# Patient Record
Sex: FEMALE | Race: BLACK OR AFRICAN AMERICAN | Employment: FULL TIME | URBAN - METROPOLITAN AREA
[De-identification: names, ages, dates, MRNs, and addresses within clinical notes are randomized per-mention and may not be internally consistent; named-entity substitution may affect disease eponyms.]

---

## 2017-11-10 ENCOUNTER — APPOINTMENT (OUTPATIENT)
Dept: GENERAL RADIOLOGY | Age: 57
End: 2017-11-10
Attending: STUDENT IN AN ORGANIZED HEALTH CARE EDUCATION/TRAINING PROGRAM
Payer: SELF-PAY

## 2017-11-10 ENCOUNTER — HOSPITAL ENCOUNTER (EMERGENCY)
Age: 57
Discharge: HOME | End: 2017-11-10
Attending: STUDENT IN AN ORGANIZED HEALTH CARE EDUCATION/TRAINING PROGRAM
Payer: SELF-PAY

## 2017-11-10 VITALS
SYSTOLIC BLOOD PRESSURE: 197 MMHG | WEIGHT: 240 LBS | TEMPERATURE: 97.8 F | BODY MASS INDEX: 32.51 KG/M2 | DIASTOLIC BLOOD PRESSURE: 90 MMHG | OXYGEN SATURATION: 94 % | RESPIRATION RATE: 18 BRPM | HEART RATE: 98 BPM | HEIGHT: 72 IN

## 2017-11-10 DIAGNOSIS — R06.2 WHEEZING: Primary | ICD-10-CM

## 2017-11-10 DIAGNOSIS — R06.00 DYSPNEA, UNSPECIFIED TYPE: ICD-10-CM

## 2017-11-10 LAB
ALBUMIN SERPL-MCNC: 3.8 G/DL (ref 3.5–5)
ALBUMIN/GLOB SERPL: 0.9 {RATIO} (ref 1.1–2.2)
ALP SERPL-CCNC: 123 U/L (ref 45–117)
ALT SERPL-CCNC: 18 U/L (ref 12–78)
ANION GAP SERPL CALC-SCNC: 11 MMOL/L (ref 5–15)
AST SERPL-CCNC: 13 U/L (ref 15–37)
ATRIAL RATE: 81 BPM
BASOPHILS # BLD: 0 K/UL (ref 0–0.1)
BASOPHILS NFR BLD: 0 % (ref 0–1)
BILIRUB SERPL-MCNC: 0.5 MG/DL (ref 0.2–1)
BNP SERPL-MCNC: 696 PG/ML (ref 0–125)
BUN SERPL-MCNC: 20 MG/DL (ref 6–20)
BUN/CREAT SERPL: 22 (ref 12–20)
CALCIUM SERPL-MCNC: 9.7 MG/DL (ref 8.5–10.1)
CALCULATED P AXIS, ECG09: 53 DEGREES
CALCULATED R AXIS, ECG10: -29 DEGREES
CALCULATED T AXIS, ECG11: 45 DEGREES
CHLORIDE SERPL-SCNC: 103 MMOL/L (ref 97–108)
CO2 SERPL-SCNC: 25 MMOL/L (ref 21–32)
CREAT SERPL-MCNC: 0.93 MG/DL (ref 0.55–1.02)
DIAGNOSIS, 93000: NORMAL
EOSINOPHIL # BLD: 0.5 K/UL (ref 0–0.4)
EOSINOPHIL NFR BLD: 7 % (ref 0–7)
ERYTHROCYTE [DISTWIDTH] IN BLOOD BY AUTOMATED COUNT: 13 % (ref 11.5–14.5)
GLOBULIN SER CALC-MCNC: 4.2 G/DL (ref 2–4)
GLUCOSE SERPL-MCNC: 92 MG/DL (ref 65–100)
HCT VFR BLD AUTO: 40.9 % (ref 35–47)
HGB BLD-MCNC: 13.5 G/DL (ref 11.5–16)
LYMPHOCYTES # BLD: 1.8 K/UL (ref 0.8–3.5)
LYMPHOCYTES NFR BLD: 26 % (ref 12–49)
MCH RBC QN AUTO: 29.1 PG (ref 26–34)
MCHC RBC AUTO-ENTMCNC: 33 G/DL (ref 30–36.5)
MCV RBC AUTO: 88.1 FL (ref 80–99)
MONOCYTES # BLD: 0.4 K/UL (ref 0–1)
MONOCYTES NFR BLD: 6 % (ref 5–13)
NEUTS SEG # BLD: 4.1 K/UL (ref 1.8–8)
NEUTS SEG NFR BLD: 61 % (ref 32–75)
P-R INTERVAL, ECG05: 176 MS
PLATELET # BLD AUTO: 199 K/UL (ref 150–400)
POTASSIUM SERPL-SCNC: 3.7 MMOL/L (ref 3.5–5.1)
PROT SERPL-MCNC: 8 G/DL (ref 6.4–8.2)
Q-T INTERVAL, ECG07: 398 MS
QRS DURATION, ECG06: 96 MS
QTC CALCULATION (BEZET), ECG08: 462 MS
RBC # BLD AUTO: 4.64 M/UL (ref 3.8–5.2)
SODIUM SERPL-SCNC: 139 MMOL/L (ref 136–145)
TROPONIN I SERPL-MCNC: <0.04 NG/ML
VENTRICULAR RATE, ECG03: 81 BPM
WBC # BLD AUTO: 6.7 K/UL (ref 3.6–11)

## 2017-11-10 PROCEDURE — 83880 ASSAY OF NATRIURETIC PEPTIDE: CPT | Performed by: STUDENT IN AN ORGANIZED HEALTH CARE EDUCATION/TRAINING PROGRAM

## 2017-11-10 PROCEDURE — 93005 ELECTROCARDIOGRAM TRACING: CPT

## 2017-11-10 PROCEDURE — 74011250636 HC RX REV CODE- 250/636: Performed by: STUDENT IN AN ORGANIZED HEALTH CARE EDUCATION/TRAINING PROGRAM

## 2017-11-10 PROCEDURE — 94640 AIRWAY INHALATION TREATMENT: CPT

## 2017-11-10 PROCEDURE — 74011000250 HC RX REV CODE- 250: Performed by: STUDENT IN AN ORGANIZED HEALTH CARE EDUCATION/TRAINING PROGRAM

## 2017-11-10 PROCEDURE — 77030029684 HC NEB SM VOL KT MONA -A

## 2017-11-10 PROCEDURE — 85025 COMPLETE CBC W/AUTO DIFF WBC: CPT | Performed by: STUDENT IN AN ORGANIZED HEALTH CARE EDUCATION/TRAINING PROGRAM

## 2017-11-10 PROCEDURE — 36415 COLL VENOUS BLD VENIPUNCTURE: CPT | Performed by: STUDENT IN AN ORGANIZED HEALTH CARE EDUCATION/TRAINING PROGRAM

## 2017-11-10 PROCEDURE — 99285 EMERGENCY DEPT VISIT HI MDM: CPT

## 2017-11-10 PROCEDURE — 96360 HYDRATION IV INFUSION INIT: CPT

## 2017-11-10 PROCEDURE — 80053 COMPREHEN METABOLIC PANEL: CPT | Performed by: STUDENT IN AN ORGANIZED HEALTH CARE EDUCATION/TRAINING PROGRAM

## 2017-11-10 PROCEDURE — 84484 ASSAY OF TROPONIN QUANT: CPT | Performed by: STUDENT IN AN ORGANIZED HEALTH CARE EDUCATION/TRAINING PROGRAM

## 2017-11-10 PROCEDURE — 71010 XR CHEST PORT: CPT

## 2017-11-10 RX ORDER — PREDNISONE 20 MG/1
60 TABLET ORAL DAILY
Qty: 15 TAB | Refills: 0 | Status: SHIPPED | OUTPATIENT
Start: 2017-11-10 | End: 2017-11-15

## 2017-11-10 RX ORDER — AZITHROMYCIN 250 MG/1
TABLET, FILM COATED ORAL
Qty: 6 TAB | Refills: 0 | Status: SHIPPED | OUTPATIENT
Start: 2017-11-10 | End: 2018-04-18

## 2017-11-10 RX ORDER — METOPROLOL SUCCINATE 50 MG/1
50 TABLET, EXTENDED RELEASE ORAL DAILY
COMMUNITY
End: 2017-11-10

## 2017-11-10 RX ORDER — METOPROLOL TARTRATE 50 MG/1
50 TABLET ORAL 2 TIMES DAILY
COMMUNITY
End: 2018-04-18

## 2017-11-10 RX ADMIN — ALBUTEROL SULFATE 1 DOSE: 2.5 SOLUTION RESPIRATORY (INHALATION) at 15:38

## 2017-11-10 RX ADMIN — SODIUM CHLORIDE 1000 ML: 900 INJECTION, SOLUTION INTRAVENOUS at 15:30

## 2017-11-10 RX ADMIN — ALBUTEROL SULFATE 1 DOSE: 2.5 SOLUTION RESPIRATORY (INHALATION) at 16:00

## 2017-11-10 NOTE — ED PROVIDER NOTES
HPI Comments: 65 y.o. female with past medical history significant for hypertension who presents from home via EMS with chief complaint of shortness of breath. Patient states she has had a persistent cough with white phlegm over the past 3 days that has progressively worsened. Patient reports accompanying shortness of breath and wheezing over the last few days. Patient notes she has been taking cough syrup with mild relief for her sx. Patient has also been taking inhalers at home, which have helped though she ran out. EMS gave Dexamethasone 10 mg en route to the hospital that helped the patient with her breathing. Patient admits she has been under a lot of stress lately and has not been able to be evaluated due to work. Patient mentions hx of hypertension, but she denies any heart issues and hx of blood clots. Patient denies any hx of asthma or any recent travels. Patient denies fever and chest pain. There are no other acute medical concerns at this time. Old Chart Review: No pertinent medical records. Social hx: Tobacco Use: Yes (1 pack per day since she was 19), Alcohol Use: No, Drug Use: No    PCP: Madan Walker DPM    Note written by Isac Lim, as dictated by Britt Gaona MD 3:04 PM      The history is provided by the patient and the EMS personnel. Past Medical History:   Diagnosis Date    Hypertension        Past Surgical History:   Procedure Laterality Date    HX ORTHOPAEDIC           History reviewed. No pertinent family history. Social History     Social History    Marital status:      Spouse name: N/A    Number of children: N/A    Years of education: N/A     Occupational History    Not on file.      Social History Main Topics    Smoking status: Current Every Day Smoker     Packs/day: 1.00     Years: 15.00    Smokeless tobacco: Never Used    Alcohol use No    Drug use: No    Sexual activity: Not on file     Other Topics Concern    Not on file     Social History Narrative    No narrative on file         ALLERGIES: Review of patient's allergies indicates not on file. Review of Systems   Constitutional: Negative for chills and fever. HENT: Negative for sore throat. Respiratory: Positive for cough, shortness of breath and wheezing. Cardiovascular: Negative for chest pain. Gastrointestinal: Negative for abdominal pain and vomiting. Genitourinary: Negative for dysuria. Musculoskeletal: Negative for back pain. Skin: Negative for rash. Neurological: Negative for syncope and headaches. Psychiatric/Behavioral: Negative for confusion. All other systems reviewed and are negative. There were no vitals filed for this visit. Physical Exam   Constitutional: She is oriented to person, place, and time. She appears well-developed. She appears distressed. HENT:   Head: Normocephalic and atraumatic. Eyes: Conjunctivae and EOM are normal. Pupils are equal, round, and reactive to light. Neck: Normal range of motion. Neck supple. Cardiovascular: Normal rate, regular rhythm and normal heart sounds. No murmur heard. Pulmonary/Chest: She is in respiratory distress. She has decreased breath sounds. She has wheezes. Diffuse expiratory wheezing   Decreased breath sounds bilaterally though equal   Mild respiratory distress with retractions   Abdominal: Soft. Bowel sounds are normal. She exhibits no distension. There is no tenderness. There is no rebound. Musculoskeletal: Normal range of motion. She exhibits no edema. Neurological: She is alert and oriented to person, place, and time. No cranial nerve deficit. She exhibits normal muscle tone. Coordination normal.   Skin: Skin is warm and dry. No rash noted. Psychiatric: She has a normal mood and affect. Her behavior is normal.   Nursing note and vitals reviewed.      Note written by Isac Hahn, as dictated by Shanthi Jerome MD 3:05 PM    [facility]  ED Course       Procedures    Pt reports feeling much better after two duonebs. No wheezing on auscultation. SpO2 in the upper 90s on RA. No evidence of AMI, PNA, PNX. No risk factors for PE. This is likely undiagnosed COPD. Smoking cessation recommended. F/U with PCP. RTER precautions as needed.

## 2017-11-10 NOTE — ED NOTES
Assumed care of patient. Patient resting in stretcher receiving 2nd breathing treatment. Patient on monitor x3, call bell in reach. No needs expressed at this time will continue to monitor.

## 2017-11-10 NOTE — PROGRESS NOTES
Admission Medication Reconciliation:    Information obtained from: Patient    Significant PMH/Disease States:   Past Medical History:   Diagnosis Date    Hypertension        Chief Complaint for this Admission:  SOB    Allergies:  Review of patient's allergies indicates no known allergies. Prior to Admission Medications:   Prior to Admission Medications   Prescriptions Last Dose Informant Patient Reported? Taking?   metoprolol tartrate (LOPRESSOR) 50 mg tablet 11/9/2017 at 9PM  Yes Yes   Sig: Take 50 mg by mouth two (2) times a day.       Facility-Administered Medications: None         Comments/Recommendations:   (1) Added: metoprolol   (2) Changed: n/a  (3) Removed: metoprolol SL    Verified NKA

## 2017-11-10 NOTE — ED NOTES
Bedside shift change report given to Kimberlyn Louis (oncoming nurse) by Indu Chowdhury (offgoing nurse). Report included the following information SBAR and MAR.

## 2017-11-10 NOTE — DISCHARGE INSTRUCTIONS
Wheezing or Bronchoconstriction: Care Instructions  Your Care Instructions  Wheezing is a whistling noise made during breathing. It occurs when the small airways, or bronchial tubes, that lead to your lungs swell or contract (spasm) and become narrow. This narrowing is called bronchoconstriction. When your airways constrict, it is hard for air to pass through and this makes it hard for you to breathe. Wheezing and bronchoconstriction can be caused by many problems, including:  · An infection such as the flu or a cold. · Allergies such as hay fever. · Diseases such as asthma or chronic obstructive pulmonary disease. · Smoking. Treatment for your wheezing depends on what is causing the problem. Your wheezing may get better without treatment. But you may need to pay attention to things that cause your wheezing and avoid them. Or you may need medicine to help treat the wheezing and to reduce the swelling or to relieve spasms in your lungs. Follow-up care is a key part of your treatment and safety. Be sure to make and go to all appointments, and call your doctor if you are having problems. It is also a good idea to know your test results and keep a list of the medicines you take. How can you care for yourself at home? · Take your medicine exactly as prescribed. Call your doctor if you think you are having a problem with your medicine. You will get more details on the specific medicine your doctor prescribes. · If your doctor prescribed antibiotics, take them as directed. Do not stop taking them just because you feel better. You need to take the full course of antibiotics. · Breathe moist air from a humidifier, hot shower, or sink filled with hot water. This may help ease your symptoms and make it easier for you to breathe. · If you have congestion in your nose and throat, drinking plenty of fluids, especially hot fluids, may help relieve your symptoms.  If you have kidney, heart, or liver disease and have to limit fluids, talk with your doctor before you increase the amount of fluids you drink. · If you have mucus in your airways, it may help to breathe deeply and cough. · Do not smoke or allow others to smoke around you. Smoking can make your wheezing worse. If you need help quitting, talk to your doctor about stop-smoking programs and medicines. These can increase your chances of quitting for good. · Avoid things that may cause your wheezing. These may include colds, smoke, air pollution, dust, pollen, pets, cockroaches, stress, and cold air. When should you call for help? Call 911 anytime you think you may need emergency care. For example, call if:  ? · You have severe trouble breathing. ? · You passed out (lost consciousness). ?Call your doctor now or seek immediate medical care if:  ? · You cough up yellow, dark brown, or bloody mucus (sputum). ? · You have new or worse shortness of breath. ? · Your wheezing is not getting better or it gets worse after you start taking your medicine. ? Watch closely for changes in your health, and be sure to contact your doctor if:  ? · You do not get better as expected. Where can you learn more? Go to http://hannah-ellie.info/. Enter 454 0027 in the search box to learn more about \"Wheezing or Bronchoconstriction: Care Instructions. \"  Current as of: May 12, 2017  Content Version: 11.4  © 1310-7750 [employer]. Care instructions adapted under license by [employer] (which disclaims liability or warranty for this information). If you have questions about a medical condition or this instruction, always ask your healthcare professional. [place] [address] any warranty or liability for your use of this information. Shortness of Breath: Care Instructions  Your Care Instructions  Shortness of breath has many causes. Sometimes conditions such as anxiety can lead to shortness of breath.  Some people get mild shortness of breath when they exercise. Trouble breathing also can be a symptom of a serious problem, such as asthma, lung disease, emphysema, heart problems, and pneumonia. If your shortness of breath continues, you may need tests and treatment. Watch for any changes in your breathing and other symptoms. Follow-up care is a key part of your treatment and safety. Be sure to make and go to all appointments, and call your doctor if you are having problems. It's also a good idea to know your test results and keep a list of the medicines you take. How can you care for yourself at home? · Do not smoke or allow others to smoke around you. If you need help quitting, talk to your doctor about stop-smoking programs and medicines. These can increase your chances of quitting for good. · Get plenty of rest and sleep. · Take your medicines exactly as prescribed. Call your doctor if you think you are having a problem with your medicine. · Find healthy ways to deal with stress. ¨ Exercise daily. ¨ Get plenty of sleep. ¨ Eat regularly and well. When should you call for help? Call 911 anytime you think you may need emergency care. For example, call if:  ? · You have severe shortness of breath. ? · You have symptoms of a heart attack. These may include:  ¨ Chest pain or pressure, or a strange feeling in the chest.  ¨ Sweating. ¨ Shortness of breath. ¨ Nausea or vomiting. ¨ Pain, pressure, or a strange feeling in the back, neck, jaw, or upper belly or in one or both shoulders or arms. ¨ Lightheadedness or sudden weakness. ¨ A fast or irregular heartbeat. After you call 911, the  may tell you to chew 1 adult-strength or 2 to 4 low-dose aspirin. Wait for an ambulance. Do not try to drive yourself. ?Call your doctor now or seek immediate medical care if:  ? · Your shortness of breath gets worse or you start to wheeze. Wheezing is a high-pitched sound when you breathe.    ? · You wake up at night out of breath or have to prop your head up on several pillows to breathe. ? · You are short of breath after only light activity or while at rest.   ? Watch closely for changes in your health, and be sure to contact your doctor if:  ? · You do not get better over the next 1 to 2 days. Where can you learn more? Go to http://hannah-ellie.info/. Enter S780 in the search box to learn more about \"Shortness of Breath: Care Instructions. \"  Current as of: May 12, 2017  Content Version: 11.4  © 3839-3049 [employer]. Care instructions adapted under license by [employer] (which disclaims liability or warranty for this information). If you have questions about a medical condition or this instruction, always ask your healthcare professional. [address] any warranty or liability for your use of this information.

## 2018-04-17 ENCOUNTER — HOSPITAL ENCOUNTER (OUTPATIENT)
Dept: LAB | Age: 58
Discharge: HOME | End: 2018-04-17
Payer: COMMERCIAL

## 2018-04-17 ENCOUNTER — OFFICE VISIT (OUTPATIENT)
Dept: FAMILY MEDICINE CLINIC | Age: 58
End: 2018-04-17

## 2018-04-17 VITALS
TEMPERATURE: 98.8 F | HEART RATE: 81 BPM | SYSTOLIC BLOOD PRESSURE: 181 MMHG | OXYGEN SATURATION: 97 % | HEIGHT: 72 IN | DIASTOLIC BLOOD PRESSURE: 104 MMHG | RESPIRATION RATE: 18 BRPM | BODY MASS INDEX: 36.08 KG/M2 | WEIGHT: 266.4 LBS

## 2018-04-17 DIAGNOSIS — I10 ESSENTIAL HYPERTENSION: ICD-10-CM

## 2018-04-17 DIAGNOSIS — Z01.419 WELL WOMAN EXAM WITH ROUTINE GYNECOLOGICAL EXAM: ICD-10-CM

## 2018-04-17 DIAGNOSIS — Z12.11 SCREENING FOR MALIGNANT NEOPLASM OF COLON: ICD-10-CM

## 2018-04-17 DIAGNOSIS — M79.672 LEFT FOOT PAIN: ICD-10-CM

## 2018-04-17 DIAGNOSIS — J45.20 MILD INTERMITTENT ASTHMA, UNSPECIFIED WHETHER COMPLICATED: ICD-10-CM

## 2018-04-17 DIAGNOSIS — F41.8 DEPRESSION WITH ANXIETY: ICD-10-CM

## 2018-04-17 DIAGNOSIS — Z23 ENCOUNTER FOR IMMUNIZATION: ICD-10-CM

## 2018-04-17 DIAGNOSIS — F43.0 STRESS REACTION: ICD-10-CM

## 2018-04-17 DIAGNOSIS — E55.9 VITAMIN D DEFICIENCY: ICD-10-CM

## 2018-04-17 DIAGNOSIS — R53.83 OTHER FATIGUE: ICD-10-CM

## 2018-04-17 DIAGNOSIS — Z87.891 FORMER SMOKER: ICD-10-CM

## 2018-04-17 DIAGNOSIS — E66.01 CLASS 2 SEVERE OBESITY DUE TO EXCESS CALORIES WITH SERIOUS COMORBIDITY AND BODY MASS INDEX (BMI) OF 36.0 TO 36.9 IN ADULT (HCC): ICD-10-CM

## 2018-04-17 DIAGNOSIS — J30.89 ALLERGIC RHINITIS DUE TO OTHER ALLERGIC TRIGGER, UNSPECIFIED SEASONALITY: ICD-10-CM

## 2018-04-17 DIAGNOSIS — Z76.89 ENCOUNTER TO ESTABLISH CARE: Primary | ICD-10-CM

## 2018-04-17 PROCEDURE — 87624 HPV HI-RISK TYP POOLED RSLT: CPT | Performed by: FAMILY MEDICINE

## 2018-04-17 PROCEDURE — 88175 CYTOPATH C/V AUTO FLUID REDO: CPT | Performed by: FAMILY MEDICINE

## 2018-04-17 PROCEDURE — 87625 HPV TYPES 16 & 18 ONLY: CPT | Performed by: FAMILY MEDICINE

## 2018-04-17 RX ORDER — TRIAMTERENE/HYDROCHLOROTHIAZID 37.5-25 MG
1 TABLET ORAL DAILY
Qty: 90 TAB | Refills: 0 | Status: SHIPPED | OUTPATIENT
Start: 2018-04-17 | End: 2018-07-13 | Stop reason: SDUPTHER

## 2018-04-17 RX ORDER — LISINOPRIL 20 MG/1
20 TABLET ORAL DAILY
Qty: 90 TAB | Refills: 0 | Status: SHIPPED | OUTPATIENT
Start: 2018-04-17 | End: 2018-05-01 | Stop reason: SDUPTHER

## 2018-04-17 RX ORDER — SERTRALINE HYDROCHLORIDE 50 MG/1
50 TABLET, FILM COATED ORAL DAILY
Qty: 90 TAB | Refills: 0 | Status: SHIPPED | OUTPATIENT
Start: 2018-04-17 | End: 2018-07-13 | Stop reason: SDUPTHER

## 2018-04-17 NOTE — PATIENT INSTRUCTIONS
Stress  Follow up with a counselor or therapist for additional treatment of your mood. If you do not already have one, you can find a list through your insurance by calling the mental  Help line number on the back of your insurance card. Weight loss:  Remember this is like a part time job so your motivation and commitment is key to your success. Use small plate only  1/2 of every meal is fruit or vegetable  Drink liter or 1/2 gallon of water every day  Try meal prepping on Sunday with new different vegetables. Replace soda with diet soda or other zero sugar drinks (selter water just fine)  Start using the [employer] cynthia for calorie counting. Goal 2000 calories per day  Start work out at least 5 days per week for 20 minutes. Consider [employer] training cynthia for home exercises. Goals:   - Lose 13 lbs in 3 month       Call me if you have any issues or feel like quitting. Consider counseling for stress management     Return for follow up in 3 months. Well Visit, Women 48 to 72: Care Instructions  Your Care Instructions    Physical exams can help you stay healthy. Your doctor has checked your overall health and may have suggested ways to take good care of yourself. He or she also may have recommended tests. At home, you can help prevent illness with healthy eating, regular exercise, and other steps. Follow-up care is a key part of your treatment and safety. Be sure to make and go to all appointments, and call your doctor if you are having problems. It's also a good idea to know your test results and keep a list of the medicines you take. How can you care for yourself at home? · Reach and stay at a healthy weight. This will lower your risk for many problems, such as obesity, diabetes, heart disease, and high blood pressure. · Get at least 30 minutes of exercise on most days of the week. Walking is a good choice.  You also may want to do other activities, such as running, swimming, cycling, or playing tennis or team sports. · Do not smoke. Smoking can make health problems worse. If you need help quitting, talk to your doctor about stop-smoking programs and medicines. These can increase your chances of quitting for good. · Protect your skin from too much sun. When you're outdoors from 10 a.m. to 4 p.m., stay in the shade or cover up with clothing and a hat with a wide brim. Wear sunglasses that block UV rays. Even when it's cloudy, put broad-spectrum sunscreen (SPF 30 or higher) on any exposed skin. · See a dentist one or two times a year for checkups and to have your teeth cleaned. · Wear a seat belt in the car. · Limit alcohol to 1 drink a day. Too much alcohol can cause health problems. Follow your doctor's advice about when to have certain tests. These tests can spot problems early. · Cholesterol. Your doctor will tell you how often to have this done based on your age, family history, or other things that can increase your risk for heart attack and stroke. · Blood pressure. Have your blood pressure checked during a routine doctor visit. Your doctor will tell you how often to check your blood pressure based on your age, your blood pressure results, and other factors. · Mammogram. Ask your doctor how often you should have a mammogram, which is an X-ray of your breasts. A mammogram can spot breast cancer before it can be felt and when it is easiest to treat. · Pap test and pelvic exam. Ask your doctor how often you should have a Pap test. You may not need to have a Pap test as often as you used to. · Vision. Have your eyes checked every year or two or as often as your doctor suggests. Some experts recommend that you have yearly exams for glaucoma and other age-related eye problems starting at age 48. · Hearing. Tell your doctor if you notice any change in your hearing. You can have tests to find out how well you hear. · Diabetes. Ask your doctor whether you should have tests for diabetes.   · Colon cancer. You should begin tests for colon cancer at age 48. You may have one of several tests. Your doctor will tell you how often to have tests based on your age and risk. Risks include whether you already had a precancerous polyp removed from your colon or whether your parents, sisters and brothers, or children have had colon cancer. · Thyroid disease. Talk to your doctor about whether to have your thyroid checked as part of a regular physical exam. Women have an increased chance of a thyroid problem. · Osteoporosis. You should begin tests for bone density at age 72. If you are younger than 72, ask your doctor whether you have factors that may increase your risk for this disease. You may want to have this test before age 72. · Heart attack and stroke risk. At least every 4 to 6 years, you should have your risk for heart attack and stroke assessed. Your doctor uses factors such as your age, blood pressure, cholesterol, and whether you smoke or have diabetes to show what your risk for a heart attack or stroke is over the next 10 years. When should you call for help? Watch closely for changes in your health, and be sure to contact your doctor if you have any problems or symptoms that concern you. Where can you learn more? Go to http://hannah-ellie.info/. Enter E577 in the search box to learn more about \"Well Visit, Women 50 to 72: Care Instructions. \"  Current as of: May 12, 2017  Content Version: 11.4  © 9214-2889 [employer] Care instructions adapted under license by [employer] (which disclaims liability or warranty for this information). If you have questions about a medical condition or this instruction, always ask your healthcare professional. [place] [address] any warranty or liability for your use of this information.        Starting a Weight Loss Plan: Care Instructions  Your Care Instructions    If you are thinking about losing weight, it can be hard to know where to start. Your doctor can help you set up a weight loss plan that best meets your needs. You may want to take a class on nutrition or exercise, or join a weight loss support group. If you have questions about how to make changes to your eating or exercise habits, ask your doctor about seeing a registered dietitian or an exercise specialist.  It can be a big challenge to lose weight. But you do not have to make huge changes at once. Make small changes, and stick with them. When those changes become habit, add a few more changes. If you do not think you are ready to make changes right now, try to pick a date in the future. Make an appointment to see your doctor to discuss whether the time is right for you to start a plan. Follow-up care is a key part of your treatment and safety. Be sure to make and go to all appointments, and call your doctor if you are having problems. It's also a good idea to know your test results and keep a list of the medicines you take. How can you care for yourself at home? · Set realistic goals. Many people expect to lose much more weight than is likely. A weight loss of 5% to 10% of your body weight may be enough to improve your health. · Get family and friends involved to provide support. Talk to them about why you are trying to lose weight, and ask them to help. They can help by participating in exercise and having meals with you, even if they may be eating something different. · Find what works best for you. If you do not have time or do not like to cook, a program that offers meal replacement bars or shakes may be better for you. Or if you like to prepare meals, finding a plan that includes daily menus and recipes may be best.  · Ask your doctor about other health professionals who can help you achieve your weight loss goals. ¨ A dietitian can help you make healthy changes in your diet.   ¨ An exercise specialist or  can help you develop a safe and effective exercise program.  ¨ A counselor or psychiatrist can help you cope with issues such as depression, anxiety, or family problems that can make it hard to focus on weight loss. · Consider joining a support group for people who are trying to lose weight. Your doctor can suggest groups in your area. Where can you learn more? Go to http://hannah-ellie.info/. Enter I236 in the search box to learn more about \"Starting a Weight Loss Plan: Care Instructions. \"  Current as of: October 13, 2016  Content Version: 11.4  © 5006-8128 [employer]. Care instructions adapted under license by [employer] (which disclaims liability or warranty for this information). If you have questions about a medical condition or this instruction, always ask your healthcare professional. [place] [address] any warranty or liability for your use of this information. Anxiety Disorder: Care Instructions  Your Care Instructions    Anxiety is a normal reaction to stress. Difficult situations can cause you to have symptoms such as sweaty palms and a nervous feeling. In an anxiety disorder, the symptoms are far more severe. Constant worry, muscle tension, trouble sleeping, nausea and diarrhea, and other symptoms can make normal daily activities difficult or impossible. These symptoms may occur for no reason, and they can affect your work, school, or social life. Medicines, counseling, and self-care can all help. Follow-up care is a key part of your treatment and safety. Be sure to make and go to all appointments, and call your doctor if you are having problems. It's also a good idea to know your test results and keep a list of the medicines you take. How can you care for yourself at home? · Take medicines exactly as directed. Call your doctor if you think you are having a problem with your medicine. · Go to your counseling sessions and follow-up appointments.   · Recognize and accept your anxiety. Then, when you are in a situation that makes you anxious, say to yourself, \"This is not an emergency. I feel uncomfortable, but I am not in danger. I can keep going even if I feel anxious. \"  · Be kind to your body:  ¨ Relieve tension with exercise or a massage. ¨ Get enough rest.  ¨ Avoid alcohol, caffeine, nicotine, and illegal drugs. They can increase your anxiety level and cause sleep problems. ¨ Learn and do relaxation techniques. See below for more about these techniques. · Engage your mind. Get out and do something you enjoy. Go to a [place] movie, or take a walk or hike. Plan your day. Having too much or too little to do can make you anxious. · Keep a record of your symptoms. Discuss your fears with a good friend or family member, or join a support group for people with similar problems. Talking to others sometimes relieves stress. · Get involved in social groups, or volunteer to help others. Being alone sometimes makes things seem worse than they are. · Get at least 30 minutes of exercise on most days of the week to relieve stress. Walking is a good choice. You also may want to do other activities, such as running, swimming, cycling, or playing tennis or team sports. Relaxation techniques  Do relaxation exercises 10 to 20 minutes a day. You can play soothing, relaxing music while you do them, if you wish. · Tell others in your house that you are going to do your relaxation exercises. Ask them not to disturb you. · Find a comfortable place, away from all distractions and noise. · Lie down on your back, or sit with your back straight. · Focus on your breathing. Make it slow and steady. · Breathe in through your nose. Breathe out through either your nose or mouth. · Breathe deeply, filling up the area between your navel and your rib cage. Breathe so that your belly goes up and down. · Do not hold your breath. · Breathe like this for 5 to 10 minutes.  Notice the feeling of calmness throughout your whole body. As you continue to breathe slowly and deeply, relax by doing the following for another 5 to 10 minutes:  · Tighten and relax each muscle group in your body. You can begin at your toes and work your way up to your head. · Imagine your muscle groups relaxing and becoming heavy. · Empty your mind of all thoughts. · Let yourself relax more and more deeply. · Become aware of the state of calmness that surrounds you. · When your relaxation time is over, you can bring yourself back to alertness by moving your fingers and toes and then your hands and feet and then stretching and moving your entire body. Sometimes people fall asleep during relaxation, but they usually wake up shortly afterward. · Always give yourself time to return to full alertness before you drive a car or do anything that might cause an accident if you are not fully alert. Never play a relaxation tape while you drive a car. When should you call for help? Call 911 anytime you think you may need emergency care. For example, call if:  ? · You feel you cannot stop from hurting yourself or someone else. ? Keep the numbers for these national suicide hotlines: 9-939-254-TALK (7-306.750.9366) and 3-543-CNNGFZH (6-251.908.1516). If you or someone you know talks about suicide or feeling hopeless, get help right away. ? Watch closely for changes in your health, and be sure to contact your doctor if:  ? · You have anxiety or fear that affects your life. ? · You have symptoms of anxiety that are new or different from those you had before. Where can you learn more? Go to http://hannah-ellei.info/. Enter P754 in the search box to learn more about \"Anxiety Disorder: Care Instructions. \"  Current as of: May 12, 2017  Content Version: 11.4  © 6370-5158 [employer]. Care instructions adapted under license by [employer] (which disclaims liability or warranty for this information). If you have questions about a medical condition or this instruction, always ask your healthcare professional. [place] [address] any warranty or liability for your use of this information.

## 2018-04-17 NOTE — PROGRESS NOTES
[facility] Note      Subjective:     Chief Complaint   Patient presents with   [facility]     Patient here to establish care with a provider. Leonardo De La Vega is a 65y.o. year old female who presents for evaluation of the following:    Establishment of Care:  Previous PCP: Dr. Maura Briones, now retired  Care Team:   Dentist  Rhoda Rowe- Dr. Donna Medley      PMH:   HTN:   Tx: metoprolol inconsistently - uses her  last used 3 weeks ago  Previous [id number]+ triamterene 37.5 + HCTZ25 + lisinopril 40 + metoprolol 50 mg bid  Not adhering to low salt diet    Smoker:   Stopped smoking 4 month ago   Cold thomas    Asthma:   Diagnosed 5 months ago in ER after wheezing episode  Uses albuterol inhaler this morning  Has some trouble breathing when thinking about coming to the doctor    Allergic Rhinitis:  Tx: benadryl    Obesity:  Diet: Has been eating all meats and vegetables with weight loss then stopped   Activity: None  - used to walk at track and play softball but now no motivation  Recently stopped smoking and now snacking on candy and chocolate  Eating large portions      Acute Concerns:  Fatigue/ Stress:   Poor sleep,   Onset 4 weeks ago  Endorses stressors at work, feeling overwhelmed, thought that the would be be better off without her but feels she would never kill herself and denies any plan for self harm  No new joint aches, headaches, dizziness. Social:   Works as  at [employer]. Work 8 hours per day during the day  Lives with   Mood is \"fine\"      Health Maintenance:   TDaP: Due  Influenza: Due  Pneumovax: Not due.  Former smoker  Prevnar @65: Not due  Shingles @60: New vaccine unavailable in market yet    Colonoscopy @50: Due  ASA @ 55f and 45m: Not taking  Dexa @65: Not due  HCV for  45-65:  Due    HIV or other STD testing: Declined  Domestic Violence Screen: Nagative  Depression Screen: Denies depression or anhedonia   FÉLIX: 16, very diffcult  PHQ over the last two weeks 2018   Little interest or pleasure in doing things More than half the days   Feeling down, depressed or hopeless More than half the days   Total Score PHQ 2 4   Trouble falling or staying asleep, or sleeping too much Nearly every day   Feeling tired or having little energy Nearly every day   Poor appetite or overeating More than half the days   Feeling bad about yourself - or that you are a failure or have let yourself or your family down More than half the days   Trouble concentrating on things such as school, work, reading or watching TV More than half the days   Moving or speaking so slowly that other people could have noticed; or the opposite being so fidgety that others notice Several days   Thoughts of being better off dead, or hurting yourself in some way Several days   PHQ 9 Score 18   How difficult have these problems made it for you to do your work, take care of your home and get along with others Somewhat difficult       Pap:  Last unknown  Mammogram: Last unknown  No LMP recorded. Patient is not currently having periods (Reason: Premenopausal). reports that she does not engage in sexual activity. Review of Systems   Pertinent positives and negative per HPI. All other systems  reviewed are negative for a Comprehensive ROS (10+). Past Medical History:   Diagnosis Date    Hypertension         Social History     Social History    Marital status:      Spouse name: N/A    Number of children: N/A    Years of education: N/A     Occupational History    Not on file.      Social History Main Topics    Smoking status: Former Smoker     Packs/day: 1.00     Years: 15.00     Quit date: 2018    Smokeless tobacco: Never Used    Alcohol use Yes      Comment: occasionally    Drug use: No    Sexual activity: No     Other Topics Concern    Not on file     Social History Narrative       Current Outpatient Prescriptions   Medication Sig    metoprolol tartrate (LOPRESSOR) 50 mg tablet Take 50 mg by mouth two (2) times a day.  albuterol sulfate 90 mcg/actuation aepb Take 2 Puffs by inhalation every four (4) hours as needed for Cough.  azithromycin (ZITHROMAX Z-LACY) 250 mg tablet Take 2 tablets by mouth on day 1. Then 1 tab by mouth daily for days 2-5. No current facility-administered medications for this visit. Objective:     Vitals:    04/17/18 0843 04/17/18 0848   BP: (!) 196/104 (!) 181/104   Pulse: 85 81   Resp: 18    Temp: 98.8 °F (37.1 °C)    TempSrc: Oral    SpO2: 97%    Weight: 266 lb 6.4 oz (120.8 kg)    Height: 6' (1.829 m)        Physical Examination:  General: Alert, cooperative, no distress, appears stated age. Obese  Eyes: Conjunctivae/corneas clear. PERRL, EOMs intact. Ears: Normal external ear canals both ears. TM clear and mobile bilaterally  Nose: Nares normal. Septum midline. Mucosa normal. No drainage or sinus tenderness. Mouth/Throat: Lips, mucosa, and tongue normal. Teeth and gums normal.  Neck: Supple, symmetrical, trachea midline, no adenopathy. No thyroid enlargement/tenderness/nodules  Back: Symmetric, no curvature. ROM normal. No CVA tenderness. Lungs: Clear to auscultation bilaterally. Normal inspiratory and expiratory ratio. Heart: Regular rate and rhythm, S1, S2 normal, no murmur, click, rub or gallop. Abdomen: Soft, non-tender. Bowel sounds normal. No masses or organomegaly. Pelvic exam: normal external genitalia, vulva, vagina, cervix, uterus and adnexa. Extremities: Extremities normal, atraumatic, no cyanosis or edema. Pulses: 2+ and symmetric all extremities. Skin: Skin color, texture, turgor normal. No rashes or lesions on exposed skin. Lymph nodes: Cervical, supraclavicular nodes normal.  Neurologic: CNII-XII intact. Strength 5/5 grossly. Sensation and reflexes normal throughout. Psych: Anxious. Fast speech, not quite pressured. Labile mood, tearful during interview. Labs reviewed. Assessment/ Plan:   Diagnoses and all orders for this visit:    1. Encounter to establish care  -     Influenza virus vaccine (QUADRIVALENT PRES FREE SYRINGE) IM (73195)  -     Tetanus, diphtheria toxoids and acellular pertussis (TDAP) vaccine, in individuals >=7 years, IM  -     RI IMMUNIZ ADMIN,1 SINGLE/COMB VAC/TOXOID    2. Well woman exam with routine gynecological exam  -     LIPID PANEL  -     HEMOGLOBIN A1C WITH EAG  -     METABOLIC PANEL, COMPREHENSIVE  -     CBC WITH AUTOMATED DIFF  -     TSH AND FREE T4  -     VITAMIN D, 25 HYDROXY  -     PAP IG, APTIMA HPV AND RFX 16/18,45 ([id number]); Future  -     [facility] [place] MAMMO BI SCREENING INCL CAD; Future  -     MICROALBUMIN, UR, RAND W/ MICROALB/CREAT RATIO  -     HCV AB W/RFLX TO KATIE  -     REFERRAL TO GASTROENTEROLOGY    3. Essential hypertension  -     triamterene-hydroCHLOROthiazide (MAXZIDE) 37.5-25 mg per tablet; Take 1 Tab by mouth daily. -     lisinopril (PRINIVIL, ZESTRIL) 20 mg tablet; Take 1 Tab by mouth daily. 4. Former smoker    5. Mild intermittent asthma, unspecified whether complicated  -     albuterol sulfate 90 mcg/actuation aepb; Take 2 Puffs by inhalation every four (4) hours as needed for Cough. 6. Allergic rhinitis due to other allergic trigger, unspecified seasonality    7. Other fatigue  -     TSH AND FREE T4  -     VITAMIN D, 25 HYDROXY    8. Class 2 severe obesity due to excess calories with serious comorbidity and body mass index (BMI) of 36.0 to 36.9 in adult (HCC)  -     TSH AND FREE T4    9. Depression with anxiety  -     sertraline (ZOLOFT) 50 mg tablet; Take 1 Tab by mouth daily. 10. Stress reaction    11. Left foot pain    12.  Encounter for immunization  -     Influenza virus vaccine (QUADRIVALENT PRES FREE SYRINGE) IM (13741)  -     Tetanus, diphtheria toxoids and acellular pertussis (TDAP) vaccine, in individuals >=7 years, IM  -     RI IMMUNIZ ADMIN,1 SINGLE/COMB VAC/TOXOID    Other orders  -     INTERPRETATION  -     CVD REPORT        Previous records requested    Doing well overall  Abnormal findings discussed below. STI screen declined. Labs to eval end organ function today to monitor complication of chronic disease  Flu and Tdap given, otherwise vaccines up to date  Pap completed today    Blood pressure controlled off medications. Will refill triamterene/HCTZ and lisinopril. Follow-up in 4 weeks. Provided positive reinforcement for recent smoking cessation. Patient declined pharmacological cessation aid. Asthma without exacerbation. Current allergies. Trial OTC symptomatic management. Will refill albuterol for patient to have in case of exacerbation. Fatigue likely caused by current depression. Labs to eval etiology    Diet and activity modification encouraged for weight loss. Prioritized stress management as this usually hinders weight loss. Severe anxiety and depression. No active suicidal ideation or suicide plan. Will start SSRI. Follow-up in 1 month. Strongly encouraged patient start counseling/CBT. Chronic left foot pain at baseline. Encourage Tylenol as needed for pain control and follow-up with orthopedics. I have discussed the diagnosis with the patient and the intended plan as seen in the above orders. The patient has received an after-visit summary and questions were answered concerning future plans. I have discussed medication side effects and warnings with the patient as well.            Follow-up Disposition: Not on File      Signed,    Vandana Escalante MD  4/17/2018

## 2018-04-17 NOTE — MR AVS SNAPSHOT
[address] 
[phone] Patient: Jason Remy MRN: [id number] EAX:6[birth year]/3745 Visit Information Date & Time Provider Department Dept. Phone Encounter #  
 4/17/2018  8:30 AM Jay Hurley  [facility] [phone] [id number] Follow-up Instructions Return in about 2 weeks (around 5/1/2018) for Follow Up. Upcoming Health Maintenance Date Due Hepatitis C Screening 1960 Pneumococcal 19-64 Medium Risk (1 of 1 - PPSV23) 7/31/1980 DTaP/Tdap/Td series (1 - Tdap) 7/31/1982 PAP AKA CERVICAL CYTOLOGY 7/31/1982 BREAST CANCER SCRN MAMMOGRAM 7/31/2011 FOBT Q 1 YEAR AGE 50-75 7/31/2011 Influenza Age 5 to Adult 8/1/2017 Allergies as of 4/17/2018  Review Complete On: 4/17/2018 By: Real Beckford LPN No Known Allergies Current Immunizations  Never Reviewed No immunizations on file. Not reviewed this visit You Were Diagnosed With   
  
 Codes Comments Encounter to establish care    -  Primary ICD-10-CM: Z76.89 
ICD-9-CM: V65.8 Well woman exam with routine gynecological exam     ICD-10-CM: B60.263 ICD-9-CM: V72.31 Essential hypertension     ICD-10-CM: I10 
ICD-9-CM: 401.9 Former smoker     ICD-10-CM: J26.812 ICD-9-CM: V15.82 Mild intermittent asthma, unspecified whether complicated     T-53-HM: J45.20 ICD-9-CM: 493.90 Allergic rhinitis due to other allergic trigger, unspecified seasonality     ICD-10-CM: J30.89 ICD-9-CM: 477.8 Other fatigue     ICD-10-CM: R53.83 ICD-9-CM: 780.79 Class 2 severe obesity due to excess calories with serious comorbidity and body mass index (BMI) of 36.0 to 36.9 in [facility])     ICD-10-CM: E66.01, Z68.36 
ICD-9-CM: 278.01, V85.36 Stress reaction     ICD-10-CM: F43.0 ICD-9-CM: 308. 9 Vitals BP Pulse Temp Resp Height(growth percentile) Weight(growth percentile) (!) 181/104 (BP 1 Location: Left arm, BP Patient Position: Sitting) 81 98.8 °F (37.1 °C) (Oral) 18 6' (1.829 m) 266 lb 6.4 oz (120.8 kg) SpO2 BMI OB Status Smoking Status 97% 36.13 kg/m2 Postmenopausal Former Smoker Vitals History BMI and BSA Data Body Mass Index Body Surface Area  
 36.13 kg/m 2 2.48 m 2 Preferred Pharmacy Pharmacy Name Phone [address] [place] [address], [address] [place] [address] Rd. [phone] Your Updated Medication List  
  
   
This list is accurate as of 4/17/18  9:21 AM.  Always use your most recent med list.  
  
  
  
  
 albuterol sulfate 90 mcg/actuation Aepb Take 2 Puffs by inhalation every four (4) hours as needed for Cough. azithromycin 250 mg tablet Commonly known as:  Markos Ammon Take 2 tablets by mouth on day 1. Then 1 tab by mouth daily for days 2-5.  
  
 lisinopril 20 mg tablet Commonly known as:  Lollie Jump Take 1 Tab by mouth daily. metoprolol tartrate 50 mg tablet Commonly known as:  LOPRESSOR Take 50 mg by mouth two (2) times a day. triamterene-hydroCHLOROthiazide 37.5-25 mg per tablet Commonly known as:  Lisa Pimple Take 1 Tab by mouth daily. Prescriptions Sent to Pharmacy Refills  
 triamterene-hydroCHLOROthiazide (MAXZIDE) 37.5-25 mg per tablet 0 Sig: Take 1 Tab by mouth daily. Class: Normal  
 Pharmacy: [facility] [address], [address] [place] [address] Ph #: [phone] Route: Oral  
 lisinopril (PRINIVIL, ZESTRIL) 20 mg tablet 0 Sig: Take 1 Tab by mouth daily. Class: Normal  
 Pharmacy: [facility] [address] [facility] Ph #: [phone] Route: Oral  
  
We Performed the Following CBC WITH AUTOMATED DIFF [06124 CPT(R)] HCV AB W/RFLX TO KATIE [82199 CPT(R)] HEMOGLOBIN A1C WITH EAG [44284 CPT(R)] LIPID PANEL [00001 CPT(R)] METABOLIC PANEL, COMPREHENSIVE [68582 CPT(R)] MICROALBUMIN, UR, RAND W/ MICROALB/CREAT RATIO [id number] CPT(R)] TSH AND FREE T4 [93232 CPT(R)] VITAMIN D, 25 HYDROXY [id number] CPT(R)] Follow-up Instructions Return in about 2 weeks (around 5/1/2018) for Follow Up. To-Do List   
 04/17/2018 Imaging:  [facility] MAMMO BI SCREENING INCL CAD   
  
 04/17/2018 Pathology:  PAP IG, APTIMA HPV AND RFX 16/[birth year],45 (552004) Patient Instructions Stress Follow up with a counselor or therapist for additional treatment of your mood. If you do not already have one, you can find a list through your insurance by calling the mental  Help line number on the back of your insurance card. Weight loss: 
Remember this is like a part time job so your motivation and commitment is key to your success. Use small plate only 1/2 of every meal is fruit or vegetable Drink liter or 1/2 gallon of water every day Try meal prepping on Sunday with new different vegetables. Replace soda with diet soda or other zero sugar drinks (selter water just fine) Start using the StemSave cynthia for calorie counting. Goal 2000 calories per day Start work out at least 5 days per week for 20 minutes. Consider [employer] training cynthia for home exercises. Goals:  
- Lose 13 lbs in 3 month Call me if you have any issues or feel like quitting. Consider counseling for stress management Return for follow up in 3 months. Well Visit, Women 48 to 72: Care Instructions Your Care Instructions Physical exams can help you stay healthy. Your doctor has checked your overall health and may have suggested ways to take good care of yourself. He or she also may have recommended tests. At home, you can help prevent illness with healthy eating, regular exercise, and other steps. Follow-up care is a key part of your treatment and safety.  Be sure to make and go to all appointments, and call your doctor if you are having problems. It's also a good idea to know your test results and keep a list of the medicines you take. How can you care for yourself at home? · Reach and stay at a healthy weight. This will lower your risk for many problems, such as obesity, diabetes, heart disease, and high blood pressure. · Get at least 30 minutes of exercise on most days of the week. Walking is a good choice. You also may want to do other activities, such as running, swimming, cycling, or playing tennis or team sports. · Do not smoke. Smoking can make health problems worse. If you need help quitting, talk to your doctor about stop-smoking programs and medicines. These can increase your chances of quitting for good. · Protect your skin from too much sun. When you're outdoors from 10 a.m. to 4 p.m., stay in the shade or cover up with clothing and a hat with a wide brim. Wear sunglasses that block UV rays. Even when it's cloudy, put broad-spectrum sunscreen (SPF 30 or higher) on any exposed skin. · See a dentist one or two times a year for checkups and to have your teeth cleaned. · Wear a seat belt in the car. · Limit alcohol to 1 drink a day. Too much alcohol can cause health problems. Follow your doctor's advice about when to have certain tests. These tests can spot problems early. · Cholesterol. Your doctor will tell you how often to have this done based on your age, family history, or other things that can increase your risk for heart attack and stroke. · Blood pressure. Have your blood pressure checked during a routine doctor visit. Your doctor will tell you how often to check your blood pressure based on your age, your blood pressure results, and other factors. · Mammogram. Ask your doctor how often you should have a mammogram, which is an X-ray of your breasts. A mammogram can spot breast cancer before it can be felt and when it is easiest to treat.  
· Pap test and pelvic exam. Ask your doctor how often you should have a Pap test. You may not need to have a Pap test as often as you used to. · Vision. Have your eyes checked every year or two or as often as your doctor suggests. Some experts recommend that you have yearly exams for glaucoma and other age-related eye problems starting at age 48. · Hearing. Tell your doctor if you notice any change in your hearing. You can have tests to find out how well you hear. · Diabetes. Ask your doctor whether you should have tests for diabetes. · Colon cancer. You should begin tests for colon cancer at age 48. You may have one of several tests. Your doctor will tell you how often to have tests based on your age and risk. Risks include whether you already had a precancerous polyp removed from your colon or whether your parents, sisters and brothers, or children have had colon cancer. · Thyroid disease. Talk to your doctor about whether to have your thyroid checked as part of a regular physical exam. Women have an increased chance of a thyroid problem. · Osteoporosis. You should begin tests for bone density at age 72. If you are younger than 72, ask your doctor whether you have factors that may increase your risk for this disease. You may want to have this test before age 72. · Heart attack and stroke risk. At least every 4 to 6 years, you should have your risk for heart attack and stroke assessed. Your doctor uses factors such as your age, blood pressure, cholesterol, and whether you smoke or have diabetes to show what your risk for a heart attack or stroke is over the next 10 years. When should you call for help? Watch closely for changes in your health, and be sure to contact your doctor if you have any problems or symptoms that concern you. Where can you learn more? Go to http://hannah-ellie.info/. Enter T081 in the search box to learn more about \"Well Visit, Women 50 to 72: Care Instructions. \" Current as of: May 12, 2017 Content Version: 11.4 © 6783-6646 Healthwise, Incorporated. Care instructions adapted under license by [employer] (which disclaims liability or warranty for this information). If you have questions about a medical condition or this instruction, always ask your healthcare professional. [address] any warranty or liability for your use of this information. Starting a Weight Loss Plan: Care Instructions Your Care Instructions If you are thinking about losing weight, it can be hard to know where to start. Your doctor can help you set up a weight loss plan that best meets your needs. You may want to take a class on nutrition or exercise, or join a weight loss support group. If you have questions about how to make changes to your eating or exercise habits, ask your doctor about seeing a registered dietitian or an exercise specialist. 
It can be a big challenge to lose weight. But you do not have to make huge changes at once. Make small changes, and stick with them. When those changes become habit, add a few more changes. If you do not think you are ready to make changes right now, try to pick a date in the future. Make an appointment to see your doctor to discuss whether the time is right for you to start a plan. Follow-up care is a key part of your treatment and safety. Be sure to make and go to all appointments, and call your doctor if you are having problems. It's also a good idea to know your test results and keep a list of the medicines you take. How can you care for yourself at home? · Set realistic goals. Many people expect to lose much more weight than is likely. A weight loss of 5% to 10% of your body weight may be enough to improve your health. · Get family and friends involved to provide support. Talk to them about why you are trying to lose weight, and ask them to help.  They can help by participating in exercise and having meals with you, even if they may be eating something different. · Find what works best for you. If you do not have time or do not like to cook, a program that offers meal replacement bars or shakes may be better for you. Or if you like to prepare meals, finding a plan that includes daily menus and recipes may be best. 
· Ask your doctor about other health professionals who can help you achieve your weight loss goals. ¨ A dietitian can help you make healthy changes in your diet. ¨ An exercise specialist or  can help you develop a safe and effective exercise program. 
¨ A counselor or psychiatrist can help you cope with issues such as depression, anxiety, or family problems that can make it hard to focus on weight loss. · Consider joining a support group for people who are trying to lose weight. Your doctor can suggest groups in your area. Where can you learn more? Go to http://hannahCARDFREEellie.info/. Enter C193 in the search box to learn more about \"Starting a Weight Loss Plan: Care Instructions. \" Current as of: October 13, 2016 Content Version: 11.4 © 8901-1009 [employer]. Care instructions adapted under license by [employer] (which disclaims liability or warranty for this information). If you have questions about a medical condition or this instruction, always ask your healthcare professional. [address] any warranty or liability for your use of this information. Anxiety Disorder: Care Instructions Your Care Instructions Anxiety is a normal reaction to stress. Difficult situations can cause you to have symptoms such as sweaty palms and a nervous feeling. In an anxiety disorder, the symptoms are far more severe. Constant worry, muscle tension, trouble sleeping, nausea and diarrhea, and other symptoms can make normal daily activities difficult or impossible.  These symptoms may occur for no reason, and they can affect your work, school, or social life. Medicines, counseling, and self-care can all help. Follow-up care is a key part of your treatment and safety. Be sure to make and go to all appointments, and call your doctor if you are having problems. It's also a good idea to know your test results and keep a list of the medicines you take. How can you care for yourself at home? · Take medicines exactly as directed. Call your doctor if you think you are having a problem with your medicine. · Go to your counseling sessions and follow-up appointments. · Recognize and accept your anxiety. Then, when you are in a situation that makes you anxious, say to yourself, \"This is not an emergency. I feel uncomfortable, but I am not in danger. I can keep going even if I feel anxious. \" · Be kind to your body: ¨ Relieve tension with exercise or a massage. ¨ Get enough rest. 
¨ Avoid alcohol, caffeine, nicotine, and illegal drugs. They can increase your anxiety level and cause sleep problems. ¨ Learn and do relaxation techniques. See below for more about these techniques. · Engage your mind. Get out and do something you enjoy. Go to a Globe[employer] movie, or take a walk or hike. Plan your day. Having too much or too little to do can make you anxious. · Keep a record of your symptoms. Discuss your fears with a good friend or family member, or join a support group for people with similar problems. Talking to others sometimes relieves stress. · Get involved in social groups, or volunteer to help others. Being alone sometimes makes things seem worse than they are. · Get at least 30 minutes of exercise on most days of the week to relieve stress. Walking is a good choice. You also may want to do other activities, such as running, swimming, cycling, or playing tennis or team sports. Relaxation techniques Do relaxation exercises 10 to 20 minutes a day. You can play soothing, relaxing music while you do them, if you wish. · Tell others in your house that you are going to do your relaxation exercises. Ask them not to disturb you. · Find a comfortable place, away from all distractions and noise. · Lie down on your back, or sit with your back straight. · Focus on your breathing. Make it slow and steady. · Breathe in through your nose. Breathe out through either your nose or mouth. · Breathe deeply, filling up the area between your navel and your rib cage. Breathe so that your belly goes up and down. · Do not hold your breath. · Breathe like this for 5 to 10 minutes. Notice the feeling of calmness throughout your whole body. As you continue to breathe slowly and deeply, relax by doing the following for another 5 to 10 minutes: · Tighten and relax each muscle group in your body. You can begin at your toes and work your way up to your head. · Imagine your muscle groups relaxing and becoming heavy. · Empty your mind of all thoughts. · Let yourself relax more and more deeply. · Become aware of the state of calmness that surrounds you. · When your relaxation time is over, you can bring yourself back to alertness by moving your fingers and toes and then your hands and feet and then stretching and moving your entire body. Sometimes people fall asleep during relaxation, but they usually wake up shortly afterward. · Always give yourself time to return to full alertness before you drive a car or do anything that might cause an accident if you are not fully alert. Never play a relaxation tape while you drive a car. When should you call for help? Call 911 anytime you think you may need emergency care. For example, call if: 
? · You feel you cannot stop from hurting yourself or someone else. ? Keep the numbers for these national suicide hotlines: 9-880-262-TALK (0-650.739.1893) and 7-294-YCWRLHI (3-224.900.7641). If you or someone you know talks about suicide or feeling hopeless, get help right away. ?Watch closely for changes in your health, and be sure to contact your doctor if: 
? · You have anxiety or fear that affects your life. ? · You have symptoms of anxiety that are new or different from those you had before. Where can you learn more? Go to http://hannah-ellie.info/. Enter P754 in the search box to learn more about \"Anxiety Disorder: Care Instructions. \" Current as of: May 12, 2017 Content Version: 11.4 © 1939-3968 [employer]. Care instructions adapted under license by [employer] (which disclaims liability or warranty for this information). If you have questions about a medical condition or this instruction, always ask your healthcare professional. [address] any warranty or liability for your use of this information. Introducing [facility]! Evelyn Shah introduces Personal Estate Manager patient portal. Now you can access parts of your medical record, email your doctor's office, and request medication refills online. 1. In your internet browser, go to [email or web address] 2. Click on the First Time User? Click Here link in the Sign In box. You will see the New Member Sign Up page. 3. Enter your Personal Estate Manager Access Code exactly as it appears below. You will not need to use this code after youve completed the sign-up process. If you do not sign up before the expiration date, you must request a new code. · Personal Estate [employer] Access Code: [email or web address] Expires: 7/16/2018  8:23 AM 
 
4. Enter the last four digits of your Social Security Number (xxxx) and Date of Birth (mm/dd/yyyy) as indicated and click Submit. You will be taken to the next sign-up page. 5. Create a Personal Estate Manager ID. This will be your Personal Estate Manager login ID and cannot be changed, so think of one that is secure and easy to remember. 6. Create a Personal Estate Manager password. You can change your password at any time. 7. Enter your Password Reset Question and Answer. This can be used at a later time if you forget your password. 8. Enter your e-mail address. You will receive e-mail notification when new information is available in [address] 9. Click Sign Up. You can now view and download portions of your medical record. 10. Click the Download Summary menu link to download a portable copy of your medical information. If you have questions, please visit the Frequently Asked Questions section of the [employer] website. Remember, [employer] is NOT to be used for urgent needs. For medical emergencies, dial 911. Now available from your iPhone and Android! Please provide this summary of care documentation to your next provider. Your primary care clinician is listed as Andrez Smith. If you have any questions after today's visit, please call [phone].

## 2018-04-17 NOTE — PROGRESS NOTES
Chief Complaint   Patient presents with   [facility] Patient     Patient here to establish care with a provider. 1. Have you been to the ER, urgent care clinic since your last visit? Hospitalized since your last visit? Yes When: Right before thanksgiving for SOB, See Encounters    2. Have you seen or consulted any other health care providers outside of the [address] [place] [address] since your last visit? Include any pap smears or colon screening. No    Tdap and Flu vaccine given today in right deltoid with no adverse reactions.

## 2018-04-18 PROBLEM — J45.20 MILD INTERMITTENT ASTHMA: Status: ACTIVE | Noted: 2018-04-18

## 2018-04-18 PROBLEM — Z87.891 FORMER SMOKER: Status: ACTIVE | Noted: 2018-04-18

## 2018-04-18 PROBLEM — M79.672 LEFT FOOT PAIN: Status: ACTIVE | Noted: 2018-04-18

## 2018-04-18 PROBLEM — J30.9 ALLERGIC RHINITIS: Status: ACTIVE | Noted: 2018-04-18

## 2018-04-18 PROBLEM — I10 ESSENTIAL HYPERTENSION: Status: ACTIVE | Noted: 2018-04-18

## 2018-04-18 PROBLEM — F41.8 DEPRESSION WITH ANXIETY: Status: ACTIVE | Noted: 2018-04-18

## 2018-04-18 PROBLEM — E66.01 CLASS 2 SEVERE OBESITY DUE TO EXCESS CALORIES WITH SERIOUS COMORBIDITY AND BODY MASS INDEX (BMI) OF 36.0 TO 36.9 IN ADULT (HCC): Status: ACTIVE | Noted: 2018-04-18

## 2018-04-18 LAB
25(OH)D3+25(OH)D2 SERPL-MCNC: 20.1 NG/ML (ref 30–100)
ALBUMIN SERPL-MCNC: 4.3 G/DL (ref 3.5–5.5)
ALBUMIN/CREAT UR: 60.9 MG/G CREAT (ref 0–30)
ALBUMIN/GLOB SERPL: 1.5 {RATIO} (ref 1.2–2.2)
ALP SERPL-CCNC: 108 IU/L (ref 39–117)
ALT SERPL-CCNC: 13 IU/L (ref 0–32)
AST SERPL-CCNC: 17 IU/L (ref 0–40)
BASOPHILS # BLD AUTO: 0 X10E3/UL (ref 0–0.2)
BASOPHILS NFR BLD AUTO: 0 %
BILIRUB SERPL-MCNC: 0.8 MG/DL (ref 0–1.2)
BUN SERPL-MCNC: 19 MG/DL (ref 6–24)
BUN/CREAT SERPL: 21 (ref 9–23)
CALCIUM SERPL-MCNC: 10.1 MG/DL (ref 8.7–10.2)
CHLORIDE SERPL-SCNC: 104 MMOL/L (ref 96–106)
CHOLEST SERPL-MCNC: 181 MG/DL (ref 100–199)
CO2 SERPL-SCNC: 21 MMOL/L (ref 18–29)
CREAT SERPL-MCNC: 0.91 MG/DL (ref 0.57–1)
CREAT UR-MCNC: 76.7 MG/DL
EOSINOPHIL # BLD AUTO: 0.3 X10E3/UL (ref 0–0.4)
EOSINOPHIL NFR BLD AUTO: 5 %
ERYTHROCYTE [DISTWIDTH] IN BLOOD BY AUTOMATED COUNT: 13.2 % (ref 12.3–15.4)
EST. AVERAGE GLUCOSE BLD GHB EST-MCNC: 100 MG/DL
GFR SERPLBLD CREATININE-BSD FMLA CKD-EPI: 71 ML/MIN/1.73
GFR SERPLBLD CREATININE-BSD FMLA CKD-EPI: 82 ML/MIN/1.73
GLOBULIN SER CALC-MCNC: 2.8 G/DL (ref 1.5–4.5)
GLUCOSE SERPL-MCNC: 87 MG/DL (ref 65–99)
HBA1C MFR BLD: 5.1 % (ref 4.8–5.6)
HCT VFR BLD AUTO: 39.8 % (ref 34–46.6)
HCV AB S/CO SERPL IA: <0.1 S/CO RATIO (ref 0–0.9)
HCV AB SERPL QL IA: NORMAL
HDLC SERPL-MCNC: 61 MG/DL
HGB BLD-MCNC: 13.2 G/DL (ref 11.1–15.9)
IMM GRANULOCYTES # BLD: 0 X10E3/UL (ref 0–0.1)
IMM GRANULOCYTES NFR BLD: 0 %
INTERPRETATION, 910389: NORMAL
LDLC SERPL CALC-MCNC: 104 MG/DL (ref 0–99)
LYMPHOCYTES # BLD AUTO: 1.7 X10E3/UL (ref 0.7–3.1)
LYMPHOCYTES NFR BLD AUTO: 26 %
MCH RBC QN AUTO: 29.1 PG (ref 26.6–33)
MCHC RBC AUTO-ENTMCNC: 33.2 G/DL (ref 31.5–35.7)
MCV RBC AUTO: 88 FL (ref 79–97)
MICROALBUMIN UR-MCNC: 46.7 UG/ML
MONOCYTES # BLD AUTO: 0.5 X10E3/UL (ref 0.1–0.9)
MONOCYTES NFR BLD AUTO: 8 %
NEUTROPHILS # BLD AUTO: 3.9 X10E3/UL (ref 1.4–7)
NEUTROPHILS NFR BLD AUTO: 61 %
PLATELET # BLD AUTO: 235 X10E3/UL (ref 150–379)
POTASSIUM SERPL-SCNC: 4.4 MMOL/L (ref 3.5–5.2)
PROT SERPL-MCNC: 7.1 G/DL (ref 6–8.5)
RBC # BLD AUTO: 4.54 X10E6/UL (ref 3.77–5.28)
SODIUM SERPL-SCNC: 140 MMOL/L (ref 134–144)
T4 FREE SERPL-MCNC: 1.43 NG/DL (ref 0.82–1.77)
TRIGL SERPL-MCNC: 78 MG/DL (ref 0–149)
TSH SERPL DL<=0.005 MIU/L-ACNC: 0.95 UIU/ML (ref 0.45–4.5)
VLDLC SERPL CALC-MCNC: 16 MG/DL (ref 5–40)
WBC # BLD AUTO: 6.4 X10E3/UL (ref 3.4–10.8)

## 2018-04-22 PROBLEM — E55.9 VITAMIN D DEFICIENCY: Status: ACTIVE | Noted: 2018-04-22

## 2018-04-22 RX ORDER — ASPIRIN 325 MG
TABLET, DELAYED RELEASE (ENTERIC COATED) ORAL
Qty: 8 CAP | Refills: 2 | Status: SHIPPED | OUTPATIENT
Start: 2018-04-22 | End: 2019-04-10 | Stop reason: SDUPTHER

## 2018-04-23 DIAGNOSIS — J45.20 MILD INTERMITTENT ASTHMA, UNSPECIFIED WHETHER COMPLICATED: ICD-10-CM

## 2018-04-23 NOTE — PROGRESS NOTES
Inbound call from patient returning nurse call. Patients  verified. Reviewed recent lab results with patient. Verbalized understanding.

## 2018-04-23 NOTE — PROGRESS NOTES
Notify Patient:     Vitamin D is low. Start weekly supplement. I will send to pharmacy. Your urine protein was high. This can be an early sign of kidney disease (likely caused by high blood pressure). We cannot confirm this on one test. We will recheck in 306 months to confirm. All other test results were normal including thyroid, cholesterol, blood counts, and electrolytes. Your hepatitis C screen is negative.

## 2018-04-23 NOTE — PROGRESS NOTES
Outbound call to patient. No answer left message on patients name confirmed voicemail for patient to return call regarding recent labs.

## 2018-04-29 DIAGNOSIS — R87.610 ASCUS WITH POSITIVE HIGH RISK HPV CERVICAL: Primary | ICD-10-CM

## 2018-04-29 DIAGNOSIS — R87.810 ASCUS WITH POSITIVE HIGH RISK HPV CERVICAL: Primary | ICD-10-CM

## 2018-04-29 NOTE — PROGRESS NOTES
Notify Patient:   Pap smear showed abnormal cells, unclear if cancerous or not. You are have positive test for the virus which causes cervical cancer called HPV. With positive HPV testing, you are at high risk for cervical cancer. The next step is evaluation with OB/GYN for colposcopy or closer exam of your cervix. They may also take a sample or biopsy from your cervix.

## 2018-04-30 NOTE — PROGRESS NOTES
Outbound call to patient.  verified.  Notified patient of recent results and advised of MD recommendations and referral. Gave patient referral information and forwarded referral to [facility]

## 2018-05-01 ENCOUNTER — OFFICE VISIT (OUTPATIENT)
Dept: FAMILY MEDICINE CLINIC | Age: 58
End: 2018-05-01

## 2018-05-01 ENCOUNTER — HOSPITAL ENCOUNTER (OUTPATIENT)
Dept: MAMMOGRAPHY | Age: 58
Discharge: HOME | End: 2018-05-01
Payer: COMMERCIAL

## 2018-05-01 VITALS
BODY MASS INDEX: 35 KG/M2 | HEART RATE: 69 BPM | WEIGHT: 258.4 LBS | SYSTOLIC BLOOD PRESSURE: 162 MMHG | OXYGEN SATURATION: 97 % | HEIGHT: 72 IN | TEMPERATURE: 97.9 F | RESPIRATION RATE: 18 BRPM | DIASTOLIC BLOOD PRESSURE: 84 MMHG

## 2018-05-01 DIAGNOSIS — Z12.31 VISIT FOR SCREENING MAMMOGRAM: ICD-10-CM

## 2018-05-01 DIAGNOSIS — F41.8 DEPRESSION WITH ANXIETY: ICD-10-CM

## 2018-05-01 DIAGNOSIS — I10 ESSENTIAL HYPERTENSION: Primary | ICD-10-CM

## 2018-05-01 DIAGNOSIS — E66.01 CLASS 2 SEVERE OBESITY DUE TO EXCESS CALORIES WITH SERIOUS COMORBIDITY AND BODY MASS INDEX (BMI) OF 36.0 TO 36.9 IN ADULT (HCC): ICD-10-CM

## 2018-05-01 DIAGNOSIS — Z87.891 FORMER SMOKER: ICD-10-CM

## 2018-05-01 PROCEDURE — 77063 BREAST TOMOSYNTHESIS BI: CPT

## 2018-05-01 RX ORDER — LISINOPRIL 40 MG/1
40 TABLET ORAL DAILY
Qty: 90 TAB | Refills: 0 | Status: SHIPPED | OUTPATIENT
Start: 2018-05-01 | End: 2018-07-13 | Stop reason: SDUPTHER

## 2018-05-01 NOTE — PROGRESS NOTES
Chief Complaint   Patient presents with    Hypertension     follow up BP     1. Have you been to the ER, urgent care clinic since your last visit? Hospitalized since your last visit? No    2. Have you seen or consulted any other health care providers outside of the [facility] since your last visit? Include any pap smears or colon screening.  No

## 2018-05-01 NOTE — PROGRESS NOTES
[facility] Note      Subjective:     Chief Complaint   Patient presents with    Hypertension     follow up BP     Donnell Benz is a 65y.o. year old female who presents for evaluation of the following:      Care Team:   GYN- Dr. Mine Reddy      PMH:   HTN:   Tx: lisinopril 20  + triamterene 37.5 + HCTZ25  Previous [id number]+  + lisinopril 40 + metoprolol 50 mg bid  Not adhering to low salt diet    Former Smoker:   Stopped smoking 12/2017  Cold turkey  Using a pencil to imitate the smoking    Obesity:  Diet: Has been eating all meats and vegetables with weight loss then stopped   Activity: None  - used to walk at track and play softball but now no motivation  Recently stopped smoking and now snacking on candy and chocolate  Eating large portions    Interval Update:   Cut down on sodas, drinking more water  Weight down 8 lbs    Depression:    Poor sleep  Onset 3/2018  Endorses stressors at work, feeling overwhelmed, thought that the would be be better off without her but feels she would never kill herself and denies any plan for self harm  No new joint aches, headaches, dizziness. Social:   Works as  at [employer]. Work 8 hours per day during the day  Lives with         Review of Systems   Pertinent positives and negative per HPI. All other systems  reviewed are negative for a Comprehensive ROS (10+). Past Medical History:   Diagnosis Date    Hypertension         Social History     Social History    Marital status:      Spouse name: N/A    Number of children: N/A    Years of education: N/A     Occupational History    Not on file.      Social History Main Topics    Smoking status: Former Smoker     Packs/day: 1.00     Years: 15.00     Quit date: 1/17/2018    Smokeless tobacco: Never Used    Alcohol use Yes      Comment: occasionally    Drug use: No    Sexual activity: No     Other Topics Concern    Not on file     Social History Narrative       Current Outpatient Prescriptions   Medication Sig    albuterol sulfate 90 mcg/actuation aepb Take 2 Puffs by inhalation every four (4) hours as needed for Cough.  Cholecalciferol, Vitamin D3, 50,000 unit cap Take 1 capsule once a week for the next 8 weeks; if available OTC, please notify pt    triamterene-hydroCHLOROthiazide (MAXZIDE) 37.5-25 mg per tablet Take 1 Tab by mouth daily.  lisinopril (PRINIVIL, ZESTRIL) 20 mg tablet Take 1 Tab by mouth daily.  sertraline (ZOLOFT) 50 mg tablet Take 1 Tab by mouth daily. No current facility-administered medications for this visit. Objective:     Vitals:    05/01/18 0809 05/01/18 0813   BP: (!) 185/94 162/84   Pulse: 69    Resp: 18    Temp: 97.9 °F (36.6 °C)    TempSrc: Oral    SpO2: 97%    Weight: 258 lb 6.4 oz (117.2 kg)    Height: 6' (1.829 m)        Physical Examination:  General: Alert, cooperative, no distress, appears stated age. Obese  Eyes: Conjunctivae/corneas clear. PERRL, EOMs intact. Ears: Normal external ear canals both ears. TM clear and mobile bilaterally  Nose: Nares normal. Septum midline. Mucosa normal. No drainage or sinus tenderness. Mouth/Throat: Lips, mucosa, and tongue normal. Teeth and gums normal.  Neck: Supple, symmetrical, trachea midline, no adenopathy. No thyroid enlargement/tenderness/nodules  Back: Symmetric, no curvature. ROM normal. No CVA tenderness. Lungs: Clear to auscultation bilaterally. Normal inspiratory and expiratory ratio. Heart: Regular rate and rhythm, S1, S2 normal, no murmur, click, rub or gallop. Abdomen: Soft, non-tender. Bowel sounds normal. No masses or organomegaly. Pelvic exam: normal external genitalia, vulva, vagina, cervix, uterus and adnexa. Extremities: Extremities normal, atraumatic, no cyanosis or edema. Pulses: 2+ and symmetric all extremities. Skin: Skin color, texture, turgor normal. No rashes or lesions on exposed skin.   Lymph nodes: Cervical, supraclavicular nodes normal.  Neurologic: CNII-XII intact. Strength 5/5 grossly. Sensation and reflexes normal throughout. Psych: Anxious. Fast speech, not quite pressured. Labile mood, not tearful- improved from previous exam    Office Visit on 04/17/2018   Component Date Value Ref Range Status    Cholesterol, total 04/17/2018 181  100 - 199 mg/dL Final    Triglyceride 04/17/2018 78  0 - 149 mg/dL Final    HDL Cholesterol 04/17/2018 61  >39 mg/dL Final    VLDL, calculated 04/17/2018 16  5 - 40 mg/dL Final    LDL, calculated 04/17/2018 104* 0 - 99 mg/dL Final    Hemoglobin A1c 04/17/2018 5.1  4.8 - 5.6 % Final    Comment:          Pre-diabetes: 5.7 - 6.4           Diabetes: >6.4           Glycemic control for adults with diabetes: <7.0      Estimated average glucose 04/17/2018 100  mg/dL Final    Glucose 04/17/2018 87  65 - 99 mg/dL Final    BUN 04/17/2018 19  6 - 24 mg/dL Final    Creatinine 04/17/2018 0.91  0.57 - 1.00 mg/dL Final    GFR est non-AA 04/17/2018 71  >59 mL/min/1.73 Final    GFR est AA 04/17/2018 82  >59 mL/min/1.73 Final    BUN/Creatinine ratio 04/17/2018 21  9 - 23 Final    Sodium 04/17/2018 140  134 - 144 mmol/L Final    Potassium 04/17/2018 4.4  3.5 - 5.2 mmol/L Final    Chloride 04/17/2018 104  96 - 106 mmol/L Final    CO2 04/17/2018 21  18 - 29 mmol/L Final    Calcium 04/17/2018 10.1  8.7 - 10.2 mg/dL Final    Protein, total 04/17/2018 7.1  6.0 - 8.5 g/dL Final    Albumin 04/17/2018 4.3  3.5 - 5.5 g/dL Final    GLOBULIN, TOTAL 04/17/2018 2.8  1.5 - 4.5 g/dL Final    A-G Ratio 04/17/2018 1.5  1.2 - 2.2 Final    Bilirubin, total 04/17/2018 0.8  0.0 - 1.2 mg/dL Final    Alk.  phosphatase 04/17/2018 108  39 - 117 IU/L Final    AST (SGOT) 04/17/2018 17  0 - 40 IU/L Final    ALT (SGPT) 04/17/2018 13  0 - 32 IU/L Final    WBC 04/17/2018 6.4  3.4 - 10.8 x10E3/uL Final    RBC 04/17/2018 4.54  3.77 - 5.28 x10E6/uL Final    HGB 04/17/2018 13.2  11.1 - 15.9 g/dL Final    HCT 04/17/2018 39.8 34.0 - 46.6 % Final    MCV 04/17/2018 88  79 - 97 fL Final    MCH 04/17/2018 29.1  26.6 - 33.0 pg Final    MCHC 04/17/2018 33.2  31.5 - 35.7 g/dL Final    RDW 04/17/2018 13.2  12.3 - 15.4 % Final    PLATELET 52/24/8025 571  150 - 379 x10E3/uL Final    NEUTROPHILS 04/17/2018 61  Not Estab. % Final    Lymphocytes 04/17/2018 26  Not Estab. % Final    MONOCYTES 04/17/2018 8  Not Estab. % Final    EOSINOPHILS 04/17/2018 5  Not Estab. % Final    BASOPHILS 04/17/2018 0  Not Estab. % Final    ABS. NEUTROPHILS 04/17/2018 3.9  1.4 - 7.0 x10E3/uL Final    Abs Lymphocytes 04/17/2018 1.7  0.7 - 3.1 x10E3/uL Final    ABS. MONOCYTES 04/17/2018 0.5  0.1 - 0.9 x10E3/uL Final    ABS. EOSINOPHILS 04/17/2018 0.3  0.0 - 0.4 x10E3/uL Final    ABS. BASOPHILS 04/17/2018 0.0  0.0 - 0.2 x10E3/uL Final    IMMATURE GRANULOCYTES 04/17/2018 0  Not Estab. % Final    ABS. IMM. GRANS. 04/17/2018 0.0  0.0 - 0.1 x10E3/uL Final    TSH 04/17/2018 0.950  0.450 - 4.500 uIU/mL Final    T4, Free 04/17/2018 1.43  0.82 - 1.77 ng/dL Final    VITAMIN D, 25-HYDROXY 04/17/2018 20.1* 30.0 - 100.0 ng/mL Final    Comment: Vitamin D deficiency has been defined by the [address] practice guideline as a  level of serum 25-OH vitamin D less than 20 ng/mL (1,2). The Endocrine Society went on to further define vitamin D  insufficiency as a level between 21 and 29 ng/mL (2). 1. IOM ([place] of Medicine). 2010. Dietary reference     intakes for calcium and D. [facility]: The     [employer]. 2. Beverly MF, Yanet CEDEÑO, Refugio LEA, et al.     Evaluation, treatment, and prevention of vitamin D     deficiency: an Endocrine Society clinical practice     guideline. JCEM. 2011 Jul; 96(7):1911-30.       Creatinine, urine 04/17/2018 76.7  Not Estab. mg/dL Final    Microalbumin, urine 04/17/2018 46.7  Not Estab. ug/mL Final    Microalb/Creat ratio (ug/mg creat.) 04/17/2018 60.9* 0.0 - 30.0 mg/g creat Final    HCV Ab 04/17/2018 <0.1  0.0 - 0.9 s/co ratio Final    HCV Interpretation 04/17/2018 Comment   Final    Comment: Negative  Not infected with HCV, unless recent infection is suspected or other  evidence exists to indicate HCV infection.  INTERPRETATION 04/17/2018 Note   Final    Supplemental report is available. Assessment/ Plan:   Diagnoses and all orders for this visit:    1. Essential hypertension  -     lisinopril (PRINIVIL, ZESTRIL) 40 mg tablet; Take 1 Tab by mouth daily. 2. Class 2 severe obesity due to excess calories with serious comorbidity and body mass index (BMI) of 36.0 to 36.9 in adult ([facility] [address]    3. Former smoker    4. Depression with anxiety      Blood pressure improved but NOT controlled. Increase lisinopril to 40mg daily. Continue Triamterene/HCTZ. Diet and activity modification encouraged for weight loss. Prioritized stress management as this usually hinders weight loss. Provided positive reinforcement for recent smoking cessation. Patient declined pharmacological cessation aid. Mood stable but affect improved since starting SSRI. Strongly encouraged patient start counseling/CBT. I have discussed the diagnosis with the patient and the intended plan as seen in the above orders. The patient has received an after-visit summary and questions were answered concerning future plans. I have discussed medication side effects and warnings with the patient as well. Follow-up Disposition:  Return in about 4 weeks (around 5/29/2018), or if symptoms worsen or fail to improve, for Follow Up.       Signed,    Vandana Escalante MD  5/1/2018

## 2018-05-01 NOTE — PATIENT INSTRUCTIONS

## 2018-05-01 NOTE — MR AVS SNAPSHOT
[facility] 
 
 
 [address] [facility] [address] [place] [address] 
[phone] Patient: Callie Faith MRN: [id number] QWT:[id number]/[id number] Visit Information Date & Time Provider Department Dept. Phone Encounter #  
 5/1/2018  8:00 AM Dee Cartagena  [facility] [phone] [id number] Follow-up Instructions Return in about 4 weeks (around 5/29/2018), or if symptoms worsen or fail to improve, for Follow Up. Your Appointments 5/30/2018 10:40 AM  
New Patient with Antelmo Wong MD  
[facility] AT [address] [place] [address] ([facility]) Appt Note: NP/ASCUS with + HPV  
 [address], [address] [address]  
[address], [address] [address] Upcoming Health Maintenance Date Due Pneumococcal 19-64 Medium Risk (1 of 1 - PPSV23) 7/31/1980 BREAST CANCER SCRN MAMMOGRAM 7/31/2011 FOBT Q 1 YEAR AGE 50-75 7/31/2011 Influenza Age 5 to Adult 8/1/2018 PAP AKA CERVICAL CYTOLOGY 4/17/2021 DTaP/Tdap/Td series (2 - Td) 4/17/2028 Allergies as of 5/1/2018  Review Complete On: 5/1/2018 By: Cj Starr LPN No Known Allergies Current Immunizations  Never Reviewed Name Date Influenza Vaccine (Quad) PF 4/17/2018 Tdap 4/17/2018 Not reviewed this visit You Were Diagnosed With   
  
 Codes Comments Essential hypertension    -  Primary ICD-10-CM: I10 
ICD-9-CM: 401.9 Class 2 severe obesity due to excess calories with serious comorbidity and body mass index (BMI) of 36.0 to 36.9 in adult [facility])     ICD-10-CM: E66.01, Z68.36 
ICD-9-CM: 278.01, V85.36 Former smoker     ICD-10-CM: U02.974 ICD-9-CM: V15.82 Vitals BP Pulse Temp Resp Height(growth percentile) Weight(growth percentile) 162/84 (BP 1 Location: Left arm, BP Patient Position: Sitting) 69 97.9 °F (36.6 °C) (Oral) 18 6' (1.829 m) 258 lb 6.4 oz (117.2 kg) SpO2 BMI OB Status Smoking Status 97% 35.05 kg/m2 Postmenopausal Former Smoker Vitals History BMI and BSA Data Body Mass Index Body Surface Area 35.05 kg/m 2 2.44 m 2 Preferred Pharmacy Pharmacy Name Phone [address] [place] [address], [address] [place] [address] Rd. [phone] Your Updated Medication List  
  
   
This list is accurate as of 5/1/18  8:25 AM.  Always use your most recent med list.  
  
  
  
  
 albuterol sulfate 90 mcg/actuation Aepb Take 2 Puffs by inhalation every four (4) hours as needed for Cough. cholecalciferol 50,000 unit capsule Commonly known as:  VITAMIN D3 Take 1 capsule once a week for the next 8 weeks; if available OTC, please notify pt  
  
 lisinopril 40 mg tablet Commonly known as:  Bolivar Kathrine Take 1 Tab by mouth daily. sertraline 50 mg tablet Commonly known as:  ZOLOFT Take 1 Tab by mouth daily. triamterene-hydroCHLOROthiazide 37.5-25 mg per tablet Commonly known as:  Nataliya Marus Take 1 Tab by mouth daily. Prescriptions Sent to Pharmacy Refills  
 lisinopril (PRINIVIL, ZESTRIL) 40 mg tablet 0 Sig: Take 1 Tab by mouth daily. Class: Normal  
 Pharmacy: [address] [place] [address], [address] [place] [address] #: [phone] Route: Oral  
  
Follow-up Instructions Return in about 4 weeks (around 5/29/2018), or if symptoms worsen or fail to improve, for Follow Up. To-Do List   
 05/01/2018 10:30 AM  
  Appointment with [facility] [place] [facility] [address] at [address] [place] [address] ([phone]) Shower or bathe using soap and water. Do not use deodorant, powder, perfumes, or lotion the day of your exam.  If your prior mammograms were not performed at [facility] 6 please bring films with you or forward prior images 2 days before your procedure.   Check in at registration 15min before your appointment time unless you were instructed to do otherwise. A script is not necessary, but if you have one, please bring it on the day of the mammogram or have it faxed to the department. [facility] [phone] [facility]  [phone] [facility] [address]  [phone] [facility] [phone] [facility] [phone] Patient Instructions DASH Diet: Care Instructions Your Care Instructions The DASH diet is an eating plan that can help lower your blood pressure. DASH stands for Dietary Approaches to Stop Hypertension. Hypertension is high blood pressure. The DASH diet focuses on eating foods that are high in calcium, potassium, and magnesium. These nutrients can lower blood pressure. The foods that are highest in these nutrients are fruits, vegetables, low-fat dairy products, nuts, seeds, and legumes. But taking calcium, potassium, and magnesium supplements instead of eating foods that are high in those nutrients does not have the same effect. The DASH diet also includes whole grains, fish, and poultry. The DASH diet is one of several lifestyle changes your doctor may recommend to lower your high blood pressure. Your doctor may also want you to decrease the amount of sodium in your diet. Lowering sodium while following the DASH diet can lower blood pressure even further than just the DASH diet alone. Follow-up care is a key part of your treatment and safety. Be sure to make and go to all appointments, and call your doctor if you are having problems. It's also a good idea to know your test results and keep a list of the medicines you take. How can you care for yourself at home? Following the DASH diet · Eat 4 to 5 servings of fruit each day. A serving is 1 medium-sized piece of fruit, ½ cup chopped or canned fruit, 1/4 cup dried fruit, or 4 ounces (½ cup) of fruit juice. Choose fruit more often than fruit juice. · Eat 4 to 5 servings of vegetables each day.  A serving is 1 cup of lettuce or raw leafy vegetables, ½ cup of chopped or cooked vegetables, or 4 ounces (½ cup) of vegetable juice. Choose vegetables more often than vegetable juice. · Get 2 to 3 servings of low-fat and fat-free dairy each day. A serving is 8 ounces of milk, 1 cup of yogurt, or 1 ½ ounces of cheese. · Eat 6 to 8 servings of grains each day. A serving is 1 slice of bread, 1 ounce of dry cereal, or ½ cup of cooked rice, pasta, or cooked cereal. Try to choose whole-grain products as much as possible. · Limit lean meat, poultry, and fish to 2 servings each day. A serving is 3 ounces, about the size of a deck of cards. · Eat 4 to 5 servings of nuts, seeds, and legumes (cooked dried beans, lentils, and split peas) each week. A serving is 1/3 cup of nuts, 2 tablespoons of seeds, or ½ cup of cooked beans or peas. · Limit fats and oils to 2 to 3 servings each day. A serving is 1 teaspoon of vegetable oil or 2 tablespoons of salad dressing. · Limit sweets and added sugars to 5 servings or less a week. A serving is 1 tablespoon jelly or jam, ½ cup sorbet, or 1 cup of lemonade. · Eat less than 2,300 milligrams (mg) of sodium a day. If you limit your sodium to 1,500 mg a day, you can lower your blood pressure even more. Tips for success · Start small. Do not try to make dramatic changes to your diet all at once. You might feel that you are missing out on your favorite foods and then be more likely to not follow the plan. Make small changes, and stick with them. Once those changes become habit, add a few more changes. · Try some of the following: ¨ Make it a goal to eat a fruit or vegetable at every meal and at snacks. This will make it easy to get the recommended amount of fruits and vegetables each day. ¨ Try yogurt topped with fruit and nuts for a snack or healthy dessert. ¨ Add lettuce, tomato, cucumber, and onion to sandwiches.  
¨ Combine a ready-made pizza crust with low-fat mozzarella cheese and lots of vegetable toppings. Try using tomatoes, squash, spinach, broccoli, carrots, cauliflower, and onions. ¨ Have a variety of cut-up vegetables with a low-fat dip as an appetizer instead of chips and dip. ¨ Sprinkle sunflower seeds or chopped almonds over salads. Or try adding chopped walnuts or almonds to cooked vegetables. ¨ Try some vegetarian meals using beans and peas. Add garbanzo or kidney beans to salads. Make burritos and tacos with mashed fatima beans or black beans. Where can you learn more? Go to http://hannahAutogridellie.info/. Enter F556 in the search box to learn more about \"DASH Diet: Care Instructions. \" Current as of: September 21, 2016 Content Version: 11.4 © 4404-6078 [employer]. Care instructions adapted under license by [employer] (which disclaims liability or warranty for this information). If you have questions about a medical condition or this instruction, always ask your healthcare professional. [address] any warranty or liability for your use of this information. Introducing [facility]! [facility] introduces [employer] patient portal. Now you can access parts of your medical record, email your doctor's office, and request medication refills online. 1. In your internet browser, go to [email or web address] 2. Click on the First Time User? Click Here link in the Sign In box. You will see the New Member Sign Up page. 3. Enter your Source[employer]ought Access Code exactly as it appears below. You will not need to use this code after youve completed the sign-up process. If you do not sign up before the expiration date, you must request a new code. · [employer] Access Code: [email or web address] Expires: 7/16/2018  8:23 AM 
 
4. Enter the last four digits of your Social Security Number (xxxx) and Date of Birth (mm/dd/yyyy) as indicated and click Submit. You will be taken to the next sign-up page. 5. Create a Social Tools ID. This will be your Social Tools login ID and cannot be changed, so think of one that is secure and easy to remember. 6. Create a Social Tools password. You can change your password at any time. 7. Enter your Password Reset Question and Answer. This can be used at a later time if you forget your password. 8. Enter your e-mail address. You will receive e-mail notification when new information is available in [address] 9. Click Sign Up. You can now view and download portions of your medical record. 10. Click the Download Summary menu link to download a portable copy of your medical information. If you have questions, please visit the Frequently Asked Questions section of the [employer] Tools website. Remember, [employer] Tools is NOT to be used for urgent needs. For medical emergencies, dial 911. Now available from your iPhone and Android! Please provide this summary of care documentation to your next provider. Your primary care clinician is listed as Cuauhtemoc Sousa. If you have any questions after today's visit, please call [phone].

## 2018-05-29 ENCOUNTER — OFFICE VISIT (OUTPATIENT)
Dept: FAMILY MEDICINE CLINIC | Age: 58
End: 2018-05-29

## 2018-05-29 VITALS
SYSTOLIC BLOOD PRESSURE: 132 MMHG | DIASTOLIC BLOOD PRESSURE: 76 MMHG | BODY MASS INDEX: 35.03 KG/M2 | HEIGHT: 72 IN | OXYGEN SATURATION: 99 % | TEMPERATURE: 98.4 F | HEART RATE: 67 BPM | RESPIRATION RATE: 18 BRPM | WEIGHT: 258.6 LBS

## 2018-05-29 DIAGNOSIS — I10 ESSENTIAL HYPERTENSION: Primary | ICD-10-CM

## 2018-05-29 NOTE — PROGRESS NOTES
[facility] Note      Subjective:     Chief Complaint   Patient presents with    Hypertension     FOLLOW UP FOR BP     Leonardo De La Vega is a 65y.o. year old female who presents for evaluation of the following:      Care Team:   GYN- Dr. Donna Medley      PMH:   HTN:   Tx: lisinopril 40  + triamterene 37.5 + HCTZ25  Previous: additional  Amlodipine 10 + metoprolol 50 mg bid  Not adhering to low salt diet  No home monitoring  No CO, SOB, leg swelling, headache     Social:   Works as  at [employer]. Work 8 hours per day during the day  Lives with         Review of Systems   Pertinent positives and negative per HPI. All other systems  reviewed are negative for a Comprehensive ROS (10+). Past Medical History:   Diagnosis Date    Hypertension     Menopause     LMP-40years old? Social History     Social History    Marital status:      Spouse name: N/A    Number of children: N/A    Years of education: N/A     Occupational History    Not on file. Social History Main Topics    Smoking status: Former Smoker     Packs/day: 1.00     Years: 15.00     Quit date: 1/17/2018    Smokeless tobacco: Never Used    Alcohol use Yes      Comment: occasionally    Drug use: No    Sexual activity: No     Other Topics Concern    Not on file     Social History Narrative       Current Outpatient Prescriptions   Medication Sig    lisinopril (PRINIVIL, ZESTRIL) 40 mg tablet Take 1 Tab by mouth daily.  albuterol sulfate 90 mcg/actuation aepb Take 2 Puffs by inhalation every four (4) hours as needed for Cough.  Cholecalciferol, Vitamin D3, 50,000 unit cap Take 1 capsule once a week for the next 8 weeks; if available OTC, please notify pt    triamterene-hydroCHLOROthiazide (MAXZIDE) 37.5-25 mg per tablet Take 1 Tab by mouth daily.  sertraline (ZOLOFT) 50 mg tablet Take 1 Tab by mouth daily. No current facility-administered medications for this visit. Objective:     Vitals:    05/29/18 1335   BP: 132/76   Pulse: 67   Resp: 18   Temp: 98.4 °F (36.9 °C)   TempSrc: Oral   SpO2: 99%   Weight: 258 lb 9.6 oz (117.3 kg)   Height: 6' (1.829 m)       Physical Examination:  General: Alert, cooperative, no distress, appears stated age. Obese  Eyes: Conjunctivae/corneas clear. PERRL, EOMs intact. Ears: Normal external ear canals both ears. TM clear and mobile bilaterally  Nose: Nares normal. Septum midline. Mucosa normal. No drainage or sinus tenderness. Mouth/Throat: Lips, mucosa, and tongue normal. Teeth and gums normal.  Neck: Supple, symmetrical, trachea midline, no adenopathy. No thyroid enlargement/tenderness/nodules  Back: Symmetric, no curvature. ROM normal.  Lungs: Clear to auscultation bilaterally. Normal inspiratory and expiratory ratio. Heart: Regular rate and rhythm, S1, S2 normal, no murmur, click, rub or gallop. Abdomen: Soft, non-tender. Bowel sounds normal. No masses or organomegaly. Extremities: Extremities normal, atraumatic, no cyanosis or edema. Pulses: 2+ and symmetric all extremities. Skin: Skin color, texture, turgor normal. No rashes or lesions on exposed skin. Lymph nodes: Cervical, supraclavicular nodes normal.  Neurologic: CNII-XII intact. Strength 5/5 grossly. Sensation and reflexes normal throughout. Psych: Calm- improved from previous visit.      Office Visit on 04/17/2018   Component Date Value Ref Range Status    Cholesterol, total 04/17/2018 181  100 - 199 mg/dL Final    Triglyceride 04/17/2018 78  0 - 149 mg/dL Final    HDL Cholesterol 04/17/2018 61  >39 mg/dL Final    VLDL, calculated 04/17/2018 16  5 - 40 mg/dL Final    LDL, calculated 04/17/2018 104* 0 - 99 mg/dL Final    Hemoglobin A1c 04/17/2018 5.1  4.8 - 5.6 % Final    Comment:          Pre-diabetes: 5.7 - 6.4           Diabetes: >6.4           Glycemic control for adults with diabetes: <7.0      Estimated average glucose 04/17/2018 100  mg/dL Final    Glucose 04/17/2018 87  65 - 99 mg/dL Final    BUN 04/17/2018 19  6 - 24 mg/dL Final    Creatinine 04/17/2018 0.91  0.57 - 1.00 mg/dL Final    GFR est non-AA 04/17/2018 71  >59 mL/min/1.73 Final    GFR est AA 04/17/2018 82  >59 mL/min/1.73 Final    BUN/Creatinine ratio 04/17/2018 21  9 - 23 Final    Sodium 04/17/2018 140  134 - 144 mmol/L Final    Potassium 04/17/2018 4.4  3.5 - 5.2 mmol/L Final    Chloride 04/17/2018 104  96 - 106 mmol/L Final    CO2 04/17/2018 21  18 - 29 mmol/L Final    Calcium 04/17/2018 10.1  8.7 - 10.2 mg/dL Final    Protein, total 04/17/2018 7.1  6.0 - 8.5 g/dL Final    Albumin 04/17/2018 4.3  3.5 - 5.5 g/dL Final    GLOBULIN, TOTAL 04/17/2018 2.8  1.5 - 4.5 g/dL Final    A-G Ratio 04/17/2018 1.5  1.2 - 2.2 Final    Bilirubin, total 04/17/2018 0.8  0.0 - 1.2 mg/dL Final    Alk. phosphatase 04/17/2018 108  39 - 117 IU/L Final    AST (SGOT) 04/17/2018 17  0 - 40 IU/L Final    ALT (SGPT) 04/17/2018 13  0 - 32 IU/L Final    WBC 04/17/2018 6.4  3.4 - 10.8 x10E3/uL Final    RBC 04/17/2018 4.54  3.77 - 5.28 x10E6/uL Final    HGB 04/17/2018 13.2  11.1 - 15.9 g/dL Final    HCT 04/17/2018 39.8  34.0 - 46.6 % Final    MCV 04/17/2018 88  79 - 97 fL Final    MCH 04/17/2018 29.1  26.6 - 33.0 pg Final    MCHC 04/17/2018 33.2  31.5 - 35.7 g/dL Final    RDW 04/17/2018 13.2  12.3 - 15.4 % Final    PLATELET 95/91/0370 956  150 - 379 x10E3/uL Final    NEUTROPHILS 04/17/2018 61  Not Estab. % Final    Lymphocytes 04/17/2018 26  Not Estab. % Final    MONOCYTES 04/17/2018 8  Not Estab. % Final    EOSINOPHILS 04/17/2018 5  Not Estab. % Final    BASOPHILS 04/17/2018 0  Not Estab. % Final    ABS. NEUTROPHILS 04/17/2018 3.9  1.4 - 7.0 x10E3/uL Final    Abs Lymphocytes 04/17/2018 1.7  0.7 - 3.1 x10E3/uL Final    ABS. MONOCYTES 04/17/2018 0.5  0.1 - 0.9 x10E3/uL Final    ABS. EOSINOPHILS 04/17/2018 0.3  0.0 - 0.4 x10E3/uL Final    ABS.  BASOPHILS 04/17/2018 0.0  0.0 - 0.2 x10E3/uL Final  IMMATURE GRANULOCYTES 04/17/2018 0  Not Estab. % Final    ABS. IMM. GRANS. 04/17/2018 0.0  0.0 - 0.1 x10E3/uL Final    TSH 04/17/2018 0.950  0.450 - 4.500 uIU/mL Final    T4, Free 04/17/2018 1.43  0.82 - 1.77 ng/dL Final    VITAMIN D, 25-HYDROXY 04/17/2018 20.1* 30.0 - 100.0 ng/mL Final    Comment: Vitamin D deficiency has been defined by the [facility] practice guideline as a  level of serum 25-OH vitamin D less than 20 ng/mL (1,2). The Endocrine Society went on to further define vitamin D  insufficiency as a level between 21 and 29 ng/mL (2). 1. IOM ([place] of Medicine). 2010. Dietary reference     intakes for calcium and D. [address] [place] [address]: The     [employer]. 2. Beverly MF, Yanet NC, Refugio LEA, et al.     Evaluation, treatment, and prevention of vitamin D     deficiency: an Endocrine Society clinical practice     guideline. JCEM. 2011 Jul; 96(7):1911-30.  Creatinine, urine 04/17/2018 76.7  Not Estab. mg/dL Final    Microalbumin, urine 04/17/2018 46.7  Not Estab. ug/mL Final    Microalb/Creat ratio (ug/mg creat.) 04/17/2018 60.9* 0.0 - 30.0 mg/g creat Final    HCV Ab 04/17/2018 <0.1  0.0 - 0.9 s/co ratio Final    HCV Interpretation 04/17/2018 Comment   Final    Comment: Negative  Not infected with HCV, unless recent infection is suspected or other  evidence exists to indicate HCV infection.  INTERPRETATION 04/17/2018 Note   Final    Supplemental report is available. Assessment/ Plan:   Diagnoses and all orders for this visit:    1. Essential hypertension     BP at goal <140/90 today  Blood pressure controlled. Continue lisinopril to 40mg + Triamterene/HCTZ. I have discussed the diagnosis with the patient and the intended plan as seen in the above orders. The patient has received an after-visit summary and questions were answered concerning future plans.   I have discussed medication side effects and warnings with the patient as well. Follow-up Disposition:  Return in about 3 months (around 8/29/2018) for Follow Up.       Signed,    Sheron Faith MD  5/29/2018

## 2018-05-29 NOTE — PROGRESS NOTES
Chief Complaint   Patient presents with    Hypertension     FOLLOW UP FOR BP     1. Have you been to the ER, urgent care clinic since your last visit? Hospitalized since your last visit? No    2. Have you seen or consulted any other health care providers outside of the [address] [place] [address] since your last visit? Include any pap smears or colon screening.  No

## 2018-05-30 NOTE — PATIENT INSTRUCTIONS

## 2018-07-31 ENCOUNTER — OFFICE VISIT (OUTPATIENT)
Dept: FAMILY MEDICINE CLINIC | Age: 58
End: 2018-07-31

## 2018-07-31 ENCOUNTER — TELEPHONE (OUTPATIENT)
Dept: FAMILY MEDICINE CLINIC | Age: 58
End: 2018-07-31

## 2018-07-31 VITALS
SYSTOLIC BLOOD PRESSURE: 158 MMHG | WEIGHT: 256.2 LBS | DIASTOLIC BLOOD PRESSURE: 85 MMHG | TEMPERATURE: 97.8 F | OXYGEN SATURATION: 95 % | HEART RATE: 80 BPM | RESPIRATION RATE: 18 BRPM | BODY MASS INDEX: 34.7 KG/M2 | HEIGHT: 72 IN

## 2018-07-31 DIAGNOSIS — I10 ESSENTIAL HYPERTENSION: ICD-10-CM

## 2018-07-31 DIAGNOSIS — F41.8 DEPRESSION WITH ANXIETY: ICD-10-CM

## 2018-07-31 DIAGNOSIS — J45.20 MILD INTERMITTENT ASTHMA, UNSPECIFIED WHETHER COMPLICATED: Primary | ICD-10-CM

## 2018-07-31 DIAGNOSIS — Z87.891 FORMER SMOKER: ICD-10-CM

## 2018-07-31 RX ORDER — SERTRALINE HYDROCHLORIDE 100 MG/1
100 TABLET, FILM COATED ORAL DAILY
Qty: 90 TAB | Refills: 1 | Status: SHIPPED | OUTPATIENT
Start: 2018-07-31 | End: 2019-04-10 | Stop reason: SDUPTHER

## 2018-07-31 NOTE — MR AVS SNAPSHOT
[facility] 
 
 
 [address] [place] [address] 
[phone] Patient: April Stern MRN: [id number] Visit Information Date & Time Provider Department Dept. Phone Encounter #  
 7/31/2018  1:15 PM Janelle Roa MD [address] [place] [address] [phone] [id number] Follow-up Instructions Return if symptoms worsen or fail to improve, for Follow Up. Your Appointments 8/28/2018  9:00 AM  
ROUTINE CARE with Janelle Roa MD  
[facility]) Appt Note: 3 month follow up HTN  
 [address] [id number]  
[phone]  
  
   
 [address] [address] Upcoming Health Maintenance Date Due Pneumococcal 19-64 Medium Risk (1 of 1 - PPSV23) 7/31/1980 FOBT Q 1 YEAR AGE 50-75 7/31/2011 Influenza Age 5 to Adult 8/1/2018 BREAST CANCER SCRN MAMMOGRAM 5/1/2020 PAP AKA CERVICAL CYTOLOGY 4/17/2021 DTaP/Tdap/Td series (2 - Td) 4/17/2028 Allergies as of 7/31/2018  Review Complete On: 7/31/2018 By: Janelle Roa MD  
 No Known Allergies Current Immunizations  Never Reviewed Name Date Influenza Vaccine (Quad) PF 4/17/2018 Tdap 4/17/2018 Not reviewed this visit You Were Diagnosed With   
  
 Codes Comments Mild intermittent asthma, unspecified whether complicated    -  Primary ICD-10-CM: J45.20 ICD-9-CM: 493.90 Former smoker     ICD-10-CM: M94.639 ICD-9-CM: V15.82 Essential hypertension     ICD-10-CM: I10 
ICD-9-CM: 401.9 Depression with anxiety     ICD-10-CM: F41.8 ICD-9-CM: 300.4 Vitals BP Pulse Temp Resp Height(growth percentile) Weight(growth percentile) 158/85 (BP 1 Location: Left arm, BP Patient Position: Sitting) 80 97.8 °F (36.6 °C) (Oral) 18 6' (1.829 m) 256 lb 3.2 oz (116.2 kg) SpO2 BMI OB Status Smoking Status 95% 34.75 kg/m2 Postmenopausal Former Smoker Vitals History BMI and BSA Data Body Mass Index Body Surface Area 34.75 kg/m 2 2.43 m 2 Preferred Pharmacy Pharmacy Name Phone [address] [place] [address], [address] [place] [address] Rd. [phone] Your Updated Medication List  
  
   
This list is accurate as of 7/31/18  1:34 PM.  Always use your most recent med list.  
  
  
  
  
 albuterol sulfate 90 mcg/actuation Aepb Take 2 Puffs by inhalation every four (4) hours as needed for Cough. cholecalciferol 50,000 unit capsule Commonly known as:  VITAMIN D3 Take 1 capsule once a week for the next 8 weeks; if available OTC, please notify pt  
  
 lisinopril 40 mg tablet Commonly known as:  Keisha Yang Take 1 Tab by mouth daily. sertraline 100 mg tablet Commonly known as:  ZOLOFT Take 1 Tab by mouth daily. triamterene-hydroCHLOROthiazide 37.5-25 mg per tablet Commonly known as:  Ramonia Gelineau Take 1 Tab by mouth daily. Prescriptions Sent to Pharmacy Refills  
 sertraline (ZOLOFT) 100 mg tablet 1 Sig: Take 1 Tab by mouth daily. Class: Normal  
 Pharmacy: [facility] DR SIMBA WORRELL [facility], [facility] #: [phone] Route: Oral  
  
We Performed the Following REFERRAL TO PULMONARY DISEASE [REO41 Custom] Comments: PFT to eval COPD given smoking history and obstructive symptoms. Follow-up Instructions Return if symptoms worsen or fail to improve, for Follow Up. Referral Information Referral ID Referred By Referred To  
  
 [id number] [facility] 101 [facility], 40 [facility] Visits Status Start Date End Date 1 New Request 7/31/18 7/31/19 If your referral has a status of pending review or denied, additional information will be sent to support the outcome of this decision. Patient Instructions DASH Diet: Care Instructions Your Care Instructions The DASH diet is an eating plan that can help lower your blood pressure. DASH stands for Dietary Approaches to Stop Hypertension. Hypertension is high blood pressure. The DASH diet focuses on eating foods that are high in calcium, potassium, and magnesium. These nutrients can lower blood pressure. The foods that are highest in these nutrients are fruits, vegetables, low-fat dairy products, nuts, seeds, and legumes. But taking calcium, potassium, and magnesium supplements instead of eating foods that are high in those nutrients does not have the same effect. The DASH diet also includes whole grains, fish, and poultry. The DASH diet is one of several lifestyle changes your doctor may recommend to lower your high blood pressure. Your doctor may also want you to decrease the amount of sodium in your diet. Lowering sodium while following the DASH diet can lower blood pressure even further than just the DASH diet alone. Follow-up care is a key part of your treatment and safety. Be sure to make and go to all appointments, and call your doctor if you are having problems. It's also a good idea to know your test results and keep a list of the medicines you take. How can you care for yourself at home? Following the DASH diet · Eat 4 to 5 servings of fruit each day. A serving is 1 medium-sized piece of fruit, ½ cup chopped or canned fruit, 1/4 cup dried fruit, or 4 ounces (½ cup) of fruit juice. Choose fruit more often than fruit juice. · Eat 4 to 5 servings of vegetables each day. A serving is 1 cup of lettuce or raw leafy vegetables, ½ cup of chopped or cooked vegetables, or 4 ounces (½ cup) of vegetable juice. Choose vegetables more often than vegetable juice. · Get 2 to 3 servings of low-fat and fat-free dairy each day. A serving is 8 ounces of milk, 1 cup of yogurt, or 1 ½ ounces of cheese. · Eat 6 to 8 servings of grains each day. A serving is 1 slice of bread, 1 ounce of dry cereal, or ½ cup of cooked rice, pasta, or cooked cereal. Try to choose whole-grain products as much as possible. · Limit lean meat, poultry, and fish to 2 servings each day. A serving is 3 ounces, about the size of a deck of cards. · Eat 4 to 5 servings of nuts, seeds, and legumes (cooked dried beans, lentils, and split peas) each week. A serving is 1/3 cup of nuts, 2 tablespoons of seeds, or ½ cup of cooked beans or peas. · Limit fats and oils to 2 to 3 servings each day. A serving is 1 teaspoon of vegetable oil or 2 tablespoons of salad dressing. · Limit sweets and added sugars to 5 servings or less a week. A serving is 1 tablespoon jelly or jam, ½ cup sorbet, or 1 cup of lemonade. · Eat less than 2,300 milligrams (mg) of sodium a day. If you limit your sodium to 1,500 mg a day, you can lower your blood pressure even more. Tips for success · Start small. Do not try to make dramatic changes to your diet all at once. You might feel that you are missing out on your favorite foods and then be more likely to not follow the plan. Make small changes, and stick with them. Once those changes become habit, add a few more changes. · Try some of the following: ¨ Make it a goal to eat a fruit or vegetable at every meal and at snacks. This will make it easy to get the recommended amount of fruits and vegetables each day. ¨ Try yogurt topped with fruit and nuts for a snack or healthy dessert. ¨ Add lettuce, tomato, cucumber, and onion to sandwiches. ¨ Combine a ready-made pizza crust with low-fat mozzarella cheese and lots of vegetable toppings. Try using tomatoes, squash, spinach, broccoli, carrots, cauliflower, and onions. ¨ Have a variety of cut-up vegetables with a low-fat dip as an appetizer instead of chips and dip. ¨ Sprinkle sunflower seeds or chopped almonds over salads.  Or try adding chopped walnuts or almonds to cooked vegetables. ¨ Try some vegetarian meals using beans and peas. Add garbanzo or kidney beans to salads. Make burritos and tacos with mashed fatima beans or black beans. Where can you learn more? Go to http://hannah-ellie.info/. Enter Z844 in the search box to learn more about \"DASH Diet: Care Instructions. \" Current as of: December 6, 2017 Content Version: 11.7 © 2574-4339 [employer]. Care instructions adapted under license by [employer] (which disclaims liability or warranty for this information). If you have questions about a medical condition or this instruction, always ask your healthcare professional. [address] any warranty or liability for your use of this information. Introducing [facility]! [facility] introduces GroupTie patient portal. Now you can access parts of your medical record, email your doctor's office, and request medication refills online. 1. In your internet browser, go to [email or web address] 2. Click on the First Time User? Click Here link in the Sign In box. You will see the New Member Sign Up page. 3. Enter your GroupTie Access Code exactly as it appears below. You will not need to use this code after youve completed the sign-up process. If you do not sign up before the expiration date, you must request a new code. · GroupTie Access Code: [email or web address] Expires: 10/29/2018  1:33 PM 
 
4. Enter the last four digits of your Social Security Number (xxxx) and Date of Birth (mm/dd/yyyy) as indicated and click Submit. You will be taken to the next sign-up page. 5. Create a [employer] ID. This will be your GroupTie login ID and cannot be changed, so think of one that is secure and easy to remember. 6. Create a [employer] password. You can change your password at any time. 7. Enter your Password Reset Question and Answer.  This can be used at a later time if you forget your password. 8. Enter your e-mail address. You will receive e-mail notification when new information is available in [address] 9. Click Sign Up. You can now view and download portions of your medical record. 10. Click the Download Summary menu link to download a portable copy of your medical information. If you have questions, please visit the Frequently Asked Questions section of the [employer] website. Remember, [employer] is NOT to be used for urgent needs. For medical emergencies, dial 911. Now available from your iPhone and Android! Please provide this summary of care documentation to your next provider. Your primary care clinician is listed as Teresa Carroll. If you have any questions after today's visit, please call [phone].

## 2018-07-31 NOTE — TELEPHONE ENCOUNTER
Patient is calling in regards to being seen in the [facility] on yesterday 7/30/18. She states she was seen their due to her breathing and feels it is more to it then her asthma. She is requesting a call back in regards to this being that she is wanting to see a lung doctor.   Patient is scheduled to be seen in office today Tuesday, July 31, 2018 01:15 PM.     Best call back [phone]

## 2018-07-31 NOTE — PROGRESS NOTES
Chief Complaint   Patient presents with   [facility] Follow Up     ER follow up for trouble breathing. Patient went to [facility] on 7/30/2018. Patient would like a referral to lung specialists. 1. Have you been to the ER, urgent care clinic since your last visit? Hospitalized since your last visit? Yes When: [facility] ER, dx COPD exacberation     2. Have you seen or consulted any other health care providers outside of the [facility] since your last visit? Include any pap smears or colon screening.  No

## 2018-07-31 NOTE — PATIENT INSTRUCTIONS

## 2018-07-31 NOTE — PROGRESS NOTES
[facility] Note      Subjective:     Chief Complaint   Patient presents with   [facility] Follow Up     ER follow up for trouble breathing. Patient went to [facility] on 2018. Patient would like a referral to lung specialists. John Henry is a 63y.o. year old female who presents for evaluation of the followin [address] Follow Up:  Outside Facility 18  Reason for Visit: Shortness of Breath  Dx: COPD Exacerbation    Per Discharge Summary: Scanned into chart    Imaging, labs, provider notes, discharge summary reviewed. Pertinent Findings: CXR Negative      Interval Events:   Persitent cough, No sleep disturbance or recurrence of breathing diffuclty  Tx: Albuterol  Requests referral to pulmonologist      Depression:    Poor sleep  Onset 3/2018  Tx: Sertraline 50mg daily. - mild improvement with treatment  Endorses stressors at work, feeling overwhelmed  No new joint aches, headaches, dizziness. HTN:   Tx: lisinopril 40  + triamterene 37.5 + HCTZ 25  Previous: additional  Amlodipine 10 + metoprolol 50 mg bid  Not adhering to low salt diet  No home monitoring  No CO, SOB, leg swelling, headache       Social:   Works as  at [employer]. Work 8 hours per day during the day  Lives with         Review of Systems   Pertinent positives and negative per HPI. All other systems  reviewed are negative for a Comprehensive ROS (10+). Past Medical History:   Diagnosis Date    Hypertension     Menopause     LMP-40years old? Social History     Social History    Marital status:      Spouse name: N/A    Number of children: N/A    Years of education: N/A     Occupational History    Not on file.      Social History Main Topics    Smoking status: Former Smoker     Packs/day: 1.00     Years: 15.00     Quit date: 2018    Smokeless tobacco: Never Used    Alcohol use Yes      Comment: occasionally    Drug use: No    Sexual activity: No     Other Topics Concern    Not on file     Social History Narrative       Current Outpatient Prescriptions   Medication Sig    albuterol sulfate 90 mcg/actuation aepb Take 2 Puffs by inhalation every four (4) hours as needed for Cough.  lisinopril (PRINIVIL, ZESTRIL) 40 mg tablet Take 1 Tab by mouth daily.  triamterene-hydroCHLOROthiazide (MAXZIDE) 37.5-25 mg per tablet Take 1 Tab by mouth daily.  sertraline (ZOLOFT) 50 mg tablet Take 1 Tab by mouth daily.  Cholecalciferol, Vitamin D3, 50,000 unit cap Take 1 capsule once a week for the next 8 weeks; if available OTC, please notify pt     No current facility-administered medications for this visit. Objective:     Vitals:    07/31/18 1316 07/31/18 1318   BP: (!) 160/94 158/85   Pulse: 78 80   Resp: 18    Temp: 97.8 °F (36.6 °C)    TempSrc: Oral    SpO2: 95%    Weight: 256 lb 3.2 oz (116.2 kg)    Height: 6' (1.829 m)        Physical Examination:  General: Alert, cooperative, no distress, appears stated age. Obese  Eyes: Conjunctivae/corneas clear. PERRL, EOMs intact. Ears: Normal external ear canals both ears. TM clear and mobile bilaterally  Nose: Nares normal. Septum midline. Mucosa normal. No drainage or sinus tenderness. Mouth/Throat: Lips, mucosa, and tongue normal. Teeth and gums normal.  Neck: Supple, symmetrical, trachea midline, no adenopathy. No thyroid enlargement/tenderness/nodules  Back: Symmetric, no curvature. ROM normal.  Lungs:  End expiratory wheezing diffusely. Heart: Regular rate and rhythm, S1, S2 normal, no murmur, click, rub or gallop. Abdomen: Soft, non-tender. Bowel sounds normal. No masses or organomegaly. Extremities: Extremities normal, atraumatic, no cyanosis or edema. Pulses: 2+ and symmetric all extremities. Skin: Skin color, texture, turgor normal. No rashes or lesions on exposed skin. Lymph nodes: Cervical, supraclavicular nodes normal.  Neurologic: CNII-XII intact.  Strength 5/5 grossly. Sensation and reflexes normal throughout. Psych: Mild anxiety     No visits with results within 3 Month(s) from this visit. Latest known visit with results is:    Office Visit on 04/17/2018   Component Date Value Ref Range Status    Cholesterol, total 04/17/2018 181  100 - 199 mg/dL Final    Triglyceride 04/17/2018 78  0 - 149 mg/dL Final    HDL Cholesterol 04/17/2018 61  >39 mg/dL Final    VLDL, calculated 04/17/2018 16  5 - 40 mg/dL Final    LDL, calculated 04/17/2018 104* 0 - 99 mg/dL Final    Hemoglobin A1c 04/17/2018 5.1  4.8 - 5.6 % Final    Comment:          Pre-diabetes: 5.7 - 6.4           Diabetes: >6.4           Glycemic control for adults with diabetes: <7.0      Estimated average glucose 04/17/2018 100  mg/dL Final    Glucose 04/17/2018 87  65 - 99 mg/dL Final    BUN 04/17/2018 19  6 - 24 mg/dL Final    Creatinine 04/17/2018 0.91  0.57 - 1.00 mg/dL Final    GFR est non-AA 04/17/2018 71  >59 mL/min/1.73 Final    GFR est AA 04/17/2018 82  >59 mL/min/1.73 Final    BUN/Creatinine ratio 04/17/2018 21  9 - 23 Final    Sodium 04/17/2018 140  134 - 144 mmol/L Final    Potassium 04/17/2018 4.4  3.5 - 5.2 mmol/L Final    Chloride 04/17/2018 104  96 - 106 mmol/L Final    CO2 04/17/2018 21  18 - 29 mmol/L Final    Calcium 04/17/2018 10.1  8.7 - 10.2 mg/dL Final    Protein, total 04/17/2018 7.1  6.0 - 8.5 g/dL Final    Albumin 04/17/2018 4.3  3.5 - 5.5 g/dL Final    GLOBULIN, TOTAL 04/17/2018 2.8  1.5 - 4.5 g/dL Final    A-G Ratio 04/17/2018 1.5  1.2 - 2.2 Final    Bilirubin, total 04/17/2018 0.8  0.0 - 1.2 mg/dL Final    Alk.  phosphatase 04/17/2018 108  39 - 117 IU/L Final    AST (SGOT) 04/17/2018 17  0 - 40 IU/L Final    ALT (SGPT) 04/17/2018 13  0 - 32 IU/L Final    WBC 04/17/2018 6.4  3.4 - 10.8 x10E3/uL Final    RBC 04/17/2018 4.54  3.77 - 5.28 x10E6/uL Final    HGB 04/17/2018 13.2  11.1 - 15.9 g/dL Final    HCT 04/17/2018 39.8  34.0 - 46.6 % Final    MCV 04/17/2018 88 79 - 97 fL Final    MCH 04/17/2018 29.1  26.6 - 33.0 pg Final    MCHC 04/17/2018 33.2  31.5 - 35.7 g/dL Final    RDW 04/17/2018 13.2  12.3 - 15.4 % Final    PLATELET 46/38/4020 547  150 - 379 x10E3/uL Final    NEUTROPHILS 04/17/2018 61  Not Estab. % Final    Lymphocytes 04/17/2018 26  Not Estab. % Final    MONOCYTES 04/17/2018 8  Not Estab. % Final    EOSINOPHILS 04/17/2018 5  Not Estab. % Final    BASOPHILS 04/17/2018 0  Not Estab. % Final    ABS. NEUTROPHILS 04/17/2018 3.9  1.4 - 7.0 x10E3/uL Final    Abs Lymphocytes 04/17/2018 1.7  0.7 - 3.1 x10E3/uL Final    ABS. MONOCYTES 04/17/2018 0.5  0.1 - 0.9 x10E3/uL Final    ABS. EOSINOPHILS 04/17/2018 0.3  0.0 - 0.4 x10E3/uL Final    ABS. BASOPHILS 04/17/2018 0.0  0.0 - 0.2 x10E3/uL Final    IMMATURE GRANULOCYTES 04/17/2018 0  Not Estab. % Final    ABS. IMM. GRANS. 04/17/2018 0.0  0.0 - 0.1 x10E3/uL Final    TSH 04/17/2018 0.950  0.450 - 4.500 uIU/mL Final    T4, Free 04/17/2018 1.43  0.82 - 1.77 ng/dL Final    VITAMIN D, 25-HYDROXY 04/17/2018 20.1* 30.0 - 100.0 ng/mL Final    Comment: Vitamin D deficiency has been defined by the [facility] practice guideline as a  level of serum 25-OH vitamin D less than 20 ng/mL (1,2). The Endocrine Society went on to further define vitamin D  insufficiency as a level between 21 and 29 ng/mL (2). 1. IOM ([place] [employer]). 2010. Dietary reference     intakes for calcium and D. [facility]: The     [employer]. 2. Beverly MF, Yanet NC, Refugio LEA, et al.     Evaluation, treatment, and prevention of vitamin D     deficiency: an Endocrine Society clinical practice     guideline. JCEM. 2011 Jul; 96(7):1911-30.       Creatinine, urine 04/17/2018 76.7  Not Estab. mg/dL Final    Microalbumin, urine 04/17/2018 46.7  Not Estab. ug/mL Final    Microalb/Creat ratio (ug/mg creat.) 04/17/2018 60.9* 0.0 - 30.0 mg/g creat Final    HCV Ab 04/17/2018 <0.1  0.0 - 0.9 s/co ratio Final    HCV Interpretation 04/17/2018 Comment   Final    Comment: Negative  Not infected with HCV, unless recent infection is suspected or other  evidence exists to indicate HCV infection.  INTERPRETATION 04/17/2018 Note   Final    Supplemental report is available. Assessment/ Plan:   Diagnoses and all orders for this visit:    1. Mild intermittent asthma, unspecified whether complicated  -     [facility]    2. Essential hypertension    3. Depression with anxiety  -     sertraline (ZOLOFT) 100 mg tablet; Take 1 Tab by mouth daily. 4. Former smoker  -     [facility]       Asthma vs COPD exacerbation in former smoker improved. Pulm referral to clarify diagnosis and optimize maintenance therapy. BP NOT at goal <140/90 today. Encouraged low salt diet and control anxiety. Mood not controlled. Increase sertraline to 100mg daily. I have discussed the diagnosis with the patient and the intended plan as seen in the above orders. The patient has received an after-visit summary and questions were answered concerning future plans. I have discussed medication side effects and warnings with the patient as well. Follow-up Disposition:  Return in about 3 months (around 10/31/2018) for Follow Up.       Signed,    Brook Walker MD  7/31/2018

## 2019-04-10 ENCOUNTER — OFFICE VISIT (OUTPATIENT)
Dept: FAMILY MEDICINE CLINIC | Age: 59
End: 2019-04-10

## 2019-04-10 VITALS
BODY MASS INDEX: 38.09 KG/M2 | RESPIRATION RATE: 19 BRPM | OXYGEN SATURATION: 94 % | WEIGHT: 281.2 LBS | HEIGHT: 72 IN | DIASTOLIC BLOOD PRESSURE: 85 MMHG | HEART RATE: 84 BPM | TEMPERATURE: 98.2 F | SYSTOLIC BLOOD PRESSURE: 148 MMHG

## 2019-04-10 DIAGNOSIS — E55.9 VITAMIN D DEFICIENCY: ICD-10-CM

## 2019-04-10 DIAGNOSIS — E66.01 CLASS 2 SEVERE OBESITY DUE TO EXCESS CALORIES WITH SERIOUS COMORBIDITY AND BODY MASS INDEX (BMI) OF 36.0 TO 36.9 IN ADULT (HCC): ICD-10-CM

## 2019-04-10 DIAGNOSIS — I10 ESSENTIAL HYPERTENSION: ICD-10-CM

## 2019-04-10 DIAGNOSIS — J42 CHRONIC BRONCHITIS, UNSPECIFIED CHRONIC BRONCHITIS TYPE (HCC): Primary | ICD-10-CM

## 2019-04-10 DIAGNOSIS — Z23 ENCOUNTER FOR IMMUNIZATION: ICD-10-CM

## 2019-04-10 DIAGNOSIS — F41.8 DEPRESSION WITH ANXIETY: ICD-10-CM

## 2019-04-10 RX ORDER — SERTRALINE HYDROCHLORIDE 100 MG/1
100 TABLET, FILM COATED ORAL DAILY
Qty: 90 TAB | Refills: 1 | Status: SHIPPED | OUTPATIENT
Start: 2019-04-10 | End: 2019-10-25 | Stop reason: SDUPTHER

## 2019-04-10 RX ORDER — ASPIRIN 325 MG
TABLET, DELAYED RELEASE (ENTERIC COATED) ORAL
Qty: 8 CAP | Refills: 2 | Status: SHIPPED | OUTPATIENT
Start: 2019-04-10 | End: 2019-06-21 | Stop reason: ALTCHOICE

## 2019-04-10 RX ORDER — HYDROXYZINE PAMOATE 25 MG/1
25 CAPSULE ORAL
Qty: 30 CAP | Refills: 0 | Status: SHIPPED | OUTPATIENT
Start: 2019-04-10 | End: 2019-04-12 | Stop reason: CLARIF

## 2019-04-10 RX ORDER — TRIAMTERENE AND HYDROCHLOROTHIAZIDE 75; 50 MG/1; MG/1
1 TABLET ORAL DAILY
Qty: 180 TAB | Refills: 0 | Status: SHIPPED | OUTPATIENT
Start: 2019-04-10 | End: 2019-10-25 | Stop reason: SDUPTHER

## 2019-04-10 NOTE — PROGRESS NOTES
Chief Complaint Patient presents with  Nausea  
  x 3 days  Dizziness  
  x 3 days  Breathing Problem  
  x 3 days, increases with activity and coughing  Cough  
  x 3 days, productive 1. Have you been to the ER, urgent care clinic since your last visit? Hospitalized since your last visit? No 
 
2. Have you seen or consulted any other health care providers outside of the [address] [place] [address] since your last visit? Include any pap smears or colon screening.  No

## 2019-04-10 NOTE — PATIENT INSTRUCTIONS
For your symptoms: Your symptoms may improve with an oral antihistamine. These are available over the counter and include: 
Loratadine/claritin Cetirizine/Zyrtec Fexofenadine/Allegra Levocetirizine/Xyzal 
 
· Your symptoms may improve with a nasal steroid. These are available over the counter and include: · Flonase (aka fluticasone) · Nasocort (aka triamcinolone) · Nasonex (aka mometasone) · Rhinocort (aka budesonide) · Increase fluid intake, especially water to thin mucous and boost the immune system. · Avoid sugar and dairy while congested since they thicken mucous. · Get plenty of rest!   
· Gargle 3 times daily and as needed in Listerine or warm salt water vinegar solutions (1 tsp salt, 1 tsp vinegar in 1 cup lukewarm water.) · Use OTC nasal saline spray up each nostril four times daily. You could also consider using a netipot with distilled water. · Use humidifier at bedtime. · Use OTC Mucinex 600 mg twice daily to loosen mucous. · Use OTC Tylenol  (up to 650mg every 6 hours) or Ibuprofen (up to 800 mg every 8 hours) as needed for pain, fever or headaches. ·  Avoid decongestants and Ibuprofen if you have high blood pressure! Return to the doctor for evaluation: · If mucous is consistently discolored yellow or green throughout the day for more than a week · If you develop worsening facial pain · If you develop a fever that will not go away · If your symptoms worsen instead of improve DASH Diet: Care Instructions Your Care Instructions The DASH diet is an eating plan that can help lower your blood pressure. DASH stands for Dietary Approaches to Stop Hypertension. Hypertension is high blood pressure. The DASH diet focuses on eating foods that are high in calcium, potassium, and magnesium. These nutrients can lower blood pressure.  The foods that are highest in these nutrients are fruits, vegetables, low-fat dairy products, nuts, seeds, and legumes. But taking calcium, potassium, and magnesium supplements instead of eating foods that are high in those nutrients does not have the same effect. The DASH diet also includes whole grains, fish, and poultry. The DASH diet is one of several lifestyle changes your doctor may recommend to lower your high blood pressure. Your doctor may also want you to decrease the amount of sodium in your diet. Lowering sodium while following the DASH diet can lower blood pressure even further than just the DASH diet alone. Follow-up care is a key part of your treatment and safety. Be sure to make and go to all appointments, and call your doctor if you are having problems. It's also a good idea to know your test results and keep a list of the medicines you take. How can you care for yourself at home? Following the DASH diet · Eat 4 to 5 servings of fruit each day. A serving is 1 medium-sized piece of fruit, ½ cup chopped or canned fruit, 1/4 cup dried fruit, or 4 ounces (½ cup) of fruit juice. Choose fruit more often than fruit juice. · Eat 4 to 5 servings of vegetables each day. A serving is 1 cup of lettuce or raw leafy vegetables, ½ cup of chopped or cooked vegetables, or 4 ounces (½ cup) of vegetable juice. Choose vegetables more often than vegetable juice. · Get 2 to 3 servings of low-fat and fat-free dairy each day. A serving is 8 ounces of milk, 1 cup of yogurt, or 1 ½ ounces of cheese. · Eat 6 to 8 servings of grains each day. A serving is 1 slice of bread, 1 ounce of dry cereal, or ½ cup of cooked rice, pasta, or cooked cereal. Try to choose whole-grain products as much as possible. · Limit lean meat, poultry, and fish to 2 servings each day. A serving is 3 ounces, about the size of a deck of cards. · Eat 4 to 5 servings of nuts, seeds, and legumes (cooked dried beans, lentils, and split peas) each week.  A serving is 1/3 cup of nuts, 2 tablespoons of seeds, or ½ cup of cooked beans or peas. · Limit fats and oils to 2 to 3 servings each day. A serving is 1 teaspoon of vegetable oil or 2 tablespoons of salad dressing. · Limit sweets and added sugars to 5 servings or less a week. A serving is 1 tablespoon jelly or jam, ½ cup sorbet, or 1 cup of lemonade. · Eat less than 2,300 milligrams (mg) of sodium a day. If you limit your sodium to 1,500 mg a day, you can lower your blood pressure even more. Tips for success · Start small. Do not try to make dramatic changes to your diet all at once. You might feel that you are missing out on your favorite foods and then be more likely to not follow the plan. Make small changes, and stick with them. Once those changes become habit, add a few more changes. · Try some of the following: ? Make it a goal to eat a fruit or vegetable at every meal and at snacks. This will make it easy to get the recommended amount of fruits and vegetables each day. ? Try yogurt topped with fruit and nuts for a snack or healthy dessert. ? Add lettuce, tomato, cucumber, and onion to sandwiches. ? Combine a ready-made pizza crust with low-fat mozzarella cheese and lots of vegetable toppings. Try using tomatoes, squash, spinach, broccoli, carrots, cauliflower, and onions. ? Have a variety of cut-up vegetables with a low-fat dip as an appetizer instead of chips and dip. ? Sprinkle sunflower seeds or chopped almonds over salads. Or try adding chopped walnuts or almonds to cooked vegetables. ? Try some vegetarian meals using beans and peas. Add garbanzo or kidney beans to salads. Make burritos and tacos with mashed fatima beans or black beans. Where can you learn more? Go to http://hannah-ellie.info/. Enter P406 in the search box to learn more about \"DASH Diet: Care Instructions. \" Current as of: July 22, 2018 Content Version: 11.9 © 9663-5347 [employer].  Care instructions adapted under license by [address] (which disclaims liability or warranty for this information). If you have questions about a medical condition or this instruction, always ask your healthcare professional. [address] any warranty or liability for your use of this information. Tiotropium (Spiriva, Spiriva Respimat) - (By breathing) Why this medicine is used:  
Prevents bronchospasm caused by chronic obstructive pulmonary disease (COPD). Contact a nurse or doctor right away if you have: 
· Increased trouble breathing · Eye pain or redness, vision problems, seeing halos around lights Common side effects: · Dry mouth · Runny or stuffy nose, cough, fever, sore throat © 2017 [facility] Information is for End User's use only and may not be sold, redistributed or otherwise used for commercial purposes. Tiotropium (By breathing) Tiotropium ([id number]) Treats asthma and chronic obstructive pulmonary disease (COPD). Brand Name(s): Spiriva, Spiriva Respimat There may be other brand names for this medicine. When This Medicine Should Not Be Used: This medicine is not right for everyone. Do not use it if you had an allergic reaction to tiotropium, ipratropium, or atropine. How to Use This Medicine:  
Capsule, Spray · Your doctor will tell you how much medicine to use. Do not use more than directed. Use this medicine at the same time each day. · Read and follow the patient instructions that come with this medicine. Talk to your doctor or pharmacist if you have any questions. · Spiriva® HandiHaler®:  
¨ Spiriva® capsules should be used only with the HandiHaler® device. Do not swallow the capsule. Do not use the device with any other medicine. ¨ Do not remove the capsule from the blister pack until you are ready to use it. Use the capsule right away once you have opened a blister pack. ¨ Do not let the powder from the capsule get into your eyes. ¨ After you have used a dose, remove the used capsule and throw it away. · Spiriva® Respimat® inhaler: ¨ Do not use the inhaler with any other medicine. ¨ Do not turn the clear base before you insert the cartridge. ¨ Do not remove the cartridge once it has been inserted in the inhaler. ¨ When you use the inhaler for the first time, you will need to prime it to make sure it delivers the correct amount of medicine. To prime the inhaler, point it away from your face and press the dose release button until you see a spray of medicine. Then press the button 3 more times. The inhaler is now ready to use. ¨ If you have not used your inhaler for more than 3 days, spray 1 dose of medicine away from your face to prime the inhaler. If you have not used your inhaler for more than 21 days, repeat the instructions for priming the inhaler for the first time. · Missed dose: Take a dose as soon as you remember. If it is almost time for your next dose, wait until then and take a regular dose. Do not take extra medicine to make up for a missed dose. Do not use this medicine more than once every 24 hours. · Store the medicine in a closed container at room temperature, away from heat, moisture, and direct light. ¨ Spiriva® HandiHaler®: Leave the capsules in the blister pack until you are ready to use the medicine. ¨ Spiriva® Respimat® inhaler: Throw away the inhaler 3 months after its first use. Drugs and Foods to Avoid: Ask your doctor or pharmacist before using any other medicine, including over-the-counter medicines, vitamins, and herbal products. · Some medicines can affect how tiotropium works. Tell your doctor if you are using ipratropium or other inhaled medicines. Warnings While Using This Medicine: · Tell your doctor if you are pregnant or breastfeeding, or if you have kidney disease, glaucoma, prostate problems, or problems urinating. Tell your doctor if you are allergic to milk proteins. · This medicine may cause the following problems: 
¨ Paradoxical bronchospasm (increased trouble breathing), which can be life-threatening ¨ New or worsening narrow-angle glaucoma ¨ New or worsening trouble urinating · This medicine may cause dizziness or blurred vision. Do not drive or do anything else that could be dangerous until you know how this medicine affects you. · Tell your doctor right away if you use other medicines for your lung condition and they do not seem to be working as well as usual. Do not change your doses or stop using your medicines before you talk to your doctor. · Your doctor will check your progress and the effects of this medicine at regular visits. Keep all appointments. · Keep all medicine out of the reach of children. Never share your medicine with anyone. Possible Side Effects While Using This Medicine:  
Call your doctor right away if you notice any of these side effects: · Allergic reaction: Itching or hives, swelling in your face or hands, swelling or tingling in your mouth or throat, chest tightness, trouble breathing · Change in how much or how often you urinate, painful or difficult urination · Eye pain or redness, vision problems, seeing halos around lights · Increased trouble breathing If you notice these less serious side effects, talk with your doctor: · Dry mouth · Cough, fever, runny nose, or sore throat If you notice other side effects that you think are caused by this medicine, tell your doctor. Call your doctor for medical advice about side effects. You may report side effects to FDA at 0-875-FDA-8968 © 2017 [facility] Information is for End User's use only and may not be sold, redistributed or otherwise used for commercial purposes. The above information is an  only. It is not intended as medical advice for individual conditions or treatments.  Talk to your doctor, nurse or pharmacist before following any medical regimen to see if it is safe and effective for you.

## 2019-04-10 NOTE — PROGRESS NOTES
[facility] Note Subjective: Chief Complaint Patient presents with  Nausea  
  x 3 days  Dizziness  
  x 3 days  Breathing Problem  
  x 3 days, increases with activity and coughing  Cough  
  x 3 days, productive Triston Panda is a 63y.o. year old female who presents for evaluation of the following: 
 
 
HTN:  
Tx: lisinopril 40  + triamterene 37.5 + HCTZ 25 Previous: additional  Amlodipine 10 + metoprolol 50 mg bid Not adhering to low salt diet No home monitoring No CO, SOB, leg swelling, headache  
BMI 37 Depression:   
Poor sleep Onset 3/2018 Tx: Sertraline 50mg daily. Endorses stressors at work, feeling overwhelmed 
-Recently her stepdaughter  from breast cancer at age 45s Denies headaches, dizziness. Cough/ COPD:  
Coughing more in the past 3 days, more productive. Exacerbation in 2018. Former smoker. Tx: Albuterol as needed, using daily Denies hemoptysis, chest pain, shortness of breath. Obesity:  
Diet: Unrestrcited Activity: None Social:  
Works as  at [employer]. Work 8 hours per day during the day Lives with  Review of Systems Pertinent positives and negative per HPI. All other systems  reviewed are negative for a Comprehensive ROS (10+). Past Medical History:  
Diagnosis Date  Hypertension  Menopause LMP-40years old? Social History Socioeconomic History  Marital status:  Spouse name: Not on file  Number of children: Not on file  Years of education: Not on file  Highest education level: Not on file Occupational History  Not on file Social Needs  Financial resource strain: Not on file  Food insecurity:  
  Worry: Not on file Inability: Not on file  Transportation needs:  
  Medical: Not on file Non-medical: Not on file Tobacco Use  Smoking status: Former Smoker   Packs/day: 1.00  
 Years: 15.00 Pack years: 15.00 Last attempt to quit: 2018 Years since quittin.2  Smokeless tobacco: Never Used Substance and Sexual Activity  Alcohol use: Yes Comment: occasionally  Drug use: No  
 Sexual activity: Never Partners: Male Lifestyle  Physical activity:  
  Days per week: Not on file Minutes per session: Not on file  Stress: Not on file Relationships  Social connections:  
  Talks on phone: Not on file Gets together: Not on file Attends Alevism service: Not on file Active member of club or organization: Not on file Attends meetings of clubs or organizations: Not on file Relationship status: Not on file  Intimate partner violence:  
  Fear of current or ex partner: Not on file Emotionally abused: Not on file Physically abused: Not on file Forced sexual activity: Not on file Other Topics Concern  Not on file Social History Narrative  Not on file Current Outpatient Medications Medication Sig  
 lisinopril (PRINIVIL, ZESTRIL) 40 mg tablet Take 1 Tab by mouth daily.  triamterene-hydroCHLOROthiazide (MAXZIDE) 37.5-25 mg per tablet Take 1 Tab by mouth daily.  sertraline (ZOLOFT) 100 mg tablet Take 1 Tab by mouth daily.  albuterol sulfate 90 mcg/actuation aepb Take 2 Puffs by inhalation every four (4) hours as needed for Cough.  Cholecalciferol, Vitamin D3, 50,000 unit cap Take 1 capsule once a week for the next 8 weeks; if available OTC, please notify pt No current facility-administered medications for this visit. Objective:  
 
Vitals:  
 04/10/19 2309 04/10/19 4069 BP: (!) 180/98 148/85 Pulse: 84 Resp: 19 Temp: 98.2 °F (36.8 °C) TempSrc: Oral   
SpO2: 94% Weight: 281 lb 3.2 oz (127.6 kg) Height: 6' 1\" (1.854 m) Physical Examination: 
General: Alert, cooperative, no distress, appears stated age. Obese Eyes: Conjunctivvature. ROM normae/corneas clear. PERRL, EOMs intact. Ears: Normal external ear canals both ears. TM clear and mobile bilaterally Nose: Nares normal. Septum midline. Mucosa normal. No drainage or sinus tenderness. Mouth/Throat: Lips, mucosa, and tongue normal. Teeth and gums normal. 
-No cough during exam. 
Neck: Supple, symmetrical, trachea midline, no adenopathy. No thyroid enlargement/tenderness/nodules Back: Symmetric, no cural. 
Lungs:  End expiratory wheezing diffusely. Heart: Regular rate and rhythm, S1, S2 normal, no murmur, click, rub or gallop. Abdomen: Soft, non-tender, nondistended. Bowel sounds normal. No masses or organomegaly. Extremities: Extremities normal, atraumatic, no cyanosis or edema. Pulses: 2+ and symmetric all extremities. Skin: Skin color, texture, turgor normal. No rashes or lesions on exposed skin. Lymph nodes: Cervical, supraclavicular nodes normal. 
Neurologic: CNII-XII intact. Strength 5/5 grossly. Sensation and reflexes normal throughout. Psych: Affect labiledepressed and anxious, fast but not pressured speech, crying. No visits with results within 3 Month(s) from this visit. Latest known visit with results is:  
Office Visit on 04/17/2018 Component Date Value Ref Range Status  Cholesterol, total 04/17/2018 181  100 - 199 mg/dL Final  
 Triglyceride 04/17/2018 78  0 - 149 mg/dL Final  
 HDL Cholesterol 04/17/2018 61  >39 mg/dL Final  
 VLDL, calculated 04/17/2018 16  5 - 40 mg/dL Final  
 LDL, calculated 04/17/2018 104* 0 - 99 mg/dL Final  
 Hemoglobin A1c 04/17/2018 5.1  4.8 - 5.6 % Final  
 Comment:          Pre-diabetes: 5.7 - 6.4 Diabetes: >6.4 Glycemic control for adults with diabetes: <7.0  Estimated average glucose 04/17/2018 100  mg/dL Final  
 Glucose 04/17/2018 87  65 - 99 mg/dL Final  
 BUN 04/17/2018 19  6 - 24 mg/dL Final  
 Creatinine 04/17/2018 0.91  0.57 - 1.00 mg/dL Final  
  GFR est non-AA 04/17/2018 71  >59 mL/min/1.73 Final  
 GFR est AA 04/17/2018 82  >59 mL/min/1.73 Final  
 BUN/Creatinine ratio 04/17/2018 21  9 - 23 Final  
 Sodium 04/17/2018 140  134 - 144 mmol/L Final  
 Potassium 04/17/2018 4.4  3.5 - 5.2 mmol/L Final  
 Chloride 04/17/2018 104  96 - 106 mmol/L Final  
 CO2 04/17/2018 21  18 - 29 mmol/L Final  
 Calcium 04/17/2018 10.1  8.7 - 10.2 mg/dL Final  
 Protein, total 04/17/2018 7.1  6.0 - 8.5 g/dL Final  
 Albumin 04/17/2018 4.3  3.5 - 5.5 g/dL Final  
 GLOBULIN, TOTAL 04/17/2018 2.8  1.5 - 4.5 g/dL Final  
 A-G Ratio 04/17/2018 1.5  1.2 - 2.2 Final  
 Bilirubin, total 04/17/2018 0.8  0.0 - 1.2 mg/dL Final  
 Alk. phosphatase 04/17/2018 108  39 - 117 IU/L Final  
 AST (SGOT) 04/17/2018 17  0 - 40 IU/L Final  
 ALT (SGPT) 04/17/2018 13  0 - 32 IU/L Final  
 WBC 04/17/2018 6.4  3.4 - 10.8 x10E3/uL Final  
 RBC 04/17/2018 4.54  3.77 - 5.28 x10E6/uL Final  
 HGB 04/17/2018 13.2  11.1 - 15.9 g/dL Final  
 HCT 04/17/2018 39.8  34.0 - 46.6 % Final  
 MCV 04/17/2018 88  79 - 97 fL Final  
 MCH 04/17/2018 29.1  26.6 - 33.0 pg Final  
 MCHC 04/17/2018 33.2  31.5 - 35.7 g/dL Final  
 RDW 04/17/2018 13.2  12.3 - 15.4 % Final  
 PLATELET 03/22/4019 584  150 - 379 x10E3/uL Final  
 NEUTROPHILS 04/17/2018 61  Not Estab. % Final  
 Lymphocytes 04/17/2018 26  Not Estab. % Final  
 MONOCYTES 04/17/2018 8  Not Estab. % Final  
 EOSINOPHILS 04/17/2018 5  Not Estab. % Final  
 BASOPHILS 04/17/2018 0  Not Estab. % Final  
 ABS. NEUTROPHILS 04/17/2018 3.9  1.4 - 7.0 x10E3/uL Final  
 Abs Lymphocytes 04/17/2018 1.7  0.7 - 3.1 x10E3/uL Final  
 ABS. MONOCYTES 04/17/2018 0.5  0.1 - 0.9 x10E3/uL Final  
 ABS. EOSINOPHILS 04/17/2018 0.3  0.0 - 0.4 x10E3/uL Final  
 ABS. BASOPHILS 04/17/2018 0.0  0.0 - 0.2 x10E3/uL Final  
 IMMATURE GRANULOCYTES 04/17/2018 0  Not Estab. % Final  
 ABS. IMM.  GRANS. 04/17/2018 0.0  0.0 - 0.1 x10E3/uL Final  
  TSH 04/17/2018 0.950  0.450 - 4.500 uIU/mL Final  
 T4, Free 04/17/2018 1.43  0.82 - 1.77 ng/dL Final  
 VITAMIN D, 25-HYDROXY 04/17/2018 20.1* 30.0 - 100.0 ng/mL Final  
 Comment: Vitamin D deficiency has been defined by the [facility] practice guideline as a 
level of serum 25-OH vitamin D less than 20 ng/mL (1,2). The Endocrine Society went on to further define vitamin D 
insufficiency as a level between 21 and 29 ng/mL (2). 1. IOM ([place] of [employer]). 2010. Dietary reference 
   intakes for calcium and D. [facility]: The 
   [employer]. 2. Beverly MF, Yanet CEDEÑO, Refugio LEA, et al. 
   Evaluation, treatment, and prevention of vitamin D 
   deficiency: an Endocrine Society clinical practice 
   guideline. JCEM. 2011 Jul; 96(7):1911-30.  Creatinine, urine 04/17/2018 76.7  Not Estab. mg/dL Final  
 Microalbumin, urine 04/17/2018 46.7  Not Estab. ug/mL Final  
 Microalb/Creat ratio (ug/mg creat.) 04/17/2018 60.9* 0.0 - 30.0 mg/g creat Final  
 HCV Ab 04/17/2018 <0.1  0.0 - 0.9 s/co ratio Final  
 HCV Interpretation 04/17/2018 Comment   Final  
 Comment: Negative Not infected with HCV, unless recent infection is suspected or other 
evidence exists to indicate HCV infection.  INTERPRETATION 04/17/2018 Note   Final  
 Supplemental report is available. Assessment/ Plan:  
Diagnoses and all orders for this visit: 
 
1. Chronic bronchitis, unspecified chronic bronchitis type ([facility] [address] -     tiotropium (SPIRIVA) 18 mcg inhalation capsule; Take 1 Cap by inhalation daily. 2. Essential hypertension 
-     triamterene-hydroCHLOROthiazide (MAXZIDE) 75-50 mg per tablet; Take 1 Tab by mouth daily. 
-     METABOLIC PANEL, COMPREHENSIVE 
-     CBC WITH AUTOMATED DIFF 3.  Class 2 severe obesity due to excess calories with serious comorbidity and body mass index (BMI) of 36.0 to 36.9 in adult (HCC) 
-     HEMOGLOBIN A1C WITH EAG 
 
 4. Depression with anxiety 
-     sertraline (ZOLOFT) 100 mg tablet; Take 1 Tab by mouth daily. -     hydrOXYzine pamoate (VISTARIL) 25 mg capsule; Take 1 Cap by mouth daily as needed for Anxiety for up to 14 days. 5. Vitamin D deficiency 
-     cholecalciferol (VITAMIN D3) 50,000 unit capsule; Take 1 capsule once a week for the next 8 weeks; if available OTC, please notify pt 
-     VITAMIN D, 25 HYDROXY 6. Encounter for immunization 
-     PNEUMOCOCCAL POLYSACCHARIDE VACCINE, 23-VALENT, ADULT OR IMMUNOSUPPRESSED PT DOSE, 
-     DC IMMUNIZ ADMIN,1 SINGLE/COMB VAC/TOXOID Other orders -     CKD REPORT Asthma vs COPD, uncontrolled in former smoker. Start maintenance Spiriva, continue albuterol as needed. PFTs to further evaluate. Noted bone referral not completed. Pneumococcal vaccine today. BP NOT at goal <140/90 today. Encouraged low salt diet and control anxiety. Increase to triamterene-HCTZ 75-50 Mood not controlled. Refilled sertraline to 100mg daily. Add hydroxyzine. Consider addition of Wellbutrin if not improved at next visit. Encouraged counseling. Diet and lifestyle modification encouraged for weight loss Refill vitamin D which patient ran out of, recheck level today. I have discussed the diagnosis with the patient and the intended plan as seen in the above orders. The patient has received an after-visit summary and questions were answered concerning future plans. I have discussed medication side effects and warnings with the patient as well. Follow-up and Dispositions · Return in about 1 month (around 5/8/2019) for Follow Up blood pressure. Signed, Ramírez Quintanilla MD 
4/10/2019

## 2019-04-11 ENCOUNTER — TELEPHONE (OUTPATIENT)
Dept: FAMILY MEDICINE CLINIC | Age: 59
End: 2019-04-11

## 2019-04-11 DIAGNOSIS — J42 CHRONIC BRONCHITIS, UNSPECIFIED CHRONIC BRONCHITIS TYPE (HCC): Primary | ICD-10-CM

## 2019-04-11 LAB
25(OH)D3+25(OH)D2 SERPL-MCNC: 21.4 NG/ML (ref 30–100)
ALBUMIN SERPL-MCNC: 4.1 G/DL (ref 3.5–5.5)
ALBUMIN/GLOB SERPL: 1.3 {RATIO} (ref 1.2–2.2)
ALP SERPL-CCNC: 93 IU/L (ref 39–117)
ALT SERPL-CCNC: 13 IU/L (ref 0–32)
AST SERPL-CCNC: 13 IU/L (ref 0–40)
BASOPHILS # BLD AUTO: 0 X10E3/UL (ref 0–0.2)
BASOPHILS NFR BLD AUTO: 0 %
BILIRUB SERPL-MCNC: 0.4 MG/DL (ref 0–1.2)
BUN SERPL-MCNC: 22 MG/DL (ref 6–24)
BUN/CREAT SERPL: 21 (ref 9–23)
CALCIUM SERPL-MCNC: 10.6 MG/DL (ref 8.7–10.2)
CHLORIDE SERPL-SCNC: 105 MMOL/L (ref 96–106)
CO2 SERPL-SCNC: 24 MMOL/L (ref 20–29)
CREAT SERPL-MCNC: 1.07 MG/DL (ref 0.57–1)
EOSINOPHIL # BLD AUTO: 0.4 X10E3/UL (ref 0–0.4)
EOSINOPHIL NFR BLD AUTO: 7 %
ERYTHROCYTE [DISTWIDTH] IN BLOOD BY AUTOMATED COUNT: 13.7 % (ref 12.3–15.4)
EST. AVERAGE GLUCOSE BLD GHB EST-MCNC: 100 MG/DL
GLOBULIN SER CALC-MCNC: 3.2 G/DL (ref 1.5–4.5)
GLUCOSE SERPL-MCNC: 91 MG/DL (ref 65–99)
HBA1C MFR BLD: 5.1 % (ref 4.8–5.6)
HCT VFR BLD AUTO: 35.7 % (ref 34–46.6)
HGB BLD-MCNC: 11.6 G/DL (ref 11.1–15.9)
IMM GRANULOCYTES # BLD AUTO: 0 X10E3/UL (ref 0–0.1)
IMM GRANULOCYTES NFR BLD AUTO: 0 %
INTERPRETATION: NORMAL
LYMPHOCYTES # BLD AUTO: 1.6 X10E3/UL (ref 0.7–3.1)
LYMPHOCYTES NFR BLD AUTO: 26 %
MCH RBC QN AUTO: 28.8 PG (ref 26.6–33)
MCHC RBC AUTO-ENTMCNC: 32.5 G/DL (ref 31.5–35.7)
MCV RBC AUTO: 89 FL (ref 79–97)
MONOCYTES # BLD AUTO: 0.4 X10E3/UL (ref 0.1–0.9)
MONOCYTES NFR BLD AUTO: 7 %
NEUTROPHILS # BLD AUTO: 3.6 X10E3/UL (ref 1.4–7)
NEUTROPHILS NFR BLD AUTO: 60 %
PLATELET # BLD AUTO: 227 X10E3/UL (ref 150–379)
POTASSIUM SERPL-SCNC: 4.8 MMOL/L (ref 3.5–5.2)
PROT SERPL-MCNC: 7.3 G/DL (ref 6–8.5)
RBC # BLD AUTO: 4.03 X10E6/UL (ref 3.77–5.28)
SODIUM SERPL-SCNC: 143 MMOL/L (ref 134–144)
WBC # BLD AUTO: 6.1 X10E3/UL (ref 3.4–10.8)

## 2019-04-11 NOTE — TELEPHONE ENCOUNTER
Call placed to patient. Name and  verified. Advised patient that provider was out of the office. Inquired if they gave her an alternative that was covered. She stated they did not. Call placed to pharmacy who could not give me information. Pulled up formulary online. Covered are Atrovent HFA (name brand), Incruse Minnei Burch.

## 2019-04-11 NOTE — TELEPHONE ENCOUNTER
----- Message from Kathryn Ellsworth sent at 4/11/2019 12:31 PM EDT -----  Regarding: Dr. Jeong Rater  Pt stated that the inhaler that was proscribed is too expensive and she would like something that is less expensive and that the insurance will cover. .  Pt uses the [employer].  Pharmacy callback: [phone] Pt's callback: [phone]

## 2019-04-12 ENCOUNTER — TELEPHONE (OUTPATIENT)
Dept: FAMILY MEDICINE CLINIC | Age: 59
End: 2019-04-12

## 2019-04-12 DIAGNOSIS — F41.8 DEPRESSION WITH ANXIETY: Primary | ICD-10-CM

## 2019-04-12 RX ORDER — HYDROXYZINE 25 MG/1
25 TABLET, FILM COATED ORAL
Qty: 30 TAB | Refills: 1 | Status: SHIPPED | OUTPATIENT
Start: 2019-04-12 | End: 2019-04-22

## 2019-04-12 NOTE — TELEPHONE ENCOUNTER
Pharmacy requesting for medication alternative     Medication Current hydrOXYzine pamoate (VISTARIL) 25 mg capsule    Pharmacy Comments: Please change to Atrax, Vistaril on backorder.     Pharmacy on file verified  ([facility] Fax # [phone])

## 2019-04-15 NOTE — TELEPHONE ENCOUNTER
Call placed to patient. Name and  verified. Offered patient information on savings cards for inhaler. She was appreciative.

## 2019-04-15 NOTE — TELEPHONE ENCOUNTER
----- Message from Rome Pierre sent at 4/15/2019 10:16 AM EDT -----  Regarding: Dr. Aliya Marley requesting a call in regards to her not feeling any better following her last appt and she also stated that the rx gel she was switched to is still not covered by her insurance and will cost her 300+ dollars. She stated she can not afford the gel at this time. Best contact [phone].

## 2019-04-15 NOTE — TELEPHONE ENCOUNTER
----- Message from Amy Morgan sent at 4/15/2019  3:30 PM EDT -----  Regarding: Dr. Manuel Olivarez  Pt returning call to [facility]. Best contact [phone].

## 2019-04-15 NOTE — TELEPHONE ENCOUNTER
Call placed to patient. No answer left message on name confirmed voicemail I was returning her call. Patient can look online for savings card. [employer] has $10 savings card.

## 2019-04-15 NOTE — PROGRESS NOTES
Notify Patient:  
Most of your results look good. Your vitamin D remains low, continue supplement. Your kidneys show very early signs of failure- keep your blood pressure normal with medications, drink 2L of water daily, continue off cigarettes to improve this.

## 2019-04-19 ENCOUNTER — TELEPHONE (OUTPATIENT)
Dept: FAMILY MEDICINE CLINIC | Age: 59
End: 2019-04-19

## 2019-04-19 NOTE — TELEPHONE ENCOUNTER
Call placed to patient. Name and  verified. Patient stated that she noticed her lip was swollen. Onset of swelling was yesterday. She stated she has not eaten anything out of the ordinary. States that she started taking the Incruse inhaler and Vitamin D3. She is unsure what has caused it. She did take some OTC benadryl which has reduced the swelling a small amount but it is still present. Advised I would send a message to the provider. She was appreciative.

## 2019-04-19 NOTE — TELEPHONE ENCOUNTER
----- Message from Juralejo Gómez sent at 4/19/2019  8:21 AM EDT -----  Regarding: Dr. Laura Srivastava  Pt stated that she has allergic reaction to medication ( does not know the name) and it mad her top lip swollen. Pt would like a call back from the nurse.  Pt's callback: [phone]

## 2019-04-19 NOTE — TELEPHONE ENCOUNTER
Call placed to patient. Name and  verified. Notified patient of provider recommendation. She verbalized understanding.

## 2019-04-26 DIAGNOSIS — I10 ESSENTIAL HYPERTENSION: ICD-10-CM

## 2019-04-26 RX ORDER — LISINOPRIL 40 MG/1
40 TABLET ORAL DAILY
Qty: 90 TAB | Refills: 0 | Status: SHIPPED | OUTPATIENT
Start: 2019-04-26 | End: 2019-07-29 | Stop reason: SDUPTHER

## 2019-04-26 NOTE — TELEPHONE ENCOUNTER
Pt. called in today requesting a 90-days supply refill on the following meds. Pharm on file verified. LOV 04/10/2019  Last refill. 01/24/2019    Requested Prescriptions     Pending Prescriptions Disp Refills    lisinopril (PRINIVIL, ZESTRIL) 40 mg tablet 90 Tab 0     Sig: Take 1 Tab by mouth daily.

## 2019-05-08 ENCOUNTER — OFFICE VISIT (OUTPATIENT)
Dept: FAMILY MEDICINE CLINIC | Age: 59
End: 2019-05-08

## 2019-05-08 VITALS
DIASTOLIC BLOOD PRESSURE: 85 MMHG | BODY MASS INDEX: 37.36 KG/M2 | OXYGEN SATURATION: 96 % | SYSTOLIC BLOOD PRESSURE: 125 MMHG | WEIGHT: 275.8 LBS | HEART RATE: 75 BPM | RESPIRATION RATE: 16 BRPM | TEMPERATURE: 98.1 F | HEIGHT: 72 IN

## 2019-05-08 DIAGNOSIS — I10 ESSENTIAL HYPERTENSION: Primary | ICD-10-CM

## 2019-05-08 NOTE — PROGRESS NOTES
[facility] Note Subjective: Chief Complaint Patient presents with  Hypertension  
  follow up uCllen Garner is a 63y.o. year old female who presents for evaluation of the following: 
 
HTN:  
Tx: lisinopril 40  + triamterene 37.5 + HCTZ 25 Previous: additional amlodipine 10 + metoprolol 50 mg bid Not adhering to low salt diet No home monitoring No CP, SOB, leg swelling, headache Body mass index is 36.39 kg/m². Social:  
Works as  at [employer]. Work 8 hours per day during the day Lives with  Review of Systems Pertinent positives and negative per HPI. All other systems  reviewed are negative for a Comprehensive ROS (10+). Past Medical History:  
Diagnosis Date  Hypertension  Menopause LMP-40years old? Social History Socioeconomic History  Marital status:  Spouse name: Not on file  Number of children: Not on file  Years of education: Not on file  Highest education level: Not on file Occupational History  Not on file Social Needs  Financial resource strain: Not on file  Food insecurity:  
  Worry: Not on file Inability: Not on file  Transportation needs:  
  Medical: Not on file Non-medical: Not on file Tobacco Use  Smoking status: Former Smoker Packs/day: 1.00 Years: 15.00 Pack years: 15.00 Last attempt to quit: 2018 Years since quittin.3  Smokeless tobacco: Never Used Substance and Sexual Activity  Alcohol use: Yes Comment: occasionally  Drug use: No  
 Sexual activity: Never Partners: Male Lifestyle  Physical activity:  
  Days per week: Not on file Minutes per session: Not on file  Stress: Not on file Relationships  Social connections:  
  Talks on phone: Not on file Gets together: Not on file Attends Gnosticism service: Not on file Active member of club or organization: Not on file Attends meetings of clubs or organizations: Not on file Relationship status: Not on file  Intimate partner violence:  
  Fear of current or ex partner: Not on file Emotionally abused: Not on file Physically abused: Not on file Forced sexual activity: Not on file Other Topics Concern  Not on file Social History Narrative  Not on file Current Outpatient Medications Medication Sig  
 lisinopril (PRINIVIL, ZESTRIL) 40 mg tablet Take 1 Tab by mouth daily.  umeclidinium (INCRUSE ELLIPTA) 62.5 mcg/actuation inhaler Take 1 Puff by inhalation daily.  triamterene-hydroCHLOROthiazide (MAXZIDE) 75-50 mg per tablet Take 1 Tab by mouth daily.  sertraline (ZOLOFT) 100 mg tablet Take 1 Tab by mouth daily.  albuterol sulfate 90 mcg/actuation aepb Take 2 Puffs by inhalation every four (4) hours as needed for Cough.  cholecalciferol (VITAMIN D3) 50,000 unit capsule Take 1 capsule once a week for the next 8 weeks; if available OTC, please notify pt No current facility-administered medications for this visit. Objective:  
 
Vitals:  
 05/08/19 7506 05/08/19 0825 BP: 149/80 125/85 Pulse: 75 Resp: 16 Temp: 98.1 °F (36.7 °C) TempSrc: Oral   
SpO2: 96% Weight: 275 lb 12.8 oz (125.1 kg) Height: 6' 1\" (1.854 m) Physical Examination: 
General: Alert, cooperative, no distress, appears stated age. Obese Eyes: Conjunctivvature. ROM normae/corneas clear. PERRL, EOMs intact. Ears: Normal external ear canals both ears. Nose: Nares normal. Septum midline. Mucosa normal. No drainage or sinus tenderness. Mouth/Throat: Lips, mucosa, and tongue normal.  
Neck: Supple, symmetrical, trachea midline, no adenopathy. No thyroid enlargement/tenderness/nodules Back: Symmetric, no cural. 
Lungs:  End expiratory wheezing diffusely.   
Heart: Regular rate and rhythm, S1, S2 normal, no murmur, click, rub or gallop. Abdomen: Soft, non-tender, nondistended. Bowel sounds normal.  
Extremities: Extremities normal, atraumatic, no cyanosis or edema. Pulses: 2+ and symmetric all extremities. Skin: Skin color, texture, turgor normal. No rashes or lesions on exposed skin. Neurologic: CNII-XII intact. Strength 5/5 grossly. Sensation and reflexes normal throughout. Psych: Affect labiledepressed and anxious, fast but not pressured speech, crying. Office Visit on 04/10/2019 Component Date Value Ref Range Status  Hemoglobin A1c 04/10/2019 5.1  4.8 - 5.6 % Final  
 Comment:          Prediabetes: 5.7 - 6.4 Diabetes: >6.4 Glycemic control for adults with diabetes: <7.0  Estimated average glucose 04/10/2019 100  mg/dL Final  
 Glucose 04/10/2019 91  65 - 99 mg/dL Final  
 BUN 04/10/2019 22  6 - 24 mg/dL Final  
 Creatinine 04/10/2019 1.07* 0.57 - 1.00 mg/dL Final  
 GFR est non-AA 04/10/2019 58* >59 mL/min/1.73 Final  
 GFR est AA 04/10/2019 67  >59 mL/min/1.73 Final  
 BUN/Creatinine ratio 04/10/2019 21  9 - 23 Final  
 Sodium 04/10/2019 143  134 - 144 mmol/L Final  
 Potassium 04/10/2019 4.8  3.5 - 5.2 mmol/L Final  
 Chloride 04/10/2019 105  96 - 106 mmol/L Final  
 CO2 04/10/2019 24  20 - 29 mmol/L Final  
 Calcium 04/10/2019 10.6* 8.7 - 10.2 mg/dL Final  
 Protein, total 04/10/2019 7.3  6.0 - 8.5 g/dL Final  
 Albumin 04/10/2019 4.1  3.5 - 5.5 g/dL Final  
 GLOBULIN, TOTAL 04/10/2019 3.2  1.5 - 4.5 g/dL Final  
 A-G Ratio 04/10/2019 1.3  1.2 - 2.2 Final  
 Bilirubin, total 04/10/2019 0.4  0.0 - 1.2 mg/dL Final  
 Alk.  phosphatase 04/10/2019 93  39 - 117 IU/L Final  
 AST (SGOT) 04/10/2019 13  0 - 40 IU/L Final  
 ALT (SGPT) 04/10/2019 13  0 - 32 IU/L Final  
 WBC 04/10/2019 6.1  3.4 - 10.8 x10E3/uL Final  
 RBC 04/10/2019 4.03  3.77 - 5.28 x10E6/uL Final  
 HGB 04/10/2019 11.6  11.1 - 15.9 g/dL Final  
 HCT 04/10/2019 35.7  34.0 - 46.6 % Final  
  MCV 04/10/2019 89  79 - 97 fL Final  
 MCH 04/10/2019 28.8  26.6 - 33.0 pg Final  
 MCHC 04/10/2019 32.5  31.5 - 35.7 g/dL Final  
 RDW 04/10/2019 13.7  12.3 - 15.4 % Final  
 PLATELET 53/61/0988 612  150 - 379 x10E3/uL Final  
 NEUTROPHILS 04/10/2019 60  Not Estab. % Final  
 Lymphocytes 04/10/2019 26  Not Estab. % Final  
 MONOCYTES 04/10/2019 7  Not Estab. % Final  
 EOSINOPHILS 04/10/2019 7  Not Estab. % Final  
 BASOPHILS 04/10/2019 0  Not Estab. % Final  
 ABS. NEUTROPHILS 04/10/2019 3.6  1.4 - 7.0 x10E3/uL Final  
 Abs Lymphocytes 04/10/2019 1.6  0.7 - 3.1 x10E3/uL Final  
 ABS. MONOCYTES 04/10/2019 0.4  0.1 - 0.9 x10E3/uL Final  
 ABS. EOSINOPHILS 04/10/2019 0.4  0.0 - 0.4 x10E3/uL Final  
 ABS. BASOPHILS 04/10/2019 0.0  0.0 - 0.2 x10E3/uL Final  
 IMMATURE GRANULOCYTES 04/10/2019 0  Not Estab. % Final  
 ABS. IMM. GRANS. 04/10/2019 0.0  0.0 - 0.1 x10E3/uL Final  
 VITAMIN D, 25-HYDROXY 04/10/2019 21.4* 30.0 - 100.0 ng/mL Final  
 Comment: Vitamin D deficiency has been defined by the [facility] practice guideline as a 
level of serum 25-OH vitamin D less than 20 ng/mL (1,2). The Endocrine Society went on to further define vitamin D 
insufficiency as a level between 21 and 29 ng/mL (2). 1. IOM ([place] of Medicine). 2010. Dietary reference 
   intakes for calcium and D. [facility]: The 
   [employer]. 2. Beverly MF, Yanet CEDEÑO, Refugio LEA, et al. 
   Evaluation, treatment, and prevention of vitamin D 
   deficiency: an Endocrine Society clinical practice 
   guideline. JCEM. 2011 Jul; 96(7):1911-30.  Interpretation 04/10/2019 Note   Final  
 Comment: ------------------------------- 
CHRONIC KIDNEY DISEASE: 
EGFR, BLOOD PRESSURE, AND PROTEINURIA ASSESSMENT Patient's eGFR was previously outside the scope of the 
program and now is 58 mL/min/1.73mE2 corresponding to CKD stage 3a. Multiply eGFR by 1.159 if patient is African American. Potassium is within goal and has not changed 
significantly, was 4.4 and now is 4.8 mmol/L. Hemoglobin A1C 
is 5.1 %; diabetic status is unknown. Refer to ADA 
guidelines for clinical practice recommendations. EGFR, BLOOD PRESSURE, AND PROTEINURIA TREATMENT SUGGESTIONS 
- 
Guidelines recommend a target blood pressure of 140/90 mmHg 
or less in CKD patients to reduce cardiovascular risk and 
CKD progression. Assessment of albuminuria (urine 
albumin:creatinine ratio or urine protein:creatinine ratio 
preferred) is recommended at least annually in CKD patients 
for staging and disease prognosis. EGFR, BLOOD PRESSURE, AND PROTEINURIA FOLLOW-UP 
- Spot Urine Panel is recommended by KDOQI guidelines, at  
                         
least yearly; fasting Renal Panel within 1 month; 
- 
BONE and MINERAL ASSESSMENT Calcium is above goal and has risen, was 10.1 and now is 
10.6 mg/dL. Carbon Dioxide is within goal and has risen, was 21 and now is 24 mmol/L. Vitamin D insufficiency is present 
(was 20.1 and now is 21.4 ng/mL). BONE and MINERAL TREATMENT SUGGESTIONS 
- Interpretations require simultaneous measurements of serum 
calcium and phosphorus. Correct vitamin D deficiency when 
serum calcium is no longer elevated. Stop calcium 
supplements if in use. BONE and MINERAL FOLLOW-UP 
- 
25-Hydroxy Vitamin D within 6 months; fasting Renal Panel 
within 1 month; fasting PTH with Renal Panel is recommended 
by KDOQI guidelines, at least yearly; 
- 
LIPIDS ASSESSMENT Most recent order does not include a fasting Lipid Panel. LIPIDS FOLLOW-UP 
- 
fasting Lipid Panel is due; 
- 
ANEMIA ASSESSMENT Hemoglobin is low, 11.6 g/dL. Hemoglobin target assumes SOPHIE 
is not in use. ANEMIA TREATMENT SUGGESTIONS 
- Iron defi  
                        ciency is a common cause of anemia in CKD. Recommend measurement of Ferritin and TSAT. ANEMIA FOLLOW-UP 
- 
 CBC within 3 months; Fe/TIBC (TSAT) and Ferritin with CBC is 
due; 
------------------------------- DISCLAIMER These assessments and treatment suggestions are provided as 
a convenience in support of the physician-patient 
relationship and are not intended to replace the physician's 
clinical judgment. They are derived from national guidelines 
in addition to other evidence and expert opinion. The 
clinician should consider this information within the 
context of clinical opinion and the individual patient. SEE GUIDANCE FOR CHRONIC KIDNEY DISEASE PROGRAM: Kidney Disease Improving Global Outcomes (KDIGO) clinical practice 
guidelines are at http://kdigo. org/home/guidelines/. 
[facility] Kidney Disease Outcomes Quality Initiative (KDOQI (TM)), with its limitations and 
disclaimers, are at www.kidney. org/professionals/KDOQI. This 
program is intended for patie  
                        nts who have been diagnosed 
with stages 3, 4, or pre-dialysis 5 CKD. It is not intended 
for children, pregnant patients, or transplant patients. Assessment/ Plan:  
Diagnoses and all orders for this visit: 1. Essential hypertension BP at goal <140/90 today. Encouraged low salt diet and control anxiety. I have discussed the diagnosis with the patient and the intended plan as seen in the above orders. The patient has received an after-visit summary and questions were answered concerning future plans. I have discussed medication side effects and warnings with the patient as well. Follow-up and Dispositions · Return in about 3 months (around 8/8/2019) for Follow Up. Signed, Carmen Keys MD 
5/8/2019

## 2019-05-08 NOTE — PROGRESS NOTES
Chief Complaint Patient presents with  Hypertension  
  follow up 1. Have you been to the ER, urgent care clinic since your last visit? Hospitalized since your last visit? No 
 
2. Have you seen or consulted any other health care providers outside of the [address] [place] [address] since your last visit? Include any pap smears or colon screening. No  
 
Most recent colonoscopy requested- [facility] FAX: [phone]

## 2019-05-20 NOTE — PATIENT INSTRUCTIONS
DASH Diet: Care Instructions Your Care Instructions The DASH diet is an eating plan that can help lower your blood pressure. DASH stands for Dietary Approaches to Stop Hypertension. Hypertension is high blood pressure. The DASH diet focuses on eating foods that are high in calcium, potassium, and magnesium. These nutrients can lower blood pressure. The foods that are highest in these nutrients are fruits, vegetables, low-fat dairy products, nuts, seeds, and legumes. But taking calcium, potassium, and magnesium supplements instead of eating foods that are high in those nutrients does not have the same effect. The DASH diet also includes whole grains, fish, and poultry. The DASH diet is one of several lifestyle changes your doctor may recommend to lower your high blood pressure. Your doctor may also want you to decrease the amount of sodium in your diet. Lowering sodium while following the DASH diet can lower blood pressure even further than just the DASH diet alone. Follow-up care is a key part of your treatment and safety. Be sure to make and go to all appointments, and call your doctor if you are having problems. It's also a good idea to know your test results and keep a list of the medicines you take. How can you care for yourself at home? Following the DASH diet · Eat 4 to 5 servings of fruit each day. A serving is 1 medium-sized piece of fruit, ½ cup chopped or canned fruit, 1/4 cup dried fruit, or 4 ounces (½ cup) of fruit juice. Choose fruit more often than fruit juice. · Eat 4 to 5 servings of vegetables each day. A serving is 1 cup of lettuce or raw leafy vegetables, ½ cup of chopped or cooked vegetables, or 4 ounces (½ cup) of vegetable juice. Choose vegetables more often than vegetable juice. · Get 2 to 3 servings of low-fat and fat-free dairy each day. A serving is 8 ounces of milk, 1 cup of yogurt, or 1 ½ ounces of cheese. · Eat 6 to 8 servings of grains each day. A serving is 1 slice of bread, 1 ounce of dry cereal, or ½ cup of cooked rice, pasta, or cooked cereal. Try to choose whole-grain products as much as possible. · Limit lean meat, poultry, and fish to 2 servings each day. A serving is 3 ounces, about the size of a deck of cards. · Eat 4 to 5 servings of nuts, seeds, and legumes (cooked dried beans, lentils, and split peas) each week. A serving is 1/3 cup of nuts, 2 tablespoons of seeds, or ½ cup of cooked beans or peas. · Limit fats and oils to 2 to 3 servings each day. A serving is 1 teaspoon of vegetable oil or 2 tablespoons of salad dressing. · Limit sweets and added sugars to 5 servings or less a week. A serving is 1 tablespoon jelly or jam, ½ cup sorbet, or 1 cup of lemonade. · Eat less than 2,300 milligrams (mg) of sodium a day. If you limit your sodium to 1,500 mg a day, you can lower your blood pressure even more. Tips for success · Start small. Do not try to make dramatic changes to your diet all at once. You might feel that you are missing out on your favorite foods and then be more likely to not follow the plan. Make small changes, and stick with them. Once those changes become habit, add a few more changes. · Try some of the following: ? Make it a goal to eat a fruit or vegetable at every meal and at snacks. This will make it easy to get the recommended amount of fruits and vegetables each day. ? Try yogurt topped with fruit and nuts for a snack or healthy dessert. ? Add lettuce, tomato, cucumber, and onion to sandwiches. ? Combine a ready-made pizza crust with low-fat mozzarella cheese and lots of vegetable toppings. Try using tomatoes, squash, spinach, broccoli, carrots, cauliflower, and onions. ? Have a variety of cut-up vegetables with a low-fat dip as an appetizer instead of chips and dip. ? Sprinkle sunflower seeds or chopped almonds over salads.  Or try adding chopped walnuts or almonds to cooked vegetables. ? Try some vegetarian meals using beans and peas. Add garbanzo or kidney beans to salads. Make burritos and tacos with mashed fatima beans or black beans. Where can you learn more? Go to http://hannah-ellie.info/. Enter O949 in the search box to learn more about \"DASH Diet: Care Instructions. \" Current as of: July 22, 2018 Content Version: 11.9 © 1571-6468 [employer]. Care instructions adapted under license by [employer] (which disclaims liability or warranty for this information). If you have questions about a medical condition or this instruction, always ask your healthcare professional. [address] any warranty or liability for your use of this information.

## 2019-05-22 ENCOUNTER — CLINICAL SUPPORT (OUTPATIENT)
Dept: FAMILY MEDICINE CLINIC | Age: 59
End: 2019-05-22

## 2019-05-22 VITALS — DIASTOLIC BLOOD PRESSURE: 76 MMHG | SYSTOLIC BLOOD PRESSURE: 137 MMHG

## 2019-05-22 DIAGNOSIS — Z01.30 BLOOD PRESSURE CHECK: Primary | ICD-10-CM

## 2019-06-21 ENCOUNTER — TELEPHONE (OUTPATIENT)
Dept: FAMILY MEDICINE CLINIC | Age: 59
End: 2019-06-21

## 2019-06-21 ENCOUNTER — OFFICE VISIT (OUTPATIENT)
Dept: FAMILY MEDICINE CLINIC | Age: 59
End: 2019-06-21

## 2019-06-21 VITALS
DIASTOLIC BLOOD PRESSURE: 90 MMHG | SYSTOLIC BLOOD PRESSURE: 170 MMHG | HEIGHT: 72 IN | OXYGEN SATURATION: 98 % | BODY MASS INDEX: 38.01 KG/M2 | HEART RATE: 72 BPM | RESPIRATION RATE: 20 BRPM | WEIGHT: 280.6 LBS | TEMPERATURE: 98.4 F

## 2019-06-21 DIAGNOSIS — R06.02 SOB (SHORTNESS OF BREATH): Primary | ICD-10-CM

## 2019-06-21 DIAGNOSIS — G62.9 NEUROPATHY: ICD-10-CM

## 2019-06-21 DIAGNOSIS — I10 ESSENTIAL HYPERTENSION: ICD-10-CM

## 2019-06-21 NOTE — PROGRESS NOTES
Patient Name: April Stern   MRN: [id number]    Glynn Palmer is a 63 y.o. female who presents with the following:     Patient reports several week history of a burning sensation in both of her legs. Seems to occur from her feet and go up to her knees. She works as a  at [employer] and is on her feet all day long and believes that her symptoms are worse due to prolonged standing. She also has a history of chronic back pain status post multiple surgeries but denies any increased back pain recently. Taking Tylenol seems to help the pain. Last night, she stated that she has had difficulty breathing and felt like her chest was tight. She does have a history of asthma. Takes both Incruse and albuterol as needed basis. She had to cancel a plane trip this morning due to her ongoing symptoms. Is very stressed and anxious right now. BP Readings from Last 3 Encounters:   06/21/19 170/90   05/22/19 137/76   05/08/19 125/85     Wt Readings from Last 3 Encounters:   06/21/19 280 lb 9.6 oz (127.3 kg)   05/08/19 275 lb 12.8 oz (125.1 kg)   04/10/19 281 lb 3.2 oz (127.6 kg)       Review of Systems   Constitutional: Negative for fever, malaise/fatigue and weight loss. Respiratory: Positive for shortness of breath. Negative for cough, hemoptysis and wheezing. Cardiovascular: Negative for chest pain, palpitations, leg swelling and PND. Gastrointestinal: Negative for abdominal pain, constipation, diarrhea, nausea and vomiting. Musculoskeletal: Positive for myalgias. Neurological: Positive for tingling. The patient's medications, allergies, past medical history, surgical history, family history and social history were reviewed and updated where appropriate. Prior to Admission medications    Medication Sig Start Date End Date Taking? Authorizing Provider   lisinopril (PRINIVIL, ZESTRIL) 40 mg tablet Take 1 Tab by mouth daily.  4/26/19  Yes Danielle Pineda MD   umandrealibennyium (INCRUSE ELLIPTA) 62.5 mcg/actuation inhaler Take 1 Puff by inhalation daily. 4/12/19  Yes Domenico Coyle MD   triamterene-hydroCHLOROthiazide [facility]) 75-50 mg per tablet Take 1 Tab by mouth daily. 4/10/19  Yes Domenico Coyle MD   sertraline (ZOLOFT) 100 mg tablet Take 1 Tab by mouth daily. 4/10/19  Yes Domenico Coyle MD   albuterol sulfate 90 mcg/actuation aepb Take 2 Puffs by inhalation every four (4) hours as needed for Cough. 7/25/18  Yes Domenico Coyle MD       No Known Allergies        OBJECTIVE    Visit Vitals  /90 (BP 1 Location: Left arm, BP Patient Position: Sitting)   Pulse 72   Temp 98.4 °F (36.9 °C) (Oral)   Resp 20   Ht 6' 1\" (1.854 m)   Wt 280 lb 9.6 oz (127.3 kg)   SpO2 98%   BMI 37.02 kg/m²       Physical Exam   Constitutional: She is oriented to person, place, and time and well-developed, well-nourished, and in no distress. No distress. Eyes: Pupils are equal, round, and reactive to light. Conjunctivae and EOM are normal.   Cardiovascular: Normal rate, regular rhythm and normal heart sounds. Exam reveals no gallop and no friction rub. No murmur heard. Pulmonary/Chest: Effort normal and breath sounds normal. No respiratory distress. She has no wheezes. Musculoskeletal:   No obvious deformities on BLE. No swelling or erythema. Neurological: She is alert and oriented to person, place, and time. Skin: Skin is warm and dry. No rash noted. She is not diaphoretic. Psychiatric: Mood, memory, affect and judgment normal.   Nursing note and vitals reviewed. ASSESSMENT AND PLAN  Chiki Dominguez is a 63 y.o. female who presents today for:    1. SOB (shortness of breath)  EKG NSR. Likely due to asthma vs anxiety. Instructed to take Incruse daily and prn albuterol.  - AMB POC EKG ROUTINE W/ 12 LEADS, INTER & REP    2. Neuropathy  Recommend compression stockings at work; will obtain labs as below.  Could consider updated back imaging if persistent.  - FERRITIN  - IRON PROFILE  - VITAMIN B12 & FOLATE  - CK  - METABOLIC PANEL, COMPREHENSIVE  - MAGNESIUM  - PHOSPHORUS  - TSH AND FREE T4     3. Essential hypertension  Elevated today; possible due to stress/anxiety/pain; pt to f/u with PCP in 2 weeks. Medications Discontinued During This Encounter   Medication Reason    cholecalciferol (VITAMIN D3) 50,000 unit capsule Therapy Completed       Follow-up and Dispositions    · Return in about 2 weeks (around 7/5/2019) for HTN follow up with PCP. Medication risks/benefits/costs/interactions/alternatives discussed with patient. Advised patient to call back or return to office if symptoms worsen/change/persist. If patient cannot reach us or should anything more severe/urgent arise he/she should proceed directly to the nearest emergency department. Discussed expected course/resolution/complications of diagnosis in detail with patient. Patient given a written after visit summary which includes his/her diagnoses, current medications and vitals. Patient expressed understanding with the diagnosis and plan. Krishna Rojo M.D.

## 2019-06-21 NOTE — PROGRESS NOTES
Chief Complaint   Patient presents with    Leg Pain     bilateral - x 3-4 days    Shortness of Breath     since last night - had chest pain last night     1. Have you been to the ER, urgent care clinic since your last visit? Hospitalized since your last visit? No    2. Have you seen or consulted any other health care providers outside of the [address] [place] [address] since your last visit? Include any pap smears or colon screening.  No

## 2019-06-21 NOTE — TELEPHONE ENCOUNTER
Pt. is calling requesting a call back in regards to discuss her BP medication. Pt. Александр Adams she has a question about her BP pills.      Best call #[phone]

## 2019-06-21 NOTE — PATIENT INSTRUCTIONS
Shortness of Breath: Care Instructions  Your Care Instructions  Shortness of breath has many causes. Sometimes conditions such as anxiety can lead to shortness of breath. Some people get mild shortness of breath when they exercise. Trouble breathing also can be a symptom of a serious problem, such as asthma, lung disease, emphysema, heart problems, and pneumonia. If your shortness of breath continues, you may need tests and treatment. Watch for any changes in your breathing and other symptoms. Follow-up care is a key part of your treatment and safety. Be sure to make and go to all appointments, and call your doctor if you are having problems. It's also a good idea to know your test results and keep a list of the medicines you take. How can you care for yourself at home? · Do not smoke or allow others to smoke around you. If you need help quitting, talk to your doctor about stop-smoking programs and medicines. These can increase your chances of quitting for good. · Get plenty of rest and sleep. · Take your medicines exactly as prescribed. Call your doctor if you think you are having a problem with your medicine. · Find healthy ways to deal with stress. ? Exercise daily. ? Get plenty of sleep. ? Eat regularly and well. When should you call for help? Call 911 anytime you think you may need emergency care. For example, call if:    · You have severe shortness of breath.     · You have symptoms of a heart attack. These may include:  ? Chest pain or pressure, or a strange feeling in the chest.  ? Sweating. ? Shortness of breath. ? Nausea or vomiting. ? Pain, pressure, or a strange feeling in the back, neck, jaw, or upper belly or in one or both shoulders or arms. ? Lightheadedness or sudden weakness. ? A fast or irregular heartbeat. After you call 911, the  may tell you to chew 1 adult-strength or 2 to 4 low-dose aspirin. Wait for an ambulance.  Do not try to drive yourself.    Call your doctor now or seek immediate medical care if:    · Your shortness of breath gets worse or you start to wheeze. Wheezing is a high-pitched sound when you breathe.     · You wake up at night out of breath or have to prop your head up on several pillows to breathe.     · You are short of breath after only light activity or while at rest.    Watch closely for changes in your health, and be sure to contact your doctor if:    · You do not get better over the next 1 to 2 days. Where can you learn more? Go to http://hannah-ellie.info/. Enter S780 in the search box to learn more about \"Shortness of Breath: Care Instructions. \"  Current as of: September 5, 2018  Content Version: 11.9  © 6626-8118 [employer]. Care instructions adapted under license by [employer] (which disclaims liability or warranty for this information). If you have questions about a medical condition or this instruction, always ask your healthcare professional. [address] any warranty or liability for your use of this information.

## 2019-06-21 NOTE — TELEPHONE ENCOUNTER
Call placed to patient. Name and  verified. Patient requested appt with PCP advised she was not in the office today. Offered appt with a different provider. She stated she only wanted to see her doctor. Offered appt Monday none of the available times worked for patient. Patient stated she is having leg pain and just would like to speak with her provider regarding it. Patient stated that she will call back on Monday or Tuesday. She was appreciative of call.

## 2019-06-22 LAB
ALBUMIN SERPL-MCNC: 4.4 G/DL (ref 3.5–5.5)
ALBUMIN/GLOB SERPL: 1.4 {RATIO} (ref 1.2–2.2)
ALP SERPL-CCNC: 103 IU/L (ref 39–117)
ALT SERPL-CCNC: 12 IU/L (ref 0–32)
AST SERPL-CCNC: 10 IU/L (ref 0–40)
BILIRUB SERPL-MCNC: 0.3 MG/DL (ref 0–1.2)
BUN SERPL-MCNC: 29 MG/DL (ref 6–24)
BUN/CREAT SERPL: 23 (ref 9–23)
CALCIUM SERPL-MCNC: 10.1 MG/DL (ref 8.7–10.2)
CHLORIDE SERPL-SCNC: 105 MMOL/L (ref 96–106)
CK SERPL-CCNC: 109 U/L (ref 24–173)
CO2 SERPL-SCNC: 22 MMOL/L (ref 20–29)
CREAT SERPL-MCNC: 1.24 MG/DL (ref 0.57–1)
FERRITIN SERPL-MCNC: 335 NG/ML (ref 15–150)
FOLATE SERPL-MCNC: 5.1 NG/ML
GLOBULIN SER CALC-MCNC: 3.1 G/DL (ref 1.5–4.5)
GLUCOSE SERPL-MCNC: 86 MG/DL (ref 65–99)
INTERPRETATION: NORMAL
IRON SATN MFR SERPL: 28 % (ref 15–55)
IRON SERPL-MCNC: 81 UG/DL (ref 27–159)
MAGNESIUM SERPL-MCNC: 2.3 MG/DL (ref 1.6–2.3)
PHOSPHATE SERPL-MCNC: 3.1 MG/DL (ref 2.5–4.5)
POTASSIUM SERPL-SCNC: 4.9 MMOL/L (ref 3.5–5.2)
PROT SERPL-MCNC: 7.5 G/DL (ref 6–8.5)
SODIUM SERPL-SCNC: 138 MMOL/L (ref 134–144)
T4 FREE SERPL-MCNC: 1.24 NG/DL (ref 0.82–1.77)
TIBC SERPL-MCNC: 287 UG/DL (ref 250–450)
TSH SERPL DL<=0.005 MIU/L-ACNC: 0.62 UIU/ML (ref 0.45–4.5)
UIBC SERPL-MCNC: 206 UG/DL (ref 131–425)
VIT B12 SERPL-MCNC: 660 PG/ML (ref 232–1245)

## 2019-06-24 ENCOUNTER — TELEPHONE (OUTPATIENT)
Dept: FAMILY MEDICINE CLINIC | Age: 59
End: 2019-06-24

## 2019-06-24 NOTE — TELEPHONE ENCOUNTER
Outbound call to patient. Patient notified that work note has been placed up front for . Patient verbalized understanding.

## 2019-06-24 NOTE — LETTER
NOTIFICATION RETURN TO WORK / SCHOOL 
 
6/24/2019 1:54 PM 
 
Ms. Carlos Swift [address] [facility] [id number] To Whom It May Concern: 
 
Carlos Swift is currently under the care of MERLINE Larios. She was seen on  6/21/2019. If there are questions or concerns please have the patient contact our office. Sincerely, Krishna Goodwin M.D.

## 2019-06-24 NOTE — TELEPHONE ENCOUNTER
----- Message from Hanna Karthik sent at 6/24/2019  1:30 PM EDT -----  Regarding: Dr. Melchor Gaona / Telephone   Pt calling to advise that she is needing a doctors note for being seen on Friday, June 21, 2019 and can come  the note when it's ready.

## 2019-07-03 ENCOUNTER — TELEPHONE (OUTPATIENT)
Dept: FAMILY MEDICINE CLINIC | Age: 59
End: 2019-07-03

## 2019-07-03 NOTE — TELEPHONE ENCOUNTER
Outbound call to patient. No answer. Left message for patient to give us a call back to reschedule her appointment on 07/8/2019 due to the provider being out of the office. appointment was for 2 week blood pressure check.     *PSR can reschedule appointment

## 2019-07-15 ENCOUNTER — HOSPITAL ENCOUNTER (OUTPATIENT)
Dept: MAMMOGRAPHY | Age: 59
Discharge: HOME | End: 2019-07-15
Payer: COMMERCIAL

## 2019-07-15 ENCOUNTER — HOSPITAL ENCOUNTER (OUTPATIENT)
Dept: PULMONOLOGY | Age: 59
Discharge: HOME | End: 2019-07-15
Payer: COMMERCIAL

## 2019-07-15 VITALS — HEART RATE: 79 BPM

## 2019-07-15 DIAGNOSIS — Z12.39 SCREENING BREAST EXAMINATION: ICD-10-CM

## 2019-07-15 DIAGNOSIS — J42 CHRONIC BRONCHITIS, UNSPECIFIED CHRONIC BRONCHITIS TYPE (HCC): ICD-10-CM

## 2019-07-15 PROCEDURE — 74011000250 HC RX REV CODE- 250

## 2019-07-15 PROCEDURE — 77063 BREAST TOMOSYNTHESIS BI: CPT

## 2019-07-15 PROCEDURE — 94060 EVALUATION OF WHEEZING: CPT

## 2019-07-15 RX ORDER — ALBUTEROL SULFATE 0.83 MG/ML
SOLUTION RESPIRATORY (INHALATION)
Status: COMPLETED
Start: 2019-07-15 | End: 2019-07-15

## 2019-07-15 RX ADMIN — ALBUTEROL SULFATE 2.5 MG: 2.5 SOLUTION RESPIRATORY (INHALATION) at 14:59

## 2019-07-23 NOTE — PROCEDURES
[address]   PULMONARY FUNCTION TEST    Name:  Roxanne Plata  MR#:  [id number]  :  1960  ACCOUNT #:  [de-identified]  DATE OF SERVICE:  07/15/2019      REFERRING PHYSICIAN:  Dr. Uriel Quiroz. INDICATION:  Chronic bronchitis. Pulmonary function performed with assessment of spirometry for pulmonary mechanics reveals a vital capacity of 87% of predicted, 3.09 liters. FEV1 is 78% of predicted, 2.08 liters. FEV1/FVC ratio is mildly reduced at 67%. Following administration of bronchodilator, there is an 18% improvement in vital capacity, 28% improvement in FEV1 and improvement of FEV1/FVC ratio which is 73%. MVV is 74% of predicted prior to bronchodilators, improved to 107% of predicted after bronchodilators. Flow volume loop is confirmatory. IMPRESSION:  Mild airflow obstruction with significant bronchodilator response. Clinical correlation is advised.       Rico Dumont MD      [id number]  D:  2019 13:43  T:  2019 18:07  JOB #:  [id number]

## 2019-07-25 PROBLEM — J41.0 SIMPLE CHRONIC BRONCHITIS (HCC): Status: ACTIVE | Noted: 2019-07-25

## 2019-07-25 NOTE — PROGRESS NOTES
Notify Patient:  Lung testing confirmed chronic obstructive lung disease, most likely COPD. Continue current medications.

## 2019-07-25 NOTE — PROGRESS NOTES
Outbound call to patient at [phone]. No answer left message for patient to return call to office regarding test results.

## 2019-07-25 NOTE — PROGRESS NOTES
Inbound call from patient returning nurse call. Patients name and  verified. Reviewed recent test results with patient. she verbalized understanding.

## 2019-07-29 DIAGNOSIS — I10 ESSENTIAL HYPERTENSION: ICD-10-CM

## 2019-07-29 RX ORDER — LISINOPRIL 40 MG/1
40 TABLET ORAL DAILY
Qty: 90 TAB | Refills: 0 | Status: SHIPPED | OUTPATIENT
Start: 2019-07-29 | End: 2020-02-17 | Stop reason: SDUPTHER

## 2019-07-29 NOTE — TELEPHONE ENCOUNTER
Pt is calling to request a refill for the following Rx 90 day supply : .. Requested Prescriptions     Pending Prescriptions Disp Refills    lisinopril (PRINIVIL, ZESTRIL) 40 mg tablet 90 Tab 0     Sig: Take 1 Tab by mouth daily.          [address] [place] [address] June 21, 2019

## 2019-10-25 DIAGNOSIS — I10 ESSENTIAL HYPERTENSION: ICD-10-CM

## 2019-10-25 DIAGNOSIS — F41.8 DEPRESSION WITH ANXIETY: ICD-10-CM

## 2019-10-25 RX ORDER — TRIAMTERENE AND HYDROCHLOROTHIAZIDE 75; 50 MG/1; MG/1
1 TABLET ORAL DAILY
Qty: 180 TAB | Refills: 0 | Status: SHIPPED | OUTPATIENT
Start: 2019-10-25 | End: 2020-04-25

## 2019-10-25 RX ORDER — SERTRALINE HYDROCHLORIDE 100 MG/1
100 TABLET, FILM COATED ORAL DAILY
Qty: 90 TAB | Refills: 1 | Status: SHIPPED | OUTPATIENT
Start: 2019-10-25 | End: 2020-06-11

## 2019-10-25 NOTE — TELEPHONE ENCOUNTER
Pt. is calling requesting a refill on the following meds, Pharm on file verified. Pt. Stated that she is completely out and needs a refill ASAP. LOV 06/21/2019    Requested Prescriptions     Pending Prescriptions Disp Refills    triamterene-hydroCHLOROthiazide (MAXZIDE) 75-50 mg per tablet 180 Tab 0     Sig: Take 1 Tab by mouth daily.  sertraline (ZOLOFT) 100 mg tablet 90 Tab 1     Sig: Take 1 Tab by mouth daily.

## 2020-02-17 ENCOUNTER — OFFICE VISIT (OUTPATIENT)
Dept: FAMILY MEDICINE CLINIC | Age: 60
End: 2020-02-17

## 2020-02-17 VITALS
WEIGHT: 293 LBS | BODY MASS INDEX: 39.68 KG/M2 | DIASTOLIC BLOOD PRESSURE: 91 MMHG | SYSTOLIC BLOOD PRESSURE: 162 MMHG | RESPIRATION RATE: 16 BRPM | TEMPERATURE: 98.1 F | OXYGEN SATURATION: 96 % | HEART RATE: 88 BPM | HEIGHT: 72 IN

## 2020-02-17 DIAGNOSIS — I10 ESSENTIAL HYPERTENSION: ICD-10-CM

## 2020-02-17 DIAGNOSIS — E66.01 CLASS 2 SEVERE OBESITY DUE TO EXCESS CALORIES WITH SERIOUS COMORBIDITY AND BODY MASS INDEX (BMI) OF 36.0 TO 36.9 IN ADULT (HCC): ICD-10-CM

## 2020-02-17 DIAGNOSIS — J42 CHRONIC BRONCHITIS, UNSPECIFIED CHRONIC BRONCHITIS TYPE (HCC): Primary | ICD-10-CM

## 2020-02-17 DIAGNOSIS — R11.0 NAUSEA: ICD-10-CM

## 2020-02-17 RX ORDER — LISINOPRIL 40 MG/1
40 TABLET ORAL DAILY
Qty: 90 TAB | Refills: 1 | Status: SHIPPED | OUTPATIENT
Start: 2020-02-17 | End: 2020-08-20

## 2020-02-17 RX ORDER — ONDANSETRON 4 MG/1
4 TABLET, ORALLY DISINTEGRATING ORAL
Qty: 10 TAB | Refills: 0 | Status: SHIPPED | OUTPATIENT
Start: 2020-02-17 | End: 2021-05-11 | Stop reason: ALTCHOICE

## 2020-02-17 NOTE — PROGRESS NOTES
[facility] Note      Subjective:     Chief Complaint   Patient presents with    Nausea     x 3-4 days     Dizziness     x 3-4 days      Rodrigo Willard is a 63y.o. year old female who presents for evaluation of the following:      HTN:   Tx: lisinopril 40  + triamterene 37.5 + HCTZ 25  Previous: additional amlodipine 10 + metoprolol 50 mg bid  Not adhering to low salt diet  No home monitoring  No CP, SOB, leg swelling, headache   BP Readings from Last 3 Encounters:   20 (!) 162/91   19 170/90   19 137/76       Dizziness/ Nausea: Onset: 4 days ago  Character: lightheaded  Intermittent  Treatment: None  Had water and a banana today at 3pm  Endorses regular stool daily  Denies injury, chest pain, shortness of breath, fever, vomiting, diarrhea, constipation      Depression:    Onset 3/2018  Tx: Sertraline 50mg daily. Triggers: stressors at work, feeling less overwhelmed, stepdaughter  from breast cancer at age 45s  Denies headaches, dizziness.   FÉLIX: 2  3 most recent PHQ Screens 2020   Little interest or pleasure in doing things Not at all   Feeling down, depressed, irritable, or hopeless Not at all   Total Score PHQ 2 0   Trouble falling or staying asleep, or sleeping too much Several days   Feeling tired or having little energy Several days   Poor appetite, weight loss, or overeating Several days   Feeling bad about yourself - or that you are a failure or have let yourself or your family down Not at all   Trouble concentrating on things such as school, work, reading, or watching TV Not at all   Moving or speaking so slowly that other people could have noticed; or the opposite being so fidgety that others notice Not at all   Thoughts of being better off dead, or hurting yourself in some way Not at all   PHQ 9 Score 3   How difficult have these problems made it for you to do your work, take care of your home and get along with others -         Obesity: Diet: Unrestricted, 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 albuterol sulfate 90 mcg/actuation aepb (Taking) Take 2 Puffs by inhalation every four (4) hours as needed for Cough. 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 Work 8 hours per day during the day  Lives with         Review of Systems   Pertinent positives and negative per HPI. All other systems  reviewed are negative for a Comprehensive ROS (10+). Past Medical History:   Diagnosis Date    Hypertension     Menopause     LMP-40years old?         Social History     Socioeconomic History    Marital status:      Spouse name: Not on file    Number of children: Not on file    Years of education: Not on file    Highest education level: Not on file   Occupational History    Not on file   Social Needs    Financial resource strain: Not on file    Food insecurity:     Worry: Not on file     Inability: Not on file    Transportation needs:     Medical: Not on file     Non-medical: Not on file   Tobacco Use    Smoking status: Former Smoker     Packs/day: 1.00     Years: 15.00     Pack years: 15.00     Last attempt to quit: 2018     Years since quittin.0    Smokeless tobacco: Never Used   Substance and Sexual Activity    Alcohol use: Yes     Comment: occasionally    Drug use: No    Sexual activity: Yes     Partners: Male   Lifestyle    Physical activity:     Days per week: Not on file     Minutes per session: Not on file    Stress: Not on file   Relationships    Social connections:     Talks on phone: Not on file     Gets together: Not on file     Attends Buddhism service: Not on file     Active member of club or organization: Not on file     Attends meetings of clubs or organizations: Not on file     Relationship status: Not on file    Intimate partner violence:     Fear of current or ex partner: Not on file     Emotionally abused: Not on file     Physically abused: Not on file     Forced sexual activity: Not on file   Other Topics Concern    Not on file   Social History Narrative    Not on file       Current Outpatient Medications   Medication Sig    triamterene-hydroCHLOROthiazide (MAXZIDE) 75-50 mg per tablet Take 1 Tab by mouth daily.  sertraline (ZOLOFT) 100 mg tablet Take 1 Tab by mouth daily.  lisinopril (PRINIVIL, ZESTRIL) 40 mg tablet Take 1 Tab by mouth daily.  umeclidinium (INCRUSE ELLIPTA) 62.5 mcg/actuation inhaler Take 1 Puff by inhalation daily.  albuterol sulfate 90 mcg/actuation aepb Take 2 Puffs by inhalation every four (4) hours as needed for Cough. No current facility-administered medications for this visit. Objective:     Vitals:    02/17/20 1459 02/17/20 1501 02/17/20 1502 02/17/20 1503   BP: 164/88 161/89 152/77 (!) 162/91   Pulse: 90 83 73 88   Resp: 16      Temp: 98.1 °F (36.7 °C)      TempSrc: Oral      SpO2: 95% 95% 95% 96%   Weight: 302 lb 3.2 oz (137.1 kg)      Height: 6' 1\" (1.854 m)          Physical Examination:  General: Alert, cooperative, no distress, appears stated age. Obese  Eyes: Conjunctivvature. ROM normae/corneas clear. PERRL, EOMs intact. Ears: Normal external ear canals both ears. Nose: Nares normal. Septum midline. Mucosa normal. No drainage or sinus tenderness.   Mouth/Throat: Lips, mucosa, and tongue normal.   Neck: Supple, symmetrical, trachea midline, no adenopathy. No thyroid enlargement/tenderness/nodules  Back: Symmetric, no cural.  Lungs:  Faint nd expiratory wheezing  At bases on forced exhalation. Good air movement. Heart: Regular rate and rhythm, S1, S2 normal, no murmur, click, rub or gallop. Abdomen: Soft, non-tender, nondistended. Bowel sounds normal.   Extremities: Extremities normal, atraumatic, no cyanosis or edema. Pulses: 2+ and symmetric all extremities. Skin: Skin color, texture, turgor normal. No rashes or lesions on exposed skin. Neurologic: CNII-XII intact. Strength 5/5 grossly. Sensation and reflexes normal throughout. Psych: Affect labile, fast but not pressured speech. No visits with results within 3 Month(s) from this visit. Latest known visit with results is:   Office Visit on 06/21/2019   Component Date Value Ref Range Status    Ferritin 06/21/2019 335* 15 - 150 ng/mL Final    TIBC 06/21/2019 287  250 - 450 ug/dL Final    UIBC 06/21/2019 206  131 - 425 ug/dL Final    Iron 06/21/2019 81  27 - 159 ug/dL Final    Iron % saturation 06/21/2019 28  15 - 55 % Final    Vitamin B12 06/21/2019 660  232 - 1,245 pg/mL Final    Folate 06/21/2019 5.1  >3.0 ng/mL Final    Comment: A serum folate concentration of less than 3.1 ng/mL is  considered to represent clinical deficiency.       Creatine Kinase,Total 06/21/2019 109  24 - 173 U/L Final    Glucose 06/21/2019 86  65 - 99 mg/dL Final    BUN 06/21/2019 29* 6 - 24 mg/dL Final    Creatinine 06/21/2019 1.24* 0.57 - 1.00 mg/dL Final    GFR est non-AA 06/21/2019 48* >59 mL/min/1.73 Final    GFR est AA 06/21/2019 56* >59 mL/min/1.73 Final    BUN/Creatinine ratio 06/21/2019 23  9 - 23 Final    Sodium 06/21/2019 138  134 - 144 mmol/L Final    Potassium 06/21/2019 4.9  3.5 - 5.2 mmol/L Final    Chloride 06/21/2019 105  96 - 106 mmol/L Final    CO2 06/21/2019 22  20 - 29 mmol/L Final    Calcium 06/21/2019 10.1  8.7 - 10.2 mg/dL Final    Protein, total 06/21/2019 7.5  6.0 - 8.5 g/dL Final    Albumin 06/21/2019 4.4  3.5 - 5.5 g/dL Final    GLOBULIN, TOTAL 06/21/2019 3.1  1.5 - 4.5 g/dL Final    A-G Ratio 06/21/2019 1.4  1.2 - 2.2 Final    Bilirubin, total 06/21/2019 0.3  0.0 - 1.2 mg/dL Final    Alk. phosphatase 06/21/2019 103  39 - 117 IU/L Final    AST (SGOT) 06/21/2019 10  0 - 40 IU/L Final    ALT (SGPT) 06/21/2019 12  0 - 32 IU/L Final    Magnesium 06/21/2019 2.3  1.6 - 2.3 mg/dL Final    Phosphorus 06/21/2019 3.1  2.5 - 4.5 mg/dL Final    TSH 06/21/2019 0.618  0.450 - 4.500 uIU/mL Final    T4, Free 06/21/2019 1.24  0.82 - 1.77 ng/dL Final    Interpretation 06/21/2019 Note   Final    Supplemental report is available. Assessment/ Plan:   Diagnoses and all orders for this visit:    1. Chronic bronchitis, unspecified chronic bronchitis type (HCC)  -     aclidinium bromide (TUDORZA PRESSAIR) 400 mcg/actuation inhaler; Take 1 Puff by inhalation two (2) times a day. 2. Nausea  -     ondansetron (ZOFRAN ODT) 4 mg disintegrating tablet; Take 1 Tab by mouth every eight (8) hours as needed for Nausea or Nausea or Vomiting. 3. Essential hypertension  -     lisinopril (PRINIVIL, ZESTRIL) 40 mg tablet; Take 1 Tab by mouth daily.  -     BASIC METABOLIC PANEL (7)    Other orders  -     CKD REPORT      COPD improved but not well controlled with persistent shortness of breath with walking. .  Current long-acting anticholinergic cost prohibitive and patient does not plan to refill. Change to [place]. BP uncontrolled off lisinopril. Restart lisinopril and continue other antihypertensives. Encouraged low salt diet and control anxiety. Nausea and lightheadedness likely related to missed blood pressure medicine and reaction to diet. Recommend frequent small meals to prevent this. Trial Zofran. I have discussed the diagnosis with the patient and the intended plan as seen in the above orders.   The patient has received an after-visit summary and questions were answered concerning future plans. I have discussed medication side effects and warnings with the patient as well. Follow-up and Dispositions    · Return in about 2 weeks (around 3/2/2020) for Follow Up blood pressure wit nurse.            Signed,    Yarelis Angeles MD  2/17/2020

## 2020-02-17 NOTE — PROGRESS NOTES
Chief Complaint   Patient presents with    Nausea     x 3-4 days     Dizziness     x 3-4 days      1. Have you been to the ER, urgent care clinic since your last visit? Hospitalized since your last visit? No    2. Have you seen or consulted any other health care providers outside of the [address] [place] [address] since your last visit? Include any pap smears or colon screening.  No

## 2020-02-18 LAB
BUN SERPL-MCNC: 31 MG/DL (ref 6–24)
BUN/CREAT SERPL: 27 (ref 9–23)
CHLORIDE SERPL-SCNC: 105 MMOL/L (ref 96–106)
CO2 SERPL-SCNC: 25 MMOL/L (ref 20–29)
CREAT SERPL-MCNC: 1.13 MG/DL (ref 0.57–1)
GLUCOSE SERPL-MCNC: 94 MG/DL (ref 65–99)
INTERPRETATION: NORMAL
POTASSIUM SERPL-SCNC: 4.7 MMOL/L (ref 3.5–5.2)
SODIUM SERPL-SCNC: 143 MMOL/L (ref 134–144)

## 2020-02-18 NOTE — PATIENT INSTRUCTIONS
Weight Loss Tips: 
Remember this is like a part time job so your motivation and commitment is key to your success. Mindset Weight loss like any other behavior change starts in your mind. Think hard about what your motivates you to lose weight then meditate on that. Remind yourself of your motivation 
with phone alarms, scheduled meditation time, vision board, journal- just to name a few ideas. Have realistic goals. We expect with diligent healthy diet and physical activity you can lose 5% of your body weight in 3 
months. Wt in lbs x 0.05 = #lbs you should lose in 3 months. Make food and activity changes with a goal of CONSISTENCY not perfection. Food Start eating differently. Most of your weight loss and gain is from what you eat. Use small plates only Drink 2 liters (1/2 gallon) of water every day HALF of every meal should be fruit or vegetables Try meal prepping on Sunday (or your day off) with new different vegetables. Consider meal prep service such as Cleaneatz.com, wepremeals. com Replace soda with diet soda or other zero sugar drinks (selter water just fine) Consider using the Pictorama cynthia for calorie counting. Goal 0224-0345 calories per day Activity Staying physically active will help you lose more weight and can help you get over the plateau when you weight just 
won't change any more with diet. Start exercise at least 5 days per week for 40 minutes. Consider Meg[employer]at[employer] training cynthia for home exercises. You can start with walking. I suggest walking at a speed of at least 3.5-4.5mph to for the weight loss benefit. Increase your speed or distance every 2 weeks. Do some slow stretching daily of legs, arms and back. Consider adding weight training with light weights at home or at the gym. See a doctor or a physical training for 
instructions in order to avoid injuries from doing muscle training incorrectly.  
Free fitness program in [place]: [employer].. [email or web address] 
 
 
  
Chronic Obstructive Pulmonary Disease (COPD): Care Instructions Your Care Instructions Chronic obstructive pulmonary disease (COPD) is a general term for a group of lung diseases, including emphysema and chronic bronchitis. People with COPD have decreased airflow in and out of the lungs, which makes it hard to breathe. The airways also can get clogged with thick mucus. Cigarette smoking is a major cause of COPD. Although there is no cure for COPD, you can slow its progress. Following your treatment plan and taking care of yourself can help you feel better and live longer. Follow-up care is a key part of your treatment and safety. Be sure to make and go to all appointments, and call your doctor if you are having problems. It's also a good idea to know your test results and keep a list of the medicines you take. How can you care for yourself at home? 
 Staying healthy 
  · Do not smoke. This is the most important step you can take to prevent more damage to your lungs. If you need help quitting, talk to your doctor about stop-smoking programs and medicines. These can increase your chances of quitting for good.  
  · Avoid colds and flu. Get a pneumococcal vaccine shot. If you have had one before, ask your doctor whether you need a second dose. Get the flu vaccine every fall. If you must be around people with colds or the flu, wash your hands often.  
  · Avoid secondhand smoke, air pollution, and high altitudes. Also avoid cold, dry air and hot, humid air. Stay at home with your windows closed when air pollution is bad.  
 Medicines and oxygen therapy 
  · Take your medicines exactly as prescribed. Call your doctor if you think you are having a problem with your medicine.  
  · You may be taking medicines such as: 
? Bronchodilators. These help open your airways and make breathing easier. Bronchodilators are either short-acting (work for 6 to 9 hours) or long-acting (work for 24 hours). You inhale most bronchodilators, so they start to act quickly. Always carry your quick-relief inhaler with you in case you need it while you are away from home. ? Corticosteroids (prednisone, budesonide). These reduce airway inflammation. They come in pill or inhaled form. You must take these medicines every day for them to work well.  
  · A spacer may help you get more inhaled medicine to your lungs. Ask your doctor or pharmacist if a spacer is right for you. If it is, ask how to use it properly.  
  · Do not take any vitamins, over-the-counter medicine, or herbal products without talking to your doctor first.  
  · If your doctor prescribed antibiotics, take them as directed. Do not stop taking them just because you feel better. You need to take the full course of antibiotics.  
  · Oxygen therapy boosts the amount of oxygen in your blood and helps you breathe easier. Use the flow rate your doctor has recommended, and do not change it without talking to your doctor first.  
Activity 
  · Get regular exercise. Walking is an easy way to get exercise. Start out slowly, and walk a little more each day.  
  · Pay attention to your breathing. You are exercising too hard if you cannot talk while you are exercising.  
  · Take short rest breaks when doing household chores and other activities.  
  · Learn breathing methodssuch as breathing through pursed lipsto help you become less short of breath.  
  · If your doctor has not set you up with a pulmonary rehabilitation program, talk to him or her about whether rehab is right for you. Rehab includes exercise programs, education about your disease and how to manage it, help with diet and other changes, and emotional support. Diet 
  · Eat regular, healthy meals.  Use bronchodilators about 1 hour before you eat to make it easier to eat. Eat several small meals instead of three large ones. Drink beverages at the end of the meal. Avoid foods that are hard to chew.  
  · Eat foods that contain protein so that you do not lose muscle mass.  
  · Talk with your doctor if you gain too much weight or if you lose weight without trying.  
 Mental health 
  · Talk to your family, friends, or a therapist about your feelings. It is normal to feel frightened, angry, hopeless, helpless, and even guilty. Talking openly about bad feelings can help you cope. If these feelings last, talk to your doctor. When should you call for help? Call 911 anytime you think you may need emergency care. For example, call if: 
  · You have severe trouble breathing.  
 Call your doctor now or seek immediate medical care if: 
  · You have new or worse trouble breathing.  
  · You cough up blood.  
  · You have a fever.  
 Watch closely for changes in your health, and be sure to contact your doctor if: 
  · You cough more deeply or more often, especially if you notice more mucus or a change in the color of your mucus.  
  · You have new or worse swelling in your legs or belly.  
  · You are not getting better as expected. Where can you learn more? Go to http://hannah-ellie.info/. Fay Avendaño in the search box to learn more about \"Chronic Obstructive Pulmonary Disease (COPD): Care Instructions. \" Current as of: June 9, 2019 Content Version: 12.2 © 2505-6976 [employer]. Care instructions adapted under license by [employer] (which disclaims liability or warranty for this information). If you have questions about a medical condition or this instruction, always ask your healthcare professional. [place] [address] any warranty or liability for your use of this information. When You Are Overweight: Care Instructions Your Care Instructions If you're overweight, your doctor may recommend that you make changes in your eating and exercise habits. Being overweight can lead to serious health problems, such as high blood pressure, heart disease, type 2 diabetes, and arthritis, or it can make these problems worse. Eating a healthy diet and being more active can help you reach and stay at a healthy weight. You don't have to make huge changes all at once. Start by making small changes in your eating and exercise habits. To lose weight, you need to burn more calories than you take in. You can do this by eating healthy foods in reasonable amounts and becoming more active every day. Follow-up care is a key part of your treatment and safety. Be sure to make and go to all appointments, and call your doctor if you are having problems. It's also a good idea to know your test results and keep a list of the medicines you take. How can you care for yourself at home? · Improve your eating habits. You'll be more successful if you work on changing one eating habit at a time. All foods, if eaten in moderation, can be part of healthy eating. Remember to: 
? Eat a variety of foods from each food group. Include grains, vegetables, fruits, dairy, and protein foods. ? Limit foods high in fat, sugar, and calories. ? Eat slowly. And don't do anything else, such as watch TV, while you are eating. ? Pay attention to portion sizes. Put your food on a smaller plate. ? Plan your meals ahead of time. You'll be less likely to grab something that's not as healthy. · Get active. Regular activity can help you feel better, have more energy, and burn more calories. If you haven't been active, start slowly. Start with at least 30 minutes of moderate activity on most days of the week. Then gradually increase the amount of activity. Try for 60 or 90 minutes a day, at least 5 days a week. There are a lot of ways to fit activity into your life. You can: ? Walk or bike to the store. Or walk with a friend, or walk the dog. 
? Mow the lawn, rake leaves, shovel snow, or do some gardening. ? Use the stairs instead of the elevator, at least for a few floors. · Change your thinking. Your thoughts have a lot to do with how you feel and what you do. When you're trying to reach a healthy weight, changing how you think about certain things may help. Here are some ideas: 
? Don't compare yourself to others. Healthy bodies come in all shapes and sizes. ? Pay attention to how hungry or full you feel. When you eat, be aware of why you're eating and how much you're eating. ? Focus on improving your health instead of dieting. Dieting almost never works over the long term. · Ask your doctor about other health professionals who can help you reach a healthy weight. ? A dietitian can help you make healthy changes in your diet. ? An exercise specialist or  can help you develop a safe and effective exercise program. 
? A counselor or psychiatrist can help you cope with issues such as depression, anxiety, or family problems that can make it hard to focus on reaching a healthy weight. · Get support from your family, your doctor, your friends, a support groupand support yourself. Where can you learn more? Go to http://hannah-ellie.info/. Enter K002 in the search box to learn more about \"When You Are Overweight: Care Instructions. \" Current as of: March 28, 2019 Content Version: 12.2 © 2226-3068 [employer]. Care instructions adapted under license by Thwapr (which disclaims liability or warranty for this information). If you have questions about a medical condition or this instruction, always ask your healthcare professional. [address] any warranty or liability for your use of this information. DASH Diet: Care Instructions Your Care Instructions The DASH diet is an eating plan that can help lower your blood pressure. DASH stands for Dietary Approaches to Stop Hypertension. Hypertension is high blood pressure. The DASH diet focuses on eating foods that are high in calcium, potassium, and magnesium. These nutrients can lower blood pressure. The foods that are highest in these nutrients are fruits, vegetables, low-fat dairy products, nuts, seeds, and legumes. But taking calcium, potassium, and magnesium supplements instead of eating foods that are high in those nutrients does not have the same effect. The DASH diet also includes whole grains, fish, and poultry. The DASH diet is one of several lifestyle changes your doctor may recommend to lower your high blood pressure. Your doctor may also want you to decrease the amount of sodium in your diet. Lowering sodium while following the DASH diet can lower blood pressure even further than just the DASH diet alone. Follow-up care is a key part of your treatment and safety. Be sure to make and go to all appointments, and call your doctor if you are having problems. It's also a good idea to know your test results and keep a list of the medicines you take. How can you care for yourself at home? Following the DASH diet · Eat 4 to 5 servings of fruit each day. A serving is 1 medium-sized piece of fruit, ½ cup chopped or canned fruit, 1/4 cup dried fruit, or 4 ounces (½ cup) of fruit juice. Choose fruit more often than fruit juice. · Eat 4 to 5 servings of vegetables each day. A serving is 1 cup of lettuce or raw leafy vegetables, ½ cup of chopped or cooked vegetables, or 4 ounces (½ cup) of vegetable juice. Choose vegetables more often than vegetable juice. · Get 2 to 3 servings of low-fat and fat-free dairy each day. A serving is 8 ounces of milk, 1 cup of yogurt, or 1 ½ ounces of cheese. · Eat 6 to 8 servings of grains each day.  A serving is 1 slice of bread, 1 ounce of dry cereal, or ½ cup of cooked rice, pasta, or cooked cereal. Try to choose whole-grain products as much as possible. · Limit lean meat, poultry, and fish to 2 servings each day. A serving is 3 ounces, about the size of a deck of cards. · Eat 4 to 5 servings of nuts, seeds, and legumes (cooked dried beans, lentils, and split peas) each week. A serving is 1/3 cup of nuts, 2 tablespoons of seeds, or ½ cup of cooked beans or peas. · Limit fats and oils to 2 to 3 servings each day. A serving is 1 teaspoon of vegetable oil or 2 tablespoons of salad dressing. · Limit sweets and added sugars to 5 servings or less a week. A serving is 1 tablespoon jelly or jam, ½ cup sorbet, or 1 cup of lemonade. · Eat less than 2,300 milligrams (mg) of sodium a day. If you limit your sodium to 1,500 mg a day, you can lower your blood pressure even more. Tips for success · Start small. Do not try to make dramatic changes to your diet all at once. You might feel that you are missing out on your favorite foods and then be more likely to not follow the plan. Make small changes, and stick with them. Once those changes become habit, add a few more changes. · Try some of the following: ? Make it a goal to eat a fruit or vegetable at every meal and at snacks. This will make it easy to get the recommended amount of fruits and vegetables each day. ? Try yogurt topped with fruit and nuts for a snack or healthy dessert. ? Add lettuce, tomato, cucumber, and onion to sandwiches. ? Combine a ready-made pizza crust with low-fat mozzarella cheese and lots of vegetable toppings. Try using tomatoes, squash, spinach, broccoli, carrots, cauliflower, and onions. ? Have a variety of cut-up vegetables with a low-fat dip as an appetizer instead of chips and dip. ? Sprinkle sunflower seeds or chopped almonds over salads. Or try adding chopped walnuts or almonds to cooked vegetables. ? Try some vegetarian meals using beans and peas. Add garbanzo or kidney beans to salads. Make burritos and tacos with mashed fatima beans or black beans. Where can you learn more? Go to http://hannah-ellie.info/. Enter G800 in the search box to learn more about \"DASH Diet: Care Instructions. \" Current as of: April 9, 2019 Content Version: 12.2 © 6228-2834 [employer]. Care instructions adapted under license by [employer] (which disclaims liability or warranty for this information). If you have questions about a medical condition or this instruction, always ask your healthcare professional. [address] any warranty or liability for your use of this information.

## 2020-02-19 NOTE — PROGRESS NOTES
Notify patient:  Your kidneys continue to show very early signs of failure- keep your blood pressure normal with medications, drink 2L of water daily, continue off cigarettes to improve this.

## 2020-02-20 NOTE — PROGRESS NOTES
Outbound call to patient. Name and  verified. reviewed recent labs with patient. She verbalized understanding.

## 2020-04-23 DIAGNOSIS — I10 ESSENTIAL HYPERTENSION: ICD-10-CM

## 2020-04-23 NOTE — TELEPHONE ENCOUNTER
----- Message from Jennifre Sharfi sent at 4/23/2020 10:22 AM EDT -----  Regarding: Dr. Katy Avila  Medication Refill    Caller (if not patient):      Relationship of caller (if not patient):      Best contact number(s):  [phone]      Name of medication and dosage if known:  \"Triamt/HCTZ 75-50mg\"      Is patient out of this medication (yes/no):  Pt has one pill remaining      Pharmacy name:  [facility], [phone]     Pharmacy listed in chart? (yes/no):  Yes  Pharmacy phone number:      Details to clarify the request:      Jennifer Sharif

## 2020-04-25 DIAGNOSIS — I10 ESSENTIAL HYPERTENSION: ICD-10-CM

## 2020-04-25 RX ORDER — TRIAMTERENE AND HYDROCHLOROTHIAZIDE 75; 50 MG/1; MG/1
TABLET ORAL
Qty: 180 TAB | Refills: 0 | Status: SHIPPED | OUTPATIENT
Start: 2020-04-25 | End: 2020-08-20

## 2020-04-25 RX ORDER — TRIAMTERENE AND HYDROCHLOROTHIAZIDE 75; 50 MG/1; MG/1
1 TABLET ORAL DAILY
Qty: 180 TAB | Refills: 0 | OUTPATIENT
Start: 2020-04-25

## 2020-06-11 DIAGNOSIS — F41.8 DEPRESSION WITH ANXIETY: ICD-10-CM

## 2020-06-11 RX ORDER — SERTRALINE HYDROCHLORIDE 100 MG/1
TABLET, FILM COATED ORAL
Qty: 90 TAB | Refills: 0 | Status: SHIPPED | OUTPATIENT
Start: 2020-06-11 | End: 2020-08-20

## 2020-06-11 NOTE — TELEPHONE ENCOUNTER
----- Message from Zoey Jefferson sent at 6/11/2020 11:14 AM EDT -----  Regarding: Dr. Monse Giraldo: [phone] (if not patient): n/a  Relationship of caller (if not patient): n/a  Best contact number(s): [phone]  Name of medication and dosage if known: \"Sertraline\" 100 mg  Is patient out of this medication (yes/no): yes  Pharmacy name: [address] listed in chart? (yes/no): yes  Pharmacy phone number and fax: n/a  Date of last visit: 2/17/20  Details to clarify the request: n/a

## 2020-07-21 DIAGNOSIS — J45.20 MILD INTERMITTENT ASTHMA, UNSPECIFIED WHETHER COMPLICATED: ICD-10-CM

## 2020-07-21 NOTE — TELEPHONE ENCOUNTER
MD Vic Rodriguez,    Patient call requesting refill of albuterol inhaler. Having issues due to weather. Thanks, Anthony Hendrickson    Last Visit: 2/17/20  MD Vic Rodriguez  Next Appointment: Not scheduled  Previous Refill Encounter(s): 7/25/18  1 + 5    Requested Prescriptions     Pending Prescriptions Disp Refills    albuterol sulfate (PROAIR RESPICLICK) 90 mcg/actuation breath activated inhaler 1 Inhaler 5     Sig: Take 2 Puffs by inhalation every four (4) hours as needed for Cough.

## 2020-08-19 DIAGNOSIS — I10 ESSENTIAL HYPERTENSION: ICD-10-CM

## 2020-08-19 NOTE — TELEPHONE ENCOUNTER
MD Sangeeta Ohara,    Patient calling requesting refill and is out of medication. Thanks, Carlos Stubbs    Last Visit: 2/17/20  MD Sangeeta Ohara  Next Appointment: Not scheduled  Previous Refill Encounter(s): 2/17/20  90 + 1    Requested Prescriptions     Pending Prescriptions Disp Refills    lisinopriL (PRINIVIL, ZESTRIL) 40 mg tablet 90 Tab 1     Sig: Take 1 Tab by mouth daily.

## 2020-08-20 DIAGNOSIS — I10 ESSENTIAL HYPERTENSION: ICD-10-CM

## 2020-08-20 DIAGNOSIS — F41.8 DEPRESSION WITH ANXIETY: ICD-10-CM

## 2020-08-20 RX ORDER — LISINOPRIL 40 MG/1
TABLET ORAL
Qty: 90 TAB | Refills: 0 | Status: SHIPPED | OUTPATIENT
Start: 2020-08-20 | End: 2020-11-13 | Stop reason: SDUPTHER

## 2020-08-20 RX ORDER — TRIAMTERENE AND HYDROCHLOROTHIAZIDE 75; 50 MG/1; MG/1
TABLET ORAL
Qty: 180 TAB | Refills: 0 | Status: SHIPPED | OUTPATIENT
Start: 2020-08-20 | End: 2020-10-26 | Stop reason: SDUPTHER

## 2020-08-20 RX ORDER — SERTRALINE HYDROCHLORIDE 100 MG/1
TABLET, FILM COATED ORAL
Qty: 90 TAB | Refills: 0 | Status: SHIPPED | OUTPATIENT
Start: 2020-08-20 | End: 2020-12-14

## 2020-08-20 RX ORDER — LISINOPRIL 40 MG/1
40 TABLET ORAL DAILY
Qty: 90 TAB | Refills: 1 | OUTPATIENT
Start: 2020-08-20

## 2020-08-20 NOTE — TELEPHONE ENCOUNTER
Patient is calling again. Meds have not been sent/approved. Patient is out. MD Chayo Sherman is out of the office today.   Thanks, Jenna      Requested Prescriptions     Pending Prescriptions Disp Refills    triamterene-hydroCHLOROthiazide (MAXZIDE) 75-50 mg per tablet [Pharmacy Med Name: Triamterene-HCTZ 75-50 MG Oral Tablet] 180 Tab 0     Sig: Take 1 tablet by mouth once daily    sertraline (ZOLOFT) 100 mg tablet [Pharmacy Med Name: Sertraline HCl 100 MG Oral Tablet] 90 Tab 0     Sig: Take 1 tablet by mouth once daily    lisinopriL (PRINIVIL, ZESTRIL) 40 mg tablet [Pharmacy Med Name: Lisinopril 40 MG Oral Tablet] 90 Tab 0     Sig: Take 1 tablet by mouth once daily

## 2020-10-26 DIAGNOSIS — I10 ESSENTIAL HYPERTENSION: ICD-10-CM

## 2020-10-26 NOTE — TELEPHONE ENCOUNTER
MD Francisco Javier Ellsworth,    Patient call requesting refill. Needs office visit. Will send to front office to call for appt. If appropriate. Thanks ,Bharti Lambert    Last Visit: 2/17/20  MD Francisco Javier Ellsworth  Next Appointment: Not scheduled- appt due  Previous Refill Encounter(s): 8/20/20  180      Requested Prescriptions     Pending Prescriptions Disp Refills    triamterene-hydroCHLOROthiazide (MAXZIDE) 75-50 mg per tablet 90 Tab 0     Sig: Take 1 Tab by mouth daily. Appointment due for further refills.

## 2020-10-28 RX ORDER — TRIAMTERENE AND HYDROCHLOROTHIAZIDE 75; 50 MG/1; MG/1
1 TABLET ORAL DAILY
Qty: 90 TAB | Refills: 0 | Status: SHIPPED | OUTPATIENT
Start: 2020-10-28 | End: 2021-05-04 | Stop reason: SDUPTHER

## 2020-11-13 DIAGNOSIS — I10 ESSENTIAL HYPERTENSION: ICD-10-CM

## 2020-11-20 RX ORDER — LISINOPRIL 40 MG/1
40 TABLET ORAL DAILY
Qty: 90 TAB | Refills: 0 | Status: SHIPPED | OUTPATIENT
Start: 2020-11-20 | End: 2021-01-04 | Stop reason: SINTOL

## 2020-12-09 DIAGNOSIS — F41.8 DEPRESSION WITH ANXIETY: ICD-10-CM

## 2020-12-09 NOTE — TELEPHONE ENCOUNTER
Patient call requesting refill of sertraline. Stating she is out. Thanks, Nir Montes    Last Visit: 2/17/20  MD Saw Arita  Next Appointment: Not scheduled- requests made for appointment. Previous Refill Encounter(s): 8/20/20 90    Requested Prescriptions     Pending Prescriptions Disp Refills    sertraline (ZOLOFT) 100 mg tablet 90 Tab 0     Sig: Take 1 Tab by mouth daily.

## 2020-12-10 DIAGNOSIS — F41.8 DEPRESSION WITH ANXIETY: ICD-10-CM

## 2020-12-14 RX ORDER — SERTRALINE HYDROCHLORIDE 100 MG/1
100 TABLET, FILM COATED ORAL DAILY
Qty: 90 TAB | Refills: 0 | OUTPATIENT
Start: 2020-12-14

## 2020-12-14 RX ORDER — SERTRALINE HYDROCHLORIDE 100 MG/1
TABLET, FILM COATED ORAL
Qty: 90 TAB | Refills: 0 | Status: SHIPPED | OUTPATIENT
Start: 2020-12-14 | End: 2021-05-14 | Stop reason: SDUPTHER

## 2021-01-04 ENCOUNTER — VIRTUAL VISIT (OUTPATIENT)
Dept: FAMILY MEDICINE CLINIC | Age: 61
End: 2021-01-04
Payer: COMMERCIAL

## 2021-01-04 DIAGNOSIS — I10 ESSENTIAL HYPERTENSION: Primary | ICD-10-CM

## 2021-01-04 DIAGNOSIS — T50.905A ADVERSE EFFECT OF DRUG, INITIAL ENCOUNTER: ICD-10-CM

## 2021-01-04 PROCEDURE — 99213 OFFICE O/P EST LOW 20 MIN: CPT | Performed by: NURSE PRACTITIONER

## 2021-01-04 RX ORDER — AMLODIPINE BESYLATE 5 MG/1
5 TABLET ORAL DAILY
Qty: 30 TAB | Refills: 1 | Status: SHIPPED | OUTPATIENT
Start: 2021-01-04 | End: 2021-03-11 | Stop reason: SDUPTHER

## 2021-01-04 NOTE — PROGRESS NOTES
Chief Complaint   Patient presents with    Allergic Reaction     lisinopril, swollen lips    Nausea       1. Have you been to the ER, urgent care clinic since your last visit? Hospitalized since your last visit? No    2. Have you seen or consulted any other health care providers outside of the [address] [place] [address] since your last visit? Include any pap smears or colon screening.  No    3 most recent PHQ Screens 2/17/2020   Little interest or pleasure in doing things Not at all   Feeling down, depressed, irritable, or hopeless Not at all   Total Score PHQ 2 0   Trouble falling or staying asleep, or sleeping too much Several days   Feeling tired or having little energy Several days   Poor appetite, weight loss, or overeating Several days   Feeling bad about yourself - or that you are a failure or have let yourself or your family down Not at all   Trouble concentrating on things such as school, work, reading, or watching TV Not at all   Moving or speaking so slowly that other people could have noticed; or the opposite being so fidgety that others notice Not at all   Thoughts of being better off dead, or hurting yourself in some way Not at all   PHQ 9 Score 3   How difficult have these problems made it for you to do your work, take care of your home and get along with others -

## 2021-01-27 ENCOUNTER — OFFICE VISIT (OUTPATIENT)
Dept: URGENT CARE | Age: 61
End: 2021-01-27
Payer: COMMERCIAL

## 2021-01-27 VITALS — HEART RATE: 90 BPM | RESPIRATION RATE: 16 BRPM | TEMPERATURE: 98.8 F | OXYGEN SATURATION: 95 %

## 2021-01-27 DIAGNOSIS — Z20.822 EXPOSURE TO COVID-19 VIRUS: Primary | ICD-10-CM

## 2021-01-27 PROCEDURE — 99202 OFFICE O/P NEW SF 15 MIN: CPT | Performed by: NURSE PRACTITIONER

## 2021-01-27 NOTE — LETTER
January 27, 2021 iDan Kingsley [address] [facility] [address] Dear Sandi Hernandez: 
Thank you for requesting access to [employer]. Please follow the instructions below to securely access and download your online medical record. [employer] allows you to send messages to your doctor, view your test results, renew your prescriptions, schedule appointments, and more. How Do I Sign Up? 1. In your internet browser, go to [email or web address]. 2. Click on the First Time User? Click Here link in the Sign In box. You will see the New Member Sign Up page. 3. Enter your [employer] Access Code exactly as it appears below. You will not need to use this code after youve completed the sign-up process. If you do not sign up before the expiration date, you must request a new code. [employer] Access Code: [email or web address] Expires: 3/13/2021 12:25 PM  
 
4. Enter the last four digits of your Social Security Number (xxxx) and Date of Birth (mm/dd/yyyy) as indicated and click Submit. You will be taken to the next sign-up page. 5. Create a [employer] ID. This will be your [employer] login ID and cannot be changed, so think of one that is secure and easy to remember. 6. Create a [employer] password. You can change your password at any time. 7. Enter your Password Reset Question and Answer. This can be used at a later time if you forget your password. 8. Enter your e-mail address. You will receive e-mail notification when new information is available in [address] 9. Click Sign Up. You can now view and download portions of your medical record. 10. Click the Download Summary menu link to download a portable copy of your medical information. Additional Information If you have questions, please visit the Frequently Asked Questions section of the [employer] website at [email or web address] Remember, [employer] is NOT to be used for urgent needs. For medical emergencies, dial 911. Now available from your iPhone and Android! Sincerely, [employer]

## 2021-01-27 NOTE — PROGRESS NOTES
Subjective: (As above and below)       This patient was seen in [facility] at [address] [place] [address] Urgent Care while in their vehicle due to COVID-19 pandemic with PPE and focused examination in order to decrease community viral transmission. The patient/guardian gave verbal consent to treat. Chief Complaint   Patient presents with    Concern For MKDUB-63 (Coronavirus)     Pt reports exposure to covid. Denies symptoms. Dian Kingsley is a 62 y.o. female who presents for COVID-19 Exposure and testing. Known contact with: covid 19 positive individual at work  Currently has not tried any therapies. Feels well and denies any symptoms at this point in time. Recent travel: no  Known Exposure to COVID-19: yes  Known flu or strep contact: no         ROS- negative for dizziness, cough, SOB, rhinorrhea, myalgias, n/v/d, rashes, headaches, fevers, chills, chest pains. Review of Systems - negative except as listed above    Reviewed PmHx, RxHx, FmHx, SocHx, AllgHx and updated in chart. Family History   Problem Relation Age of Onset    Heart Disease Mother     Hypertension Mother     Stroke Father     Kidney Disease Brother     Hypertension Brother        Past Medical History:   Diagnosis Date    Hypertension     Menopause     LMP-40years old?       Social History     Socioeconomic History    Marital status:      Spouse name: Not on file    Number of children: Not on file    Years of education: Not on file    Highest education level: Not on file   Tobacco Use    Smoking status: Former Smoker     Packs/day: 1.00     Years: 15.00     Pack years: 15.00     Quit date: 1/17/2018     Years since quitting: 3.0    Smokeless tobacco: Never Used   Substance and Sexual Activity    Alcohol use: Yes     Comment: occasionally    Drug use: No    Sexual activity: Yes     Partners: Male          Current Outpatient Medications   Medication Sig    amLODIPine (NORVASC) 5 mg tablet Take 1 Tab by mouth daily.    sertraline (ZOLOFT) 100 mg tablet Take 1 tablet by mouth once daily    triamterene-hydroCHLOROthiazide (MAXZIDE) 75-50 mg per tablet Take 1 Tab by mouth daily. Appointment due for further refills.  albuterol sulfate (PROAIR RESPICLICK) 90 mcg/actuation breath activated inhaler Take 2 Puffs by inhalation every four (4) hours as needed for Cough.  aclidinium bromide (TUDORZA PRESSAIR) 400 mcg/actuation inhaler Take 1 Puff by inhalation two (2) times a day.  ondansetron (ZOFRAN ODT) 4 mg disintegrating tablet Take 1 Tab by mouth every eight (8) hours as needed for Nausea or Nausea or Vomiting. No current facility-administered medications for this visit. Objective:     Vitals:    01/27/21 1237   Pulse: 90   Resp: 16   Temp: 98.8 °F (37.1 °C)   SpO2: 95%       Physical Exam  General appearance  appears well hydrated and does not appear toxic, no acute distress  Eyes - EOMs intact. Non injected. Ears - no external swelling  Neck/Lymphatics  no obvious swelling  Chest - normal breathing effort. No active cough heard. No audible wheezing. Heart - HR-see vitals  Skin - no observable rashes or pallor  Neurologic- alert and oriented x 3  Psychiatric- normal mood, behavior and though content. Assessment/ Plan:     1. Exposure to COVID-19 virus    - NOVEL CORONAVIRUS (COVID-19)      Will test for COVID-19 given exposure  Supportive home care for any development of mild symptoms - tylenol, maintain adequate fluid intake, deep breathing exercises  Self isolate/quarantine advised based on recommendations in current CDC guidelines. Follow up: We have reviewed, in detail, particular presentations/worsening/concerning signs and symptoms that may warrant seeking immediate care in the ED setting and patient verbalized being aware of what to watch for.  For other non-severe changes, non-improvement or questions, patient aware to contact PCP office or consider a virtual online medical consultation.         Kaitlin Rapp NP

## 2021-01-29 LAB — SARS-COV-2, NAA: DETECTED

## 2021-01-29 NOTE — PROGRESS NOTES
I notified patient of positive COVID-19 result. Reports has nasal congestion and loss of taste otherwise feels fine. Denies SOB, weakness. Eating/drinking well. Advised to quarantine x 14 days from sx onset. Encouraged ambulation and deep breathing exercises. ER if SOB, lethargy.

## 2021-02-02 ENCOUNTER — TELEPHONE (OUTPATIENT)
Dept: FAMILY MEDICINE CLINIC | Age: 61
End: 2021-02-02

## 2021-02-10 ENCOUNTER — OFFICE VISIT (OUTPATIENT)
Dept: URGENT CARE | Age: 61
End: 2021-02-10
Payer: COMMERCIAL

## 2021-02-10 VITALS — HEART RATE: 78 BPM | RESPIRATION RATE: 17 BRPM | TEMPERATURE: 98.6 F | OXYGEN SATURATION: 100 %

## 2021-02-10 DIAGNOSIS — U07.1 COVID-19: Primary | ICD-10-CM

## 2021-02-10 PROCEDURE — 99213 OFFICE O/P EST LOW 20 MIN: CPT | Performed by: NURSE PRACTITIONER

## 2021-02-10 NOTE — PROGRESS NOTES
This patient was seen at [address] [place] [address] Urgent Care while in their vehicle due to COVID-19 pandemic with PPE and focused examination in order to decrease community viral transmission. The patient/guardian gave verbal consent to treat. Anup Angelo is a 62 y.o. female who presents for COVID-19 testing. Was diagnosed with COVID about 2 weeks ago, needs to be retested with negative result to return to work. Reports some chest congestion and cough, but much improved. Denies any symptoms such as SOB, chest tightness, ST, HA, n/v/d, fever etc. No other complaints or concerns at this time. PMH: HTN. Former Smoker. The history is provided by the patient. Past Medical History:   Diagnosis Date    Hypertension     Menopause     LMP-40years old?         Past Surgical History:   Procedure Laterality Date    HX ORTHOPAEDIC           Family History   Problem Relation Age of Onset    Heart Disease Mother     Hypertension Mother     Stroke Father     Kidney Disease Brother     Hypertension Brother         Social History     Socioeconomic History    Marital status:      Spouse name: Not on file    Number of children: Not on file    Years of education: Not on file    Highest education level: Not on file   Occupational History    Not on file   Social Needs    Financial resource strain: Not on file    Food insecurity     Worry: Not on file     Inability: Not on file   WOMN needs     Medical: Not on file     Non-medical: Not on file   Tobacco Use    Smoking status: Former Smoker     Packs/day: 1.00     Years: 15.00     Pack years: 15.00     Quit date: 1/17/2018     Years since quitting: 3.0    Smokeless tobacco: Never Used   Substance and Sexual Activity    Alcohol use: Yes     Comment: occasionally    Drug use: No    Sexual activity: Yes     Partners: Male   Lifestyle    Physical activity     Days per week: Not on file     Minutes per session: Not on file    Stress: Not on file   Relationships    Social connections     Talks on phone: Not on file     Gets together: Not on file     Attends Jehovah's witness service: Not on file     Active member of club or organization: Not on file     Attends meetings of clubs or organizations: Not on file     Relationship status: Not on file    Intimate partner violence     Fear of current or ex partner: Not on file     Emotionally abused: Not on file     Physically abused: Not on file     Forced sexual activity: Not on file   Other Topics Concern    Not on file   Social History Narrative    Not on file                ALLERGIES: Lisinopril    Review of Systems   Constitutional: Negative for activity change, appetite change, chills, diaphoresis, fatigue and fever. HENT: Negative for congestion, ear pain, rhinorrhea, sinus pressure, sinus pain and sore throat. Respiratory: Positive for cough. Negative for chest tightness, shortness of breath and wheezing. Cardiovascular: Negative for chest pain. Gastrointestinal: Negative for abdominal pain, constipation, diarrhea, nausea and vomiting. Musculoskeletal: Negative for myalgias. Skin: Negative for rash. Neurological: Negative for dizziness, weakness, light-headedness and headaches. Vitals:    02/10/21 0851   Pulse: 78   Resp: 17   Temp: 98.6 °F (37 °C)   SpO2: 100%       Physical Exam  Vitals signs and nursing note reviewed. Constitutional:       General: She is not in acute distress. Appearance: Normal appearance. She is not ill-appearing. HENT:      Head: Normocephalic and atraumatic. Mouth/Throat:      Mouth: Mucous membranes are moist.   Eyes:      Conjunctiva/sclera: Conjunctivae normal.      Pupils: Pupils are equal, round, and reactive to light. Neck:      Musculoskeletal: Normal range of motion and neck supple. No muscular tenderness. Cardiovascular:      Rate and Rhythm: Normal rate and regular rhythm. Heart sounds: Normal heart sounds. No murmur. Pulmonary:      Effort: Pulmonary effort is normal.      Breath sounds: Normal breath sounds. No wheezing or rhonchi. Musculoskeletal: Normal range of motion. Lymphadenopathy:      Cervical: No cervical adenopathy. Skin:     General: Skin is warm and dry. Findings: No rash. Neurological:      Mental Status: She is alert and oriented to person, place, and time. Psychiatric:         Mood and Affect: Mood normal.         Behavior: Behavior normal.         MDM    Procedures        ICD-10-CM ICD-9-CM   1. COVID-19  U07.1 079.89       Orders Placed This Encounter    NOVEL CORONAVIRUS (COVID-19) ([facility] Default)     Scheduling Instructions:      1) Due to current limited availability of the COVID-19 PCR test, tests will be prioritized and may not be completed.              2) Order only if the test result will change clinical management or necessary for a return to mission-critical employment decision.              3) Print and instruct patient to adhere to CDC home isolation program. (Link Above)              4) Set up or refer patient for a monitoring program.              5) Have patient sign up for and leverage [employer] (if not previously done). Order Specific Question:   Is this test for diagnosis or screening? Answer:   Diagnosis of ill patient     Order Specific Question:   Symptomatic for COVID-19 as defined by CDC? Answer:   Yes     Order Specific Question:   Date of Symptom Onset     Answer:   1/27/2021     Order Specific Question:   Hospitalized for COVID-19? Answer:   No     Order Specific Question:   Admitted to ICU for COVID-19? Answer:   No     Order Specific Question:   Employed in healthcare setting? Answer:   Unknown     Order Specific Question:   Resident in a congregate (group) care setting? Answer:   Unknown     Order Specific Question:   Pregnant? Answer:   Unknown     Order Specific Question:   Previously tested for COVID-19? Answer:    Yes Quarantine recommendations reviewed per CDC guidelines. Encouraged deep breathing exercises, ambulation, Tylenol prn- should symptoms develop. Increase fluids with electrolytes. The patient is to follow up with PCP PRN. If signs and symptoms become worse the pt is to go to the ER.      Signed By: Shazia Lemus NP     February 10, 2021

## 2021-02-11 LAB — SARS-COV-2, NAA: NOT DETECTED

## 2021-02-16 ENCOUNTER — VIRTUAL VISIT (OUTPATIENT)
Dept: FAMILY MEDICINE CLINIC | Age: 61
End: 2021-02-16
Payer: COMMERCIAL

## 2021-02-16 DIAGNOSIS — J41.0 SIMPLE CHRONIC BRONCHITIS (HCC): ICD-10-CM

## 2021-02-16 DIAGNOSIS — I10 ESSENTIAL HYPERTENSION: ICD-10-CM

## 2021-02-16 DIAGNOSIS — U07.1 UPPER RESPIRATORY TRACT INFECTION DUE TO COVID-19 VIRUS: Primary | ICD-10-CM

## 2021-02-16 DIAGNOSIS — J06.9 UPPER RESPIRATORY TRACT INFECTION DUE TO COVID-19 VIRUS: Primary | ICD-10-CM

## 2021-02-16 PROCEDURE — 99213 OFFICE O/P EST LOW 20 MIN: CPT | Performed by: FAMILY MEDICINE

## 2021-02-16 NOTE — PATIENT INSTRUCTIONS
COVID Vaccine pre-registration [email or web address] DASH Diet: Care Instructions Your Care Instructions The DASH diet is an eating plan that can help lower your blood pressure. DASH stands for Dietary Approaches to Stop Hypertension. Hypertension is high blood pressure. The DASH diet focuses on eating foods that are high in calcium, potassium, and magnesium. These nutrients can lower blood pressure. The foods that are highest in these nutrients are fruits, vegetables, low-fat dairy products, nuts, seeds, and legumes. But taking calcium, potassium, and magnesium supplements instead of eating foods that are high in those nutrients does not have the same effect. The DASH diet also includes whole grains, fish, and poultry. The DASH diet is one of several lifestyle changes your doctor may recommend to lower your high blood pressure. Your doctor may also want you to decrease the amount of sodium in your diet. Lowering sodium while following the DASH diet can lower blood pressure even further than just the DASH diet alone. Follow-up care is a key part of your treatment and safety. Be sure to make and go to all appointments, and call your doctor if you are having problems. It's also a good idea to know your test results and keep a list of the medicines you take. How can you care for yourself at home? Following the DASH diet · Eat 4 to 5 servings of fruit each day. A serving is 1 medium-sized piece of fruit, ½ cup chopped or canned fruit, 1/4 cup dried fruit, or 4 ounces (½ cup) of fruit juice. Choose fruit more often than fruit juice. · Eat 4 to 5 servings of vegetables each day. A serving is 1 cup of lettuce or raw leafy vegetables, ½ cup of chopped or cooked vegetables, or 4 ounces (½ cup) of vegetable juice. Choose vegetables more often than vegetable juice. · Get 2 to 3 servings of low-fat and fat-free dairy each day. A serving is 8 ounces of milk, 1 cup of yogurt, or 1 ½ ounces of cheese. · Eat 6 to 8 servings of grains each day. A serving is 1 slice of bread, 1 ounce of dry cereal, or ½ cup of cooked rice, pasta, or cooked cereal. Try to choose whole-grain products as much as possible. · Limit lean meat, poultry, and fish to 2 servings each day. A serving is 3 ounces, about the size of a deck of cards. · Eat 4 to 5 servings of nuts, seeds, and legumes (cooked dried beans, lentils, and split peas) each week. A serving is 1/3 cup of nuts, 2 tablespoons of seeds, or ½ cup of cooked beans or peas. · Limit fats and oils to 2 to 3 servings each day. A serving is 1 teaspoon of vegetable oil or 2 tablespoons of salad dressing. · Limit sweets and added sugars to 5 servings or less a week. A serving is 1 tablespoon jelly or jam, ½ cup sorbet, or 1 cup of lemonade. · Eat less than 2,300 milligrams (mg) of sodium a day. If you limit your sodium to 1,500 mg a day, you can lower your blood pressure even more. Tips for success · Start small. Do not try to make dramatic changes to your diet all at once. You might feel that you are missing out on your favorite foods and then be more likely to not follow the plan. Make small changes, and stick with them. Once those changes become habit, add a few more changes. · Try some of the following: ? Make it a goal to eat a fruit or vegetable at every meal and at snacks. This will make it easy to get the recommended amount of fruits and vegetables each day. ? Try yogurt topped with fruit and nuts for a snack or healthy dessert. ? Add lettuce, tomato, cucumber, and onion to sandwiches. ? Combine a ready-made pizza crust with low-fat mozzarella cheese and lots of vegetable toppings. Try using tomatoes, squash, spinach, broccoli, carrots, cauliflower, and onions. ? Have a variety of cut-up vegetables with a low-fat dip as an appetizer instead of chips and dip. ? Sprinkle sunflower seeds or chopped almonds over salads. Or try adding chopped walnuts or almonds to cooked vegetables. ? Try some vegetarian meals using beans and peas. Add garbanzo or kidney beans to salads. Make burritos and tacos with mashed fatima beans or black beans. Where can you learn more? Go to http://www.gray.com/ Enter Y455 in the search box to learn more about \"DASH Diet: Care Instructions. \" Current as of: December 16, 2019               Content Version: 12.6 © 2039-8451 [employer]. Care instructions adapted under license by [employer] (which disclaims liability or warranty for this information). If you have questions about a medical condition or this instruction, always ask your healthcare professional. [place] [address] any warranty or liability for your use of this information.

## 2021-02-16 NOTE — PROGRESS NOTES
[address] [address] Note      Assessment/ Plan:   Diagnoses and all orders for this visit:    1. Upper respiratory tract infection due to COVID-19 virus    2. Simple chronic bronchitis ([address]    3. Essential hypertension      Recommendations based on history, physical exam and review of pertinent labs, studies and medications:   COVID-19 upper respiratory symptoms resolved. Patient may return to work since quarantine has completed and negative testing has been performed. Blood pressure is well controlled. . Continue current regimen. DASH Diet recommended  An office visit with labs needed in 1 month. Follow up with specialists per routine. We discussed the expected course, resolution and complications of the diagnosis(es) in detail. Medication risks, benefits, costs, interactions, and alternatives were discussed as indicated. I advised her to contact the office if her condition worsens, changes or fails to improve as anticipated. She expressed understanding with the diagnosis(es) and plan. Tomasa Malik is a 62 y.o. female being evaluated by a video visit encounter for concerns as above. A caregiver was present when appropriate. Due to this being a TeleHealth encounter ([facility] public health emergency), evaluation of the following organ systems was limited: Vitals/Constitutional/EENT/Resp/CV/GI//MS/Neuro/Skin/Heme-Lymph-Imm. Pursuant to the emergency declaration under the [facility] waiver authority and the [employer] Act, this Virtual  Visit was conducted, with patient's (and/or legal guardian's) consent, to reduce the patient's risk of exposure to COVID-19 and provide necessary medical care. Services were provided through a video synchronous discussion virtually to substitute for in-person clinic visit.      Provider was in the office while conducting this encounter. Patient was at home during encounter. Other persons participating in call: None  Consent:  She and/or her healthcare decision maker is aware that this patient-initiated Telehealth encounter is a billable service, with coverage as determined by her insurance carrier. She is aware that she may receive a bill and has provided verbal consent to proceed: Yes  This virtual visit was conducted via [employer]. Pursuant to the emergency declaration under the [facility], [facility] waiver authority and the [employer] Act, this Virtual  Visit was conducted to reduce the patient's risk of exposure to COVID-19 and provide continuity of care for an established patient. Services were provided through a video synchronous discussion virtually to substitute for in-person clinic visit. Due to this being a TeleHealth evaluation, many elements of the physical examination are unable to be assessed. Total Time: minutes: 21-30 minutes. Follow-up and Dispositions    · Return if symptoms worsen or fail to improve. Subjective:     Chief Complaint   Patient presents with    Follow-up     Kenia Jara is a 62y.o. year old female who presents for evaluation of the following:      Upper Respiratory Symptoms:  Onset: 2021 with positive test  - thinks she was exposed byemployee   Negative test on 2/10/2021  Current Symptoms: None  Previous Symptoms Now Resolved: cold symptoms, fatigue  History of chornic bronchitis. Endorses anxiety about exposure to Deni Sabal  Denies current fever, coughing , shortness of breath, chest pain    Asks if she can return to work and if so with what precautions  Goes back to work tomorrow  Ask about Deni Sabal vaccination process      Hypertension:  Chronic. Home monitorin/62, 122/81  Treatment:   Key CAD CHF Meds             amLODIPine (NORVASC) 5 mg tablet Take 1 Tab by mouth daily. triamterene-hydroCHLOROthiazide (MAXZIDE) 75-50 mg per tablet Take 1 Tab by mouth daily. Appointment due for further refills. Previous Tx: lisinopril (lip swelling)  Not adhering to strict low salt diet  Co-morbidities: Obesity  BP Readings from Last 3 Encounters:   02/17/20 (!) 162/91   06/21/19 170/90   05/22/19 137/76       Review of Systems   Pertinent positives and negative per HPI. All other systems  reviewed are negative for a Comprehensive ROS (10+). Past medical history, social history, family history reviewed. Medications reconciled. Objective:     Patient-Reported Vitals 1/4/2021   Patient-Reported Temperature 97.2   Patient-Reported Systolic  353   Patient-Reported Diastolic 61      Vitals measurement not available     Physical Examination:  General: Alert, cooperative, no distress, appears stated age. Eyes: Conjunctivae clear. Pupils equally round. Extraocular muscles intact. Ears: Normal appearing external ear   Nose: Nares normal appearing  Mouth/Throat: Lips, mucosa, and tongue normal. Moist mucous membranes. No tonsillar enlargement noted. Neck: Supple, symmetrical, trachea midline, no neck mass visualized. Respiratory: Breathing comfortably, in no acute respiratory distress. Cardiovascular: Visualized extremities without edema. MSK: Upper extremities normal appearing. Skin: No significant erythematous lesions or discoloration noted on facial skin. No rashes or lesions on exposed skin. Neurologic: No facial asymmetry. Normal gaze. Cranial nerves intact. Psychiatric: Affect appropriate. Mood euthymic/. Thoughts logical. Speech volume and speed normal. No hallucinations. Well kempt.        Signed,    Ana Ding MD  2/16/2021

## 2021-03-02 ENCOUNTER — IMMUNIZATION (OUTPATIENT)
Dept: INTERNAL MEDICINE CLINIC | Age: 61
End: 2021-03-02
Payer: COMMERCIAL

## 2021-03-02 DIAGNOSIS — Z23 ENCOUNTER FOR IMMUNIZATION: Primary | ICD-10-CM

## 2021-03-02 PROCEDURE — 91300 COVID-19, MRNA, LNP-S, PF, 30MCG/0.3ML DOSE(PFIZER): CPT | Performed by: FAMILY MEDICINE

## 2021-03-02 PROCEDURE — 0001A COVID-19, MRNA, LNP-S, PF, 30MCG/0.3ML DOSE(PFIZER): CPT | Performed by: FAMILY MEDICINE

## 2021-03-11 DIAGNOSIS — I10 ESSENTIAL HYPERTENSION: ICD-10-CM

## 2021-03-11 NOTE — TELEPHONE ENCOUNTER
MD Apple Bowers,    Patient call requesting refill of amlodipine. Thanks, Jenna    Last Visit: VV 2/16/21 MD Apple Bowers  Next Appointment: Not scheduled  Previous Refill Encounter(s): 1/4/21 30 + 1    Requested Prescriptions     Pending Prescriptions Disp Refills    amLODIPine (NORVASC) 5 mg tablet 30 Tab 2     Sig: Take 1 Tab by mouth daily.

## 2021-03-12 RX ORDER — AMLODIPINE BESYLATE 5 MG/1
5 TABLET ORAL DAILY
Qty: 30 TAB | Refills: 2 | Status: SHIPPED | OUTPATIENT
Start: 2021-03-12 | End: 2021-05-14 | Stop reason: SINTOL

## 2021-03-23 ENCOUNTER — IMMUNIZATION (OUTPATIENT)
Dept: INTERNAL MEDICINE CLINIC | Age: 61
End: 2021-03-23
Payer: COMMERCIAL

## 2021-03-23 DIAGNOSIS — Z23 ENCOUNTER FOR IMMUNIZATION: Primary | ICD-10-CM

## 2021-03-23 PROCEDURE — 0002A COVID-19, MRNA, LNP-S, PF, 30MCG/0.3ML DOSE(PFIZER): CPT | Performed by: FAMILY MEDICINE

## 2021-03-23 PROCEDURE — 91300 COVID-19, MRNA, LNP-S, PF, 30MCG/0.3ML DOSE(PFIZER): CPT | Performed by: FAMILY MEDICINE

## 2021-03-25 ENCOUNTER — PATIENT MESSAGE (OUTPATIENT)
Dept: FAMILY MEDICINE CLINIC | Age: 61
End: 2021-03-25

## 2021-04-07 ENCOUNTER — TELEPHONE (OUTPATIENT)
Dept: FAMILY MEDICINE CLINIC | Age: 61
End: 2021-04-07

## 2021-04-07 DIAGNOSIS — J41.0 SIMPLE CHRONIC BRONCHITIS (HCC): Primary | ICD-10-CM

## 2021-04-07 DIAGNOSIS — Z87.891 FORMER SMOKER: ICD-10-CM

## 2021-04-07 NOTE — TELEPHONE ENCOUNTER
----- Message from Lanie Prince sent at 4/7/2021  1:28 PM EDT -----  Regarding: /Telephone      General Message/Vendor Calls    Caller's first and last name: Self      Reason for call: Pulmonary referral      Callback required yes/no and why: Yes to advise of specialist      Best contact number(s): [phone]      Details to clarify the request: Pt would like to be referred to a Hali Salts

## 2021-04-16 NOTE — TELEPHONE ENCOUNTER
Outbound call to patient. Patient made aware that referral for pulmonology has been placed and given the following information below. Patient verbalized understanding.     You have been referred to:  Salazar Waggoner  [facility] [address]  [place] [id number]  Phone: [phone]  Fax: [phone]

## 2021-05-04 DIAGNOSIS — I10 ESSENTIAL HYPERTENSION: ICD-10-CM

## 2021-05-04 NOTE — TELEPHONE ENCOUNTER
MD Sabrina Bullock,    Patient call requesting refill of her Triamterene/hctz to [facility]. Thanks, Xochitl Robertson    Last Visit: VV 2/16/21 MD Sabrina Bullock  Next Appointment: Not scheduled- f/u due per message but not read by patient  Previous Refill Encounter(s): 10/28/20 90    Requested Prescriptions     Pending Prescriptions Disp Refills    triamterene-hydroCHLOROthiazide (MAXZIDE) 75-50 mg per tablet 90 Tab 0     Sig: Take 1 Tab by mouth daily. Appointment due for further refills.

## 2021-05-05 RX ORDER — TRIAMTERENE AND HYDROCHLOROTHIAZIDE 75; 50 MG/1; MG/1
1 TABLET ORAL DAILY
Qty: 30 TAB | Refills: 0 | Status: SHIPPED | OUTPATIENT
Start: 2021-05-05 | End: 2021-05-14 | Stop reason: SDUPTHER

## 2021-05-11 ENCOUNTER — OFFICE VISIT (OUTPATIENT)
Dept: FAMILY MEDICINE CLINIC | Age: 61
End: 2021-05-11
Payer: COMMERCIAL

## 2021-05-11 VITALS
TEMPERATURE: 98.3 F | OXYGEN SATURATION: 97 % | DIASTOLIC BLOOD PRESSURE: 83 MMHG | HEIGHT: 72 IN | SYSTOLIC BLOOD PRESSURE: 157 MMHG | HEART RATE: 88 BPM | BODY MASS INDEX: 39.68 KG/M2 | RESPIRATION RATE: 18 BRPM | WEIGHT: 293 LBS

## 2021-05-11 DIAGNOSIS — I10 ESSENTIAL HYPERTENSION: Primary | ICD-10-CM

## 2021-05-11 DIAGNOSIS — E66.01 CLASS 2 SEVERE OBESITY DUE TO EXCESS CALORIES WITH SERIOUS COMORBIDITY AND BODY MASS INDEX (BMI) OF 36.0 TO 36.9 IN ADULT (HCC): ICD-10-CM

## 2021-05-11 DIAGNOSIS — F41.8 DEPRESSION WITH ANXIETY: ICD-10-CM

## 2021-05-11 DIAGNOSIS — J41.0 SIMPLE CHRONIC BRONCHITIS (HCC): ICD-10-CM

## 2021-05-11 DIAGNOSIS — Z72.820 POOR SLEEP: ICD-10-CM

## 2021-05-11 DIAGNOSIS — Z91.89 10 YEAR RISK OF MI OR STROKE 7.5% OR GREATER: ICD-10-CM

## 2021-05-11 PROCEDURE — 99214 OFFICE O/P EST MOD 30 MIN: CPT | Performed by: FAMILY MEDICINE

## 2021-05-11 RX ORDER — TRAZODONE HYDROCHLORIDE 100 MG/1
TABLET ORAL
Qty: 30 TAB | Refills: 0 | Status: SHIPPED | OUTPATIENT
Start: 2021-05-11 | End: 2021-05-14 | Stop reason: SDUPTHER

## 2021-05-11 NOTE — PROGRESS NOTES
[facility] Note    Assessment/ Plan:   Diagnoses and all orders for this visit:    1. Essential hypertension  -     CBC WITH AUTOMATED DIFF; Future  -     METABOLIC PANEL, COMPREHENSIVE; Future  -     LIPID PANEL; Future  -     TSH 3RD GENERATION; Future    2. Depression with anxiety  -     traZODone (DESYREL) 100 mg tablet; Take 1 tab daily. May increase to 1.5 tab if needed  -     CBC WITH AUTOMATED DIFF; Future  -     METABOLIC PANEL, COMPREHENSIVE; Future  -     LIPID PANEL; Future  -     TSH 3RD GENERATION; Future    3. Simple chronic bronchitis (HCC)    4. Class 2 severe obesity due to excess calories with serious comorbidity and body mass index (BMI) of 36.0 to 36.9 in adult ([facility] [address]    5. Poor sleep  -     traZODone (DESYREL) 100 mg tablet; Take 1 tab daily. May increase to 1.5 tab if needed      Recommendations based on history, physical exam and review of pertinent labs, studies and medications:   Blood pressure is elevated while crying and upset. Continue current regimen. DASH Diet recommended. Recheck in office in 1 month. Diet and lifestyle modification encouraged for weight loss and chronic disease prevention/ management. Grieving, and poor sleep in setting of chron depression and anxiety. Add trazoone. Encouraged counseling for mood disorder. Negative depression screen. Follow up with specialists per routine. Educated patient on red flag symptoms to warrant return to clinic or emergency room visit. I have discussed the diagnosis with the patient and the intended plan as seen in the above orders. The patient has been offered or received an after-visit summary and questions were answered concerning future plans. I have discussed medication side effects and warnings with the patient as well. Follow-up and Dispositions    · Return in about 4 weeks (around 6/8/2021) for Follow Up blood prssure.          Subjective:     Chief Complaint   Patient presents with    Hypertension     follow up     Erika Mix is a 62y.o. year old female who presents for evaluation of the following:      HTN:   Key CAD CHF Meds             triamterene-hydroCHLOROthiazide (MAXZIDE) 75-50 mg per tablet (Taking) Take 1 Tab by mouth daily. Appointment due for further refills. amLODIPine (NORVASC) 5 mg tablet (Taking) Take 1 Tab by mouth daily. Previous: additional amlodipine 10 + metoprolol 50 mg bid, lisinopril (cough)  Not adhering to low salt diet  No home monitoring  No CP, SOB, leg swelling, headache   BP Readings from Last 3 Encounters:   21 (!) 157/83   20 (!) 162/91   19 170/90     Lab Results   Component Value Date/Time    Creatinine (POC) 0.9 2016 01:35 PM    Creatinine 1.13 (H) 2020 04:10 PM         Depression:    Onset 3/2018  Key Psychotherapeutic Meds             sertraline (ZOLOFT) 100 mg tablet Take 1 tablet by mouth once daily      Triggers: stressors at work, feeling less overwhelmed, stepdaughter  from breast cancer at age 45s  Denies headaches, dizziness.   FÉLIX: 2  3 most recent PHQ Screens 2020   Little interest or pleasure in doing things Not at all   Feeling down, depressed, irritable, or hopeless Not at all   Total Score PHQ 2 0   Trouble falling or staying asleep, or sleeping too much Several days   Feeling tired or having little energy Several days   Poor appetite, weight loss, or overeating Several days   Feeling bad about yourself - or that you are a failure or have let yourself or your family down Not at all   Trouble concentrating on things such as school, work, reading, or watching TV Not at all   Moving or speaking so slowly that other people could have noticed; or the opposite being so fidgety that others notice Not at all   Thoughts of being better off dead, or hurting yourself in some way Not at all   PHQ 9 Score 3   How difficult have these problems made it for you to do your work, take care of your home and get along with others -           Obesity:   Diet: Unrestricted, 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 aclidinium bromide (TUDORZA PRESSAIR) 400 mcg/actuation inhaler Take 1 Puff by inhalation two (2) times a day. - States incruse ellipta is too expensive  Previous treatment: Spiriva (cost prohibitive)    Social:   Works as  at [employer]. Work 8 hours per day during the day  Lives with       Review of Systems   Pertinent positives and negative per HPI. All other systems  reviewed are negative for a Comprehensive ROS (10+). Past medical history, social history, family history reviewed. Medications reconciled. Objective:     Vitals:    05/11/21 1434   BP: (!) 157/83   Pulse: 88   Resp: 18   Temp: 98.3 °F (36.8 °C)   TempSrc: Temporal   SpO2: 97%   Weight: 297 lb 9.6 oz (135 kg)   Height: 6' 1\" (1.854 m)       Physical Examination:  General: Alert, cooperative, no distress, appears stated age. Obese/  Eyes: Conjunctivae clear. Pupils equally round and reactive to light, Extraocular muscles intact. Ears: Normal external ear canals both ears. Nose: Nares normal. Septum midline. Mucosa normal. No drainage or sinus tenderness. Mouth/Throat: Lips, mucosa, and tongue normal. No oropharyngeal erythema. No tonsillar enlargement or exudate.    Neck: Supple, symmetrical, trachea midline, no adenopathy. No thyroid enlargement/tenderness/nodules  Respiratory: Breathing comfortably, in no acute respiratory distress. Clear to auscultation bilaterally. Normal inspiratory and expiratory ratio. Cardiovascular: Regular rate and rhythm, S1, S2 normal, no murmur, click, rub or gallop.   -Extremities no edema. Abdomen: Soft, non-tender, not distended. Bowel sounds normal. No masses or organomegaly. MSK: Extremities normal appearing, atraumatic, no effusion. Gait steady and unassisted. Skin: Skin color, texture, turgor normal. No rashes or lesions on exposed skin. Lymph nodes: Cervical, supraclavicular nodes normal.  Neurologic: Cranial nerves II-XII intact. Strength 5/5 grossly. Sensation and reflexes normal throughout. Psychiatric: Affect sad, tearful. Mood sad.  Thoughts logical. Speech volume and speed normal      Signed,    Kaylyn Dominguez MD  5/11/2021

## 2021-05-11 NOTE — PROGRESS NOTES
Chief Complaint   Patient presents with    Hypertension     follow up       1. Have you been to the ER, urgent care clinic since your last visit? Hospitalized since your last visit? No    2. Have you seen or consulted any other health care providers outside of the [address] [place] [address] since your last visit? Include any pap smears or colon screening.  No    3 most recent PHQ Screens 2/17/2020   Little interest or pleasure in doing things Not at all   Feeling down, depressed, irritable, or hopeless Not at all   Total Score PHQ 2 0   Trouble falling or staying asleep, or sleeping too much Several days   Feeling tired or having little energy Several days   Poor appetite, weight loss, or overeating Several days   Feeling bad about yourself - or that you are a failure or have let yourself or your family down Not at all   Trouble concentrating on things such as school, work, reading, or watching TV Not at all   Moving or speaking so slowly that other people could have noticed; or the opposite being so fidgety that others notice Not at all   Thoughts of being better off dead, or hurting yourself in some way Not at all   PHQ 9 Score 3   How difficult have these problems made it for you to do your work, take care of your home and get along with others -

## 2021-05-12 LAB
ALBUMIN SERPL-MCNC: 3.9 G/DL (ref 3.5–5)
ALBUMIN/GLOB SERPL: 1 {RATIO} (ref 1.1–2.2)
ALP SERPL-CCNC: 103 U/L (ref 45–117)
ALT SERPL-CCNC: 17 U/L (ref 12–78)
ANION GAP SERPL CALC-SCNC: 5 MMOL/L (ref 5–15)
AST SERPL-CCNC: 13 U/L (ref 15–37)
BASOPHILS # BLD: 0.1 K/UL (ref 0–0.1)
BASOPHILS NFR BLD: 1 % (ref 0–1)
BILIRUB SERPL-MCNC: 0.4 MG/DL (ref 0.2–1)
BUN SERPL-MCNC: 36 MG/DL (ref 6–20)
BUN/CREAT SERPL: 24 (ref 12–20)
CALCIUM SERPL-MCNC: 10.2 MG/DL (ref 8.5–10.1)
CHLORIDE SERPL-SCNC: 109 MMOL/L (ref 97–108)
CHOLEST SERPL-MCNC: 202 MG/DL
CO2 SERPL-SCNC: 24 MMOL/L (ref 21–32)
CREAT SERPL-MCNC: 1.5 MG/DL (ref 0.55–1.02)
DIFFERENTIAL METHOD BLD: ABNORMAL
EOSINOPHIL # BLD: 0.3 K/UL (ref 0–0.4)
EOSINOPHIL NFR BLD: 4 % (ref 0–7)
ERYTHROCYTE [DISTWIDTH] IN BLOOD BY AUTOMATED COUNT: 13.2 % (ref 11.5–14.5)
GLOBULIN SER CALC-MCNC: 4.1 G/DL (ref 2–4)
GLUCOSE SERPL-MCNC: 88 MG/DL (ref 65–100)
HCT VFR BLD AUTO: 35.8 % (ref 35–47)
HDLC SERPL-MCNC: 58 MG/DL
HDLC SERPL: 3.5 {RATIO} (ref 0–5)
HGB BLD-MCNC: 11.1 G/DL (ref 11.5–16)
IMM GRANULOCYTES # BLD AUTO: 0 K/UL (ref 0–0.04)
IMM GRANULOCYTES NFR BLD AUTO: 0 % (ref 0–0.5)
LDLC SERPL CALC-MCNC: 116.8 MG/DL (ref 0–100)
LIPID PROFILE,FLP: ABNORMAL
LYMPHOCYTES # BLD: 1.5 K/UL (ref 0.8–3.5)
LYMPHOCYTES NFR BLD: 22 % (ref 12–49)
MCH RBC QN AUTO: 29.1 PG (ref 26–34)
MCHC RBC AUTO-ENTMCNC: 31 G/DL (ref 30–36.5)
MCV RBC AUTO: 93.7 FL (ref 80–99)
MONOCYTES # BLD: 0.6 K/UL (ref 0–1)
MONOCYTES NFR BLD: 9 % (ref 5–13)
NEUTS SEG # BLD: 4.5 K/UL (ref 1.8–8)
NEUTS SEG NFR BLD: 64 % (ref 32–75)
NRBC # BLD: 0 K/UL (ref 0–0.01)
NRBC BLD-RTO: 0 PER 100 WBC
PLATELET # BLD AUTO: 224 K/UL (ref 150–400)
PMV BLD AUTO: 10.3 FL (ref 8.9–12.9)
POTASSIUM SERPL-SCNC: 4.6 MMOL/L (ref 3.5–5.1)
PROT SERPL-MCNC: 8 G/DL (ref 6.4–8.2)
RBC # BLD AUTO: 3.82 M/UL (ref 3.8–5.2)
SODIUM SERPL-SCNC: 138 MMOL/L (ref 136–145)
TRIGL SERPL-MCNC: 136 MG/DL (ref ?–150)
TSH SERPL DL<=0.05 MIU/L-ACNC: 0.73 UIU/ML (ref 0.36–3.74)
VLDLC SERPL CALC-MCNC: 27.2 MG/DL
WBC # BLD AUTO: 7 K/UL (ref 3.6–11)

## 2021-05-13 NOTE — PATIENT INSTRUCTIONS
Weight Loss Tips: 
Remember this is like a part time job so your motivation and commitment is key to your success. Mindset Weight loss like any other behavior change starts in your mind. Think hard about what your motivates you to lose weight then meditate on that. Remind yourself of your motivation 
with phone alarms, scheduled meditation time, vision board, journal- just to name a few ideas. Have realistic goals. We expect with diligent healthy diet and physical activity you can lose 5% of your body weight in 3 
months. Wt in lbs x 0.05 = #lbs you should lose in 3 months. Make food and activity changes with a goal of CONSISTENCY not perfection. Food Start eating differently. Most of your weight loss and gain is from what you eat. Use small plates only Drink 2 liters (1/2 gallon) of water every day HALF of every meal should be fruit or vegetables Try meal prepping on Sunday (or your day off) with new different vegetables. Consider meal prep service such as [employer], wepremeals. com Replace soda with diet soda or other zero sugar drinks (selter water just fine) Consider using the [employer] cynthia for calorie counting. Goal 7153-6851 calories per day Activity Staying physically active will help you lose more weight and can help you get over the plateau when you weight just 
won't change any more with diet. Start exercise at least 5 days per week for 40 minutes. Consider [employer] training cynthia for home exercises. You can start with walking. I suggest walking at a speed of at least 3.5-4.5mph to for the weight loss benefit. Increase your speed or distance every 2 weeks. Do some slow stretching daily of legs, arms and back. Consider adding weight training with light weights at home or at the gym. See a doctor or a physical training for 
instructions in order to avoid injuries from doing muscle training incorrectly.  
Free fitness program in [place]: [employer] org/program/fitness-warriors/ DASH Diet: Care Instructions Your Care Instructions The DASH diet is an eating plan that can help lower your blood pressure. DASH stands for Dietary Approaches to Stop Hypertension. Hypertension is high blood pressure. The DASH diet focuses on eating foods that are high in calcium, potassium, and magnesium. These nutrients can lower blood pressure. The foods that are highest in these nutrients are fruits, vegetables, low-fat dairy products, nuts, seeds, and legumes. But taking calcium, potassium, and magnesium supplements instead of eating foods that are high in those nutrients does not have the same effect. The DASH diet also includes whole grains, fish, and poultry. The DASH diet is one of several lifestyle changes your doctor may recommend to lower your high blood pressure. Your doctor may also want you to decrease the amount of sodium in your diet. Lowering sodium while following the DASH diet can lower blood pressure even further than just the DASH diet alone. Follow-up care is a key part of your treatment and safety. Be sure to make and go to all appointments, and call your doctor if you are having problems. It's also a good idea to know your test results and keep a list of the medicines you take. How can you care for yourself at home? Following the DASH diet · Eat 4 to 5 servings of fruit each day. A serving is 1 medium-sized piece of fruit, ½ cup chopped or canned fruit, 1/4 cup dried fruit, or 4 ounces (½ cup) of fruit juice. Choose fruit more often than fruit juice. · Eat 4 to 5 servings of vegetables each day. A serving is 1 cup of lettuce or raw leafy vegetables, ½ cup of chopped or cooked vegetables, or 4 ounces (½ cup) of vegetable juice. Choose vegetables more often than vegetable juice. · Get 2 to 3 servings of low-fat and fat-free dairy each day. A serving is 8 ounces of milk, 1 cup of yogurt, or 1 ½ ounces of cheese.  
· Eat 6 to 8 servings of grains each day. A serving is 1 slice of bread, 1 ounce of dry cereal, or ½ cup of cooked rice, pasta, or cooked cereal. Try to choose whole-grain products as much as possible. · Limit lean meat, poultry, and fish to 2 servings each day. A serving is 3 ounces, about the size of a deck of cards. · Eat 4 to 5 servings of nuts, seeds, and legumes (cooked dried beans, lentils, and split peas) each week. A serving is 1/3 cup of nuts, 2 tablespoons of seeds, or ½ cup of cooked beans or peas. · Limit fats and oils to 2 to 3 servings each day. A serving is 1 teaspoon of vegetable oil or 2 tablespoons of salad dressing. · Limit sweets and added sugars to 5 servings or less a week. A serving is 1 tablespoon jelly or jam, ½ cup sorbet, or 1 cup of lemonade. · Eat less than 2,300 milligrams (mg) of sodium a day. If you limit your sodium to 1,500 mg a day, you can lower your blood pressure even more. · Be aware that all of these are the suggested number of servings for people who eat 1,800 to 2,000 calories a day. Your recommended number of servings may be different if you need more or fewer calories. Tips for success · Start small. Do not try to make dramatic changes to your diet all at once. You might feel that you are missing out on your favorite foods and then be more likely to not follow the plan. Make small changes, and stick with them. Once those changes become habit, add a few more changes. · Try some of the following: ? Make it a goal to eat a fruit or vegetable at every meal and at snacks. This will make it easy to get the recommended amount of fruits and vegetables each day. ? Try yogurt topped with fruit and nuts for a snack or healthy dessert. ? Add lettuce, tomato, cucumber, and onion to sandwiches. ? Combine a ready-made pizza crust with low-fat mozzarella cheese and lots of vegetable toppings. Try using tomatoes, squash, spinach, broccoli, carrots, cauliflower, and onions.  
? Have a variety of cut-up vegetables with a low-fat dip as an appetizer instead of chips and dip. ? Sprinkle sunflower seeds or chopped almonds over salads. Or try adding chopped walnuts or almonds to cooked vegetables. ? Try some vegetarian meals using beans and peas. Add garbanzo or kidney beans to salads. Make burritos and tacos with mashed fatima beans or black beans. Where can you learn more? Go to http://www.gray.com/ Enter V672 in the search box to learn more about \"DASH Diet: Care Instructions. \" Current as of: August 31, 2020               Content Version: 12.8 © 0970-8859 Healthwise, [employer]. Care instructions adapted under license by [employer] (which disclaims liability or warranty for this information). If you have questions about a medical condition or this instruction, always ask your healthcare professional. [address] any warranty or liability for your use of this information.

## 2021-05-14 RX ORDER — ATORVASTATIN CALCIUM 10 MG/1
10 TABLET, FILM COATED ORAL DAILY
Qty: 90 TAB | Refills: 1 | Status: SHIPPED | OUTPATIENT
Start: 2021-05-14 | End: 2021-11-19 | Stop reason: SDUPTHER

## 2021-05-14 RX ORDER — SERTRALINE HYDROCHLORIDE 100 MG/1
TABLET, FILM COATED ORAL
Qty: 90 TAB | Refills: 1 | Status: SHIPPED | OUTPATIENT
Start: 2021-05-14 | End: 2021-11-19 | Stop reason: SDUPTHER

## 2021-05-14 RX ORDER — TRIAMTERENE AND HYDROCHLOROTHIAZIDE 75; 50 MG/1; MG/1
1 TABLET ORAL DAILY
Qty: 90 TAB | Refills: 1 | Status: SHIPPED | OUTPATIENT
Start: 2021-05-14 | End: 2021-11-19 | Stop reason: SDUPTHER

## 2021-05-14 RX ORDER — TRAZODONE HYDROCHLORIDE 100 MG/1
TABLET ORAL
Qty: 30 TAB | Refills: 0 | Status: SHIPPED | OUTPATIENT
Start: 2021-05-14 | End: 2021-11-19

## 2021-05-14 RX ORDER — LOSARTAN POTASSIUM 50 MG/1
50 TABLET ORAL DAILY
Qty: 90 TAB | Refills: 1 | Status: SHIPPED | OUTPATIENT
Start: 2021-05-14 | End: 2021-11-19 | Stop reason: SDUPTHER

## 2021-05-14 NOTE — PROGRESS NOTES
Most of your test results are normal.  Your cholesterol was higher than normal. Your calculated risk for heart attack or stroke in 10 years is 11.3%, cholesterol medication is recommended if greater then 7.5%. I will send a cholesterol medicine to your pharmacy to treat this. Try to avoid foods high in saturated fats such as processed meats, fried foods, and greasy snacks. Weight loss will also help with this. Your kidney function is worsening. I have sent 6 month supply of your medications. Improving your blood pressure may improve this kidney function. 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    Mychart result comment sent

## 2021-05-17 NOTE — PROGRESS NOTES
Went over results with pt. Recommended the American heart assoc website. Also told her about the DASH diet book by Scarlett Hunt. Pt states understanding.

## 2021-05-18 ENCOUNTER — TRANSCRIBE ORDER (OUTPATIENT)
Dept: SCHEDULING | Age: 61
End: 2021-05-18

## 2021-05-18 DIAGNOSIS — Z87.891 PERSONAL HISTORY OF NICOTINE DEPENDENCE: Primary | ICD-10-CM

## 2021-05-27 NOTE — TELEPHONE ENCOUNTER
Patient call for refill of triamterene/hctz. New rx sent on 5/14/21 and can be processed tomorrow per Dee 5/28/21. (had 30 day supply filled on 5/5/21)  They stated they will run again tomorrow.   Thanks, Bobbi Stovall

## 2021-06-10 ENCOUNTER — TRANSCRIBE ORDER (OUTPATIENT)
Dept: SCHEDULING | Age: 61
End: 2021-06-10

## 2021-06-10 DIAGNOSIS — Z87.891 PERSONAL HISTORY OF NICOTINE DEPENDENCE: Primary | ICD-10-CM

## 2021-11-19 ENCOUNTER — OFFICE VISIT (OUTPATIENT)
Dept: FAMILY MEDICINE CLINIC | Age: 61
End: 2021-11-19
Payer: COMMERCIAL

## 2021-11-19 VITALS
SYSTOLIC BLOOD PRESSURE: 161 MMHG | BODY MASS INDEX: 39.68 KG/M2 | WEIGHT: 293 LBS | HEART RATE: 68 BPM | HEIGHT: 72 IN | OXYGEN SATURATION: 95 % | RESPIRATION RATE: 16 BRPM | TEMPERATURE: 98.2 F | DIASTOLIC BLOOD PRESSURE: 70 MMHG

## 2021-11-19 DIAGNOSIS — F41.8 DEPRESSION WITH ANXIETY: ICD-10-CM

## 2021-11-19 DIAGNOSIS — Z12.31 SCREENING MAMMOGRAM FOR BREAST CANCER: ICD-10-CM

## 2021-11-19 DIAGNOSIS — Z91.89 10 YEAR RISK OF MI OR STROKE 7.5% OR GREATER: ICD-10-CM

## 2021-11-19 DIAGNOSIS — I10 ESSENTIAL HYPERTENSION: Primary | ICD-10-CM

## 2021-11-19 PROCEDURE — 99212 OFFICE O/P EST SF 10 MIN: CPT | Performed by: NURSE PRACTITIONER

## 2021-11-19 RX ORDER — TRIAMTERENE AND HYDROCHLOROTHIAZIDE 75; 50 MG/1; MG/1
1 TABLET ORAL DAILY
Qty: 90 TABLET | Refills: 1 | Status: SHIPPED | OUTPATIENT
Start: 2021-11-19 | End: 2022-04-15

## 2021-11-19 RX ORDER — ATORVASTATIN CALCIUM 10 MG/1
10 TABLET, FILM COATED ORAL DAILY
Qty: 90 TABLET | Refills: 1 | Status: SHIPPED | OUTPATIENT
Start: 2021-11-19 | End: 2022-06-03 | Stop reason: SDUPTHER

## 2021-11-19 RX ORDER — LOSARTAN POTASSIUM 100 MG/1
100 TABLET ORAL DAILY
Qty: 90 TABLET | Refills: 1 | Status: SHIPPED | OUTPATIENT
Start: 2021-11-19 | End: 2022-03-16 | Stop reason: ALTCHOICE

## 2021-11-19 RX ORDER — SERTRALINE HYDROCHLORIDE 100 MG/1
TABLET, FILM COATED ORAL
Qty: 90 TABLET | Refills: 1 | Status: SHIPPED | OUTPATIENT
Start: 2021-11-19 | End: 2022-05-17 | Stop reason: SDUPTHER

## 2021-11-19 NOTE — PROGRESS NOTES
[facility] Note     Navin Puga (: 1960) is a 62 y.o. female, established patient, here for evaluation of the following chief complaint(s):  Medication Refill       ASSESSMENT/PLAN:  1. Essential hypertension  -     losartan (COZAAR) 100 mg tablet; Take 1 Tablet by mouth daily. , Normal, Disp-90 Tablet, R-1  -     atorvastatin (LIPITOR) 10 mg tablet; Take 1 Tablet by mouth daily. , Normal, Disp-90 Tablet, R-1  -     triamterene-hydroCHLOROthiazide (MAXZIDE) 75-50 mg per tablet; Take 1 Tablet by mouth daily. Appointment due for further refills. , Normal, Disp-90 Tablet, R-1  2. 10 year risk of MI or stroke 7.5% or greater  -     atorvastatin (LIPITOR) 10 mg tablet; Take 1 Tablet by mouth daily. , Normal, Disp-90 Tablet, R-1  3. Depression with anxiety  -     sertraline (ZOLOFT) 100 mg tablet; Take 1 tablet by mouth once daily, Normal, Disp-90 Tablet, R-1  4. Screening mammogram for breast cancer  -     [facility] MAMMO BI SCREENING INCL CAD; Future      Return in about 6 months (around 2022). SUBJECTIVE/OBJECTIVE:    Navin Puga is a 62 y.o. female seen today for HTN & depression with anxiety. Cardiovascular Review:  She has hypertension, hyperlipidemia and obesity. Diet and Lifestyle: not attempting to follow a low sodium diet, exercises sporadically  Home BP Monitoring: is borderline controlled at home, ranging 145's/70's. Pertinent ROS: taking medications as instructed, no medication side effects noted, no TIA's, no chest pain on exertion, no dyspnea on exertion, no swelling of ankles, no palpitations. Anxiety/Depression:  Patient complains of anxiety and depressive disorder. She denies current suicidal and homicidal ideation. She complains of the following side effects from the treatment: none.     3 most recent PHQ Screens 2021   Little interest or pleasure in doing things Not at all   Feeling down, depressed, irritable, or hopeless Not at all   Total Score PHQ 2 0   Trouble falling or staying asleep, or sleeping too much -   Feeling tired or having little energy -   Poor appetite, weight loss, or overeating -   Feeling bad about yourself - or that you are a failure or have let yourself or your family down -   Trouble concentrating on things such as school, work, reading, or watching TV -   Moving or speaking so slowly that other people could have noticed; or the opposite being so fidgety that others notice -   Thoughts of being better off dead, or hurting yourself in some way -   PHQ 9 Score -   How difficult have these problems made it for you to do your work, take care of your home and get along with others -         Review of Systems   Constitutional: Negative. HENT: Negative. Eyes: Negative. Respiratory: Negative. Cardiovascular: Negative. Gastrointestinal: Negative. Genitourinary: Negative. Musculoskeletal: Negative. Skin: Negative. Neurological: Negative. Psychiatric/Behavioral: Negative. Wt Readings from Last 3 Encounters:   11/19/21 297 lb 11.2 oz (135 kg)   05/11/21 297 lb 9.6 oz (135 kg)   02/17/20 302 lb 3.2 oz (137.1 kg)     Temp Readings from Last 3 Encounters:   11/19/21 98.2 °F (36.8 °C) (Temporal)   05/11/21 98.3 °F (36.8 °C) (Temporal)   02/10/21 98.6 °F (37 °C)     BP Readings from Last 3 Encounters:   11/19/21 (!) 161/70   05/11/21 (!) 157/83   02/17/20 (!) 162/91     Pulse Readings from Last 3 Encounters:   11/19/21 68   05/11/21 88   02/10/21 78           PHYSICAL EXAMINATION:       General: Alert, cooperative, no distress  Eyes: Conjunctivae clear. Respiratory: Breathing comfortably, in no acute respiratory distress. Clear to auscultation bilaterally. Normal inspiratory and expiratory ratio. Cardiovascular: Regular rate and rhythm, S1, S2 normal, no murmur, click, rub or gallop. Extremities: no edema. Pulses 2+ and symmetric radial and dorsalis pedis   Abdomen: Soft, non-tender, not distended.  Bowel sounds normal. No masses or organomegaly. MSK: Extremities normal appearing, atraumatic, no effusion. Gait steady and unassisted. Skin: Skin color, texture, turgor normal. No rashes or lesions on exposed skin. Neurologic: a/Ox3, Strength 5/5 grossly. Sensation and reflexes normal throughout. Psychiatric: Normal affect. Mood euthymic. Thoughts logical. Speech volume and speed normal            Treatment risks/benefits/costs/interactions/alternatives discussed with patient. Advised patient to call back or return to office if symptoms worsen/change/persist. If patient cannot reach us or should anything more severe/urgent arise he/she should proceed directly to the nearest emergency department. Discussed expected course/resolution/complications of diagnosis in detail with patient. Patient expressed understanding with the diagnosis and plan. An electronic signature was used to authenticate this note.   -- Cali Severino NP

## 2021-11-19 NOTE — PROGRESS NOTES
Chief Complaint   Patient presents with    Medication Refill       1. \"Have you been to the ER, urgent care clinic since your last visit? Hospitalized since your last visit? \" No    2. \"Have you seen or consulted any other health care providers outside of the [address] [place] [address] since your last visit? \" No     3. For patients over 45: Has the patient had a colonoscopy? Yes, HM satisfied with blue hyperlink     If the patient is female:    4. For patients over 40: Has the patient had a mammogram? Yes, but HM not satisfied. Rooming MA/LPN to request most recent results - [facility]     5. For patients over 21: Has the patient had a pap smear?  Yes, HM satisfied with blue hyperlink    3 most recent PHQ Screens 11/19/2021   Little interest or pleasure in doing things Not at all   Feeling down, depressed, irritable, or hopeless Not at all   Total Score PHQ 2 0   Trouble falling or staying asleep, or sleeping too much -   Feeling tired or having little energy -   Poor appetite, weight loss, or overeating -   Feeling bad about yourself - or that you are a failure or have let yourself or your family down -   Trouble concentrating on things such as school, work, reading, or watching TV -   Moving or speaking so slowly that other people could have noticed; or the opposite being so fidgety that others notice -   Thoughts of being better off dead, or hurting yourself in some way -   PHQ 9 Score -   How difficult have these problems made it for you to do your work, take care of your home and get along with others -

## 2021-11-19 NOTE — PATIENT INSTRUCTIONS
High Blood Pressure: Care Instructions  Overview     It's normal for blood pressure to go up and down throughout the day. But if it stays up, you have high blood pressure. Another name for high blood pressure is hypertension. Despite what a lot of people think, high blood pressure usually doesn't cause headaches or make you feel dizzy or lightheaded. It usually has no symptoms. But it does increase your risk of stroke, heart attack, and other problems. You and your doctor will talk about your risks of these problems based on your blood pressure. Your doctor will give you a goal for your blood pressure. Your goal will be based on your health and your age. Lifestyle changes, such as eating healthy and being active, are always important to help lower blood pressure. You might also take medicine to reach your blood pressure goal.  Follow-up care is a key part of your treatment and safety. Be sure to make and go to all appointments, and call your doctor if you are having problems. It's also a good idea to know your test results and keep a list of the medicines you take. How can you care for yourself at home? Medical treatment  · If you stop taking your medicine, your blood pressure will go back up. You may take one or more types of medicine to lower your blood pressure. Be safe with medicines. Take your medicine exactly as prescribed. Call your doctor if you think you are having a problem with your medicine. · Talk to your doctor before you start taking aspirin every day. Aspirin can help certain people lower their risk of a heart attack or stroke. But taking aspirin isn't right for everyone, because it can cause serious bleeding. · See your doctor regularly. You may need to see the doctor more often at first or until your blood pressure comes down. · If you are taking blood pressure medicine, talk to your doctor before you take decongestants or anti-inflammatory medicine, such as ibuprofen.  Some of these medicines can raise blood pressure. · Learn how to check your blood pressure at home. Lifestyle changes  · Stay at a healthy weight. This is especially important if you put on weight around the waist. Losing even 10 pounds can help you lower your blood pressure. · If your doctor recommends it, get more exercise. Walking is a good choice. Bit by bit, increase the amount you walk every day. Try for at least 30 minutes on most days of the week. You also may want to swim, bike, or do other activities. · Avoid or limit alcohol. Talk to your doctor about whether you can drink any alcohol. · Try to limit how much sodium you eat to less than 2,300 milligrams (mg) a day. Your doctor may ask you to try to eat less than 1,500 mg a day. · Eat plenty of fruits (such as bananas and oranges), vegetables, legumes, whole grains, and low-fat dairy products. · Lower the amount of saturated fat in your diet. Saturated fat is found in animal products such as milk, cheese, and meat. Limiting these foods may help you lose weight and also lower your risk for heart disease. · Do not smoke. Smoking increases your risk for heart attack and stroke. If you need help quitting, talk to your doctor about stop-smoking programs and medicines. These can increase your chances of quitting for good. When should you call for help? Call 911  anytime you think you may need emergency care. This may mean having symptoms that suggest that your blood pressure is causing a serious heart or blood vessel problem. Your blood pressure may be over 180/120. For example, call 911 if:    · You have symptoms of a heart attack. These may include:  ? Chest pain or pressure, or a strange feeling in the chest.  ? Sweating. ? Shortness of breath. ? Nausea or vomiting. ? Pain, pressure, or a strange feeling in the back, neck, jaw, or upper belly or in one or both shoulders or arms. ? Lightheadedness or sudden weakness.   ? A fast or irregular heartbeat.     · You have symptoms of a stroke. These may include:  ? Sudden numbness, tingling, weakness, or loss of movement in your face, arm, or leg, especially on only one side of your body. ? Sudden vision changes. ? Sudden trouble speaking. ? Sudden confusion or trouble understanding simple statements. ? Sudden problems with walking or balance. ? A sudden, severe headache that is different from past headaches.     · You have severe back or belly pain. Do not wait until your blood pressure comes down on its own. Get help right away. Call your doctor now or seek immediate care if:    · Your blood pressure is much higher than normal (such as 180/120 or higher), but you don't have symptoms.     · You think high blood pressure is causing symptoms, such as:  ? Severe headache.  ? Blurry vision. Watch closely for changes in your health, and be sure to contact your doctor if:    · Your blood pressure measures higher than your doctor recommends at least 2 times. That means the top number is higher or the bottom number is higher, or both.     · You think you may be having side effects from your blood pressure medicine. Where can you learn more? Go to http://www.gray.com/  Enter [id number] in the search box to learn more about \"High Blood Pressure: Care Instructions. \"  Current as of: April 29, 2021               Content Version: 13.0  © 2006-2021 [employer]. Care instructions adapted under license by [employer] (which disclaims liability or warranty for this information). If you have questions about a medical condition or this instruction, always ask your healthcare professional. [place] [address] any warranty or liability for your use of this information.

## 2021-12-10 NOTE — TELEPHONE ENCOUNTER
----- Message from Judi Marcano DO sent at 12/10/2021 12:27 PM CST -----  Please notify the patient of improving WBC. Hcg over 2,000, schedule dating US ASAP   MD Kyaw Borden,    Patient call for refill of lisinopril. She is out. Added info to schedule appt. Thanks, John Breaux    Last Visit: 2/7/20  MD Kyaw Borden  Next Appointment: Not scheduled- routed another rx on 10/26/20 to schedule appt  Previous Refill Encounter(s): 8/20/20  90        Requested Prescriptions     Pending Prescriptions Disp Refills    lisinopriL (PRINIVIL, ZESTRIL) 40 mg tablet 90 Tab 0     Sig: Take 1 Tab by mouth daily. Schedule office visit for further refills.

## 2022-03-14 ENCOUNTER — TELEPHONE (OUTPATIENT)
Dept: FAMILY MEDICINE CLINIC | Age: 62
End: 2022-03-14

## 2022-03-14 NOTE — TELEPHONE ENCOUNTER
Pt reports no chest pain or shortness of breath, just some chest tightness and the proair inhaler is not working like her other inhalers have worked in the past. Could not recall name of inhaler that has worked for her. Pt reports this has been happening for a few days and is not constant but only happens when she gets \"excited\" or upset. Advised pt to go to the ER if she begins to experience any SOB or chest pain or if tightness worsens. Verbalized understanding.

## 2022-03-14 NOTE — TELEPHONE ENCOUNTER
Pt called ans said she does not think the inhaler she is using is working. Pt said she has tightness in her chest. Call transferred to nurse Carol Loo.

## 2022-03-16 ENCOUNTER — TELEPHONE (OUTPATIENT)
Dept: FAMILY MEDICINE CLINIC | Age: 62
End: 2022-03-16

## 2022-03-16 ENCOUNTER — OFFICE VISIT (OUTPATIENT)
Dept: FAMILY MEDICINE CLINIC | Age: 62
End: 2022-03-16
Payer: COMMERCIAL

## 2022-03-16 VITALS
WEIGHT: 293 LBS | TEMPERATURE: 98.1 F | BODY MASS INDEX: 39.68 KG/M2 | RESPIRATION RATE: 16 BRPM | DIASTOLIC BLOOD PRESSURE: 76 MMHG | SYSTOLIC BLOOD PRESSURE: 166 MMHG | HEART RATE: 88 BPM | OXYGEN SATURATION: 97 % | HEIGHT: 72 IN

## 2022-03-16 DIAGNOSIS — R06.02 SOB (SHORTNESS OF BREATH): ICD-10-CM

## 2022-03-16 DIAGNOSIS — J45.20 MILD INTERMITTENT ASTHMA, UNSPECIFIED WHETHER COMPLICATED: ICD-10-CM

## 2022-03-16 DIAGNOSIS — E66.01 OBESITY, CLASS III, BMI 40-49.9 (MORBID OBESITY) (HCC): ICD-10-CM

## 2022-03-16 DIAGNOSIS — I10 ESSENTIAL HYPERTENSION: ICD-10-CM

## 2022-03-16 DIAGNOSIS — J41.0 SIMPLE CHRONIC BRONCHITIS (HCC): Primary | ICD-10-CM

## 2022-03-16 LAB
ALBUMIN SERPL-MCNC: 3.6 G/DL (ref 3.5–5)
ALBUMIN/GLOB SERPL: 0.9 {RATIO} (ref 1.1–2.2)
ALP SERPL-CCNC: 111 U/L (ref 45–117)
ALT SERPL-CCNC: 25 U/L (ref 12–78)
ANION GAP SERPL CALC-SCNC: 2 MMOL/L (ref 5–15)
AST SERPL-CCNC: 12 U/L (ref 15–37)
BASOPHILS # BLD: 0 K/UL (ref 0–0.1)
BASOPHILS NFR BLD: 1 % (ref 0–1)
BILIRUB SERPL-MCNC: 0.4 MG/DL (ref 0.2–1)
BUN SERPL-MCNC: 31 MG/DL (ref 6–20)
BUN/CREAT SERPL: 25 (ref 12–20)
CALCIUM SERPL-MCNC: 10.6 MG/DL (ref 8.5–10.1)
CHLORIDE SERPL-SCNC: 109 MMOL/L (ref 97–108)
CHOLEST SERPL-MCNC: 169 MG/DL
CO2 SERPL-SCNC: 28 MMOL/L (ref 21–32)
CREAT SERPL-MCNC: 1.22 MG/DL (ref 0.55–1.02)
DIFFERENTIAL METHOD BLD: ABNORMAL
EOSINOPHIL # BLD: 0.2 K/UL (ref 0–0.4)
EOSINOPHIL NFR BLD: 4 % (ref 0–7)
ERYTHROCYTE [DISTWIDTH] IN BLOOD BY AUTOMATED COUNT: 13.2 % (ref 11.5–14.5)
GLOBULIN SER CALC-MCNC: 3.8 G/DL (ref 2–4)
GLUCOSE SERPL-MCNC: 99 MG/DL (ref 65–100)
HCT VFR BLD AUTO: 36.2 % (ref 35–47)
HDLC SERPL-MCNC: 63 MG/DL
HDLC SERPL: 2.7 {RATIO} (ref 0–5)
HGB BLD-MCNC: 11 G/DL (ref 11.5–16)
IMM GRANULOCYTES # BLD AUTO: 0 K/UL (ref 0–0.04)
IMM GRANULOCYTES NFR BLD AUTO: 0 % (ref 0–0.5)
LDLC SERPL CALC-MCNC: 93.2 MG/DL (ref 0–100)
LYMPHOCYTES # BLD: 1.3 K/UL (ref 0.8–3.5)
LYMPHOCYTES NFR BLD: 20 % (ref 12–49)
MCH RBC QN AUTO: 28.6 PG (ref 26–34)
MCHC RBC AUTO-ENTMCNC: 30.4 G/DL (ref 30–36.5)
MCV RBC AUTO: 94 FL (ref 80–99)
MONOCYTES # BLD: 0.7 K/UL (ref 0–1)
MONOCYTES NFR BLD: 10 % (ref 5–13)
NEUTS SEG # BLD: 4.2 K/UL (ref 1.8–8)
NEUTS SEG NFR BLD: 65 % (ref 32–75)
NRBC # BLD: 0 K/UL (ref 0–0.01)
NRBC BLD-RTO: 0 PER 100 WBC
PLATELET # BLD AUTO: 209 K/UL (ref 150–400)
PMV BLD AUTO: 10.5 FL (ref 8.9–12.9)
POTASSIUM SERPL-SCNC: 5 MMOL/L (ref 3.5–5.1)
PROT SERPL-MCNC: 7.4 G/DL (ref 6.4–8.2)
RBC # BLD AUTO: 3.85 M/UL (ref 3.8–5.2)
SODIUM SERPL-SCNC: 139 MMOL/L (ref 136–145)
TRIGL SERPL-MCNC: 64 MG/DL (ref ?–150)
VLDLC SERPL CALC-MCNC: 12.8 MG/DL
WBC # BLD AUTO: 6.5 K/UL (ref 3.6–11)

## 2022-03-16 PROCEDURE — 99214 OFFICE O/P EST MOD 30 MIN: CPT | Performed by: NURSE PRACTITIONER

## 2022-03-16 RX ORDER — BUDESONIDE AND FORMOTEROL FUMARATE DIHYDRATE 160; 4.5 UG/1; UG/1
2 AEROSOL RESPIRATORY (INHALATION) 2 TIMES DAILY
Qty: 10.2 G | Refills: 2 | Status: SHIPPED | OUTPATIENT
Start: 2022-03-16 | End: 2022-03-16 | Stop reason: ALTCHOICE

## 2022-03-16 RX ORDER — FLUTICASONE PROPIONATE AND SALMETEROL 250; 50 UG/1; UG/1
1 POWDER RESPIRATORY (INHALATION) EVERY 12 HOURS
Qty: 60 EACH | Refills: 1 | Status: SHIPPED | OUTPATIENT
Start: 2022-03-16 | End: 2022-04-22

## 2022-03-16 RX ORDER — ALBUTEROL SULFATE 90 UG/1
2 AEROSOL, METERED RESPIRATORY (INHALATION)
Qty: 18 G | Refills: 1 | Status: SHIPPED | OUTPATIENT
Start: 2022-03-16

## 2022-03-16 RX ORDER — AMLODIPINE AND OLMESARTAN MEDOXOMIL 10; 40 MG/1; MG/1
1 TABLET ORAL DAILY
Qty: 90 TABLET | Refills: 1 | Status: SHIPPED | OUTPATIENT
Start: 2022-03-16 | End: 2022-04-15

## 2022-03-16 NOTE — PATIENT INSTRUCTIONS
Budesonide/Formoterol (By breathing)   Budesonide (oob-ZWA-hc-nide), Formoterol Fumarate (for-AMILCAR-ter-ol FUE-ma-rate)  Treats asthma and chronic obstructive pulmonary disease (COPD). This medicine contains a corticosteroid. Brand Name(s): Symbicort 160/4.5, Symbicort 80/4.5   There may be other brand names for this medicine. When This Medicine Should Not Be Used: You should not use this medicine if you have had an allergic reaction to budesonide or formoterol. You should not use this medicine if your asthma attack has already started, or if you are having a severe asthma attack or COPD flare-up. How to Use This Medicine:   Liquid Under Pressure, Powder  · Your doctor will tell you how much medicine to use. Do not use more than directed. · This medicine should come with a Medication Guide. Ask your pharmacist for a copy if you do not have one. · Test spray in the air before using for the first time or if the inhaler has not been used for a while. · Shake well for 5 seconds before using. You must keep track of the number of puffs you use. Use the dose tracker card to do this. Throw away the inhaler after you have used the number of puffs allowed, or if it is 3 months since you opened the foil pouch. · When you have finished all your inhalations, rinse your mouth out with water. Do not swallow the water after rinsing. If a dose is missed:   · Take a dose as soon as you remember. If it is almost time for your next dose, wait until then and take a regular dose. Do not take extra medicine to make up for a missed dose. How to Store and Dispose of This Medicine:   · Store the canister at room temperature, away from heat and direct light. Do not freeze. Do not keep this medicine inside a car where it could be exposed to extreme heat or cold. Do not poke holes in the canister or throw it into a fire, even if the canister is empty. Store the inhaler with the mouthpiece down.   · Ask your pharmacist, doctor, or health caregiver about the best way to dispose of the used medicine container and any leftover medicine. You will also need to throw away old medicine after the expiration date has passed. · Keep all medicine out of the reach of children. Never share your medicine with anyone. Drugs and Foods to Avoid:   Ask your doctor or pharmacist before using any other medicine, including over-the-counter medicines, vitamins, and herbal products. · Make sure your doctor knows if you are also using clarithromycin (Biaxin®), erythromycin (PCE®), itraconazole (Sporanox®), ketoconazole (Oneita Alter), telithromycin Dellia Balm), medicine to treat HIV infection (such as atazanavir, indinavir, nelfinavir, ritonavir, saquinavir, Crixivan®, Fotovase®, Invirase®, Norvir®, or Reyataz®), blood pressure medicines (such as atenolol, labetalol, Inderal®, Tenormin®, or Toprol®), or diuretics or \"water pills\" (such as furosemide, hydrochlorothiazide [HCTZ], or Lasix®). Tell your doctor if you are taking a medicine for depression or took a depression medicine in the past 2 weeks such as amitriptyline, doxepin, nefazodone, Elavil®, Eldepryl®, Marplan®, Nardil®, Pamelor®, Parnate®, or Sinequan®. Warnings While Using This Medicine:   · Make sure your doctor knows if you are pregnant or breastfeeding, or if you have liver disease, bone problems (such as osteoporosis), heart or blood vessel disease, heart rhythm problems, high blood pressure, seizures, thyroid problems, diabetes, any kind of infection (especially tuberculosis or herpes infection of the eye), low potassium in the blood, or if you have a weakened immune system. Tell your doctor if you have eye problems (such as a cataract or glaucoma). · Although this medicine decreases the number of asthma episodes, it may increase the chances of a severe asthma episode when they do occur. Talk to your doctor about any questions or concerns that you have.   · This should not be the first and only medicine you use for asthma or COPD. This medicine will not stop an asthma attack that has already started. Your doctor may prescribe another medicine for you to use in case of an acute asthma attack or an acute COPD flare-up. If the other medicine does not work as well, tell your doctor right away. · Take all of your COPD medicines as your doctor ordered. If you use any type of corticosteroid medicine to control your breathing, keep using it as ordered by your doctor. Do not change your doses or stop using your medicines without asking your doctor. · Do not use any other asthma medicine or medicine for breathing problems without talking to your doctor. This medicine should not be used with arformoterol, formoterol, salmeterol, Brovana®, Perforomist, or Serevent® inhalers. · If any of your asthma medicines do not seem to be working as well as usual, call your doctor right away. Do not change your doses or stop using your medicines without asking your doctor. · Call your doctor if your symptoms do not improve or if they get worse. · You may get infections more easily while using this medicine. Avoid people who are sick or have infections. Tell your doctor right away if you have been exposed to someone with chickenpox or measles. · This medicine may cause a fungus infection of the mouth or throat (thrush). Tell your doctor right away if you have white patches in the mouth or throat; or pain when eating or swallowing. · Patients with COPD may be more likely to have pneumonia when taking this medicine. Check with your doctor if you start having an increased sputum (spit) production, change in sputum color, fever, chills, increased cough, or an increase in breathing problems. · Using too much of this medicine or using it for a long time may increase your risk of having adrenal gland problems.  Talk to your doctor if you have more than one of these symptoms while you are using this medicine: darkening of the skin; diarrhea; dizziness; fainting; loss of appetite; mental depression; nausea; skin rash; unusual tiredness or weakness; or vomiting. · This medicine may cause paradoxical bronchospasm, which means your breathing or wheezing will get worse. Paradoxical bronchospasm may be life-threatening. Stop using this medicine and check with your doctor right away if you have coughing, difficulty breathing, shortness of breath, or wheezing after using this medicine. · If you or your child develop a skin rash, hives, or any allergic reaction to this medicine, stop using the medicine and check with your doctor as soon as possible. · This medicine may decrease bone mineral density when used for a long time. A low bone mineral density can cause weak bones or osteoporosis. If you have any questions about this, ask your doctor. · This medicine may cause children to grow more slowly than usual. Talk to your child's doctor if you have any concerns. · Check with your doctor right away if you or your child have blurred vision, difficulty with reading, or any other change in vision while using this medicine. Your doctor may want you to have your eyes checked by an ophthalmologist (eye doctor). Be sure to keep all appointments. · This medicine may affect blood sugar and potassium levels. If you have heart disease or are diabetic and notice a change in the results of your blood or urine sugar or potassium tests, check with your doctor. · Your doctor may want you to carry a medical identification (ID) card stating that you are using this medicine. The card will say that you or your child may need additional medicine during an emergency, a severe asthma attack or other illness, or unusual stress. · Your doctor will check your progress and the effects of this medicine at regular visits. Keep all appointments.   Possible Side Effects While Using This Medicine:   Call your doctor right away if you notice any of these side effects:  · Allergic reaction: Itching or hives, swelling in your face or hands, swelling or tingling in your mouth or throat, chest tightness, trouble breathing  · Changes in vision. · Chest pain, shortness of breath, troubled breathing, tightness in the chest, or wheezing. · Dry mouth, increased thirst, or muscle cramps. · Fast, pounding, or uneven heartbeat. · Fever, chills, cough, runny or stuffy nose, sore throat, and body aches. · Lightheadedness, dizziness, or fainting. · Seizures or tremors. · Sores or white patches on your lips, mouth, or throat. If you notice these less serious side effects, talk with your doctor:   · Anxiety, nervousness, or restlessness. · Headache. · Nausea, vomiting, diarrhea, upset stomach, or stomach pain. · Skin rash. If you notice other side effects that you think are caused by this medicine, tell your doctor. Call your doctor for medical advice about side effects. You may report side effects to FDA at 7-771-FDA-2408  © 2017 [facility] Information is for End User's use only and may not be sold, redistributed or otherwise used for commercial purposes. The above information is an  only. It is not intended as medical advice for individual conditions or treatments. Talk to your doctor, nurse or pharmacist before following any medical regimen to see if it is safe and effective for you.

## 2022-03-16 NOTE — TELEPHONE ENCOUNTER
Pt called and said the cost of the inhaler is $300 and she cannot afford it. Pt would like an alternate instead.

## 2022-03-16 NOTE — TELEPHONE ENCOUNTER
NP Ana Guadarrama,           Per [address] the pr's current insurance plan does not cover the Carlota Knows. Suggested alternatives are:  - Ventolin HFA INH,   - Pro-Air HFA INH. Please review. Requested Prescriptions     Pending Prescriptions Disp Refills    albuterol sulfate (PROAIR RESPICLICK) 90 mcg/actuation breath activated inhaler 1 Each 1     Sig: Take 2 Puffs by inhalation every four (4) hours as needed for Cough.

## 2022-03-16 NOTE — PROGRESS NOTES
[facility] Note     Ronny Garcia (: 1960) is a 62 y.o. female, established patient, here for evaluation of the following chief complaint(s):  Shortness of Breath (chest tightness)       ASSESSMENT/PLAN:  1. Simple chronic bronchitis / COPD (HCC)  -     XR CHEST PA LAT; Future  -     budesonide-formoteroL (SYMBICORT) 160-4.5 mcg/actuation HFAA; Take 2 Puffs by inhalation two (2) times a day., Normal, Disp-10.2 g, R-2  -     albuterol (PROVENTIL HFA, VENTOLIN HFA, PROAIR HFA) 90 mcg/actuation inhaler; Take 2 Puffs by inhalation every six (6) hours as needed for Wheezing., Normal, Disp-18 g, R-1    2. Obesity, Class III, BMI 40-49.9 (morbid obesity) ([facility])  -    Diet and lifestyle modification encouraged for weight loss and chronic disease prevention/ management    3. SOB (shortness of breath)  -     XR CHEST PA LAT; Future, suspected uncontrolled asthma/copd    4. Mild intermittent asthma, unspecified whether complicated  -     budesonide-formoteroL (SYMBICORT) 160-4.5 mcg/actuation HFAA; Take 2 Puffs by inhalation two (2) times a day., Normal, Disp-10.2 g, R-2  -     albuterol (PROVENTIL HFA, VENTOLIN HFA, PROAIR HFA) 90 mcg/actuation inhaler; Take 2 Puffs by inhalation every six (6) hours as needed for Wheezing., Normal, Disp-18 g, R-1  -Avoiding triggers    5. Essential hypertension  -     METABOLIC PANEL, COMPREHENSIVE; Future  -     LIPID PANEL; Future  -     amLODIPine-Olmesartan 10-40 mg tab; Take 1 Tablet by mouth daily. , Normal, Disp-90 Tablet, R-1  -Continue home blood pressure monitoring. Discussed goal of blood pressure less than 140/90.  -Weight loss and DASH diet discussed        Return in about 1 month (around 2022). SUBJECTIVE/OBJECTIVE:    Ronny Garcia is a 62 y.o. female seen today for asthma, copd, HTN    Asthma & COPD:  Since the last visit, Ronny Garcia has Daytime asthma and COPD and no nighttime asthma symptoms.  These symptoms include wheezing, increased work of breathing and shortness of breath. The rescue inhaler has been used As needed for wheezing or during allergy season. There has not used oral steroids in the last year, and has had no hospitalizations for asthma in the last year. Measured peak flow not checked. Cardiovascular Review:  She has hypertension, hyperlipidemia and obesity. Diet and Lifestyle: not attempting to follow a low fat, low cholesterol diet, sedentary    Home BP Monitoring: is not well controlled at home, ranging 's. Pertinent ROS: taking medications as instructed, no medication side effects noted, no TIA's, no chest pain on exertion, no dyspnea on exertion, no swelling of ankles. REVIEW OF SYSTEMS:    Review of Systems   Constitutional: Negative. HENT: Positive for rhinorrhea. Negative for facial swelling, sneezing, sore throat and trouble swallowing. Respiratory: Positive for chest tightness, shortness of breath and wheezing. Negative for cough and stridor. Cardiovascular: Negative. Genitourinary: Negative. Musculoskeletal: Negative. Skin: Negative. Neurological: Negative. VITAL SIGNS:    Wt Readings from Last 3 Encounters:   03/16/22 309 lb (140.2 kg)   11/19/21 297 lb 11.2 oz (135 kg)   05/11/21 297 lb 9.6 oz (135 kg)     Temp Readings from Last 3 Encounters:   03/16/22 98.1 °F (36.7 °C) (Temporal)   11/19/21 98.2 °F (36.8 °C) (Temporal)   05/11/21 98.3 °F (36.8 °C) (Temporal)     BP Readings from Last 3 Encounters:   03/16/22 (!) 166/76   11/19/21 (!) 161/70   05/11/21 (!) 157/83     Pulse Readings from Last 3 Encounters:   03/16/22 88   11/19/21 68   05/11/21 88           PHYSICAL EXAMINATION:       General: Alert, cooperative, no distress  HEENT: Normocephalic, sclera nonicteric, frontal maxillary sinuses nontender, oropharynx pink, no lymphadenopathy  Respiratory: Breathing comfortably, in no acute respiratory distress. Clear but distant to auscultation bilaterally. Cardiovascular: Regular rate and rhythm, S1, S2 normal, no murmur, click, rub or gallop. Extremities: no edema. Abdomen: Soft, non-tender, not distended. Bowel sounds normal. No masses or organomegaly. MSK: Extremities normal appearing, atraumatic, no effusion. Gait steady and unassisted. Skin: Skin color, texture, turgor normal. No rashes or lesions on exposed skin. Neurologic: A/Ox3  Psychiatric: Normal affect. Mood euthymic. Thoughts logical. Speech volume and speed normal            Treatment risks/benefits/costs/interactions/alternatives discussed with patient. Advised patient to call back or return to office if symptoms worsen/change/persist. If patient cannot reach us or should anything more severe/urgent arise he/she should proceed directly to the nearest emergency department. Discussed expected course/resolution/complications of diagnosis in detail with patient. Patient expressed understanding with the diagnosis and plan. An electronic signature was used to authenticate this note.   -- Cheri Hernández NP

## 2022-03-16 NOTE — PROGRESS NOTES
Chief Complaint   Patient presents with    Shortness of Breath     chest tightness         1. \"Have you been to the ER, urgent care clinic since your last visit? Hospitalized since your last visit? \" No    2. \"Have you seen or consulted any other health care providers outside of the [address] [place] [address] since your last visit? \" No     3. For patients over 45: Has the patient had a colonoscopy? Yes - no Care Gap present     If the patient is female:    4. For patients over 40: Has the patient had a mammogram? No    5. For patients over 21: Has the patient had a pap smear?  Yes - no Care Gap present     3 most recent PHQ Screens 3/16/2022   Little interest or pleasure in doing things Not at all   Feeling down, depressed, irritable, or hopeless Not at all   Total Score PHQ 2 0   Trouble falling or staying asleep, or sleeping too much -   Feeling tired or having little energy -   Poor appetite, weight loss, or overeating -   Feeling bad about yourself - or that you are a failure or have let yourself or your family down -   Trouble concentrating on things such as school, work, reading, or watching TV -   Moving or speaking so slowly that other people could have noticed; or the opposite being so fidgety that others notice -   Thoughts of being better off dead, or hurting yourself in some way -   PHQ 9 Score -   How difficult have these problems made it for you to do your work, take care of your home and get along with others -       Health Maintenance Due   Topic Date Due    Shingrix Vaccine Age 49> (1 of 2) Never done    Breast Cancer Screen Mammogram  07/15/2021    COVID-19 Vaccine (3 - Booster for Shogether series) 08/23/2021    Flu Vaccine (1) 09/01/2021

## 2022-03-17 NOTE — TELEPHONE ENCOUNTER
Called patient. Two patient identifiers verified. Informed pt unless she would like to proceed, SOLOMON Baptiste Dangelo is going to hold off until she can speak to office's pharmacist to try to find an alternative. Pt agreed to hold off as inhaler would still be too expensive for her.

## 2022-03-18 PROBLEM — E66.812 CLASS 2 SEVERE OBESITY DUE TO EXCESS CALORIES WITH SERIOUS COMORBIDITY AND BODY MASS INDEX (BMI) OF 36.0 TO 36.9 IN ADULT: Status: ACTIVE | Noted: 2018-04-18

## 2022-03-18 PROBLEM — E66.01 CLASS 2 SEVERE OBESITY DUE TO EXCESS CALORIES WITH SERIOUS COMORBIDITY AND BODY MASS INDEX (BMI) OF 36.0 TO 36.9 IN ADULT (HCC): Status: ACTIVE | Noted: 2018-04-18

## 2022-03-19 PROBLEM — I10 ESSENTIAL HYPERTENSION: Status: ACTIVE | Noted: 2018-04-18

## 2022-03-19 PROBLEM — J41.0 SIMPLE CHRONIC BRONCHITIS (HCC): Status: ACTIVE | Noted: 2019-07-25

## 2022-03-19 PROBLEM — M79.672 LEFT FOOT PAIN: Status: ACTIVE | Noted: 2018-04-18

## 2022-03-19 PROBLEM — J30.9 ALLERGIC RHINITIS: Status: ACTIVE | Noted: 2018-04-18

## 2022-03-19 PROBLEM — R87.810 ASCUS WITH POSITIVE HIGH RISK HPV CERVICAL: Status: ACTIVE | Noted: 2018-04-29

## 2022-03-19 PROBLEM — R87.610 ASCUS WITH POSITIVE HIGH RISK HPV CERVICAL: Status: ACTIVE | Noted: 2018-04-29

## 2022-03-19 PROBLEM — Z87.891 FORMER SMOKER: Status: ACTIVE | Noted: 2018-04-18

## 2022-03-19 PROBLEM — F41.8 DEPRESSION WITH ANXIETY: Status: ACTIVE | Noted: 2018-04-18

## 2022-03-19 PROBLEM — E55.9 VITAMIN D DEFICIENCY: Status: ACTIVE | Noted: 2018-04-22

## 2022-03-20 ENCOUNTER — HOSPITAL ENCOUNTER (EMERGENCY)
Age: 62
Discharge: HOME | End: 2022-03-20
Attending: EMERGENCY MEDICINE | Admitting: EMERGENCY MEDICINE
Payer: COMMERCIAL

## 2022-03-20 ENCOUNTER — APPOINTMENT (OUTPATIENT)
Dept: CT IMAGING | Age: 62
End: 2022-03-20
Attending: EMERGENCY MEDICINE
Payer: COMMERCIAL

## 2022-03-20 VITALS
HEIGHT: 72 IN | OXYGEN SATURATION: 94 % | WEIGHT: 293 LBS | BODY MASS INDEX: 39.68 KG/M2 | DIASTOLIC BLOOD PRESSURE: 83 MMHG | TEMPERATURE: 97.8 F | SYSTOLIC BLOOD PRESSURE: 160 MMHG | HEART RATE: 77 BPM | RESPIRATION RATE: 16 BRPM

## 2022-03-20 DIAGNOSIS — R10.9 LEFT FLANK PAIN: Primary | ICD-10-CM

## 2022-03-20 DIAGNOSIS — D25.9 UTERINE LEIOMYOMA, UNSPECIFIED LOCATION: ICD-10-CM

## 2022-03-20 PROBLEM — J45.20 MILD INTERMITTENT ASTHMA: Status: ACTIVE | Noted: 2018-04-18

## 2022-03-20 LAB
ALBUMIN SERPL-MCNC: 3.6 G/DL (ref 3.5–5)
ALBUMIN/GLOB SERPL: 0.8 {RATIO} (ref 1.1–2.2)
ALP SERPL-CCNC: 107 U/L (ref 45–117)
ALT SERPL-CCNC: 24 U/L (ref 12–78)
ANION GAP SERPL CALC-SCNC: 1 MMOL/L (ref 5–15)
APPEARANCE UR: CLEAR
AST SERPL-CCNC: 11 U/L (ref 15–37)
ATRIAL RATE: 90 BPM
BASOPHILS # BLD: 0 K/UL (ref 0–0.1)
BASOPHILS NFR BLD: 0 % (ref 0–1)
BILIRUB SERPL-MCNC: 0.5 MG/DL (ref 0.2–1)
BILIRUB UR QL: NEGATIVE
BUN SERPL-MCNC: 30 MG/DL (ref 6–20)
BUN/CREAT SERPL: 23 (ref 12–20)
CALCIUM SERPL-MCNC: 9.8 MG/DL (ref 8.5–10.1)
CALCULATED P AXIS, ECG09: 28 DEGREES
CALCULATED R AXIS, ECG10: -9 DEGREES
CALCULATED T AXIS, ECG11: 33 DEGREES
CHLORIDE SERPL-SCNC: 107 MMOL/L (ref 97–108)
CO2 SERPL-SCNC: 29 MMOL/L (ref 21–32)
COLOR UR: NORMAL
COMMENT, HOLDF: NORMAL
CREAT SERPL-MCNC: 1.33 MG/DL (ref 0.55–1.02)
DIAGNOSIS, 93000: NORMAL
DIFFERENTIAL METHOD BLD: ABNORMAL
EOSINOPHIL # BLD: 0.3 K/UL (ref 0–0.4)
EOSINOPHIL NFR BLD: 3 % (ref 0–7)
ERYTHROCYTE [DISTWIDTH] IN BLOOD BY AUTOMATED COUNT: 13.2 % (ref 11.5–14.5)
GLOBULIN SER CALC-MCNC: 4.5 G/DL (ref 2–4)
GLUCOSE SERPL-MCNC: 90 MG/DL (ref 65–100)
GLUCOSE UR STRIP.AUTO-MCNC: NEGATIVE MG/DL
HCT VFR BLD AUTO: 35.1 % (ref 35–47)
HGB BLD-MCNC: 10.9 G/DL (ref 11.5–16)
HGB UR QL STRIP: NEGATIVE
IMM GRANULOCYTES # BLD AUTO: 0 K/UL (ref 0–0.04)
IMM GRANULOCYTES NFR BLD AUTO: 0 % (ref 0–0.5)
KETONES UR QL STRIP.AUTO: NEGATIVE MG/DL
LACTATE SERPL-SCNC: 0.5 MMOL/L (ref 0.4–2)
LEUKOCYTE ESTERASE UR QL STRIP.AUTO: NEGATIVE
LIPASE SERPL-CCNC: 75 U/L (ref 73–393)
LYMPHOCYTES # BLD: 1 K/UL (ref 0.8–3.5)
LYMPHOCYTES NFR BLD: 12 % (ref 12–49)
MCH RBC QN AUTO: 28.5 PG (ref 26–34)
MCHC RBC AUTO-ENTMCNC: 31.1 G/DL (ref 30–36.5)
MCV RBC AUTO: 91.9 FL (ref 80–99)
MONOCYTES # BLD: 0.8 K/UL (ref 0–1)
MONOCYTES NFR BLD: 10 % (ref 5–13)
NEUTS SEG # BLD: 6 K/UL (ref 1.8–8)
NEUTS SEG NFR BLD: 75 % (ref 32–75)
NITRITE UR QL STRIP.AUTO: NEGATIVE
NRBC # BLD: 0 K/UL (ref 0–0.01)
NRBC BLD-RTO: 0 PER 100 WBC
P-R INTERVAL, ECG05: 166 MS
PH UR STRIP: 5 [PH] (ref 5–8)
PLATELET # BLD AUTO: 195 K/UL (ref 150–400)
PMV BLD AUTO: 9.9 FL (ref 8.9–12.9)
POTASSIUM SERPL-SCNC: 4.8 MMOL/L (ref 3.5–5.1)
PROT SERPL-MCNC: 8.1 G/DL (ref 6.4–8.2)
PROT UR STRIP-MCNC: NEGATIVE MG/DL
Q-T INTERVAL, ECG07: 372 MS
QRS DURATION, ECG06: 96 MS
QTC CALCULATION (BEZET), ECG08: 455 MS
RBC # BLD AUTO: 3.82 M/UL (ref 3.8–5.2)
SAMPLES BEING HELD,HOLD: NORMAL
SODIUM SERPL-SCNC: 137 MMOL/L (ref 136–145)
SP GR UR REFRACTOMETRY: 1.03 (ref 1–1.03)
TROPONIN-HIGH SENSITIVITY: 14 NG/L (ref 0–51)
UR CULT HOLD, URHOLD: NORMAL
UROBILINOGEN UR QL STRIP.AUTO: 0.2 EU/DL (ref 0.2–1)
VENTRICULAR RATE, ECG03: 90 BPM
WBC # BLD AUTO: 8 K/UL (ref 3.6–11)

## 2022-03-20 PROCEDURE — 80053 COMPREHEN METABOLIC PANEL: CPT

## 2022-03-20 PROCEDURE — 83690 ASSAY OF LIPASE: CPT

## 2022-03-20 PROCEDURE — 36415 COLL VENOUS BLD VENIPUNCTURE: CPT

## 2022-03-20 PROCEDURE — 74177 CT ABD & PELVIS W/CONTRAST: CPT

## 2022-03-20 PROCEDURE — 96374 THER/PROPH/DIAG INJ IV PUSH: CPT

## 2022-03-20 PROCEDURE — 94664 DEMO&/EVAL PT USE INHALER: CPT

## 2022-03-20 PROCEDURE — 74011250636 HC RX REV CODE- 250/636: Performed by: EMERGENCY MEDICINE

## 2022-03-20 PROCEDURE — 93005 ELECTROCARDIOGRAM TRACING: CPT

## 2022-03-20 PROCEDURE — 84484 ASSAY OF TROPONIN QUANT: CPT

## 2022-03-20 PROCEDURE — 74011000250 HC RX REV CODE- 250: Performed by: EMERGENCY MEDICINE

## 2022-03-20 PROCEDURE — 83605 ASSAY OF LACTIC ACID: CPT

## 2022-03-20 PROCEDURE — 74011000636 HC RX REV CODE- 636: Performed by: RADIOLOGY

## 2022-03-20 PROCEDURE — 94640 AIRWAY INHALATION TREATMENT: CPT

## 2022-03-20 PROCEDURE — 99285 EMERGENCY DEPT VISIT HI MDM: CPT

## 2022-03-20 PROCEDURE — 81003 URINALYSIS AUTO W/O SCOPE: CPT

## 2022-03-20 PROCEDURE — 77030029684 HC NEB SM VOL KT MONA -A

## 2022-03-20 PROCEDURE — 85025 COMPLETE CBC W/AUTO DIFF WBC: CPT

## 2022-03-20 RX ORDER — MORPHINE SULFATE 4 MG/ML
4 INJECTION INTRAVENOUS ONCE
Status: COMPLETED | OUTPATIENT
Start: 2022-03-20 | End: 2022-03-20

## 2022-03-20 RX ORDER — IPRATROPIUM BROMIDE AND ALBUTEROL SULFATE 2.5; .5 MG/3ML; MG/3ML
3 SOLUTION RESPIRATORY (INHALATION)
Status: COMPLETED | OUTPATIENT
Start: 2022-03-20 | End: 2022-03-20

## 2022-03-20 RX ORDER — METHOCARBAMOL 500 MG/1
500 TABLET, FILM COATED ORAL 4 TIMES DAILY
Qty: 20 TABLET | Refills: 0 | Status: SHIPPED | OUTPATIENT
Start: 2022-03-20 | End: 2022-03-20 | Stop reason: SDUPTHER

## 2022-03-20 RX ORDER — LIDOCAINE 4 G/100G
1 PATCH TOPICAL EVERY 24 HOURS
Status: DISCONTINUED | OUTPATIENT
Start: 2022-03-20 | End: 2022-03-20 | Stop reason: HOSPADM

## 2022-03-20 RX ORDER — METHOCARBAMOL 500 MG/1
500 TABLET, FILM COATED ORAL 4 TIMES DAILY
Qty: 20 TABLET | Refills: 0 | Status: SHIPPED | OUTPATIENT
Start: 2022-03-20 | End: 2022-03-25

## 2022-03-20 RX ORDER — MORPHINE SULFATE 4 MG/ML
4 INJECTION INTRAVENOUS
Status: DISCONTINUED | OUTPATIENT
Start: 2022-03-20 | End: 2022-03-20 | Stop reason: HOSPADM

## 2022-03-20 RX ADMIN — IOPAMIDOL 100 ML: 755 INJECTION, SOLUTION INTRAVENOUS at 08:59

## 2022-03-20 RX ADMIN — MORPHINE SULFATE 4 MG: 4 INJECTION INTRAVENOUS at 10:06

## 2022-03-20 RX ADMIN — SODIUM CHLORIDE, POTASSIUM CHLORIDE, SODIUM LACTATE AND CALCIUM CHLORIDE 500 ML: 600; 310; 30; 20 INJECTION, SOLUTION INTRAVENOUS at 10:06

## 2022-03-20 RX ADMIN — IPRATROPIUM BROMIDE AND ALBUTEROL SULFATE 3 ML: .5; 3 SOLUTION RESPIRATORY (INHALATION) at 10:01

## 2022-03-20 NOTE — ED TRIAGE NOTES
Patient arrives from home with c/o LEFT flank pain 8/10. Pt denies any recent falls or strain - states pain started yesterday evening 'out of the blue'. Pt denies any urinary symptoms, denies N/V or diarrhea. Pt is SOB at baseline.      Hx COPD, HTN    Pt hypertensive in triage, has not taken BP meds this am

## 2022-03-20 NOTE — ED PROVIDER NOTES
Patient is here because of left flank pain. She states that yesterday she had a gradual onset of pain in the left lower back left flank area. She says it feels like a cramp. Does not radiate. No nausea vomiting diarrhea constipation. No falls trauma heavy lifting. No alleviating or aggravating factors. No chest pain shortness of breath. No history of kidney stones. No history of abdominal surgeries. States that she has a history of lumbar spine surgeries but this does not feel like pain from her lumbar spine as its more on the flank area. Denies any bowel or bladder incontinence. Denies any vaginal discharge or bleeding. Denies paresthesias numbness. Past Medical History:   Diagnosis Date    Chronic obstructive pulmonary disease ([facility] [address]     Hypertension     Menopause     LMP-40years old?        Past Surgical History:   Procedure Laterality Date    HX ORTHOPAEDIC           Family History:   Problem Relation Age of Onset    Heart Disease Mother     Hypertension Mother     Stroke Father     Kidney Disease Brother     Hypertension Brother        Social History     Socioeconomic History    Marital status:      Spouse name: Not on file    Number of children: Not on file    Years of education: Not on file    Highest education level: Not on file   Occupational History    Not on file   Tobacco Use    Smoking status: Former Smoker     Packs/day: 1.00     Years: 15.00     Pack years: 15.00     Quit date: 2018     Years since quittin.1    Smokeless tobacco: Never Used   Vaping Use    Vaping Use: Never used   Substance and Sexual Activity    Alcohol use: Yes     Comment: occasionally    Drug use: No    Sexual activity: Yes     Partners: Male   Other Topics Concern    Not on file   Social History Narrative    Not on file     Social Determinants of Health     Financial Resource Strain:     Difficulty of Paying Living Expenses: Not on file   Food Insecurity:     Worried About Running Out of Food in the Last Year: Not on file    Ran Out of Food in the Last Year: Not on file   Transportation Needs:     Lack of Transportation (Medical): Not on file    Lack of Transportation (Non-Medical): Not on file   Physical Activity:     Days of Exercise per Week: Not on file    Minutes of Exercise per Session: Not on file   Stress:     Feeling of Stress : Not on file   Social Connections:     Frequency of Communication with Friends and Family: Not on file    Frequency of Social Gatherings with Friends and Family: Not on file    Attends Sikhism Services: Not on file    Active Member of [address] [place] [address] [employer] or Organizations: Not on file    Attends Club or Organization Meetings: Not on file    Marital Status: Not on file   Intimate Partner Violence:     Fear of Current or Ex-Partner: Not on file    Emotionally Abused: Not on file    Physically Abused: Not on file    Sexually Abused: Not on file   Housing Stability:     Unable to Pay for Housing in the Last Year: Not on file    Number of [place] in the Last Year: Not on file    Unstable Housing in the Last Year: Not on file         ALLERGIES: Lisinopril    Review of Systems   Constitutional: Negative for chills, fatigue and fever. HENT: Negative for rhinorrhea and sore throat. Eyes: Negative for pain and discharge. Respiratory: Negative for cough, shortness of breath and wheezing. Cardiovascular: Negative for chest pain, palpitations and leg swelling. Gastrointestinal: Positive for abdominal pain. Negative for constipation, diarrhea, nausea and vomiting. Genitourinary: Positive for flank pain. Negative for decreased urine volume, dysuria, hematuria, pelvic pain, urgency, vaginal bleeding, vaginal discharge and vaginal pain. Musculoskeletal: Positive for back pain. Negative for arthralgias, gait problem, joint swelling, myalgias, neck pain and neck stiffness. Skin: Negative for color change, pallor, rash and wound. Neurological: Negative for dizziness, tremors, syncope, light-headedness, numbness and headaches. Vitals:    03/20/22 0718 03/20/22 0719 03/20/22 1005 03/20/22 1230   BP:    (!) 160/83   Pulse:    77   Resp:    16   Temp:    97.8 °F (36.6 °C)   SpO2:  94% 94% 94%   Weight: 141.6 kg (312 lb 2.7 oz)      Height: 6' 1\" (1.854 m)               Physical Exam  Vitals and nursing note reviewed. Exam conducted with a chaperone present. Constitutional:       General: She is not in acute distress. Appearance: Normal appearance. She is normal weight. She is not ill-appearing, toxic-appearing or diaphoretic. HENT:      Head: Normocephalic and atraumatic. Right Ear: External ear normal.      Left Ear: External ear normal.      Nose: Nose normal.      Mouth/Throat:      Mouth: Mucous membranes are moist.      Pharynx: Oropharynx is clear. Eyes:      Extraocular Movements: Extraocular movements intact. Conjunctiva/sclera: Conjunctivae normal.      Pupils: Pupils are equal, round, and reactive to light. Cardiovascular:      Rate and Rhythm: Normal rate and regular rhythm. Pulses: Normal pulses. Heart sounds: Normal heart sounds. Pulmonary:      Effort: Pulmonary effort is normal.      Breath sounds: Normal breath sounds. Abdominal:      General: Abdomen is flat. Bowel sounds are normal. There is no distension. Palpations: Abdomen is soft. There is no mass. Tenderness: There is abdominal tenderness (Left flank. There is no trauma. No rash. No crepitus. ). There is no right CVA tenderness, left CVA tenderness, guarding or rebound. Hernia: No hernia is present. Musculoskeletal:         General: No swelling, tenderness, deformity or signs of injury. Normal range of motion. Cervical back: Normal range of motion and neck supple. Right lower leg: No edema. Left lower leg: No edema. Comments: No midline tenderness palpation of the thoracic or lumbar spine. There is no edema erythema crepitus warmth fluctuance trauma rash step-off. No rash. Skin:     General: Skin is warm and dry. Capillary Refill: Capillary refill takes less than 2 seconds. Neurological:      General: No focal deficit present. Mental Status: She is alert and oriented to person, place, and time. Mental status is at baseline. Psychiatric:         Mood and Affect: Mood normal.         Behavior: Behavior normal.          [facility]  ED Course as of 03/20/22 1248   Sun Mar 20, 2022   0831 LACTIC ACID, PLASMA:    Lactic acid 0.5 [AF]   0831 LIPASE:    Lipase 75 [AF]   0831 CBC WITH AUTOMATED DIFF(!):    WBC 8.0   RBC 3.82   HGB 10.9(!)   HCT 35.1   MCV 91.9   MCH 28.5   MCHC 31.1   RDW 13.2   PLATELET 038   MPV 9.9   NRBC 0.0   ABSOLUTE NRBC 0.00   NEUTROPHILS 75   LYMPHOCYTES 12   MONOCYTES 10   EOSINOPHILS 3   BASOPHILS 0   IMMATURE GRANULOCYTES 0   ABS. NEUTROPHILS 6.0   ABS. LYMPHOCYTES 1.0   ABS. MONOCYTES 0.8   ABS. EOSINOPHILS 0.3   ABS. BASOPHILS 0.0   ABS. IMM. GRANS. 0.0   DF AUTOMATED [AF]   0831 COMPREHENSIVE METABOLIC PANEL(!):    Sodium 137   Potassium 4.8   Chloride 107   CO2 29   Anion gap 1(!)   Glucose 90   BUN 30(!)   Creatinine 1.33(!)   BUN/Creatinine ratio 23(!)   GFR est AA 49(!)   GFR est non-AA 41(!)   Calcium 9.8   Bilirubin, total 0.5   ALT 24   AST 11(!)   Alk. phosphatase 107   Protein, total 8.1   Albumin 3.6   Globulin 4.5(!)   A-G Ratio 0.8(!) [AF]   0831 Late note due to patient care. Patient evaluated. Patient no acute distress. Does have hypertension otherwise vital signs normal.  Pain in the left flank area. No tenderness along the back and no evidence of trauma or shingles. No CVA tenderness. Will get CT of the abdomen to evaluate. Will get urinalysis. Suspect hypertension may be secondary to her pain and anxiety. Will treat pain first and if that does not for the blood pressure will start with antihypertensives.  [AF]   0920 CT ABD PELV W CONT [AF] 1548 CT scan with uterine fibroids. Will have patient follow-up with her gynecologist for that. Will reevaluate. [AF]   1115 TROPONIN-HIGH SENSITIVITY:    Troponin-High Sensitivity 14 [AF]   1156 URINALYSIS W/ RFLX MICROSCOPIC:    Color YELLOW/STRAW   Appearance CLEAR   Specific gravity 1.030   pH (UA) 5.0   Protein Negative   Glucose Negative   Ketone Negative   Bilirubin Negative   Blood Negative   Urobilinogen 0.2   Nitrites Negative   Leukocyte Esterase Negative [AF]   1239 SBP improved [AF]   0448 Patient rechecked. Pain improved. Will discharge patient home in the care of her  appears reliable for patient's wellbeing. Patient will follow-up with primary care doctor prescription for muscle relaxer provided. Return instructions provided.   Patient verbalized understanding and agreement plan [AF]      ED Course User Index  [AF] Dwayne Underwood, DO       Procedures

## 2022-03-21 ENCOUNTER — OFFICE VISIT (OUTPATIENT)
Dept: FAMILY MEDICINE CLINIC | Age: 62
End: 2022-03-21
Payer: COMMERCIAL

## 2022-03-21 ENCOUNTER — HOSPITAL ENCOUNTER (EMERGENCY)
Age: 62
Discharge: HOME | End: 2022-03-21
Attending: EMERGENCY MEDICINE
Payer: COMMERCIAL

## 2022-03-21 ENCOUNTER — APPOINTMENT (OUTPATIENT)
Dept: CT IMAGING | Age: 62
End: 2022-03-21
Attending: EMERGENCY MEDICINE
Payer: COMMERCIAL

## 2022-03-21 VITALS
TEMPERATURE: 97.8 F | WEIGHT: 293 LBS | HEIGHT: 72 IN | HEART RATE: 81 BPM | DIASTOLIC BLOOD PRESSURE: 81 MMHG | RESPIRATION RATE: 18 BRPM | SYSTOLIC BLOOD PRESSURE: 151 MMHG | OXYGEN SATURATION: 95 % | BODY MASS INDEX: 39.68 KG/M2

## 2022-03-21 VITALS
HEIGHT: 72 IN | HEART RATE: 88 BPM | WEIGHT: 293 LBS | OXYGEN SATURATION: 94 % | BODY MASS INDEX: 39.68 KG/M2 | DIASTOLIC BLOOD PRESSURE: 81 MMHG | TEMPERATURE: 97.8 F | RESPIRATION RATE: 16 BRPM | SYSTOLIC BLOOD PRESSURE: 172 MMHG

## 2022-03-21 DIAGNOSIS — R06.02 SHORTNESS OF BREATH: Primary | ICD-10-CM

## 2022-03-21 DIAGNOSIS — J18.9 COMMUNITY ACQUIRED PNEUMONIA OF RIGHT LOWER LOBE OF LUNG: Primary | ICD-10-CM

## 2022-03-21 DIAGNOSIS — J44.1 COPD EXACERBATION (HCC): ICD-10-CM

## 2022-03-21 LAB
ALBUMIN SERPL-MCNC: 3.6 G/DL (ref 3.5–5)
ALBUMIN/GLOB SERPL: 0.9 {RATIO} (ref 1.1–2.2)
ALP SERPL-CCNC: 109 U/L (ref 45–117)
ALT SERPL-CCNC: 26 U/L (ref 12–78)
ANION GAP SERPL CALC-SCNC: 3 MMOL/L (ref 5–15)
AST SERPL-CCNC: 18 U/L (ref 15–37)
BASOPHILS # BLD: 0 K/UL (ref 0–0.1)
BASOPHILS NFR BLD: 0 % (ref 0–1)
BILIRUB SERPL-MCNC: 0.5 MG/DL (ref 0.2–1)
BNP SERPL-MCNC: 1996 PG/ML
BUN SERPL-MCNC: 24 MG/DL (ref 6–20)
BUN/CREAT SERPL: 21 (ref 12–20)
CALCIUM SERPL-MCNC: 10.2 MG/DL (ref 8.5–10.1)
CHLORIDE SERPL-SCNC: 108 MMOL/L (ref 97–108)
CO2 SERPL-SCNC: 26 MMOL/L (ref 21–32)
COMMENT, HOLDF: NORMAL
CREAT SERPL-MCNC: 1.13 MG/DL (ref 0.55–1.02)
DIFFERENTIAL METHOD BLD: ABNORMAL
EOSINOPHIL # BLD: 0.2 K/UL (ref 0–0.4)
EOSINOPHIL NFR BLD: 3 % (ref 0–7)
ERYTHROCYTE [DISTWIDTH] IN BLOOD BY AUTOMATED COUNT: 13.1 % (ref 11.5–14.5)
GLOBULIN SER CALC-MCNC: 4.1 G/DL (ref 2–4)
GLUCOSE SERPL-MCNC: 94 MG/DL (ref 65–100)
HCT VFR BLD AUTO: 34.8 % (ref 35–47)
HGB BLD-MCNC: 10.7 G/DL (ref 11.5–16)
IMM GRANULOCYTES # BLD AUTO: 0 K/UL (ref 0–0.04)
IMM GRANULOCYTES NFR BLD AUTO: 0 % (ref 0–0.5)
LYMPHOCYTES # BLD: 1 K/UL (ref 0.8–3.5)
LYMPHOCYTES NFR BLD: 15 % (ref 12–49)
MCH RBC QN AUTO: 28.2 PG (ref 26–34)
MCHC RBC AUTO-ENTMCNC: 30.7 G/DL (ref 30–36.5)
MCV RBC AUTO: 91.6 FL (ref 80–99)
MONOCYTES # BLD: 0.7 K/UL (ref 0–1)
MONOCYTES NFR BLD: 10 % (ref 5–13)
NEUTS SEG # BLD: 4.8 K/UL (ref 1.8–8)
NEUTS SEG NFR BLD: 72 % (ref 32–75)
NRBC # BLD: 0 K/UL (ref 0–0.01)
NRBC BLD-RTO: 0 PER 100 WBC
PLATELET # BLD AUTO: 194 K/UL (ref 150–400)
PMV BLD AUTO: 10.5 FL (ref 8.9–12.9)
POTASSIUM SERPL-SCNC: 3.9 MMOL/L (ref 3.5–5.1)
PROT SERPL-MCNC: 7.7 G/DL (ref 6.4–8.2)
RBC # BLD AUTO: 3.8 M/UL (ref 3.8–5.2)
SAMPLES BEING HELD,HOLD: NORMAL
SODIUM SERPL-SCNC: 137 MMOL/L (ref 136–145)
TROPONIN-HIGH SENSITIVITY: 12 NG/L (ref 0–51)
WBC # BLD AUTO: 6.7 K/UL (ref 3.6–11)

## 2022-03-21 PROCEDURE — 74011250636 HC RX REV CODE- 250/636: Performed by: EMERGENCY MEDICINE

## 2022-03-21 PROCEDURE — 71250 CT THORAX DX C-: CPT

## 2022-03-21 PROCEDURE — 83880 ASSAY OF NATRIURETIC PEPTIDE: CPT

## 2022-03-21 PROCEDURE — 74011250637 HC RX REV CODE- 250/637: Performed by: EMERGENCY MEDICINE

## 2022-03-21 PROCEDURE — 84484 ASSAY OF TROPONIN QUANT: CPT

## 2022-03-21 PROCEDURE — 99284 EMERGENCY DEPT VISIT MOD MDM: CPT

## 2022-03-21 PROCEDURE — 36415 COLL VENOUS BLD VENIPUNCTURE: CPT

## 2022-03-21 PROCEDURE — 80053 COMPREHEN METABOLIC PANEL: CPT

## 2022-03-21 PROCEDURE — 85025 COMPLETE CBC W/AUTO DIFF WBC: CPT

## 2022-03-21 PROCEDURE — 74011000250 HC RX REV CODE- 250: Performed by: EMERGENCY MEDICINE

## 2022-03-21 PROCEDURE — 99215 OFFICE O/P EST HI 40 MIN: CPT | Performed by: NURSE PRACTITIONER

## 2022-03-21 PROCEDURE — 94640 AIRWAY INHALATION TREATMENT: CPT

## 2022-03-21 PROCEDURE — 96374 THER/PROPH/DIAG INJ IV PUSH: CPT

## 2022-03-21 PROCEDURE — 96375 TX/PRO/DX INJ NEW DRUG ADDON: CPT

## 2022-03-21 RX ORDER — IPRATROPIUM BROMIDE AND ALBUTEROL SULFATE 2.5; .5 MG/3ML; MG/3ML
3 SOLUTION RESPIRATORY (INHALATION)
Status: COMPLETED | OUTPATIENT
Start: 2022-03-21 | End: 2022-03-21

## 2022-03-21 RX ORDER — AZITHROMYCIN 250 MG/1
250 TABLET, FILM COATED ORAL DAILY
Qty: 4 TABLET | Refills: 0 | Status: SHIPPED | OUTPATIENT
Start: 2022-03-21 | End: 2022-03-25

## 2022-03-21 RX ORDER — ALBUTEROL SULFATE 1.25 MG/3ML
1.25 SOLUTION RESPIRATORY (INHALATION)
Status: SHIPPED | OUTPATIENT
Start: 2022-03-21

## 2022-03-21 RX ORDER — DEXAMETHASONE SODIUM PHOSPHATE 10 MG/ML
10 INJECTION INTRAMUSCULAR; INTRAVENOUS ONCE
Status: COMPLETED | OUTPATIENT
Start: 2022-03-21 | End: 2022-03-21

## 2022-03-21 RX ORDER — AZITHROMYCIN 250 MG/1
500 TABLET, FILM COATED ORAL
Status: COMPLETED | OUTPATIENT
Start: 2022-03-21 | End: 2022-03-21

## 2022-03-21 RX ADMIN — IPRATROPIUM BROMIDE AND ALBUTEROL SULFATE 3 ML: .5; 3 SOLUTION RESPIRATORY (INHALATION) at 14:09

## 2022-03-21 RX ADMIN — CEFTRIAXONE SODIUM 1 G: 1 INJECTION, POWDER, FOR SOLUTION INTRAMUSCULAR; INTRAVENOUS at 16:38

## 2022-03-21 RX ADMIN — IPRATROPIUM BROMIDE AND ALBUTEROL SULFATE 3 ML: .5; 3 SOLUTION RESPIRATORY (INHALATION) at 14:08

## 2022-03-21 RX ADMIN — AZITHROMYCIN MONOHYDRATE 500 MG: 250 TABLET ORAL at 16:38

## 2022-03-21 RX ADMIN — DEXAMETHASONE SODIUM PHOSPHATE 10 MG: 10 INJECTION INTRAMUSCULAR; INTRAVENOUS at 14:27

## 2022-03-21 NOTE — PROGRESS NOTES
[facility] Note     Mike Davenport (: 1960) is a 62 y.o. female, established patient, here for evaluation of the following chief complaint(s):  ED Follow-up (back pain, SOB)       ASSESSMENT/PLAN:  1. Shortness of breath  -     albuterol (ACCUNEB) nebulizer solution 1.25 mg; 1.25 mg, Nebulization, EVERY 6 HOURS AS NEEDED, Starting on Mon 3/21/22 at 1133, Until Discontinued, WheezingMODE OF DELIVERY: NebulizerInitiate RT Bronchodilator Protocol: No  -No improvement after nebulizer treatment in office  -Patient ambulated our office while wearing oxygen saturation probe. Her O2 remained stable at 93 to 94% on room air with activity and at rest.  - Reviewed hospital course and testing completed from 2022. Given significant of Ms. Patricia symptoms and progression of shortness of breath she has been advised to be seen in the emergency room. Discussed with her ruling out pulmonary embolism with either a CT scanning or VQ scan. She did receive a dye load yesterday for her abdominal CT. She does have underlying renal disease with elevated creatinine of 1.1-1.3      Return if symptoms worsen or fail to improve. SUBJECTIVE/OBJECTIVE:    Mike Davenport is a 62 y.o. female seen today for SOB. Ms. Kanika Melendez was seen in the emergency room yesterday 2022 for flank pain on the left lower side which she describes as if it feels that someone is stepping on her back. She is also complaining of increased shortness of breath for the last 3 days. The emergency room did a CT of the abdomen which showed no acute disease and incidental finding of enlarged uterus with fibroids. There is no imaging of the chest in the records available. Ms. Kanika Melendez shares that due to the shortness of breath she is limited in her activity and requires frequent breaks for breathing. She expresses concern for blood clot in the lung given her family history significant for blood clots.   She is using an albuterol inhaler as needed at home. Denies fever. Denies chest pain. REVIEW OF SYSTEMS:    Review of Systems   Constitutional: Negative. HENT: Negative. Respiratory: Positive for cough and shortness of breath. Negative for wheezing. Cardiovascular: Negative. Gastrointestinal: Negative. Genitourinary: Negative. Musculoskeletal: Positive for back pain. Skin: Negative. Neurological: Negative. VITAL SIGNS:    Wt Readings from Last 3 Encounters:   03/21/22 312 lb (141.5 kg)   03/21/22 312 lb (141.5 kg)   03/20/22 312 lb 2.7 oz (141.6 kg)     Temp Readings from Last 3 Encounters:   03/21/22 97.8 °F (36.6 °C)   03/21/22 97.8 °F (36.6 °C) (Temporal)   03/20/22 97.8 °F (36.6 °C)     BP Readings from Last 3 Encounters:   03/21/22 (!) 222/127   03/21/22 (!) 172/81   03/20/22 (!) 160/83     Pulse Readings from Last 3 Encounters:   03/21/22 90   03/21/22 88   03/20/22 77           PHYSICAL EXAMINATION:       General: Alert, cooperative, no distress  Respiratory: Breathing comfortably, in no acute respiratory distress. Clear to auscultation bilaterally. Cardiovascular: Regular rate and rhythm, S1, S2 normal, no murmur, click, rub or gallop. Extremities: no edema. Abdomen: Soft, non-tender, not distended. Bowel sounds normal. No masses or organomegaly. MSK: Extremities normal appearing, atraumatic, no effusion. Gait steady and unassisted. Skin: Skin color, texture, turgor normal. No rashes or lesions on exposed skin. Neurologic: A/Ox3  Psychiatric: Normal affect. Mood euthymic. Thoughts logical. Speech volume and speed normal            Treatment risks/benefits/costs/interactions/alternatives discussed with patient. Advised patient to call back or return to office if symptoms worsen/change/persist. If patient cannot reach us or should anything more severe/urgent arise he/she should proceed directly to the nearest emergency department.   Discussed expected course/resolution/complications of diagnosis in detail with patient. Patient expressed understanding with the diagnosis and plan. An electronic signature was used to authenticate this note.   -- Ld Boyce NP

## 2022-03-21 NOTE — PATIENT INSTRUCTIONS
Shortness of Breath: Care Instructions  Your Care Instructions     Shortness of breath has many causes. Sometimes conditions such as anxiety can lead to shortness of breath. Some people get mild shortness of breath when they exercise. Trouble breathing also can be a symptom of a serious problem, such as asthma, lung disease, emphysema, heart problems, and pneumonia. If your shortness of breath continues, you may need tests and treatment. Watch for any changes in your breathing and other symptoms. Follow-up care is a key part of your treatment and safety. Be sure to make and go to all appointments, and call your doctor if you are having problems. It's also a good idea to know your test results and keep a list of the medicines you take. How can you care for yourself at home? · Do not smoke or allow others to smoke around you. If you need help quitting, talk to your doctor about stop-smoking programs and medicines. These can increase your chances of quitting for good. · Get plenty of rest and sleep. · Take your medicines exactly as prescribed. Call your doctor if you think you are having a problem with your medicine. · Find healthy ways to deal with stress. ? Exercise daily. ? Get plenty of sleep. ? Eat regularly and well. When should you call for help? Call 911 anytime you think you may need emergency care. For example, call if:    · You have severe shortness of breath.     · You have symptoms of a heart attack. These may include:  ? Chest pain or pressure, or a strange feeling in the chest.  ? Sweating. ? Shortness of breath. ? Nausea or vomiting. ? Pain, pressure, or a strange feeling in the back, neck, jaw, or upper belly or in one or both shoulders or arms. ? Lightheadedness or sudden weakness. ? A fast or irregular heartbeat. After you call 911, the  may tell you to chew 1 adult-strength or 2 to 4 low-dose aspirin. Wait for an ambulance. Do not try to drive yourself.    Call your doctor now or seek immediate medical care if:    · Your shortness of breath gets worse or you start to wheeze. Wheezing is a high-pitched sound when you breathe.     · You wake up at night out of breath or have to prop your head up on several pillows to breathe.     · You are short of breath after only light activity or while at rest.   Watch closely for changes in your health, and be sure to contact your doctor if:    · You do not get better over the next 1 to 2 days. Where can you learn more? Go to http://www.gray.com/  Enter S780 in the search box to learn more about \"Shortness of Breath: Care Instructions. \"  Current as of: July 6, 2021               Content Version: 13.2  © 2006-2022 [employer]. Care instructions adapted under license by [employer] (which disclaims liability or warranty for this information). If you have questions about a medical condition or this instruction, always ask your healthcare professional. [place] [address] any warranty or liability for your use of this information.

## 2022-03-21 NOTE — PROGRESS NOTES
Chief Complaint   Patient presents with   [facility] ED Follow-up     back pain, SOB         1. \"Have you been to the ER, urgent care clinic since your last visit? Hospitalized since your last visit? \" Yes When: 3/20/22 Where[facility] Reason for visit: back pain, SOB    2. \"Have you seen or consulted any other health care providers outside of the [address] [place] [address] since your last visit? \" No     3. For patients over 45: Has the patient had a colonoscopy? Yes - no Care Gap present     If the patient is female:    4. For patients over 40: Has the patient had a mammogram? Yes - Care Gap present. Most recent result on file    5. For patients over 21: Has the patient had a pap smear?  Yes - no Care Gap present     3 most recent PHQ Screens 3/16/2022   Little interest or pleasure in doing things Not at all   Feeling down, depressed, irritable, or hopeless Not at all   Total Score PHQ 2 0   Trouble falling or staying asleep, or sleeping too much -   Feeling tired or having little energy -   Poor appetite, weight loss, or overeating -   Feeling bad about yourself - or that you are a failure or have let yourself or your family down -   Trouble concentrating on things such as school, work, reading, or watching TV -   Moving or speaking so slowly that other people could have noticed; or the opposite being so fidgety that others notice -   Thoughts of being better off dead, or hurting yourself in some way -   PHQ 9 Score -   How difficult have these problems made it for you to do your work, take care of your home and get along with others -       Health Maintenance Due   Topic Date Due    Shingrix Vaccine Age 49> (1 of 2) Never done    Breast Cancer Screen Mammogram  07/15/2021    COVID-19 Vaccine (3 - Booster for Jones Peter series) 08/23/2021    Flu Vaccine (1) 09/01/2021

## 2022-03-21 NOTE — ED TRIAGE NOTES
TRIAGE NOTE:  Patient was seen yesterday for similar issues. She went to her PCP today and was referred back to the ER. Patient is extremely short of breath, she is 94% on room air, history of COPD.

## 2022-03-28 ENCOUNTER — TELEPHONE (OUTPATIENT)
Dept: FAMILY MEDICINE CLINIC | Age: 62
End: 2022-03-28

## 2022-03-28 ENCOUNTER — PATIENT MESSAGE (OUTPATIENT)
Dept: FAMILY MEDICINE CLINIC | Age: 62
End: 2022-03-28

## 2022-03-28 DIAGNOSIS — J45.20 MILD INTERMITTENT ASTHMA, UNSPECIFIED WHETHER COMPLICATED: Primary | ICD-10-CM

## 2022-04-12 ENCOUNTER — OFFICE VISIT (OUTPATIENT)
Dept: FAMILY MEDICINE CLINIC | Age: 62
End: 2022-04-12
Payer: COMMERCIAL

## 2022-04-12 VITALS
HEIGHT: 72 IN | BODY MASS INDEX: 39.68 KG/M2 | RESPIRATION RATE: 18 BRPM | DIASTOLIC BLOOD PRESSURE: 64 MMHG | WEIGHT: 293 LBS | OXYGEN SATURATION: 97 % | SYSTOLIC BLOOD PRESSURE: 137 MMHG | TEMPERATURE: 97.5 F | HEART RATE: 81 BPM

## 2022-04-12 DIAGNOSIS — M19.042: Primary | ICD-10-CM

## 2022-04-12 PROCEDURE — 99214 OFFICE O/P EST MOD 30 MIN: CPT | Performed by: STUDENT IN AN ORGANIZED HEALTH CARE EDUCATION/TRAINING PROGRAM

## 2022-04-12 RX ORDER — OLMESARTAN MEDOXOMIL 40 MG/1
40 TABLET ORAL DAILY
Qty: 30 TABLET | Refills: 0 | Status: SHIPPED | OUTPATIENT
Start: 2022-04-12 | End: 2022-05-05 | Stop reason: SDUPTHER

## 2022-04-12 RX ORDER — IBUPROFEN 600 MG/1
600 TABLET ORAL
Qty: 30 TABLET | Refills: 0 | Status: SHIPPED | OUTPATIENT
Start: 2022-04-12

## 2022-04-12 RX ORDER — PREDNISONE 10 MG/1
TABLET ORAL
Qty: 21 TABLET | Refills: 0 | Status: SHIPPED | OUTPATIENT
Start: 2022-04-12

## 2022-04-12 NOTE — PROGRESS NOTES
Chief Complaint   Patient presents with    Hand Swelling     Left hand swelling with pain x 2 days. 1. \"Have you been to the ER, urgent care clinic since your last visit? Hospitalized since your last visit? \" No    2. \"Have you seen or consulted any other health care providers outside of the [address] [place] [address] since your last visit? \" No     3. For patients aged 39-70: Has the patient had a colonoscopy / FIT/ Cologuard? Yes - no Care Gap present      If the patient is female:    4. For patients aged 41-77: Has the patient had a mammogram within the past 2 years? No      5. For patients aged 21-65: Has the patient had a pap smear?  Yes - no Care Gap present

## 2022-04-12 NOTE — LETTER
4/12/2022    To Ulis Reasons It May Concern,    Mrs Kathie Anglin was seen at [facility] 4/12/2022 and due to an acute medical issue needs to be relieved of work duties through 4/15/2022.      Paradise Nunes MD

## 2022-04-12 NOTE — PATIENT INSTRUCTIONS
STOP taking your Amlodipine-olmesartan combination pill and just take the new prescription for olmesartan 40mg daily

## 2022-04-12 NOTE — PROGRESS NOTES
Garry Houston  62 y.o. female  1960  [facility] [place] [id number]  [id number]     [facility]       Chief Complaint: hand swelling  Source: self    Subjective  Garry Houston is an 62 y.o. female who presents for hand swelling. Started 3 days ago as swelling in the first finger of her left hand and has progressed to involve all of her fingers in the left hand as well and the hand, wrist and mid forearm. The pain in the fingers is still the worst and pain is slightly better in areas proximally. She dose not recall an injury to the hand nor wrist. 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 She denies any rash or the hand or other body parts, has not noticed any redness and hand has not felt warm. She did start on a combination BP pill - Norvasc and Olmesartan ~ 3 weeks ago however has been on Norvasc for quite some time according to the patient, however per chart review appears that norvasc was new in combo pill ~ 1 month ago. Denies fevers, chills, N/V/D. No family hx of RA, SLE or other rheum nor immunologic conditions that she is aware of. Allergies - reviewed: Allergies   Allergen Reactions    Lisinopril Swelling         Medications - reviewed:   Current Outpatient Medications   Medication Sig    ibuprofen (MOTRIN) 600 mg tablet Take 1 Tablet by mouth every six (6) hours as needed for Pain.  predniSONE (STERAPRED DS) 10 mg dose pack See administration instruction per 10mg dose pack    olmesartan (BENICAR) 40 mg tablet Take 1 Tablet by mouth daily.  amLODIPine-Olmesartan 10-40 mg tab Take 1 Tablet by mouth daily.  albuterol (PROVENTIL HFA, VENTOLIN HFA, PROAIR HFA) 90 mcg/actuation inhaler Take 2 Puffs by inhalation every six (6) hours as needed for Wheezing.  fluticasone propion-salmeteroL (ADVAIR/WIXELA) 250-50 mcg/dose diskus inhaler Take 1 Puff by inhalation every twelve (12) hours.  Rinse mouth after use  atorvastatin (LIPITOR) 10 mg tablet Take 1 Tablet by mouth daily.  sertraline (ZOLOFT) 100 mg tablet Take 1 tablet by mouth once daily    triamterene-hydroCHLOROthiazide (MAXZIDE) 75-50 mg per tablet Take 1 Tablet by mouth daily. Appointment due for further refills.  aclidinium bromide (TUDORZA PRESSAIR) 400 mcg/actuation inhaler Take 1 Puff by inhalation two (2) times a day. Current Facility-Administered Medications   Medication Dose Route Frequency    albuterol (ACCUNEB) nebulizer solution 1.25 mg  1.25 mg Nebulization Q6H PRN         Past Medical History - reviewed:  Past Medical History:   Diagnosis Date    Chronic obstructive pulmonary disease (HCC)     Hypertension     Menopause     LMP-40years old? Past Surgical History - reviewed:   Past Surgical History:   Procedure Laterality Date    HX ORTHOPAEDIC           Social History - reviewed:  Social History     Socioeconomic History    Marital status:      Spouse name: Not on file    Number of children: Not on file    Years of education: Not on file    Highest education level: Not on file   Occupational History    Not on file   Tobacco Use    Smoking status: Former Smoker     Packs/day: 1.00     Years: 15.00     Pack years: 15.00     Quit date: 2018     Years since quittin.2    Smokeless tobacco: Never Used   Vaping Use    Vaping Use: Never used   Substance and Sexual Activity    Alcohol use: Yes     Comment: occasionally    Drug use: No    Sexual activity: Yes     Partners: Male   Other Topics Concern    Not on file   Social History Narrative    Not on file     Social Determinants of Health     Financial Resource Strain:     Difficulty of Paying Living Expenses: Not on file   Food Insecurity:     Worried About Running Out of Food in the Last Year: Not on file    Luis Alfredo of Food in the Last Year: Not on file   Transportation Needs:     Lack of Transportation (Medical):  Not on file    Lack of Transportation (Non-Medical): Not on file   Physical Activity:     Days of Exercise per Week: Not on file    Minutes of Exercise per Session: Not on file   Stress:     Feeling of Stress : Not on file   Social Connections:     Frequency of Communication with Friends and Family: Not on file    Frequency of Social Gatherings with Friends and Family: Not on file    Attends Restorationism Services: Not on file    Active Member of [address] [place] [address] [employer] or Organizations: Not on file    Attends Club or Organization Meetings: Not on file    Marital Status: Not on file   Intimate Partner Violence:     Fear of Current or Ex-Partner: Not on file    Emotionally Abused: Not on file    Physically Abused: Not on file    Sexually Abused: Not on file   Housing Stability:     Unable to Pay for Housing in the Last Year: Not on file    Number of [place] in the Last Year: Not on file    Unstable Housing in the Last Year: Not on file         Family History - reviewed:  Family History   Problem Relation Age of Onset    Heart Disease Mother     Hypertension Mother     Stroke Father     Kidney Disease Brother     Hypertension Brother          Immunizations - reviewed:   Immunization History   Administered Date(s) Administered    COVID-19, Pfizer Purple top, DILUTE for use, 12+ yrs, 30mcg/0.3mL dose 03/02/2021, 03/23/2021    Influenza Vaccine Citrix Online) PF (>6 Mo Flulaval, Fluarix, and >3 Yrs Afluria, Fluzone 22257) 04/17/2018    Pneumococcal Polysaccharide (PPSV-23) 04/10/2019    Tdap 04/17/2018         Review of Systems   Constitutional: Negative for chills, fever and malaise/fatigue. Gastrointestinal: Negative for abdominal pain, diarrhea, nausea and vomiting. Musculoskeletal: Positive for joint pain. Negative for back pain, falls, myalgias and neck pain. Skin: Negative for itching and rash.          Physical Exam  Visit Vitals  /64 (BP 1 Location: Right arm, BP Patient Position: Sitting, BP Cuff Size: Large adult)   Pulse 81   Temp 97.5 °F (36.4 °C) (Temporal)   Resp 18   Ht 6' 1\" (1.854 m)   Wt 302 lb 9.6 oz (137.3 kg)   SpO2 97%   BMI 39.92 kg/m²       Physical Exam  Constitutional:       General: She is not in acute distress. Appearance: She is obese. She is not ill-appearing, toxic-appearing or diaphoretic. Musculoskeletal:         General: Swelling and tenderness present. No signs of injury. Right hand: No swelling, deformity, lacerations, tenderness or bony tenderness. Normal range of motion. Normal strength. Normal sensation. Normal capillary refill. Normal pulse. Left hand: Swelling and tenderness present. No deformity, lacerations or bony tenderness. Decreased range of motion. Normal strength. Normal sensation. Normal capillary refill. Normal pulse. Comments: Moderate swelling of the left digits, more so in the first finger vs thumb and remaining digits. No associated rash, no evidence of injury. Left hand slightly warmer than right hand. ROM of flexion and extension of the fingers limited 2/2 pain but with full pROM. Tenderness over wrist without bony tenderness and full ROM. Moderate tenderness on palpation up to mid forearm. Neurological:      Mental Status: She is alert. Assessment/Plan    ICD-10-CM ICD-9-CM    1. Inflammation of hand joint, left  M19.042 716.94 SED RATE (ESR)      C REACTIVE PROTEIN, QT      ibuprofen (MOTRIN) 600 mg tablet      CBC W/O DIFF      RHEUMATOID FACTOR, QL      predniSONE (STERAPRED DS) 10 mg dose pack      CBC W/O DIFF      C REACTIVE PROTEIN, QT      SED RATE (ESR)       Oral Shell is an 62 y.o. female with hx of HTN (recently started on Norvasc?), obesity, AR, Vit D deficiency presenting with 3 days of left hand pain and swelling that has progressively gotten worse and is now extending into the forearm. Not injury of note and no bony tenderness on exam and is without deformity.  Denies family or personal hx of RA, SLE however seems to be an inflammatory process. Will do initial rheum workup and treat sx with pain control and steroid course. Also holding Norvasc in case contributing to swelling though unusual to be unilateral and progressive if due to medication side effect. 1. Inflammation of hand joint, left  - SED RATE (ESR); Future  - C REACTIVE PROTEIN, QT; Future  - ibuprofen (MOTRIN) 600 mg tablet; Take 1 Tablet by mouth every six (6) hours as needed for Pain. Dispense: 30 Tablet; Refill: 0  - CBC W/O DIFF; Future  - RHEUMATOID FACTOR, QL  - predniSONE (STERAPRED DS) 10 mg dose pack; See administration instruction per 10mg dose pack  Dispense: 21 Tablet; Refill: 0  - CBC W/O DIFF  - C REACTIVE PROTEIN, QT  - SED RATE (ESR)      Patient informed to follow up: Follow-up and Dispositions    · Return in about 3 days (around 4/15/2022) for followup of hand swelling . I have discussed the diagnosis with the patient and the intended plan as seen in the above orders. Patient verbalized understanding of the plan and agrees with the plan. The patient has received an after-visit summary and questions were answered concerning future plans. I have discussed medication side effects and warnings with the patient as well. Informed patient to return to the office if new symptoms arise.       Miguelina Wilkersno MD  [facility]

## 2022-04-13 LAB
COMMENT, HOLDF: NORMAL
CRP SERPL-MCNC: 1.37 MG/DL (ref 0–0.6)
ERYTHROCYTE [DISTWIDTH] IN BLOOD BY AUTOMATED COUNT: 13.2 % (ref 11.5–14.5)
ERYTHROCYTE [SEDIMENTATION RATE] IN BLOOD: 28 MM/HR (ref 0–30)
HCT VFR BLD AUTO: 38.2 % (ref 35–47)
HGB BLD-MCNC: 11.5 G/DL (ref 11.5–16)
MCH RBC QN AUTO: 28 PG (ref 26–34)
MCHC RBC AUTO-ENTMCNC: 30.1 G/DL (ref 30–36.5)
MCV RBC AUTO: 93.2 FL (ref 80–99)
NRBC # BLD: 0 K/UL (ref 0–0.01)
NRBC BLD-RTO: 0 PER 100 WBC
PLATELET # BLD AUTO: 229 K/UL (ref 150–400)
PMV BLD AUTO: 10.9 FL (ref 8.9–12.9)
RBC # BLD AUTO: 4.1 M/UL (ref 3.8–5.2)
SAMPLES BEING HELD,HOLD: NORMAL
WBC # BLD AUTO: 7.8 K/UL (ref 3.6–11)

## 2022-04-15 ENCOUNTER — OFFICE VISIT (OUTPATIENT)
Dept: FAMILY MEDICINE CLINIC | Age: 62
End: 2022-04-15
Payer: COMMERCIAL

## 2022-04-15 VITALS
SYSTOLIC BLOOD PRESSURE: 172 MMHG | HEART RATE: 77 BPM | WEIGHT: 293 LBS | DIASTOLIC BLOOD PRESSURE: 90 MMHG | BODY MASS INDEX: 39.68 KG/M2 | HEIGHT: 72 IN | OXYGEN SATURATION: 98 % | TEMPERATURE: 97.4 F | RESPIRATION RATE: 16 BRPM

## 2022-04-15 DIAGNOSIS — I10 ESSENTIAL HYPERTENSION: Primary | ICD-10-CM

## 2022-04-15 DIAGNOSIS — J44.9 CHRONIC OBSTRUCTIVE PULMONARY DISEASE, UNSPECIFIED COPD TYPE (HCC): ICD-10-CM

## 2022-04-15 DIAGNOSIS — M19.042: ICD-10-CM

## 2022-04-15 LAB — RHEUMATOID FACT SERPL-ACNC: 28 IU/ML

## 2022-04-15 PROCEDURE — 99214 OFFICE O/P EST MOD 30 MIN: CPT | Performed by: NURSE PRACTITIONER

## 2022-04-15 RX ORDER — CHLORTHALIDONE 25 MG/1
25 TABLET ORAL DAILY
Qty: 30 TABLET | Refills: 1 | Status: SHIPPED | OUTPATIENT
Start: 2022-04-15 | End: 2022-05-04 | Stop reason: SDUPTHER

## 2022-04-15 NOTE — PROGRESS NOTES
[facility] Note     Cristal Tony (: 1960) is a 62 y.o. female, established patient, here for evaluation of the following chief complaint(s):  Hand Swelling (follow up )       ASSESSMENT/PLAN:  1. Essential hypertension  -    Uncontrolled, will need close follow up  -  chlorthalidone (HYGROTON) 25 mg tablet; Take 1 Tablet by mouth daily. , Normal, Disp-30 Tablet, R-1  - Olmesartan 40mg daily  -Ms. Kwadwo Rivera to continue home blood pressure monitoring given her uncontrolled blood pressure. I suspect it is a combination of holding amlodipine as well as current steroid use  -Reevaluate need for resuming amlodipine, caution given hand swelling  -Diet and lifestyle modification encouraged for weight loss and chronic disease prevention/ management  - Discussed cardiology referral for risk stratification, will re-visit at follow up appointment    2. Inflammation of hand joint, left  -     REFERRAL TO RHEUMATOLOGY  - ESR normal. Elevated CPK  - Following up with lab on Rheumatoid factor  - complete prednisone as prescribed    3. Chronic obstructive pulmonary disease, unspecified COPD type ([facility] [address]  -Improving  - Recently treated for COPD exacerbation and PNA. CT 3/21/22 reviewed. -Continue Wixela twice daily with albuterol as needed    CT Results (most recent):  Results from Hospital Encounter encounter on 22    CT CHEST WO CONT    Narrative  INDICATION: Shortness of breath, chest tightness and pain    COMPARISON: CT abdomen yesterday    CONTRAST: None. TECHNIQUE:  5 mm axial images were obtained through the chest. Coronal and  sagittal reformats were generated. CT dose reduction was achieved through use  of a standardized protocol tailored for this examination and automatic exposure  control for dose modulation. The absence of intravenous contrast reduces the sensitivity for evaluation of  the mediastinum, silvina, vasculature, and upper abdominal organs.     FINDINGS:    CHEST WALL: No mass or axillary lymphadenopathy. THYROID: No nodule. MEDIASTINUM: No mass or lymphadenopathy. ANDREWS: No mass or lymphadenopathy. THORACIC AORTA: No aneurysm. MAIN PULMONARY ARTERY: Main pulmonary outflow tract 3.8 cm, enlarged. TRACHEA/BRONCHI: Patent. ESOPHAGUS: No wall thickening or dilatation. HEART: Normal in size. PLEURA: No effusion or pneumothorax. LUNGS: Right lower lobe infiltrate suspicious for infection, adjacent to the  major fissure. Bibasilar atelectasis otherwise stable  INCIDENTALLY IMAGED UPPER ABDOMEN: Subcentimeter hypodensities in the liver too  small to characterize but possibly representing cysts. Mild splenic enlargement  at 14.0 cm span. Left renal cyst.  BONES: No destructive bone lesion. Impression  1. Acute infiltrate right lower lobe laterally, suspicious for infection. Correlate clinically and follow-up to clearing suggested. 2. Bibasilar atelectasis. 3. Enlarged main pulmonary outflow tract. Correlate for pulmonary arterial  hypertension. 4. Incidental findings in the upper abdomen, including splenomegaly. Return in about 2 weeks (around 4/29/2022), or if symptoms worsen or fail to improve. SUBJECTIVE/OBJECTIVE:    Salma Elam is a 62 y.o. female seen today for follow up hand swelling, COPD, HTN      Cardiovascular Review:  She has hypertension and hyperlipidemia. Diet and Lifestyle: generally follows a low fat low cholesterol diet, sedentary, nonsmoker  Home BP Monitoring: is not measured at home. Pertinent ROS: taking medications as instructed, no medication side effects noted, no TIA's, no chest pain on exertion, no dyspnea on exertion, no swelling of ankles. Hand:  Recently evaluated for hand swellinng on 4/12/22. She denies any rash or the hand or other body parts, has not noticed any redness and hand has not felt warm. She is taking the prednisone dose pack as prescribed with near resolution of hand swelling.   Denies fevers, chills, N/V/D. No family hx of RA, SLE or other rheum nor immunologic conditions that she is aware of. REVIEW OF SYSTEMS:    Review of Systems   Constitutional: Negative. HENT: Positive for postnasal drip. Negative for sinus pain and sore throat. Musculoskeletal:        Hand swelling (nearly resolved)   All other systems reviewed and are negative. VITAL SIGNS:    Wt Readings from Last 3 Encounters:   04/15/22 306 lb (138.8 kg)   04/12/22 302 lb 9.6 oz (137.3 kg)   03/21/22 312 lb (141.5 kg)     Temp Readings from Last 3 Encounters:   04/15/22 97.4 °F (36.3 °C) (Temporal)   04/12/22 97.5 °F (36.4 °C) (Temporal)   03/21/22 97.8 °F (36.6 °C)     BP Readings from Last 3 Encounters:   04/15/22 (!) 172/90   04/12/22 137/64   03/21/22 (!) 151/81     Pulse Readings from Last 3 Encounters:   04/15/22 77   04/12/22 81   03/21/22 81           PHYSICAL EXAMINATION:       General: Alert, cooperative, no distress  Respiratory: Breathing comfortably, in no acute respiratory distress. Clear to auscultation bilaterally. Cardiovascular: Regular rate and rhythm, S1, S2 normal, no murmur, click, rub or gallop. Extremities: trace left hand edema,  no BLE edema. Abdomen: Soft, non-tender, not distended. Bowel sounds normal. No masses or organomegaly. MSK: Extremities normal appearing, atraumatic, no effusion. Gait steady and unassisted. Skin: Skin color, texture, turgor normal. No rashes or lesions on exposed skin. Neurologic: A/Ox3  Psychiatric: Normal affect. Mood euthymic. Thoughts logical. Speech volume and speed normal            Treatment risks/benefits/costs/interactions/alternatives discussed with patient. Advised patient to call back or return to office if symptoms worsen/change/persist. If patient cannot reach us or should anything more severe/urgent arise he/she should proceed directly to the nearest emergency department.   Discussed expected course/resolution/complications of diagnosis in detail with patient. Patient expressed understanding with the diagnosis and plan. An electronic signature was used to authenticate this note.   -- Alok Awad, NP

## 2022-04-15 NOTE — PROGRESS NOTES
Please inform Ms. Flako Riojas her rheumatoid factor did return and it is elevated. She is to proceed with recommendations to see rheumatology as discussed during her encounter today.

## 2022-04-15 NOTE — PATIENT INSTRUCTIONS
Chlorthalidone (By mouth)   Chlorthalidone (klor-THAL-i-done)  Treats high blood pressure and fluid retention (edema). This medicine is a diuretic (water pill). Brand Name(s):   There may be other brand names for this medicine. When This Medicine Should Not Be Used: You should not use this medicine if you have had an allergic reaction to chlorthalidone, sulfa drugs, or to other diuretic medicines. You should not use this medicine if you are unable to urinate. How to Use This Medicine:   Tablet  · Your doctor will tell you how much of this medicine to take and how often. Do not take more medicine or take it more often than your doctor tells you to. · Carefully follow your doctor's instructions about any special diet. You may need to eat foods that are high in potassium (such as oranges or bananas) to prevent potassium loss while you are using this medicine. If a dose is missed:   · If you miss a dose or forget to take your medicine, take it as soon as you can. If it is almost time for your next dose, wait until then to take the medicine and skip the missed dose. · Do not use extra medicine to make up for a missed dose. How to Store and Dispose of This Medicine:   · Store the medicine at room temperature, away from heat, moisture, and direct light. · Keep all medicine out of the reach of children and never share your medicine with anyone. Drugs and Foods to Avoid:   Ask your doctor or pharmacist before using any other medicine, including over-the-counter medicines, vitamins, and herbal products. · Make sure your doctor knows if you are also using bepridil (Vascor®), cholestyramine Rene Hortenciat), colestipol (Colestid®), digoxin (Lanoxin®), lithium, steroids (such as cortisone, prednisone), or low-salt milk. · Do not drink alcohol while you are using this medicine.   Warnings While Using This Medicine:   · Make sure your doctor knows if you are pregnant or breastfeeding, or if you have liver disease, kidney disease, diabetes, gout, pancreatitis, or lupus. · This medicine may make you dizzy. Avoid driving, using machines, or doing anything else that could be dangerous if you are not alert. · This medicine may make your skin more sensitive to sunlight. Use a sunscreen when you are outdoors. Avoid sunlamps and tanning beds. Possible Side Effects While Using This Medicine:   Call your doctor right away if you notice any of these side effects:  · Blood in urine or stools  · Confusion, weakness, irregular heartbeat, shortness of breath, numbness or tingling in hands, feet, or lips  · Dry mouth, increased thirst, muscle cramps, nausea or vomiting  · Fever chills, cough, hoarseness  · Problems urinating, pain in side or lower back  · Skin rash or itching  · Unusual bleeding or bruising  · Yellow eyes or skin  If you notice these less serious side effects, talk with your doctor:   · Loss of appetite  · Problems having sex  · Mild diarrhea or stomach upset  If you notice other side effects that you think are caused by this medicine, tell your doctor. Call your doctor for medical advice about side effects. You may report side effects to FDA at 5-047-FDA-3851  © 2017 [facility] Information is for End User's use only and may not be sold, redistributed or otherwise used for commercial purposes. The above information is an  only. It is not intended as medical advice for individual conditions or treatments. Talk to your doctor, nurse or pharmacist before following any medical regimen to see if it is safe and effective for you.

## 2022-04-15 NOTE — PROGRESS NOTES
Chief Complaint   Patient presents with    Hand Swelling     follow up      1. \"Have you been to the ER, urgent care clinic since your last visit? Hospitalized since your last visit? \" no    2. \"Have you seen or consulted any other health care providers outside of the [address] [place] [address] since your last visit? \"  no    3. For patients aged 39-70: Has the patient had a colonoscopy / FIT/ Cologuard? 2018      If the patient is female:    4. For patients aged 41-77: Has the patient had a mammogram within the past 2 years? 2019      5. For patients aged 21-65: Has the patient had a pap smear?    2018      Lab did not run Rheumatoid Factor - faxed add on to  Lab

## 2022-04-18 ENCOUNTER — TELEPHONE (OUTPATIENT)
Dept: FAMILY MEDICINE CLINIC | Age: 62
End: 2022-04-18

## 2022-04-18 NOTE — PROGRESS NOTES
Called, left vm for pt to return call to office. Message left concern lab results and letter mailed.

## 2022-04-22 RX ORDER — FLUTICASONE PROPIONATE AND SALMETEROL 250; 50 UG/1; UG/1
1 POWDER RESPIRATORY (INHALATION) EVERY 12 HOURS
Qty: 60 EACH | Refills: 2 | Status: SHIPPED | OUTPATIENT
Start: 2022-04-22

## 2022-04-22 NOTE — TELEPHONE ENCOUNTER
Pharmacy Progress Note - Telephone Encounter    Pt had previously been switched from Symbicort ($378) to generic Advair HFA ($138). Both of these options were too expensive for the pt. Wixela using GoodRx was best cost ($89). Requested pt to provide insurance information to evaluate for other covered options. Called insurance to discuss cost effective alternatives for pt's inhaler therapy. Insurance rep stated that brand Advair Diskus is a Tier 1 brand medication. It was confirmed with a dummy claim that pt's cost would be $0 at her local or mail order pharmacy. A/P:  - Pt was prescribed Wixela 250-50 1 puff Q12H. Pt can switch to equivalent dose of Advair Diskus.  - STOP Wixela  - START Advair Diskus 250-50 mcg 1 puff Q12H scheduled  - Provided device education on how to use Diskus via [employer] message  - Offered to schedule virtual or in person visit to go over instructions  - Patient endorses understanding to the provided information. All questions answered at this time. Medications Discontinued During This Encounter   Medication Reason    fluticasone propion-salmeteroL (ADVAIR/WIXELA) 250-50 mcg/dose diskus inhaler Cost of Medication     Orders Placed This Encounter    fluticasone propion-salmeteroL (Advair Diskus) 250-50 mcg/dose diskus inhaler     Sig: Take 1 Puff by inhalation every twelve (12) hours. Dispense:  60 Each     Refill:  2       Ashley Clark PharmD, [facility]  Clinical Pharmacist Specialist      For Pharmacy Admin Tracking Only     CPA in place:  Yes   Recommendation Provided To: Patient/Caregiver: 5 via [address] and Other: 5   Intervention Detail: Device Training, Discontinued Rx: 1, reason: Cost/Formulary Change, New Rx: 1, reason: Cost/Formulary Change, Patient Access Assistance/Sample Provided and Scheduled Appointment   Intervention Accepted By: Patient/Caregiver: 5 and Other: 5   Time Spent (min): 45        From: Ashley Clark PHARMD  To: Romelia Novak Harshad  Sent: 3/28/2022 11:40 AM EDT  Subject: Cost of Inhaler Therapy    Hi Ms. Lucille Holley,    It was destiny to speak with you on the phone earlier today about the cost of your inhaler therapy. Please send me the following information on your [employer] prescription drug coverage card. ID number  Rx Bin  PCN  Group number  Customer Service phone number    I will see what kind of information I find out about the cost of alternative therapy. Our other option is to use Wixela (has the same active ingredients as Advair) but costs $89 at [facility] with a Good Rx coupon.       Pharmacist Contact Information:  Sydney Elizondo, PharmD, [address]  Work Cell: [phone]

## 2022-04-28 ENCOUNTER — TRANSCRIBE ORDER (OUTPATIENT)
Dept: SCHEDULING | Age: 62
End: 2022-04-28

## 2022-04-28 DIAGNOSIS — Z12.31 VISIT FOR SCREENING MAMMOGRAM: Primary | ICD-10-CM

## 2022-04-29 ENCOUNTER — HOSPITAL ENCOUNTER (OUTPATIENT)
Dept: MAMMOGRAPHY | Age: 62
Discharge: HOME | End: 2022-04-29
Attending: NURSE PRACTITIONER
Payer: COMMERCIAL

## 2022-04-29 DIAGNOSIS — Z12.31 VISIT FOR SCREENING MAMMOGRAM: ICD-10-CM

## 2022-04-29 PROCEDURE — 77063 BREAST TOMOSYNTHESIS BI: CPT

## 2022-05-04 DIAGNOSIS — I10 ESSENTIAL HYPERTENSION: ICD-10-CM

## 2022-05-05 NOTE — TELEPHONE ENCOUNTER
Patient mychart request for refills. [employer] message sent to patient advising refill available for chlorthalidone and f/u appt is due. Thanks, Sona Saab    Last Visit: 4/15/22 NP Tera Sanders  Next Appointment: None-   Previous Refill Encounter(s):   Chlorthalidone 4/15/22 30 + 1  olmesartan 4/12/22 30    Requested Prescriptions     Pending Prescriptions Disp Refills    chlorthalidone (HYGROTON) 25 mg tablet 90 Tablet 0     Sig: Take 1 Tablet by mouth daily.  olmesartan (BENICAR) 40 mg tablet 90 Tablet 0     Sig: Take 1 Tablet by mouth daily.

## 2022-05-07 RX ORDER — OLMESARTAN MEDOXOMIL 40 MG/1
40 TABLET ORAL DAILY
Qty: 90 TABLET | Refills: 0 | Status: SHIPPED | OUTPATIENT
Start: 2022-05-07 | End: 2022-08-01 | Stop reason: SDUPTHER

## 2022-05-07 RX ORDER — CHLORTHALIDONE 25 MG/1
25 TABLET ORAL DAILY
Qty: 90 TABLET | Refills: 0 | Status: SHIPPED | OUTPATIENT
Start: 2022-05-07

## 2022-05-17 DIAGNOSIS — F41.8 DEPRESSION WITH ANXIETY: ICD-10-CM

## 2022-05-18 RX ORDER — SERTRALINE HYDROCHLORIDE 100 MG/1
TABLET, FILM COATED ORAL
Qty: 90 TABLET | Refills: 1 | Status: SHIPPED | OUTPATIENT
Start: 2022-05-18 | End: 2022-11-03

## 2022-05-18 NOTE — TELEPHONE ENCOUNTER
Patient mychart request for refill.   Thanks, Valerie Broussard    Last Visit: 4/15/22 NP Cecile Jones  Next Appointment: None  Previous Refill Encounter(s): 11/19/21 90 + 1    Requested Prescriptions     Pending Prescriptions Disp Refills    sertraline (ZOLOFT) 100 mg tablet 90 Tablet 1     Sig: Take 1 tablet by mouth once daily

## 2022-06-03 DIAGNOSIS — I10 ESSENTIAL HYPERTENSION: ICD-10-CM

## 2022-06-03 DIAGNOSIS — Z91.89 10 YEAR RISK OF MI OR STROKE 7.5% OR GREATER: ICD-10-CM

## 2022-06-03 RX ORDER — ATORVASTATIN CALCIUM 10 MG/1
10 TABLET, FILM COATED ORAL DAILY
Qty: 90 TABLET | Refills: 3 | Status: SHIPPED | OUTPATIENT
Start: 2022-06-03

## 2022-06-03 RX ORDER — CHLORTHALIDONE 25 MG/1
25 TABLET ORAL DAILY
Qty: 90 TABLET | Refills: 0 | Status: CANCELLED | OUTPATIENT
Start: 2022-06-03

## 2022-06-03 NOTE — TELEPHONE ENCOUNTER
Patient request for refills of chlorthalidone and atorvastatin via [facility]. A new prescription for the chlorthalidone was sent to [facility] on 5/7/22 for a 90 day supply. Advised patient to check with [facility] for this medication via [facility]. (deleted the chlorthalidone)  Jenna Grullon    Last Visit: 4/15/22 SOLOMON Villalba, lipid 3/2022  Next Appointment: None  Previous Refill Encounter(s): 11/19/21 90 + 1    Requested Prescriptions     Pending Prescriptions Disp Refills    atorvastatin (LIPITOR) 10 mg tablet 90 Tablet 3     Sig: Take 1 Tablet by mouth daily.        For Pharmacy Admin Tracking Only       Intervention Detail: New Rx: 1, reason: Patient Preference   Time Spent (min): 5

## 2022-07-20 LAB
LEFT VENTRICULAR EJECTION FRACTION HIGH VALUE: 35 %
LV EF: 30 %

## 2022-07-22 ENCOUNTER — OFFICE VISIT (OUTPATIENT)
Dept: FAMILY MEDICINE CLINIC | Age: 62
End: 2022-07-22
Payer: COMMERCIAL

## 2022-07-22 VITALS
WEIGHT: 293 LBS | HEART RATE: 90 BPM | TEMPERATURE: 98 F | SYSTOLIC BLOOD PRESSURE: 138 MMHG | BODY MASS INDEX: 39.68 KG/M2 | HEIGHT: 72 IN | OXYGEN SATURATION: 96 % | DIASTOLIC BLOOD PRESSURE: 73 MMHG | RESPIRATION RATE: 20 BRPM

## 2022-07-22 DIAGNOSIS — I77.89 ASCENDING AORTA ENLARGEMENT (HCC): ICD-10-CM

## 2022-07-22 DIAGNOSIS — Z09 HOSPITAL DISCHARGE FOLLOW-UP: Primary | ICD-10-CM

## 2022-07-22 DIAGNOSIS — I50.41 ACUTE COMBINED SYSTOLIC AND DIASTOLIC CONGESTIVE HEART FAILURE (HCC): ICD-10-CM

## 2022-07-22 PROCEDURE — 1111F DSCHRG MED/CURRENT MED MERGE: CPT | Performed by: NURSE PRACTITIONER

## 2022-07-22 PROCEDURE — 99214 OFFICE O/P EST MOD 30 MIN: CPT | Performed by: NURSE PRACTITIONER

## 2022-07-22 RX ORDER — CARVEDILOL 6.25 MG/1
TABLET ORAL
COMMUNITY
Start: 2022-07-21

## 2022-07-22 RX ORDER — FOLIC ACID 1 MG/1
1 TABLET ORAL DAILY
COMMUNITY
Start: 2022-07-21

## 2022-07-22 RX ORDER — DOXYCYCLINE 100 MG/1
100 CAPSULE ORAL 2 TIMES DAILY
COMMUNITY
Start: 2022-07-21

## 2022-07-22 RX ORDER — FUROSEMIDE 40 MG/1
40 TABLET ORAL DAILY
COMMUNITY
Start: 2022-07-21

## 2022-07-22 RX ORDER — CYANOCOBALAMIN/FOLIC AC/VIT B6 1-2.2-25MG
TABLET ORAL
COMMUNITY
Start: 2022-07-22

## 2022-07-22 NOTE — PROGRESS NOTES
Chief Complaint   Patient presents with    ED Follow-up     7/20/22 at [facility] EMERGENCY [facility] for SOB         1. \"Have you been to the ER, urgent care clinic since your last visit? Hospitalized since your last visit? \" Yes When: 7/20/22 Where: [facility] Reason for visit: SOB    2. \"Have you seen or consulted any other health care providers outside of the [address] [place] [address] since your last visit? \" No     3. For patients over 45: Has the patient had a colonoscopy? Yes - no Care Gap present     If the patient is female:    4. For patients over 40: Has the patient had a mammogram? Yes - no Care Gap present    5. For patients over 21: Has the patient had a pap smear?  Yes - no Care Gap present     3 most recent PHQ Screens 4/15/2022   Little interest or pleasure in doing things Not at all   Feeling down, depressed, irritable, or hopeless Not at all   Total Score PHQ 2 0   Trouble falling or staying asleep, or sleeping too much -   Feeling tired or having little energy -   Poor appetite, weight loss, or overeating -   Feeling bad about yourself - or that you are a failure or have let yourself or your family down -   Trouble concentrating on things such as school, work, reading, or watching TV -   Moving or speaking so slowly that other people could have noticed; or the opposite being so fidgety that others notice -   Thoughts of being better off dead, or hurting yourself in some way -   PHQ 9 Score -   How difficult have these problems made it for you to do your work, take care of your home and get along with others -       Health Maintenance Due   Topic Date Due    Shingrix Vaccine Age 49> (1 of 2) Never done    Pneumococcal 0-64 years (2 - PCV) 04/10/2020    COVID-19 Vaccine (3 - Booster for [employer] series) 08/23/2021

## 2022-07-22 NOTE — PROGRESS NOTES
[address] [facility] Note    Transitional Care Management Progress Note    Patient: Ramsey Anderson  : 1960  PCP: Neetu Smith NP    Date of admission: 22  Date of discharge: 22    Patient was contacted by Transitional Care Management services within two days after her discharge: No as she is here <24hr since hospital discharge. This encounter and supporting documentation was reviewed if available. Medication reconciliation was performed today (2022). Assessment/Plan:   Diagnoses and all orders for this visit:    1. Hospital discharge follow-up  -     [place] DISCHARGE MEDS RECONCILED W/ CURRENT OUTPATIENT MED LIST  -     METABOLIC PANEL, COMPREHENSIVE; Future    2. Acute combined systolic and diastolic congestive heart failure (HCC)  -     METABOLIC PANEL, COMPREHENSIVE; Future  -     NT-PRO BNP; Future  - Improved  - Continue lasix, Lasix 0.25 mg twice daily and olmesartan 40 mg daily    3. Ascending aorta enlargement (HCC)  Comments:  ECHO 22 at [facility] Aortic Root 4.3c, Asc aorta 4.7  Aortic dilatation not noted on CTA. Discussed readdressing this with cardiology at follow-up appointment with Dr. Claudia Benavides   - BP control  - Follow up with Cardiology, Dr. Claudia Benavides in 1-2 weeks    Follow-up and Dispositions    Return in about 3 months (around 10/22/2022), or if symptoms worsen or fail to improve. Subjective:   Ramsey Anderson is a 62 y.o. female presenting today for follow-up [facility]  The discharge summary was reviewed or requested. The main problem requiring admission was CHF/SOB. Complications during admission: none    Interval history/Current status:       Presented to the emergency room with a 2-day history of increasing shortness of breath and lower extremity edema. Endorsed at the time orthopnea PND. Her x-ray at the time was consistent with pulmonary edema and cardiomegaly. During her hospitalization a CT of the chest to rule out PE was completed on 7/20/22 was negative for pulmonary embolus. No discrete lung mass. Negative for aortic aneurysm or dissection. No mediastinal mass or lymphadenopathy. There was notation of a 1.3 cm liver cyst.  Echocardiogram was completed on 7/21/22 which showed LV dilatation. Wall thickness was increased in a pattern consistent with moderate LVH. Systolic function was moderately reduced. Ejection fraction was 30-35%. Diffuse hypokinesis. Aortic valve had mild regurgitation. Aorta was mildly dilated. Aortic root measuring 4.3 cm. Ascending aorta was moderate to severely dilated: 4.7 cm. Pulmonary artery pressure 41 mmHg. Venous Doppler on 7/20/2022 was negative for bilateral lower extremity DVT. NT-ProBNP 7/21/22 = 10,256    Admitting symptoms have: improved      Medications marked \"taking\" at this time:  Home Medications    Medication Sig Start Date End Date Taking? Authorizing Provider   carvediloL (COREG) 6.25 mg tablet TAKE 1 TABLET BY MOUTH TWICE DAILY ( ONCE AT 6AM AND ONCE AT 6 PM ) 7/21/22  Yes Provider, Historical   furosemide (LASIX) 40 mg tablet Take 40 mg by mouth in the morning. 7/21/22  Yes Provider, Historical   folic acid (FOLVITE) 1 mg tablet Take 1 mg by mouth in the morning. 7/21/22  Yes Provider, Historical   doxycycline (MONODOX) 100 mg capsule Take 100 mg by mouth two (2) times a day. 7/21/22  Yes Provider, Historical   atorvastatin (LIPITOR) 10 mg tablet Take 1 Tablet by mouth daily. 6/3/22  Yes Ileana Jackson NP   sertraline (ZOLOFT) 100 mg tablet Take 1 tablet by mouth once daily 5/18/22  Yes Ileana Jackson NP   chlorthalidone (HYGROTON) 25 mg tablet Take 1 Tablet by mouth daily. 5/7/22  Yes Ileana Jackson NP   olmesartan (BENICAR) 40 mg tablet Take 1 Tablet by mouth daily.  5/7/22  Yes Ileana Jackson NP   fluticasone propion-salmeteroL (Advair Diskus) 250-50 mcg/dose diskus inhaler Take 1 Puff by inhalation every twelve (12) hours. 4/22/22  Yes Ileana Jackson NP   albuterol (PROVENTIL HFA, VENTOLIN HFA, PROAIR HFA) 90 mcg/actuation inhaler Take 2 Puffs by inhalation every six (6) hours as needed for Wheezing. 3/16/22  Yes Lupe Gill NP   Virt-Jocelynn 2.2-25-1 mg tab  7/22/22   Provider, Sonal   ibuprofen (MOTRIN) 600 mg tablet Take 1 Tablet by mouth every six (6) hours as needed for Pain.   Patient not taking: Reported on 7/22/2022 4/12/22   Tamar Hernández MD   [facility]) 10 mg dose pack See administration instruction per 10mg dose pack  Patient not taking: Reported on 7/22/2022 4/12/22   Tamar Hernández MD        Review of Systems:  History obtained from the patient  General ROS: negative for - chills, fatigue, or fever  Respiratory ROS: negative for - cough, shortness of breath, tachypnea, or wheezing  Cardiovascular ROS: no chest pain or dyspnea on exertion  Gastrointestinal ROS: no abdominal pain, change in bowel habits, or black or bloody stools  Musculoskeletal ROS: negative  Neurological ROS: no TIA or stroke symptoms              Objective:   /73 (BP 1 Location: Right upper arm, BP Patient Position: Sitting, BP Cuff Size: Large adult)   Pulse 90   Temp 98 °F (36.7 °C) (Temporal)   Resp 20   Ht 6' 1\" (1.854 m)   Wt 300 lb 6.4 oz (136.3 kg)   SpO2 96%   BMI 39.63 kg/m²      Physical Examination: General appearance - alert, well appearing, and in no distress  Mental status - alert, oriented to person, place, and time, normal mood, behavior, speech, dress, motor activity, and thought processes  Chest - clear to auscultation, no wheezes, rales or rhonchi, symmetric air entry  Heart - normal rate, regular rhythm, normal S1, S2, no murmurs, rubs, clicks or gallops  Abdomen - soft, nontender, nondistended, no masses or organomegaly  Neurological - alert, oriented, normal speech, no focal findings or movement disorder noted  Musculoskeletal - no joint tenderness, deformity or swelling  Extremities - peripheral pulses normal, no pedal edema, no clubbing or cyanosis  Skin - normal coloration and turgor, no rashes, no suspicious skin lesions noted        We discussed the expected course, resolution and complications of the diagnosis(es) in detail. Medication risks, benefits, costs, interactions, and alternatives were discussed as indicated. I advised her to contact the office if her condition worsens, changes or fails to improve as anticipated. She expressed understanding with the diagnosis(es) and plan.      Ady Pinto NP

## 2022-07-25 ENCOUNTER — LAB ONLY (OUTPATIENT)
Dept: FAMILY MEDICINE CLINIC | Age: 62
End: 2022-07-25

## 2022-07-25 DIAGNOSIS — Z09 HOSPITAL DISCHARGE FOLLOW-UP: ICD-10-CM

## 2022-07-25 DIAGNOSIS — I50.41 ACUTE COMBINED SYSTOLIC AND DIASTOLIC CONGESTIVE HEART FAILURE (HCC): ICD-10-CM

## 2022-07-25 LAB
ALBUMIN SERPL-MCNC: 3.7 G/DL (ref 3.5–5)
ALBUMIN/GLOB SERPL: 1 {RATIO} (ref 1.1–2.2)
ALP SERPL-CCNC: 102 U/L (ref 45–117)
ALT SERPL-CCNC: 22 U/L (ref 12–78)
ANION GAP SERPL CALC-SCNC: 5 MMOL/L (ref 5–15)
AST SERPL-CCNC: 10 U/L (ref 15–37)
BILIRUB SERPL-MCNC: 0.7 MG/DL (ref 0.2–1)
BNP SERPL-MCNC: 1829 PG/ML
BUN SERPL-MCNC: 29 MG/DL (ref 6–20)
BUN/CREAT SERPL: 25 (ref 12–20)
CALCIUM SERPL-MCNC: 10.4 MG/DL (ref 8.5–10.1)
CHLORIDE SERPL-SCNC: 103 MMOL/L (ref 97–108)
CO2 SERPL-SCNC: 31 MMOL/L (ref 21–32)
CREAT SERPL-MCNC: 1.14 MG/DL (ref 0.55–1.02)
GLOBULIN SER CALC-MCNC: 3.7 G/DL (ref 2–4)
GLUCOSE SERPL-MCNC: 106 MG/DL (ref 65–100)
POTASSIUM SERPL-SCNC: 4.1 MMOL/L (ref 3.5–5.1)
PROT SERPL-MCNC: 7.4 G/DL (ref 6.4–8.2)
SODIUM SERPL-SCNC: 139 MMOL/L (ref 136–145)

## 2022-07-28 PROBLEM — I77.89 ASCENDING AORTA ENLARGEMENT (HCC): Status: ACTIVE | Noted: 2022-07-28

## 2022-07-28 PROBLEM — I50.41 ACUTE COMBINED SYSTOLIC AND DIASTOLIC CONGESTIVE HEART FAILURE (HCC): Status: ACTIVE | Noted: 2022-07-28

## 2022-08-01 NOTE — PROGRESS NOTES
Please inform the patient to continue lasix as prescribed and follow up with Dr. Selina Bowles (Cardiology) as pallned.

## 2022-08-01 NOTE — TELEPHONE ENCOUNTER
Last Visit: 7/22/22 NP Ana Goodrich  Next Appointment: None  Previous Refill Encounter(s): 5/7/22 90    Requested Prescriptions     Pending Prescriptions Disp Refills    olmesartan (BENICAR) 40 mg tablet 90 Tablet 1     Sig: Take 1 Tablet by mouth in the morning. For [address] [facility] in place:   Recommendation Provided To:    Intervention Detail: New Rx: 1, reason: Patient Preference  Gap Closed?:   Intervention Accepted By:   Time Spent (min): 5

## 2022-08-03 ENCOUNTER — TELEPHONE (OUTPATIENT)
Dept: FAMILY MEDICINE CLINIC | Age: 62
End: 2022-08-03

## 2022-08-03 RX ORDER — OLMESARTAN MEDOXOMIL 40 MG/1
40 TABLET ORAL DAILY
Qty: 90 TABLET | Refills: 1 | Status: SHIPPED | OUTPATIENT
Start: 2022-08-03

## 2022-08-03 NOTE — TELEPHONE ENCOUNTER
----- Message from Jim Hicks sent at 8/2/2022 11:09 AM EDT -----  Subject: Message to Provider    QUESTIONS  Information for Provider? James Farrell with [employer] and she wanted   to leave her contact info since he was discharged. [phone] ext [phone]  ---------------------------------------------------------------------------  --------------  Gayle SCOTT  [phone]; OK to leave message on voicemail  ---------------------------------------------------------------------------  --------------  SCRIPT ANSWERS  Relationship to Patient? Third Party  Third Party Type? Insurance? Representative Name?  James Farrell

## 2022-12-12 DIAGNOSIS — F41.8 DEPRESSION WITH ANXIETY: ICD-10-CM

## 2022-12-13 DIAGNOSIS — F41.8 DEPRESSION WITH ANXIETY: ICD-10-CM

## 2022-12-13 NOTE — TELEPHONE ENCOUNTER
Patient mychart request for refill. [facility] message relayed to patient of request for appt for follow-up due. Shahida Grullon    Last Visit: 7/22/22 NP Flora Mckeon  Next Appointment: None- mychart message sent to patient to schedule  Previous Refill Encounter(s): 11/3/22 30    Requested Prescriptions     Pending Prescriptions Disp Refills    sertraline (ZOLOFT) 100 mg tablet 30 Tablet 0     Sig: Take 1 Tablet by mouth daily. Follow-up appt due       For Pharmacy [facility] in place:   Recommendation Provided To:    Intervention Detail: New Rx: 1, reason: Patient Preference  Gap Closed?:   Intervention Accepted By:   Time Spent (min): 10

## 2022-12-14 RX ORDER — SERTRALINE HYDROCHLORIDE 100 MG/1
100 TABLET, FILM COATED ORAL DAILY
Qty: 30 TABLET | Refills: 0 | Status: SHIPPED | OUTPATIENT
Start: 2022-12-14

## 2022-12-14 RX ORDER — SERTRALINE HYDROCHLORIDE 100 MG/1
100 TABLET, FILM COATED ORAL DAILY
Qty: 30 TABLET | Refills: 0 | OUTPATIENT
Start: 2022-12-14

## 2022-12-14 NOTE — TELEPHONE ENCOUNTER
Left generic message to callback the office. Pt's medication was approved and pt need's to make a follow-up.   -LEIN 12/14/22

## 2022-12-14 NOTE — TELEPHONE ENCOUNTER
Duplicate      For Pharmacy [address] [facility] in place:   Recommendation Provided To:    Intervention Detail: Discontinued Rx: 1, reason: Duplicate Therapy  Gap Closed?:   Intervention Accepted By:   Time Spent (min): 5

## 2023-01-06 ENCOUNTER — TELEPHONE (OUTPATIENT)
Dept: ORTHOPEDIC SURGERY | Age: 63
End: 2023-01-06

## 2023-01-06 ENCOUNTER — OFFICE VISIT (OUTPATIENT)
Dept: FAMILY MEDICINE CLINIC | Age: 63
End: 2023-01-06
Payer: COMMERCIAL

## 2023-01-06 VITALS
SYSTOLIC BLOOD PRESSURE: 135 MMHG | HEART RATE: 68 BPM | DIASTOLIC BLOOD PRESSURE: 68 MMHG | HEIGHT: 72 IN | OXYGEN SATURATION: 97 % | BODY MASS INDEX: 39.68 KG/M2 | WEIGHT: 293 LBS | RESPIRATION RATE: 16 BRPM | TEMPERATURE: 97.9 F

## 2023-01-06 DIAGNOSIS — E66.01 SEVERE OBESITY (BMI 35.0-39.9) WITH COMORBIDITY (HCC): ICD-10-CM

## 2023-01-06 DIAGNOSIS — M54.16 LUMBAR RADICULOPATHY: ICD-10-CM

## 2023-01-06 DIAGNOSIS — J44.9 CHRONIC OBSTRUCTIVE PULMONARY DISEASE, UNSPECIFIED COPD TYPE (HCC): ICD-10-CM

## 2023-01-06 DIAGNOSIS — M54.41 CHRONIC BILATERAL LOW BACK PAIN WITH BILATERAL SCIATICA: Primary | ICD-10-CM

## 2023-01-06 DIAGNOSIS — G89.29 CHRONIC BILATERAL LOW BACK PAIN WITH BILATERAL SCIATICA: Primary | ICD-10-CM

## 2023-01-06 DIAGNOSIS — I77.89 ASCENDING AORTA ENLARGEMENT (HCC): ICD-10-CM

## 2023-01-06 DIAGNOSIS — M54.42 CHRONIC BILATERAL LOW BACK PAIN WITH BILATERAL SCIATICA: Primary | ICD-10-CM

## 2023-01-06 DIAGNOSIS — I50.42 CHRONIC COMBINED SYSTOLIC AND DIASTOLIC CONGESTIVE HEART FAILURE (HCC): ICD-10-CM

## 2023-01-06 DIAGNOSIS — Z23 ENCOUNTER FOR IMMUNIZATION: ICD-10-CM

## 2023-01-06 PROCEDURE — 3078F DIAST BP <80 MM HG: CPT | Performed by: NURSE PRACTITIONER

## 2023-01-06 PROCEDURE — 90471 IMMUNIZATION ADMIN: CPT | Performed by: NURSE PRACTITIONER

## 2023-01-06 PROCEDURE — 3074F SYST BP LT 130 MM HG: CPT | Performed by: NURSE PRACTITIONER

## 2023-01-06 PROCEDURE — 99215 OFFICE O/P EST HI 40 MIN: CPT | Performed by: NURSE PRACTITIONER

## 2023-01-06 PROCEDURE — 90686 IIV4 VACC NO PRSV 0.5 ML IM: CPT | Performed by: NURSE PRACTITIONER

## 2023-01-06 RX ORDER — METHYLPREDNISOLONE 4 MG/1
TABLET ORAL
Qty: 1 DOSE PACK | Refills: 0 | Status: SHIPPED | OUTPATIENT
Start: 2023-01-06

## 2023-01-06 NOTE — TELEPHONE ENCOUNTER
We received a referral to schedule an appt for back, left pt voicemail to call our office and schedule an appt

## 2023-01-06 NOTE — PROGRESS NOTES
[facility] Note     Renee Ortiz (: 1960) is a 65 y.o. female, established patient, here for evaluation of the following chief complaint(s):  Hypertension and Leg Pain (bilateral)       ASSESSMENT/PLAN:  1. Chronic bilateral low back pain with bilateral sciatica  -     METABOLIC PANEL, COMPREHENSIVE; Future  -     NT-PRO BNP; Future  -     MAGNESIUM; Future  -     methylPREDNISolone (MEDROL DOSEPACK) 4 mg tablet; Per pack instructions, Normal, Disp-1 Dose Pack, R-0  -     CBC WITH AUTOMATED DIFF; Future  -     VITAMIN B12 & FOLATE; Future  -     REFERRAL TO ORTHOPEDICS  -Wishes to defer physical therapy until speaking with orthopedic  -Tylenol as needed  - Heat as needed for pain  -Handout provided on back stretches and back pain relief  -Counseling provided as to when to seek emergent care    2. Chronic combined systolic and diastolic congestive heart failure ([facility]  -Request last 2 visits notes and any cardiac testing from outside cardiologist-Broderick Childress. At this time we do not have any records on file for review. 3. Severe obesity (BMI 35.0-39. 9) with comorbidity ([facility]  -     Diet and lifestyle modification encouraged for weight loss and chronic disease prevention/ management    4. Ascending aorta enlargement (HCC)  -     -Request last 2 visits notes and any cardiac testing from outside cardiologist-Broderick Childress. At this time we do not have any records on file for review that aortic enlargement  - Reviewed 3/2021 CT which does not note any diliation     5. Chronic obstructive pulmonary disease, unspecified COPD type ([facility]  -     Followed by Pulmonary    6. Lumbar radiculopathy  -     VITAMIN B12 & FOLATE;  Future  -     REFERRAL TO ORTHOPEDICS  -Wishes to defer physical therapy until speaking with orthopedic  -Tylenol as needed  - Heat as needed for pain  -Handout provided on back stretches and back pain relief  -Counseling provided as to when to seek emergent care    7. Encounter for immunization  -     [place] IMMUNIZ ADMIN,1 SINGLE/COMB VAC/TOXOID  -     INFLUENZA, FLUARIX, FLULAVAL, FLUZONE (AGE 6 MO+), AFLURIA(AGE 3Y+) IM, PF, 0.5 ML      Return in about 6 months (around 7/6/2023), or if symptoms worsen or fail to improve. SUBJECTIVE/OBJECTIVE:    Tomasa Malik is a 65 y.o. female seen today for back pain, COPD,  CHF    Back pain:  Complains of low back pain in midline for 1 week(s), is positional with bending or lifting, with radiation down the legs. Severity of pain is 10 out of 10.  tingling is not present in bilateral  leg(s)/ foot. Laying flat increases pain. Sitting upright improves the pain. Precipitating factors: none recalled by the patient. Prior history of back problems: no prior back problems. Self treatment:  medication not used . The patient denies fevers, chills or sweats. The patient denies bowel/bladder incontinence and saddle numbness. Cardiovascular Review:  She has hypertension, hyperlipidemia, CHF, and obesity. Diet and Lifestyle: not attempting to follow a low fat, low cholesterol diet, sedentary  Home BP Monitoring: is not measured at home. Pertinent ROS: taking medications as instructed, no medication side effects noted, no TIA's, no chest pain on exertion, no dyspnea on exertion, no swelling of ankles. COPD:  The patient is being seen for follow up of COPD. r Oxygen: She currently is not on home oxygen therapy. Symptoms: becomes dyspneic after 1 blocks  symptoms worse with exertion. .   Patient uses >1 pillows at night. Followed by pulmonology and uses her inhaler as prescribed. REVIEW OF SYSTEMS:    Review of Systems   Constitutional:  Positive for fatigue. Negative for activity change and appetite change. HENT: Negative. Respiratory:  Positive for chest tightness, shortness of breath and wheezing. Negative for choking. Cardiovascular: Negative. Gastrointestinal: Negative.     Genitourinary: Negative. Musculoskeletal:  Positive for back pain. Skin: Negative. Neurological:         Tingling BLE       VITAL SIGNS:    Wt Readings from Last 3 Encounters:   01/06/23 301 lb 6.4 oz (136.7 kg)   07/22/22 300 lb 6.4 oz (136.3 kg)   04/15/22 306 lb (138.8 kg)     Temp Readings from Last 3 Encounters:   01/06/23 97.9 °F (36.6 °C) (Temporal)   07/22/22 98 °F (36.7 °C) (Temporal)   04/15/22 97.4 °F (36.3 °C) (Temporal)     BP Readings from Last 3 Encounters:   01/06/23 135/68   07/22/22 138/73   04/15/22 (!) 172/90     Pulse Readings from Last 3 Encounters:   01/06/23 68   07/22/22 90   04/15/22 77           PHYSICAL EXAMINATION:       General: Alert, cooperative, no distress, overweight AAF  Respiratory: Pursed lipped breathing with prolonged conversation, in no acute respiratory distress. Distant but Clear to auscultation bilaterally. Cardiovascular: distant Regular rate and rhythm, S1, S2 normal, no murmur, click, rub or gallop. Extremities: trace edema. Abdomen: Soft, non-tender, not distended. Bowel sounds normal. No masses or organomegaly. MSK: Spinal and paraspinals in the lumbar area tender to palpation, SI joints nontender, positive bilateral lower extremity SLR, BUE &BLE strength 5/5,  extremities normal appearing, atraumatic, no effusion. Gait steady and unassisted. Skin: Skin color, texture, turgor normal. No rashes or lesions on exposed skin. Neurologic: A/Ox3  Psychiatric: Normal affect. Mood euthymic. Thoughts logical. Speech volume and speed normal            Treatment risks/benefits/costs/interactions/alternatives discussed with patient. Advised patient to call back or return to office if symptoms worsen/change/persist. If patient cannot reach us or should anything more severe/urgent arise he/she should proceed directly to the nearest emergency department. Discussed expected course/resolution/complications of diagnosis in detail with patient.   Patient expressed understanding with the diagnosis and plan. An electronic signature was used to authenticate this note.   -- Alfonzo Peres NP

## 2023-01-06 NOTE — PROGRESS NOTES
Chief Complaint   Patient presents with    Hypertension    Leg Pain     bilateral         1. \"Have you been to the ER, urgent care clinic since your last visit? Hospitalized since your last visit? \" No    2. \"Have you seen or consulted any other health care providers outside of the [address] [place] [address] since your last visit? \" No     3. For patients over 45: Has the patient had a colonoscopy? Yes - no Care Gap present     If the patient is female:    4. For patients over 40: Has the patient had a mammogram? Yes - no Care Gap present    5. For patients over 21: Has the patient had a pap smear?  Yes - no Care Gap present     3 most recent PHQ Screens 1/6/2023   Little interest or pleasure in doing things Not at all   Feeling down, depressed, irritable, or hopeless Nearly every day   Total Score PHQ 2 3   Trouble falling or staying asleep, or sleeping too much -   Feeling tired or having little energy -   Poor appetite, weight loss, or overeating -   Feeling bad about yourself - or that you are a failure or have let yourself or your family down -   Trouble concentrating on things such as school, work, reading, or watching TV -   Moving or speaking so slowly that other people could have noticed; or the opposite being so fidgety that others notice -   Thoughts of being better off dead, or hurting yourself in some way -   PHQ 9 Score -   How difficult have these problems made it for you to do your work, take care of your home and get along with others -       Health Maintenance Due   Topic Date Due    Shingles Vaccine (1 of 2) Never done    COVID-19 Vaccine (3 - Booster for [employer] series) 05/18/2021

## 2023-01-07 LAB
ALBUMIN SERPL-MCNC: 3.8 G/DL (ref 3.5–5)
ALBUMIN/GLOB SERPL: 0.9 (ref 1.1–2.2)
ALP SERPL-CCNC: 96 U/L (ref 45–117)
ALT SERPL-CCNC: 20 U/L (ref 12–78)
ANION GAP SERPL CALC-SCNC: 3 MMOL/L (ref 5–15)
AST SERPL-CCNC: 11 U/L (ref 15–37)
BASOPHILS # BLD: 0 K/UL (ref 0–0.1)
BASOPHILS NFR BLD: 1 % (ref 0–1)
BILIRUB SERPL-MCNC: 0.5 MG/DL (ref 0.2–1)
BNP SERPL-MCNC: 683 PG/ML
BUN SERPL-MCNC: 34 MG/DL (ref 6–20)
BUN/CREAT SERPL: 31 (ref 12–20)
CALCIUM SERPL-MCNC: 10.3 MG/DL (ref 8.5–10.1)
CHLORIDE SERPL-SCNC: 105 MMOL/L (ref 97–108)
CO2 SERPL-SCNC: 31 MMOL/L (ref 21–32)
CREAT SERPL-MCNC: 1.09 MG/DL (ref 0.55–1.02)
DIFFERENTIAL METHOD BLD: NORMAL
EOSINOPHIL # BLD: 0.3 K/UL (ref 0–0.4)
EOSINOPHIL NFR BLD: 5 % (ref 0–7)
ERYTHROCYTE [DISTWIDTH] IN BLOOD BY AUTOMATED COUNT: 13.1 % (ref 11.5–14.5)
FOLATE SERPL-MCNC: 71.2 NG/ML (ref 5–21)
GLOBULIN SER CALC-MCNC: 4.1 G/DL (ref 2–4)
GLUCOSE SERPL-MCNC: 87 MG/DL (ref 65–100)
HCT VFR BLD AUTO: 39.5 % (ref 35–47)
HGB BLD-MCNC: 12.1 G/DL (ref 11.5–16)
IMM GRANULOCYTES # BLD AUTO: 0 K/UL (ref 0–0.04)
IMM GRANULOCYTES NFR BLD AUTO: 0 % (ref 0–0.5)
LYMPHOCYTES # BLD: 1.5 K/UL (ref 0.8–3.5)
LYMPHOCYTES NFR BLD: 26 % (ref 12–49)
MAGNESIUM SERPL-MCNC: 2.2 MG/DL (ref 1.6–2.4)
MCH RBC QN AUTO: 28.3 PG (ref 26–34)
MCHC RBC AUTO-ENTMCNC: 30.6 G/DL (ref 30–36.5)
MCV RBC AUTO: 92.5 FL (ref 80–99)
MONOCYTES # BLD: 0.7 K/UL (ref 0–1)
MONOCYTES NFR BLD: 12 % (ref 5–13)
NEUTS SEG # BLD: 3.3 K/UL (ref 1.8–8)
NEUTS SEG NFR BLD: 56 % (ref 32–75)
NRBC # BLD: 0 K/UL (ref 0–0.01)
NRBC BLD-RTO: 0 PER 100 WBC
PLATELET # BLD AUTO: 239 K/UL (ref 150–400)
PMV BLD AUTO: 11.1 FL (ref 8.9–12.9)
POTASSIUM SERPL-SCNC: 4.2 MMOL/L (ref 3.5–5.1)
PROT SERPL-MCNC: 7.9 G/DL (ref 6.4–8.2)
RBC # BLD AUTO: 4.27 M/UL (ref 3.8–5.2)
SODIUM SERPL-SCNC: 139 MMOL/L (ref 136–145)
VIT B12 SERPL-MCNC: 1042 PG/ML (ref 193–986)
WBC # BLD AUTO: 5.8 K/UL (ref 3.6–11)

## 2023-01-09 PROBLEM — E78.5 DYSLIPIDEMIA: Status: ACTIVE | Noted: 2023-01-09

## 2023-01-09 PROBLEM — J44.9 CHRONIC OBSTRUCTIVE PULMONARY DISEASE (HCC): Status: ACTIVE | Noted: 2023-01-09

## 2023-01-09 PROBLEM — I50.42 CHRONIC COMBINED SYSTOLIC AND DIASTOLIC CONGESTIVE HEART FAILURE (HCC): Status: ACTIVE | Noted: 2023-01-09

## 2023-01-15 DIAGNOSIS — F41.8 DEPRESSION WITH ANXIETY: ICD-10-CM

## 2023-01-16 NOTE — TELEPHONE ENCOUNTER
Patient mychart request for refill. Thanks, Candance Horner    Last Visit: 1/6/23 NP Golden Wolf  Next Appointment: 7/6/23 NP Golden Wolf  Previous Refill Encounter(s): 12/14/22 30    Requested Prescriptions     Pending Prescriptions Disp Refills    sertraline (ZOLOFT) 100 mg tablet 90 Tablet 1     Sig: Take 1 Tablet by mouth daily. For Pharmacy Admin Tracking Only    Program: Medication Refill  CPA in place:   Recommendation Provided To:    Intervention Detail: New Rx: 1, reason: Patient Preference  Intervention Accepted By:   Marta Guidry Closed?:   Time Spent (min): 5

## 2023-01-17 RX ORDER — SERTRALINE HYDROCHLORIDE 100 MG/1
100 TABLET, FILM COATED ORAL DAILY
Qty: 90 TABLET | Refills: 1 | Status: SHIPPED | OUTPATIENT
Start: 2023-01-17

## 2023-02-01 DIAGNOSIS — J45.20 MILD INTERMITTENT ASTHMA, UNSPECIFIED WHETHER COMPLICATED: ICD-10-CM

## 2023-02-01 DIAGNOSIS — J41.0 SIMPLE CHRONIC BRONCHITIS (HCC): ICD-10-CM

## 2023-02-01 NOTE — TELEPHONE ENCOUNTER
Patient mychart request for refill albuterol inhaler. Thanks, Caryle Saras    Last Visit: 1/6/23 NP Eddie Hernandez  Next Appointment: 7/6/23 NP Eddie Hernandez  Previous Refill Encounter(s): 3/16/22 18g + 1    Requested Prescriptions     Pending Prescriptions Disp Refills    albuterol (PROVENTIL HFA, VENTOLIN HFA, PROAIR HFA) 90 mcg/actuation inhaler 18 g 1     Sig: Take 2 Puffs by inhalation every six (6) hours as needed for Wheezing. For Pharmacy Admin Tracking Only    Program: Medication Refill  CPA in place:   Recommendation Provided To:    Intervention Detail: New Rx: 1, reason: Patient Preference  Intervention Accepted By:   Lucila Rivera Closed?:   Time Spent (min): 5

## 2023-02-02 RX ORDER — ALBUTEROL SULFATE 90 UG/1
2 AEROSOL, METERED RESPIRATORY (INHALATION)
Qty: 18 G | Refills: 1 | Status: SHIPPED | OUTPATIENT
Start: 2023-02-02

## 2023-04-22 ENCOUNTER — TRANSCRIBE ORDERS (OUTPATIENT)
Facility: HOSPITAL | Age: 63
End: 2023-04-22

## 2023-04-22 DIAGNOSIS — Z12.31 VISIT FOR SCREENING MAMMOGRAM: Primary | ICD-10-CM

## 2023-04-23 DIAGNOSIS — Z12.31 VISIT FOR SCREENING MAMMOGRAM: Primary | ICD-10-CM

## 2023-05-01 ENCOUNTER — HOSPITAL ENCOUNTER (INPATIENT)
Facility: HOSPITAL | Age: 63
LOS: 5 days | Discharge: HOME | DRG: 291 | End: 2023-05-06
Attending: HOSPITALIST | Admitting: HOSPITALIST
Payer: COMMERCIAL

## 2023-05-01 ENCOUNTER — APPOINTMENT (OUTPATIENT)
Dept: GENERAL RADIOLOGY | Age: 63
End: 2023-05-01
Attending: EMERGENCY MEDICINE
Payer: COMMERCIAL

## 2023-05-01 ENCOUNTER — HOSPITAL ENCOUNTER (INPATIENT)
Age: 63
LOS: 5 days | Discharge: STILL ADMITTED OR ADMINISTRATIVE | End: 2023-05-06
Attending: EMERGENCY MEDICINE | Admitting: HOSPITALIST
Payer: COMMERCIAL

## 2023-05-01 ENCOUNTER — APPOINTMENT (OUTPATIENT)
Dept: GENERAL RADIOLOGY | Age: 63
End: 2023-05-01
Attending: HOSPITALIST
Payer: COMMERCIAL

## 2023-05-01 DIAGNOSIS — J96.01 ACUTE RESPIRATORY FAILURE WITH HYPOXIA (HCC): ICD-10-CM

## 2023-05-01 DIAGNOSIS — R06.03 RESPIRATORY DISTRESS: ICD-10-CM

## 2023-05-01 DIAGNOSIS — R09.02 HYPOXIA: ICD-10-CM

## 2023-05-01 DIAGNOSIS — I50.9 ACUTE CONGESTIVE HEART FAILURE, UNSPECIFIED HEART FAILURE TYPE (HCC): Primary | ICD-10-CM

## 2023-05-01 PROBLEM — J96.00 ACUTE RESPIRATORY FAILURE (HCC): Status: ACTIVE | Noted: 2023-05-01

## 2023-05-01 LAB
ALBUMIN SERPL-MCNC: 3.6 G/DL (ref 3.5–5)
ALBUMIN/GLOB SERPL: 0.9 (ref 1.1–2.2)
ALP SERPL-CCNC: 120 U/L (ref 45–117)
ALT SERPL-CCNC: 45 U/L (ref 12–78)
ANION GAP SERPL CALC-SCNC: 0 MMOL/L (ref 5–15)
ARTERIAL PATENCY WRIST A: POSITIVE
AST SERPL-CCNC: 23 U/L (ref 15–37)
ATRIAL RATE: 61 BPM
BASE EXCESS BLD CALC-SCNC: 1.7 MMOL/L
BASE EXCESS BLDV CALC-SCNC: 1.4 MMOL/L
BASOPHILS # BLD: 0 K/UL (ref 0–0.1)
BASOPHILS NFR BLD: 1 % (ref 0–1)
BDY SITE: ABNORMAL
BILIRUB SERPL-MCNC: 0.6 MG/DL (ref 0.2–1)
BNP SERPL-MCNC: 5732 PG/ML
BUN SERPL-MCNC: 18 MG/DL (ref 6–20)
BUN/CREAT SERPL: 19 (ref 12–20)
CALCIUM SERPL-MCNC: 10.1 MG/DL (ref 8.5–10.1)
CALCULATED P AXIS, ECG09: 43 DEGREES
CALCULATED R AXIS, ECG10: -48 DEGREES
CALCULATED T AXIS, ECG11: 24 DEGREES
CHLORIDE SERPL-SCNC: 108 MMOL/L (ref 97–108)
CO2 SERPL-SCNC: 29 MMOL/L (ref 21–32)
COMMENT, HOLDF: NORMAL
CREAT SERPL-MCNC: 0.97 MG/DL (ref 0.55–1.02)
CRP SERPL-MCNC: 1.58 MG/DL (ref 0–0.6)
DIAGNOSIS, 93000: NORMAL
DIFFERENTIAL METHOD BLD: NORMAL
EOSINOPHIL # BLD: 0.2 K/UL (ref 0–0.4)
EOSINOPHIL NFR BLD: 4 % (ref 0–7)
ERYTHROCYTE [DISTWIDTH] IN BLOOD BY AUTOMATED COUNT: 12.5 % (ref 11.5–14.5)
ERYTHROCYTE [SEDIMENTATION RATE] IN BLOOD: 20 MM/HR (ref 0–30)
GAS FLOW.O2 O2 DELIVERY SYS: ABNORMAL
GLOBULIN SER CALC-MCNC: 4.2 G/DL (ref 2–4)
GLUCOSE SERPL-MCNC: 129 MG/DL (ref 65–100)
HCO3 BLD-SCNC: 28.5 MMOL/L (ref 22–26)
HCO3 BLDV-SCNC: 29.1 MMOL/L (ref 23–28)
HCT VFR BLD AUTO: 39.5 % (ref 35–47)
HGB BLD-MCNC: 12.3 G/DL (ref 11.5–16)
IMM GRANULOCYTES # BLD AUTO: 0 K/UL (ref 0–0.04)
IMM GRANULOCYTES NFR BLD AUTO: 0 % (ref 0–0.5)
LYMPHOCYTES # BLD: 1.1 K/UL (ref 0.8–3.5)
LYMPHOCYTES NFR BLD: 17 % (ref 12–49)
MCH RBC QN AUTO: 28.5 PG (ref 26–34)
MCHC RBC AUTO-ENTMCNC: 31.1 G/DL (ref 30–36.5)
MCV RBC AUTO: 91.4 FL (ref 80–99)
MONOCYTES # BLD: 0.5 K/UL (ref 0–1)
MONOCYTES NFR BLD: 8 % (ref 5–13)
NEUTS SEG # BLD: 4.6 K/UL (ref 1.8–8)
NEUTS SEG NFR BLD: 70 % (ref 32–75)
NRBC # BLD: 0 K/UL (ref 0–0.01)
NRBC BLD-RTO: 0 PER 100 WBC
O2/TOTAL GAS SETTING VFR VENT: 40 %
P-R INTERVAL, ECG05: 194 MS
PCO2 BLD: 52.8 MMHG (ref 35–45)
PCO2 BLDV: 57.6 MMHG (ref 41–51)
PEEP RESPIRATORY: 5 CMH2O
PH BLD: 7.34 (ref 7.35–7.45)
PH BLDV: 7.31 (ref 7.32–7.42)
PLATELET # BLD AUTO: 246 K/UL (ref 150–400)
PMV BLD AUTO: 10.4 FL (ref 8.9–12.9)
PO2 BLD: 76 MMHG (ref 80–100)
PO2 BLDV: 33 MMHG (ref 25–40)
POTASSIUM SERPL-SCNC: 4.3 MMOL/L (ref 3.5–5.1)
PRESSURE SUPPORT SETTING VENT: 10 CMH2O
PROT SERPL-MCNC: 7.8 G/DL (ref 6.4–8.2)
Q-T INTERVAL, ECG07: 464 MS
QRS DURATION, ECG06: 98 MS
QTC CALCULATION (BEZET), ECG08: 467 MS
RBC # BLD AUTO: 4.32 M/UL (ref 3.8–5.2)
RHEUMATOID FACT SERPL-ACNC: 17 IU/ML
SAMPLES BEING HELD,HOLD: NORMAL
SAO2 % BLD: 93.8 % (ref 92–97)
SAO2 % BLDV: 57.1 % (ref 65–88)
SERVICE CMNT-IMP: ABNORMAL
SODIUM SERPL-SCNC: 137 MMOL/L (ref 136–145)
SPECIMEN TYPE: ABNORMAL
SPECIMEN TYPE: ABNORMAL
TROPONIN I SERPL HS-MCNC: 13 NG/L (ref 0–37)
VENTRICULAR RATE, ECG03: 61 BPM
WBC # BLD AUTO: 6.5 K/UL (ref 3.6–11)

## 2023-05-01 PROCEDURE — 85652 RBC SED RATE AUTOMATED: CPT

## 2023-05-01 PROCEDURE — 85025 COMPLETE CBC W/AUTO DIFF WBC: CPT

## 2023-05-01 PROCEDURE — 86140 C-REACTIVE PROTEIN: CPT

## 2023-05-01 PROCEDURE — 84484 ASSAY OF TROPONIN QUANT: CPT

## 2023-05-01 PROCEDURE — 80053 COMPREHEN METABOLIC PANEL: CPT

## 2023-05-01 PROCEDURE — 65660000001 HC RM ICU INTERMED STEPDOWN

## 2023-05-01 PROCEDURE — 5A09357 ASSISTANCE WITH RESPIRATORY VENTILATION, LESS THAN 24 CONSECUTIVE HOURS, CONTINUOUS POSITIVE AIRWAY PRESSURE: ICD-10-PCS | Performed by: FAMILY MEDICINE

## 2023-05-01 PROCEDURE — 74011250636 HC RX REV CODE- 250/636: Performed by: INTERNAL MEDICINE

## 2023-05-01 PROCEDURE — 73120 X-RAY EXAM OF HAND: CPT

## 2023-05-01 PROCEDURE — 9990 CHARGE CONVERSION

## 2023-05-01 PROCEDURE — 83880 ASSAY OF NATRIURETIC PEPTIDE: CPT

## 2023-05-01 PROCEDURE — 74011000250 HC RX REV CODE- 250: Performed by: HOSPITALIST

## 2023-05-01 PROCEDURE — 82803 BLOOD GASES ANY COMBINATION: CPT

## 2023-05-01 PROCEDURE — 94640 AIRWAY INHALATION TREATMENT: CPT

## 2023-05-01 PROCEDURE — 74011250637 HC RX REV CODE- 250/637: Performed by: HOSPITALIST

## 2023-05-01 PROCEDURE — 96374 THER/PROPH/DIAG INJ IV PUSH: CPT

## 2023-05-01 PROCEDURE — 93005 ELECTROCARDIOGRAM TRACING: CPT

## 2023-05-01 PROCEDURE — 74011000250 HC RX REV CODE- 250: Performed by: EMERGENCY MEDICINE

## 2023-05-01 PROCEDURE — 96375 TX/PRO/DX INJ NEW DRUG ADDON: CPT

## 2023-05-01 PROCEDURE — 74011250636 HC RX REV CODE- 250/636: Performed by: HOSPITALIST

## 2023-05-01 PROCEDURE — 71045 X-RAY EXAM CHEST 1 VIEW: CPT

## 2023-05-01 PROCEDURE — 74011250636 HC RX REV CODE- 250/636: Performed by: EMERGENCY MEDICINE

## 2023-05-01 PROCEDURE — 74011250637 HC RX REV CODE- 250/637: Performed by: INTERNAL MEDICINE

## 2023-05-01 PROCEDURE — 86038 ANTINUCLEAR ANTIBODIES: CPT

## 2023-05-01 PROCEDURE — 86431 RHEUMATOID FACTOR QUANT: CPT

## 2023-05-01 PROCEDURE — 99285 EMERGENCY DEPT VISIT HI MDM: CPT

## 2023-05-01 PROCEDURE — 36415 COLL VENOUS BLD VENIPUNCTURE: CPT

## 2023-05-01 PROCEDURE — 36600 WITHDRAWAL OF ARTERIAL BLOOD: CPT

## 2023-05-01 RX ORDER — ACETAMINOPHEN 325 MG/1
650 TABLET ORAL
Status: DISCONTINUED | OUTPATIENT
Start: 2023-05-01 | End: 2023-05-06 | Stop reason: HOSPADM

## 2023-05-01 RX ORDER — ACETAMINOPHEN 650 MG/1
650 SUPPOSITORY RECTAL
Status: DISCONTINUED | OUTPATIENT
Start: 2023-05-01 | End: 2023-05-06 | Stop reason: HOSPADM

## 2023-05-01 RX ORDER — UREA 10 %
325 LOTION (ML) TOPICAL EVERY OTHER DAY
COMMUNITY

## 2023-05-01 RX ORDER — ATORVASTATIN CALCIUM 10 MG/1
10 TABLET, FILM COATED ORAL DAILY
Status: DISCONTINUED | OUTPATIENT
Start: 2023-05-01 | End: 2023-05-02

## 2023-05-01 RX ORDER — BUDESONIDE 0.5 MG/2ML
500 INHALANT ORAL
Status: DISCONTINUED | OUTPATIENT
Start: 2023-05-01 | End: 2023-05-06 | Stop reason: HOSPADM

## 2023-05-01 RX ORDER — IPRATROPIUM BROMIDE AND ALBUTEROL SULFATE 2.5; .5 MG/3ML; MG/3ML
3 SOLUTION RESPIRATORY (INHALATION)
Status: COMPLETED | OUTPATIENT
Start: 2023-05-01 | End: 2023-05-01

## 2023-05-01 RX ORDER — ONDANSETRON 2 MG/ML
4 INJECTION INTRAMUSCULAR; INTRAVENOUS
Status: DISCONTINUED | OUTPATIENT
Start: 2023-05-01 | End: 2023-05-06 | Stop reason: HOSPADM

## 2023-05-01 RX ORDER — LOSARTAN POTASSIUM 50 MG/1
100 TABLET ORAL DAILY
Status: DISCONTINUED | OUTPATIENT
Start: 2023-05-01 | End: 2023-05-01

## 2023-05-01 RX ORDER — SERTRALINE HYDROCHLORIDE 50 MG/1
100 TABLET, FILM COATED ORAL DAILY
Status: DISCONTINUED | OUTPATIENT
Start: 2023-05-01 | End: 2023-05-06 | Stop reason: HOSPADM

## 2023-05-01 RX ORDER — CARVEDILOL 12.5 MG/1
12.5 TABLET ORAL 2 TIMES DAILY WITH MEALS
COMMUNITY

## 2023-05-01 RX ORDER — MULTIVITAMIN
1 TABLET ORAL DAILY
COMMUNITY

## 2023-05-01 RX ORDER — IPRATROPIUM BROMIDE AND ALBUTEROL SULFATE 2.5; .5 MG/3ML; MG/3ML
3 SOLUTION RESPIRATORY (INHALATION)
Status: DISCONTINUED | OUTPATIENT
Start: 2023-05-01 | End: 2023-05-06 | Stop reason: HOSPADM

## 2023-05-01 RX ORDER — FUROSEMIDE 10 MG/ML
40 INJECTION INTRAMUSCULAR; INTRAVENOUS 2 TIMES DAILY
Status: DISCONTINUED | OUTPATIENT
Start: 2023-05-01 | End: 2023-05-01

## 2023-05-01 RX ORDER — POLYETHYLENE GLYCOL 3350 17 G/17G
17 POWDER, FOR SOLUTION ORAL DAILY PRN
Status: DISCONTINUED | OUTPATIENT
Start: 2023-05-01 | End: 2023-05-06 | Stop reason: HOSPADM

## 2023-05-01 RX ORDER — ONDANSETRON 4 MG/1
4 TABLET, ORALLY DISINTEGRATING ORAL
Status: DISCONTINUED | OUTPATIENT
Start: 2023-05-01 | End: 2023-05-06 | Stop reason: HOSPADM

## 2023-05-01 RX ORDER — IPRATROPIUM BROMIDE AND ALBUTEROL SULFATE 2.5; .5 MG/3ML; MG/3ML
3 SOLUTION RESPIRATORY (INHALATION)
Status: DISCONTINUED | OUTPATIENT
Start: 2023-05-01 | End: 2023-05-04

## 2023-05-01 RX ORDER — ARFORMOTEROL TARTRATE 15 UG/2ML
15 SOLUTION RESPIRATORY (INHALATION)
Status: DISCONTINUED | OUTPATIENT
Start: 2023-05-01 | End: 2023-05-06 | Stop reason: HOSPADM

## 2023-05-01 RX ORDER — SODIUM CHLORIDE 0.9 % (FLUSH) 0.9 %
5-40 SYRINGE (ML) INJECTION AS NEEDED
Status: DISCONTINUED | OUTPATIENT
Start: 2023-05-01 | End: 2023-05-06 | Stop reason: HOSPADM

## 2023-05-01 RX ORDER — FUROSEMIDE 10 MG/ML
60 INJECTION INTRAMUSCULAR; INTRAVENOUS
Status: COMPLETED | OUTPATIENT
Start: 2023-05-01 | End: 2023-05-01

## 2023-05-01 RX ORDER — SODIUM CHLORIDE 0.9 % (FLUSH) 0.9 %
5-40 SYRINGE (ML) INJECTION EVERY 8 HOURS
Status: DISCONTINUED | OUTPATIENT
Start: 2023-05-01 | End: 2023-05-06 | Stop reason: HOSPADM

## 2023-05-01 RX ORDER — ENOXAPARIN SODIUM 100 MG/ML
30 INJECTION SUBCUTANEOUS EVERY 12 HOURS
Status: DISCONTINUED | OUTPATIENT
Start: 2023-05-01 | End: 2023-05-06 | Stop reason: HOSPADM

## 2023-05-01 RX ORDER — FUROSEMIDE 10 MG/ML
80 INJECTION INTRAMUSCULAR; INTRAVENOUS 2 TIMES DAILY
Status: DISCONTINUED | OUTPATIENT
Start: 2023-05-01 | End: 2023-05-02

## 2023-05-01 RX ORDER — CARVEDILOL 6.25 MG/1
6.25 TABLET ORAL 2 TIMES DAILY WITH MEALS
Status: DISCONTINUED | OUTPATIENT
Start: 2023-05-01 | End: 2023-05-02

## 2023-05-01 RX ORDER — SPIRONOLACTONE 25 MG/1
25 TABLET ORAL DAILY
Status: DISCONTINUED | OUTPATIENT
Start: 2023-05-01 | End: 2023-05-02

## 2023-05-01 RX ADMIN — FUROSEMIDE 60 MG: 10 INJECTION, SOLUTION INTRAMUSCULAR; INTRAVENOUS at 09:13

## 2023-05-01 RX ADMIN — FUROSEMIDE 80 MG: 10 INJECTION, SOLUTION INTRAMUSCULAR; INTRAVENOUS at 19:34

## 2023-05-01 RX ADMIN — METHYLPREDNISOLONE SODIUM SUCCINATE 125 MG: 125 INJECTION, POWDER, FOR SOLUTION INTRAMUSCULAR; INTRAVENOUS at 08:41

## 2023-05-01 RX ADMIN — CARVEDILOL 6.25 MG: 6.25 TABLET, FILM COATED ORAL at 19:34

## 2023-05-01 RX ADMIN — ENOXAPARIN SODIUM 30 MG: 100 INJECTION SUBCUTANEOUS at 10:30

## 2023-05-01 RX ADMIN — IPRATROPIUM BROMIDE AND ALBUTEROL SULFATE 3 ML: 2.5; .5 SOLUTION RESPIRATORY (INHALATION) at 08:36

## 2023-05-01 RX ADMIN — ARFORMOTEROL TARTRATE 15 MCG: 15 SOLUTION RESPIRATORY (INHALATION) at 21:19

## 2023-05-01 RX ADMIN — METHYLPREDNISOLONE SODIUM SUCCINATE 40 MG: 40 INJECTION, POWDER, FOR SOLUTION INTRAMUSCULAR; INTRAVENOUS at 15:05

## 2023-05-01 RX ADMIN — IPRATROPIUM BROMIDE AND ALBUTEROL SULFATE 3 ML: 2.5; .5 SOLUTION RESPIRATORY (INHALATION) at 10:43

## 2023-05-01 RX ADMIN — SODIUM CHLORIDE, PRESERVATIVE FREE 10 ML: 5 INJECTION INTRAVENOUS at 21:27

## 2023-05-01 RX ADMIN — SPIRONOLACTONE 25 MG: 25 TABLET ORAL at 12:18

## 2023-05-01 RX ADMIN — BUDESONIDE 500 MCG: 0.5 INHALANT RESPIRATORY (INHALATION) at 10:43

## 2023-05-01 RX ADMIN — IPRATROPIUM BROMIDE AND ALBUTEROL SULFATE 3 ML: 2.5; .5 SOLUTION RESPIRATORY (INHALATION) at 08:56

## 2023-05-01 RX ADMIN — IPRATROPIUM BROMIDE AND ALBUTEROL SULFATE 3 ML: 2.5; .5 SOLUTION RESPIRATORY (INHALATION) at 21:19

## 2023-05-01 RX ADMIN — METHYLPREDNISOLONE SODIUM SUCCINATE 40 MG: 40 INJECTION, POWDER, FOR SOLUTION INTRAMUSCULAR; INTRAVENOUS at 21:29

## 2023-05-01 RX ADMIN — BUDESONIDE 500 MCG: 0.5 INHALANT RESPIRATORY (INHALATION) at 21:18

## 2023-05-01 RX ADMIN — IPRATROPIUM BROMIDE AND ALBUTEROL SULFATE 3 ML: 2.5; .5 SOLUTION RESPIRATORY (INHALATION) at 14:07

## 2023-05-01 RX ADMIN — IPRATROPIUM BROMIDE AND ALBUTEROL SULFATE 3 ML: 2.5; .5 SOLUTION RESPIRATORY (INHALATION) at 08:42

## 2023-05-01 RX ADMIN — ARFORMOTEROL TARTRATE 15 MCG: 15 SOLUTION RESPIRATORY (INHALATION) at 10:43

## 2023-05-01 RX ADMIN — ENOXAPARIN SODIUM 30 MG: 100 INJECTION SUBCUTANEOUS at 21:25

## 2023-05-01 RX ADMIN — SACUBITRIL AND VALSARTAN 1 TABLET: 24; 26 TABLET, FILM COATED ORAL at 21:25

## 2023-05-01 RX ADMIN — SODIUM CHLORIDE, PRESERVATIVE FREE 10 ML: 5 INJECTION INTRAVENOUS at 10:31

## 2023-05-01 NOTE — CONSULTS
CARDIOLOGY CONSULT                  Subjective:    Date of  Admission:   Admission type:Emergency    Rociojaison SOLOMON Sullivan     This is a 65 yof with CM, HTN, CHF here with acute on chronic combined CHF ([place]). She started to notice symptoms last week with activity which were progressing with less activity and then with lying down. Weight also up. ED showed elevated BNP and also edema on CXR. Patient Active Problem List   Diagnosis Code    Essential hypertension I10    Former smoker Z87.891    Mild intermittent asthma J45.20    Allergic rhinitis J30.9    Class 2 severe obesity due to excess calories with serious comorbidity and body mass index (BMI) of 36.0 to 36.9 in adult [facility]) E66.01, Z68.36    Depression with anxiety F41.8    Left foot pain M79.672    Vitamin D deficiency E55.9    ASCUS with positive high risk HPV cervical R87.610, R87.810    Simple chronic bronchitis (HCC) J41.0    Ascending aorta enlargement (HCC) I77.89    Acute combined systolic and diastolic congestive heart failure (HCC) I50.41    Chronic obstructive pulmonary disease (HCC) J44.9    Chronic combined systolic and diastolic congestive heart failure (HCC) I50.42    Dyslipidemia E78.5    Acute respiratory failure (HCC) J96.00    Acute CHF (congestive heart failure) (HCC) I50.9        Past Medical History:   Diagnosis Date    Acute combined systolic and diastolic congestive heart failure ([address] 7/28/2022    Chronic obstructive pulmonary disease (HCC)     Hypertension     Menopause     LMP-40years old?       Past Surgical History:   Procedure Laterality Date    HX ORTHOPAEDIC        Family History   Problem Relation Age of Onset    Heart Disease Mother     Hypertension Mother     Stroke Father     Kidney Disease Brother     Hypertension Brother       Social History     Socioeconomic History    Marital status:      Spouse name: Not on file    Number of children: Not on file    Years of education: Not on file    Highest education level: Not on file   Occupational History    Not on file   Tobacco Use    Smoking status: Former     Packs/day: 1.00     Years: 15.00     Pack years: 15.00     Types: Cigarettes     Quit date: 2018     Years since quittin.2    Smokeless tobacco: Never   Vaping Use    Vaping Use: Never used   Substance and Sexual Activity    Alcohol use: Yes     Comment: occasionally    Drug use: No    Sexual activity: Yes     Partners: Male   Other Topics Concern    Not on file   Social History Narrative    Not on file     Social Determinants of Health     Financial Resource Strain: Not on file   Food Insecurity: Not on file   Transportation Needs: Not on file   Physical Activity: Not on file   Stress: Not on file   Social Connections: Not on file   Intimate Partner Violence: Not on file   Housing Stability: Not on file        Current Facility-Administered Medications   Medication Dose Route Frequency    sodium chloride (NS) flush 5-40 mL  5-40 mL IntraVENous Q8H    sodium chloride (NS) flush 5-40 mL  5-40 mL IntraVENous PRN    acetaminophen (TYLENOL) tablet 650 mg  650 mg Oral Q6H PRN    Or    acetaminophen (TYLENOL) suppository 650 mg  650 mg Rectal Q6H PRN    polyethylene glycol (MIRALAX) packet 17 g  17 g Oral DAILY PRN    ondansetron (ZOFRAN ODT) tablet 4 mg  4 mg Oral Q8H PRN    Or    ondansetron (ZOFRAN) injection 4 mg  4 mg IntraVENous Q6H PRN    enoxaparin (LOVENOX) injection 30 mg  30 mg SubCUTAneous Q12H    furosemide (LASIX) injection 40 mg  40 mg IntraVENous BID    albuterol-ipratropium (DUO-NEB) 2.5 MG-0.5 MG/3 ML  3 mL Nebulization Q6H PRN    albuterol-ipratropium (DUO-NEB) 2.5 MG-0.5 MG/3 ML  3 mL Nebulization Q6H RT    methylPREDNISolone (PF) (SOLU-MEDROL) injection 40 mg  40 mg IntraVENous Q8H    atorvastatin (LIPITOR) tablet 10 mg  10 mg Oral DAILY    carvediloL (COREG) tablet 6.25 mg  6.25 mg Oral BID WITH MEALS    sertraline (ZOLOFT) tablet 100 mg  100 mg Oral DAILY    arformoteroL (BROVANA) neb solution 15 mcg  15 mcg Nebulization BID RT    And    budesonide (PULMICORT) 500 mcg/2 ml nebulizer suspension  500 mcg Nebulization BID RT    spironolactone (ALDACTONE) tablet 25 mg  25 mg Oral DAILY    [START ON 5/2/2023] empagliflozin (JARDIANCE) tablet 10 mg  10 mg Oral DAILY    sacubitriL-valsartan (ENTRESTO) 24-26 mg tablet 1 Tablet  1 Tablet Oral Q12H    albuterol (ACCUNEB) nebulizer solution 1.25 mg  1.25 mg Nebulization Q6H PRN     Current Outpatient Medications   Medication Sig    albuterol (PROVENTIL HFA, VENTOLIN HFA, PROAIR HFA) 90 mcg/actuation inhaler Take 2 Puffs by inhalation every six (6) hours as needed for Wheezing. olmesartan (BENICAR) 40 mg tablet TAKE 1 TABLET BY MOUTH IN THE MORNING    sertraline (ZOLOFT) 100 mg tablet Take 1 Tablet by mouth daily. methylPREDNISolone (MEDROL DOSEPACK) 4 mg tablet Per pack instructions    carvediloL (COREG) 6.25 mg tablet TAKE 1 TABLET BY MOUTH TWICE DAILY ( ONCE AT 6AM AND ONCE AT 6 PM )    furosemide (LASIX) 40 mg tablet Take 40 mg by mouth in the morning. atorvastatin (LIPITOR) 10 mg tablet Take 1 Tablet by mouth daily. chlorthalidone (HYGROTON) 25 mg tablet Take 1 Tablet by mouth daily. Review of Symptoms:    Gen - no F/C/S  Eyes - no vision changes  ENT - no sore throat, rhinorrhea, otalgia  CV - no CP, no palpitations, no orthopnea, no PND, no NEHEMIAH  Resp no cough, +SOB/CROSS  GI - no AP, no n/v/d/c   - no dysuria, no hematuria  MSK - no abnormal joint pains  Skin - no rashes  Neuro - no HA, no numbness, no weakness, no slurred speech  Psych - no change in mood       Physical Exam    BP (!) 186/88   Pulse 65   Temp 97.8 °F (36.6 °C)   Resp 22   Ht 6' (1.829 m)   Wt 140.3 kg (309 lb 4.9 oz)   SpO2 94%   BMI 41.95 kg/m²      NAD  Skin warm and dry  Nl conjunctiva  Oropharynx without exudate.     Neck supple  Lungs decreased at bases  Normal S1/ S2 with occasional ectopy  No Murmurs, click or Rubs  Abdomen soft and non tender  Pulses 2+ radials  Neuro:  Grossly intact  Appropriate       Cardiographics    Telemetry: NSR  ECG: NSR  Echocardiogram: pending    Assessment:     This jazz  65 yof with MMP here with acute on chronic combined CHF [facility])    Plan:    Increased diuretic dose  Daily labs  Started spironolactone and Jardiance  Transitioned ARB to Entresto  Echo pending      Please call with questions

## 2023-05-01 NOTE — ED NOTES
Educated patient on diet order, provided patient water and crackers, no other needs expressed at this time, bed low and locked, callbell in reach, family at bedside

## 2023-05-01 NOTE — H&P
History and Physical    Date of Service:  5/1/2023  Primary Care Provider: Antonio Wilkinson NP  Source of information: The patient and Chart review    Chief Complaint: Shortness of Breath      History of Presenting Illness:   Aaron Gil is a 65 y.o. female with a past history significant for congestive heart failure, COPD not oxygen dependent, former smoker presented with dyspnea. Shortness of breath has been progressive in the last couple days and she could barely take few steps without getting short winded. She is breathing treatment. She noticed increasing edema. She takes vitamins. No personal or symptoms. No fever or chills. She denies chest pain or chest pressure. She was first admitted with CHF in July 2022 when she was observed overnight in [facility]. She had echocardiogram that time I was able to review, EF was 30-35%. Upon arrival in the ED respiratory 70% on room air and respiration 40. She was put on BiPAP. Significant lab and imaging results include: proBNP 5722. CXR showed moderate pulm vascular congestion. > Diabetes 4: Four 760 mg IV x1, Solu-Medrol 125 mg, DuoNeb 3 treatments. And is on BiPAP. During this encounter, patient is comfortable on BiPAP, able to speak in complete sentence. REVIEW OF SYSTEMS:  A comprehensive review of systems was negative except for that written in the History of Present Illness. Past Medical History:   Diagnosis Date    Acute combined systolic and diastolic congestive heart failure ([address] 7/28/2022    Chronic obstructive pulmonary disease (HCC)     Hypertension     Menopause     LMP-40years old? Past Surgical History:   Procedure Laterality Date    HX ORTHOPAEDIC       Prior to Admission medications    Medication Sig Start Date End Date Taking? Authorizing Provider   albuterol (PROVENTIL HFA, VENTOLIN HFA, PROAIR HFA) 90 mcg/actuation inhaler Take 2 Puffs by inhalation every six (6) hours as needed for Wheezing.  4/14/23   Porsha Simpson Memo Davila NP   olmesartan (BENICAR) 40 mg tablet TAKE 1 TABLET BY MOUTH IN THE MORNING 4/11/23   Sylvia Mcpherson NP   sertraline (ZOLOFT) 100 mg tablet Take 1 Tablet by mouth daily. 1/17/23   Sylvia Mcpherson NP   methylPREDNISolone (MEDROL DOSEPACK) 4 mg tablet Per pack instructions 1/6/23   Sylvia Mcpherson NP   carvediloL (COREG) 6.25 mg tablet TAKE 1 TABLET BY MOUTH TWICE DAILY ( ONCE AT 6AM AND ONCE AT 6 PM ) 7/21/22   Provider, Historical   furosemide (LASIX) 40 mg tablet Take 40 mg by mouth in the morning. 7/21/22   Provider, Historical   atorvastatin (LIPITOR) 10 mg tablet Take 1 Tablet by mouth daily. 6/3/22   Sylvia Mcpherson NP   chlorthalidone (HYGROTON) 25 mg tablet Take 1 Tablet by mouth daily. 5/7/22   Sylvia Mcpherson NP     Allergies   Allergen Reactions    Lisinopril Swelling      Family History   Problem Relation Age of Onset    Heart Disease Mother     Hypertension Mother     Stroke Father     Kidney Disease Brother     Hypertension Brother       Social History:  reports that she quit smoking about 5 years ago. She has a 15.00 pack-year smoking history. She has never used smokeless tobacco. She reports current alcohol use. She reports that she does not use drugs. Social Determinants of Health     Tobacco Use: Medium Risk    Smoking Tobacco Use: Former    Smokeless Tobacco Use: Never    Passive Exposure: Not on file   Alcohol Use: Not on file   Financial Resource Strain: Not on file   Food Insecurity: Not on file   Transportation Needs: Not on file   Physical Activity: Not on file   Stress: Not on file   Social Connections: Not on file   Intimate Partner Violence: Not on file   Depression: At risk    PHQ-2 Score: 3   Housing Stability: Not on file        Medications were reconciled to the best of my ability given all available resources at the time of admission. Route is PO if not otherwise noted.      Family and social history were personally reviewed, all pertinent and relevant details are outlined as above. Objective:   Visit Vitals  BP (!) 141/71   Pulse (!) 57   Temp 97.8 °F (36.6 °C)   Resp 12   Ht 6' (1.829 m)   Wt 140.3 kg (309 lb 4.9 oz)   SpO2 99%   BMI 41.95 kg/m²    O2 Flow Rate (L/min): 15 l/min O2 Device: BIPAP    PHYSICAL EXAM:   General: On BiPAP. Alert x oriented x 3, awake, no acute distress,   HEENT: PEERL, EOMI, moist mucus membranes  Neck: Supple, no JVD, no meningeal signs  Chest: On BiPAP. Able to speak in complete sentence. Crackles and wheezing heard. CVS: RRR, S1 S2 heard, no murmurs/rubs/gallops  Abd/KUMAR: Soft, non-tender, non-distended, +bowel sounds          No CVA or suprapubic tenderness    Musculoskeletal/extremity: Peripheral edema. Left leg edema and warmth. Neuro/Psych:   Pleasant mood and affect  And oriented x3  CN 2-12 grossly intact, sensory grossly within normal limit, Strength 5/5 in all extremities, DTR 1+ x 4  Skin: Warm, dry, without rashes or lesions    Data Review:   I have independently reviewed and interpreted patient's lab and all other diagnostic data    Abnormal Labs Reviewed   METABOLIC PANEL, COMPREHENSIVE - Abnormal; Notable for the following components:       Result Value    Anion gap 0 (*)     Glucose 129 (*)     Alk. phosphatase 120 (*)     Globulin 4.2 (*)     A-G Ratio 0.9 (*)     All other components within normal limits   NT-PRO BNP - Abnormal; Notable for the following components:    NT pro-BNP 5,732 (*)     All other components within normal limits   POC VENOUS BLOOD GAS - Abnormal; Notable for the following components:    pH, venous (POC) 7.31 (*)     pCO2, venous (POC) 57.6 (*)     HCO3, venous (POC) 29.1 (*)     sO2, venous (POC) 57.1 (*)     All other components within normal limits       All Micro Results       None            IMAGING:   XR CHEST PORT   Final Result   Moderate pulmonary vascular congestive changes.                ECG/ECHO:    Results for orders placed or performed during the hospital encounter of 05/01/23   EKG, 12 LEAD, INITIAL   Result Value Ref Range    Ventricular Rate 61 BPM    Atrial Rate 61 BPM    P-R Interval 194 ms    QRS Duration 98 ms    Q-T Interval 464 ms    QTC Calculation (Bezet) 467 ms    Calculated P Axis 43 degrees    Calculated R Axis -48 degrees    Calculated T Axis 24 degrees    Diagnosis       Normal sinus rhythm  Possible Left atrial enlargement  Left axis deviation  Minimal voltage criteria for LVH, may be normal variant ( Anselmo product )  Abnormal ECG  When compared with ECG of 20-MAR-2022 07:37,  QRS axis shifted left            Notes reviewed from all clinical/nonclinical/nursing services involved in patient's clinical care. Care coordination discussions were held with appropriate clinical/nonclinical/ nursing providers based on care coordination needs. Assessment and plan   Given the patient's current clinical presentation, there is a high level of concern for decompensation if discharged from the emergency department. Complex decision making was performed, which includes reviewing the patient's available past medical records, laboratory results, and imaging studies. Acute hypoxic respiratory failure due to acute systolic CHF, NYHA class IV  COPD exacerbation  Patient currently on BiPAP, comfortable, able to speak in complete sentence  Admitted to immediate care  CHF management: IV frusemide, fluid, monitor input and output. Other GDMT as able. Consult cardiology. Obtain echocardiogram to assess EF for further management decision  COPD management: IV Solu-Medrol, inhaled steroids, bronchodilators  With ABG  Wean off BiPAP as able, discussed with respiratory    Rheumatoid condition, undiagnosed. Patient had a referral by her PCP to rheumatologist , has not been seen yet. Noted isolated swelling of the left hand, check x-ray, RF, ERIK, ESR, CRP.     DIET: DIET NPO   ISOLATION PRECAUTIONS: There are currently no Active Isolations  CODE STATUS: Full Code   DVT PROPHYLAXIS: Lovenox  FUNCTIONAL STATUS PRIOR TO HOSPITALIZATION: Fully active and ambulatory; able to carry on all self-care without restriction. Ambulatory status/function: By self   EARLY MOBILITY ASSESSMENT: Recommend routine ambulation while hospitalized with the assistance of nursing staff  ANTICIPATED DISCHARGE: Greater than 48 hours. ANTICIPATED DISPOSITION: Home  EMERGENCY CONTACT/SURROGATE DECISION MAKER:   Extended Emergency Contact Information  Primary Emergency Contact: Tigist Gupta  Address: [address], [address] [facility] Phone: [phone]  Mobile Phone: [phone]  Relation: Spouse  Secondary Emergency Contact: Hilton Brown  Mobile Phone: [phone]  Relation: Sister      CRITICAL CARE WAS PERFORMED FOR THIS ENCOUNTER: YES. I had a face to face encounter with the patient, reviewed and interpreted patient data including clinical events, labs, images, vital signs, I/O's, and examined patient. I have discussed the case and the plan and management of the patient's care with the consulting services, the bedside nurses and necessary ancillary providers. This patient has a high probability of imminent, clinically significant deterioration, which requires the highest level of preparedness to intervene urgently. I participated in the decision-making and personally managed or directed the management of the following life and organ supporting interventions that required my frequent assessment to treat or prevent imminent deterioration. I personally spent 40 minutes of critical care time. This is time spent at this critically ill patient's bedside actively involved in patient care as well as the coordination of care and discussions with the patient's family. This does not include any procedural time which has been billed separately.       Signed By: Zia Narayanan MD     May 1, 2023         Please note that this dictation may have been completed with Dragon, the computer voice recognition software. Quite often unanticipated grammatical, syntax, homophones, and other interpretive errors are inadvertently transcribed by the computer software. Please disregard these errors. Please excuse any errors that have escaped final proofreading.

## 2023-05-01 NOTE — ED NOTES
Pt resting on stretcher, reports breathing status has improved, no acute distress, vitals are stable, no needs expressed at this time, family at bedside, bed low,locked call bell in reach

## 2023-05-01 NOTE — ED PROVIDER NOTES
Shortness of Breath       58y F with COPD and CHF here with shortness of breath. Ongoing for 3 days but worse in the past day. No fever. Has been using albuterol inhaler with minimal relief. Reports some swelling in her legs. No rash. (+) cough. Has never been this severe. Has had to come to the ED in the past but never admitted for breathing problems. No chest pain. No abdominal pain. Past Medical History:   Diagnosis Date    Acute combined systolic and diastolic congestive heart failure ([facility] 2022    Chronic obstructive pulmonary disease (HCC)     Hypertension     Menopause     LMP-40years old?        Past Surgical History:   Procedure Laterality Date    HX ORTHOPAEDIC           Family History:   Problem Relation Age of Onset    Heart Disease Mother     Hypertension Mother     Stroke Father     Kidney Disease Brother     Hypertension Brother        Social History     Socioeconomic History    Marital status:      Spouse name: Not on file    Number of children: Not on file    Years of education: Not on file    Highest education level: Not on file   Occupational History    Not on file   Tobacco Use    Smoking status: Former     Packs/day: 1.00     Years: 15.00     Pack years: 15.00     Types: Cigarettes     Quit date: 2018     Years since quittin.2    Smokeless tobacco: Never   Vaping Use    Vaping Use: Never used   Substance and Sexual Activity    Alcohol use: Yes     Comment: occasionally    Drug use: No    Sexual activity: Yes     Partners: Male   Other Topics Concern    Not on file   Social History Narrative    Not on file     Social Determinants of Health     Financial Resource Strain: Not on file   Food Insecurity: Not on file   Transportation Needs: Not on file   Physical Activity: Not on file   Stress: Not on file   Social Connections: Not on file   Intimate Partner Violence: Not on file   Housing Stability: Not on file         ALLERGIES: Lisinopril    Review of Systems Respiratory:  Positive for shortness of breath. Review of Systems   Constitutional: (-) weight loss. HEENT: (-) stiff neck   Eyes: (-) discharge. Respiratory: (+) cough. Cardiovascular: (-) syncope. Gastrointestinal: (-) blood in stool. Genitourinary: (-) hematuria. Musculoskeletal: (-) myalgias. Neurological: (-) seizure. Skin: (-) petechiae  Lymph/Immunologic: (-) enlarged lymph nodes  All other systems reviewed and are negative. Vitals:    05/01/23 0823   Pulse: 90   Resp: (!) 40   SpO2: (!) 70%            Physical Exam   Nursing note and vitals reviewed. Constitutional: oriented to person, place, and time. appears well-developed and well-nourished. (+) distress. Head: Normocephalic and atraumatic. Sclera anicteric  Nose: No rhinorrhea  Mouth/Throat: Oropharynx is clear and moist. Pharynx normal  Eyes: Conjunctivae are normal. Pupils are equal, round, and reactive to light. Right eye exhibits no discharge. Left eye exhibits no discharge. Neck: Painless normal range of motion. Neck supple. No LAD. Cardiovascular: Normal rate, regular rhythm, normal heart sounds and intact distal pulses. Exam reveals no gallop and no friction rub. No murmur heard. Pulmonary/Chest:  (+) severe respiratory distress. No wheezes. No rales. No rhonchi. (+) increased work of breathing. (+) accessory muscle use. Fair air exchange throughout. Abdominal: soft, non-tender, no rebound or guarding. No hepatosplenomegaly. Normal bowel sounds throughout. Back: no tenderness to palpation, no deformities, no CVA tenderness  Extremities/Musculoskeletal: Normal range of motion. no tenderness. No edema. Distal extremities are neurovasc intact. Lymphadenopathy:   No adenopathy. Neurological:  Alert and oriented to person, place, and time. Coordination normal. CN 2-12 intact. Motor and sensory function intact. Skin: Skin is warm and dry. No rash noted. No pallor.     Medical Decision Making  Amount and/or Complexity of Data Reviewed  Labs: ordered. Radiology: ordered. ECG/medicine tests: ordered. Risk  Prescription drug management. Decision regarding hospitalization. 58y F here with respiratory distress. Sats 68% on arrival with marked distress. Hx of COPD and CHF. Will need nebs, steroids, labs, ECG, CXR, bipap and admission. NRB placed on arrival and sats up to 98% with some improvement in distress. Procedures    ED EKG interpretation:  Rhythm: normal sinus rhythm; and regular . Rate (approx.): 61; Axis: normal; P wave: normal; QRS interval: normal ; ST/T wave: normal;  This EKG was interpreted by Gina Shepard MD,ED Provider. Perfect Serve Consult for Admission  9:33 AM    ED Room Number: ER25/25  Patient Name and age:  Taylor Allen 65 y.o.  female  Working Diagnosis:   1. Acute congestive heart failure, unspecified heart failure type ([address]    2. Hypoxia    3. Acute respiratory failure with hypoxia (HCC)    4. Respiratory distress        COVID-19 Suspicion:  no  Sepsis present:  no  Reassessment needed: N/A  Code Status:  Full Code  Readmission: no  Isolation Requirements:  no  Recommended Level of Care:  step down  Department: [facility] Adult ED - 21     Other:  58y F here with shortness of breath. Hx of COPD and CHF. Using albuterol at home with only minimal relief. On arrival in distress with sats of 68%. Looks much better on bipap. Labs ok. CXR with pulm edema. On arrival wasn't sure the etiology so got nebs and steroids as well as IV lasix. Total critical care time spent exclusive of procedures:  40 minutes.

## 2023-05-01 NOTE — ED TRIAGE NOTES
Pt arrives to triage tachyneic, unable to speak in full sentences, tripoding , resp labored, taken straight back to room , RT called

## 2023-05-01 NOTE — ED NOTES
Verbal shift [facility] and Reba RN (oncoming nurse) by Samuel Coleman (offgoing nurse). Report included the following information SBAR, Kardex, ED Summary, MAR, and Recent Results.

## 2023-05-01 NOTE — PROGRESS NOTES
Admission Medication Reconciliation:    Information obtained from:  Patient  RxQuery data available¹:  YES    Comments/Recommendations: Spoke with patient while sister present with patient's permission. Discussed use of PTA medications including prescription, OTC, vitamins/supplements, inhaled, topical, nasal, injectable, otic and ophthalmic medications. Patient was a good historian. She had her pill bottles with her which were also used to verify doses. Updated PTA meds/reviewed patient's allergies. 1)  Of note, patient reports she ran out of her furosemide and has not had for about a week. Patient previously was on Entresto; however, no longer takes due to affordability issues. Currently on olmesartan. Also, states she is supposed to be taking atorvastatin but states she stopped taking because it was causing muscle pain. 2)  Medication changes (since last review): Added  - Ferrous sulfate  - Multivitamin    Adjusted  - Carvedilol (from 6.25 to 12.5 mg BID)    Removed  - Methylprednisolone dosepack  - Chlorthalidone  - Albuterol nebs     ¹RxQuery pharmacy benefit data reflects medications filled and processed through the patient's insurance, however   this data does NOT capture whether the medication was picked up or is currently being taken by the patient. Allergies:  Lisinopril    Significant PMH/Disease States:   Past Medical History:   Diagnosis Date    Acute combined systolic and diastolic congestive heart failure ([facility] [address] 7/28/2022    Chronic obstructive pulmonary disease (HCC)     Hypertension     Menopause     LMP-40years old? Chief Complaint for this Admission:    Chief Complaint   Patient presents with    Shortness of Breath     Prior to Admission Medications:   Prior to Admission Medications   Prescriptions Last Dose Informant Taking?    albuterol (PROVENTIL HFA, VENTOLIN HFA, PROAIR HFA) 90 mcg/actuation inhaler 5/1/2023  Yes   Sig: Take 2 Puffs by inhalation every six (6) hours as needed for Wheezing. atorvastatin (LIPITOR) 10 mg tablet Not Taking  No   Sig: Take 1 Tablet by mouth daily. Patient not taking: Reported on 5/1/2023   carvediloL (COREG) 12.5 mg tablet 5/1/2023  Yes   Sig: Take 1 Tablet by mouth two (2) times daily (with meals). ferrous sulfate 325 mg (65 mg iron) tablet 5/1/2023  Yes   Sig: Take 1 Tablet by mouth every other day. furosemide (LASIX) 40 mg tablet 4/24/2023  Yes   Sig: Take 1 Tablet by mouth daily. multivitamin (ONE A DAY) tablet   Yes   Sig: Take 1 Tablet by mouth daily. olmesartan (BENICAR) 40 mg tablet 5/1/2023  Yes   Sig: TAKE 1 TABLET BY MOUTH IN THE MORNING   sertraline (ZOLOFT) 100 mg tablet 5/1/2023  Yes   Sig: Take 1 Tablet by mouth daily. Facility-Administered Medications: None     Please contact the main inpatient pharmacy with any questions or concerns at [phone] and we will direct you to the clinical pharmacist covering this patient's care while in-house.    GHANSHYAM Alfaro

## 2023-05-01 NOTE — PROGRESS NOTES
1828: TRANSFER - IN REPORT:    Verbal report received from 7050 Avel Nevarez RN (name) on Moraima Villanueva  being received from ED (unit) for routine progression of care      Report consisted of patients Situation, Background, Assessment and   Recommendations(SBAR). Information from the following report(s) SBAR, Kardex, MAR, and Cardiac Rhythm NSR  was reviewed with the receiving nurse. Opportunity for questions and clarification was provided. Assessment completed upon patients arrival to unit and care assumed. Pt arrived to unit, connected to monitor and VS obtained. Pt oriented to unit and educated regarding unit safety measures and specifics in what to monitor/report to RN/staff, pt verbalized and acknowledged information and education provided. Pt in bed resting with bed in lowest position and call light within reach. 1930: Bedside and Verbal shift change report given to iGo Holland RN (oncoming nurse) by Samir Oswald RN (offgoing nurse). Report included the following information SBAR, Kardex, MAR, and Cardiac Rhythm NSR .

## 2023-05-01 NOTE — PROGRESS NOTES
Lovenox Monitoring  Indication: DVT Prophylaxis  Recent Labs     05/01/23  0827   HGB 12.3      CREA 0.97     Current Weight: 140.3 kg  Est. CrCl = 96.1 ml/min  Current Dose: 40 mg subcutaneously every 24 hours. Plan: Change to 30 mg SC every 12 hours based on prophylactic Lovenox dosing protocol.

## 2023-05-02 ENCOUNTER — APPOINTMENT (OUTPATIENT)
Dept: NON INVASIVE DIAGNOSTICS | Age: 63
End: 2023-05-02
Attending: HOSPITALIST
Payer: COMMERCIAL

## 2023-05-02 LAB
ANA SER QL: NEGATIVE
ANION GAP SERPL CALC-SCNC: 5 MMOL/L (ref 5–15)
BASOPHILS # BLD: 0 K/UL (ref 0–0.1)
BASOPHILS NFR BLD: 0 % (ref 0–1)
BUN SERPL-MCNC: 23 MG/DL (ref 6–20)
BUN/CREAT SERPL: 23 (ref 12–20)
CALCIUM SERPL-MCNC: 9.8 MG/DL (ref 8.5–10.1)
CHLORIDE SERPL-SCNC: 104 MMOL/L (ref 97–108)
CO2 SERPL-SCNC: 28 MMOL/L (ref 21–32)
CREAT SERPL-MCNC: 1 MG/DL (ref 0.55–1.02)
DIFFERENTIAL METHOD BLD: ABNORMAL
ECHO AV MEAN GRADIENT: 12 MMHG
ECHO AV MEAN VELOCITY: 1.6 M/S
ECHO AV PEAK GRADIENT: 20 MMHG
ECHO AV PEAK VELOCITY: 2.3 M/S
ECHO AV VELOCITY RATIO: 0.48
ECHO AV VTI: 47.6 CM
ECHO LVOT PEAK GRADIENT: 5 MMHG
ECHO LVOT PEAK VELOCITY: 1.1 M/S
ECHO MV A VELOCITY: 1.12 M/S
ECHO MV E VELOCITY: 0.98 M/S
ECHO MV E/A RATIO: 0.88
ECHO MV REGURGITANT PEAK GRADIENT: 92 MMHG
ECHO MV REGURGITANT PEAK VELOCITY: 4.8 M/S
ECHO RV TAPSE: 3.6 CM (ref 1.7–?)
EOSINOPHIL # BLD: 0 K/UL (ref 0–0.4)
EOSINOPHIL NFR BLD: 0 % (ref 0–7)
ERYTHROCYTE [DISTWIDTH] IN BLOOD BY AUTOMATED COUNT: 12.4 % (ref 11.5–14.5)
GLUCOSE SERPL-MCNC: 154 MG/DL (ref 65–100)
HCT VFR BLD AUTO: 39.8 % (ref 35–47)
HGB BLD-MCNC: 12.3 G/DL (ref 11.5–16)
IMM GRANULOCYTES # BLD AUTO: 0 K/UL (ref 0–0.04)
IMM GRANULOCYTES NFR BLD AUTO: 0 % (ref 0–0.5)
LYMPHOCYTES # BLD: 0.5 K/UL (ref 0.8–3.5)
LYMPHOCYTES NFR BLD: 10 % (ref 12–49)
MAGNESIUM SERPL-MCNC: 1.9 MG/DL (ref 1.6–2.4)
MCH RBC QN AUTO: 28.3 PG (ref 26–34)
MCHC RBC AUTO-ENTMCNC: 30.9 G/DL (ref 30–36.5)
MCV RBC AUTO: 91.5 FL (ref 80–99)
MONOCYTES # BLD: 0.3 K/UL (ref 0–1)
MONOCYTES NFR BLD: 5 % (ref 5–13)
NEUTS SEG # BLD: 4.3 K/UL (ref 1.8–8)
NEUTS SEG NFR BLD: 85 % (ref 32–75)
NRBC # BLD: 0 K/UL (ref 0–0.01)
NRBC BLD-RTO: 0 PER 100 WBC
PLATELET # BLD AUTO: 237 K/UL (ref 150–400)
PMV BLD AUTO: 10.7 FL (ref 8.9–12.9)
POTASSIUM SERPL-SCNC: 3.7 MMOL/L (ref 3.5–5.1)
RBC # BLD AUTO: 4.35 M/UL (ref 3.8–5.2)
RBC MORPH BLD: ABNORMAL
SODIUM SERPL-SCNC: 137 MMOL/L (ref 136–145)
WBC # BLD AUTO: 5.1 K/UL (ref 3.6–11)

## 2023-05-02 PROCEDURE — 74011250636 HC RX REV CODE- 250/636: Performed by: HOSPITALIST

## 2023-05-02 PROCEDURE — 80048 BASIC METABOLIC PNL TOTAL CA: CPT

## 2023-05-02 PROCEDURE — 83735 ASSAY OF MAGNESIUM: CPT

## 2023-05-02 PROCEDURE — 74011250637 HC RX REV CODE- 250/637: Performed by: HOSPITALIST

## 2023-05-02 PROCEDURE — 94640 AIRWAY INHALATION TREATMENT: CPT

## 2023-05-02 PROCEDURE — 74011250637 HC RX REV CODE- 250/637: Performed by: INTERNAL MEDICINE

## 2023-05-02 PROCEDURE — 77010033678 HC OXYGEN DAILY

## 2023-05-02 PROCEDURE — 94760 N-INVAS EAR/PLS OXIMETRY 1: CPT

## 2023-05-02 PROCEDURE — 65660000001 HC RM ICU INTERMED STEPDOWN

## 2023-05-02 PROCEDURE — 85025 COMPLETE CBC W/AUTO DIFF WBC: CPT

## 2023-05-02 PROCEDURE — 74011250636 HC RX REV CODE- 250/636: Performed by: INTERNAL MEDICINE

## 2023-05-02 PROCEDURE — 36415 COLL VENOUS BLD VENIPUNCTURE: CPT

## 2023-05-02 PROCEDURE — 74011000250 HC RX REV CODE- 250: Performed by: HOSPITALIST

## 2023-05-02 PROCEDURE — 9990 CHARGE CONVERSION

## 2023-05-02 PROCEDURE — 93306 TTE W/DOPPLER COMPLETE: CPT

## 2023-05-02 RX ORDER — PREDNISONE 20 MG/1
40 TABLET ORAL
Status: DISCONTINUED | OUTPATIENT
Start: 2023-05-03 | End: 2023-05-06 | Stop reason: HOSPADM

## 2023-05-02 RX ORDER — SPIRONOLACTONE 25 MG/1
12.5 TABLET ORAL DAILY
Status: DISCONTINUED | OUTPATIENT
Start: 2023-05-02 | End: 2023-05-06 | Stop reason: HOSPADM

## 2023-05-02 RX ORDER — CARVEDILOL 12.5 MG/1
12.5 TABLET ORAL 2 TIMES DAILY WITH MEALS
Status: DISCONTINUED | OUTPATIENT
Start: 2023-05-02 | End: 2023-05-06 | Stop reason: HOSPADM

## 2023-05-02 RX ORDER — FUROSEMIDE 10 MG/ML
60 INJECTION INTRAMUSCULAR; INTRAVENOUS 2 TIMES DAILY
Status: DISCONTINUED | OUTPATIENT
Start: 2023-05-02 | End: 2023-05-03

## 2023-05-02 RX ADMIN — IPRATROPIUM BROMIDE AND ALBUTEROL SULFATE 3 ML: 2.5; .5 SOLUTION RESPIRATORY (INHALATION) at 07:41

## 2023-05-02 RX ADMIN — SPIRONOLACTONE 12.5 MG: 25 TABLET ORAL at 09:30

## 2023-05-02 RX ADMIN — SACUBITRIL AND VALSARTAN 1 TABLET: 24; 26 TABLET, FILM COATED ORAL at 09:30

## 2023-05-02 RX ADMIN — ENOXAPARIN SODIUM 30 MG: 100 INJECTION SUBCUTANEOUS at 09:31

## 2023-05-02 RX ADMIN — IPRATROPIUM BROMIDE AND ALBUTEROL SULFATE 3 ML: 2.5; .5 SOLUTION RESPIRATORY (INHALATION) at 03:05

## 2023-05-02 RX ADMIN — SODIUM CHLORIDE, PRESERVATIVE FREE 10 ML: 5 INJECTION INTRAVENOUS at 20:44

## 2023-05-02 RX ADMIN — IPRATROPIUM BROMIDE AND ALBUTEROL SULFATE 3 ML: 2.5; .5 SOLUTION RESPIRATORY (INHALATION) at 13:27

## 2023-05-02 RX ADMIN — IPRATROPIUM BROMIDE AND ALBUTEROL SULFATE 3 ML: 2.5; .5 SOLUTION RESPIRATORY (INHALATION) at 21:08

## 2023-05-02 RX ADMIN — BUDESONIDE 500 MCG: 0.5 INHALANT RESPIRATORY (INHALATION) at 21:08

## 2023-05-02 RX ADMIN — EMPAGLIFLOZIN 10 MG: 10 TABLET, FILM COATED ORAL at 09:29

## 2023-05-02 RX ADMIN — ARFORMOTEROL TARTRATE 15 MCG: 15 SOLUTION RESPIRATORY (INHALATION) at 07:41

## 2023-05-02 RX ADMIN — SODIUM CHLORIDE, PRESERVATIVE FREE 10 ML: 5 INJECTION INTRAVENOUS at 15:31

## 2023-05-02 RX ADMIN — SERTRALINE HYDROCHLORIDE 100 MG: 50 TABLET ORAL at 09:30

## 2023-05-02 RX ADMIN — CARVEDILOL 12.5 MG: 12.5 TABLET, FILM COATED ORAL at 17:34

## 2023-05-02 RX ADMIN — ENOXAPARIN SODIUM 30 MG: 100 INJECTION SUBCUTANEOUS at 20:44

## 2023-05-02 RX ADMIN — FUROSEMIDE 60 MG: 10 INJECTION, SOLUTION INTRAMUSCULAR; INTRAVENOUS at 17:34

## 2023-05-02 RX ADMIN — BUDESONIDE 500 MCG: 0.5 INHALANT RESPIRATORY (INHALATION) at 07:41

## 2023-05-02 RX ADMIN — FUROSEMIDE 60 MG: 10 INJECTION, SOLUTION INTRAMUSCULAR; INTRAVENOUS at 09:31

## 2023-05-02 RX ADMIN — SACUBITRIL AND VALSARTAN 1 TABLET: 24; 26 TABLET, FILM COATED ORAL at 20:44

## 2023-05-02 RX ADMIN — ARFORMOTEROL TARTRATE 15 MCG: 15 SOLUTION RESPIRATORY (INHALATION) at 21:08

## 2023-05-02 RX ADMIN — SODIUM CHLORIDE, PRESERVATIVE FREE 10 ML: 5 INJECTION INTRAVENOUS at 06:31

## 2023-05-02 RX ADMIN — CARVEDILOL 6.25 MG: 6.25 TABLET, FILM COATED ORAL at 09:30

## 2023-05-02 RX ADMIN — PERFLUTREN 1.5 ML: 6.52 INJECTION, SUSPENSION INTRAVENOUS at 09:00

## 2023-05-02 RX ADMIN — METHYLPREDNISOLONE SODIUM SUCCINATE 40 MG: 40 INJECTION, POWDER, FOR SOLUTION INTRAMUSCULAR; INTRAVENOUS at 06:31

## 2023-05-03 LAB
ANION GAP SERPL CALC-SCNC: 2 MMOL/L (ref 5–15)
BASOPHILS # BLD: 0 K/UL (ref 0–0.1)
BASOPHILS NFR BLD: 0 % (ref 0–1)
BUN SERPL-MCNC: 30 MG/DL (ref 6–20)
BUN/CREAT SERPL: 26 (ref 12–20)
CALCIUM SERPL-MCNC: 10.1 MG/DL (ref 8.5–10.1)
CHLORIDE SERPL-SCNC: 99 MMOL/L (ref 97–108)
CO2 SERPL-SCNC: 34 MMOL/L (ref 21–32)
CREAT SERPL-MCNC: 1.15 MG/DL (ref 0.55–1.02)
DIFFERENTIAL METHOD BLD: NORMAL
EOSINOPHIL # BLD: 0 K/UL (ref 0–0.4)
EOSINOPHIL NFR BLD: 1 % (ref 0–7)
ERYTHROCYTE [DISTWIDTH] IN BLOOD BY AUTOMATED COUNT: 12.8 % (ref 11.5–14.5)
GLUCOSE SERPL-MCNC: 106 MG/DL (ref 65–100)
HCT VFR BLD AUTO: 42.6 % (ref 35–47)
HGB BLD-MCNC: 13.4 G/DL (ref 11.5–16)
IMM GRANULOCYTES # BLD AUTO: 0 K/UL (ref 0–0.04)
IMM GRANULOCYTES NFR BLD AUTO: 0 % (ref 0–0.5)
LYMPHOCYTES # BLD: 1.8 K/UL (ref 0.8–3.5)
LYMPHOCYTES NFR BLD: 24 % (ref 12–49)
MCH RBC QN AUTO: 28.6 PG (ref 26–34)
MCHC RBC AUTO-ENTMCNC: 31.5 G/DL (ref 30–36.5)
MCV RBC AUTO: 90.8 FL (ref 80–99)
MONOCYTES # BLD: 0.8 K/UL (ref 0–1)
MONOCYTES NFR BLD: 10 % (ref 5–13)
NEUTS SEG # BLD: 5 K/UL (ref 1.8–8)
NEUTS SEG NFR BLD: 65 % (ref 32–75)
NRBC # BLD: 0 K/UL (ref 0–0.01)
NRBC BLD-RTO: 0 PER 100 WBC
PLATELET # BLD AUTO: 268 K/UL (ref 150–400)
PMV BLD AUTO: 10 FL (ref 8.9–12.9)
POTASSIUM SERPL-SCNC: 3.3 MMOL/L (ref 3.5–5.1)
RBC # BLD AUTO: 4.69 M/UL (ref 3.8–5.2)
SODIUM SERPL-SCNC: 135 MMOL/L (ref 136–145)
WBC # BLD AUTO: 7.6 K/UL (ref 3.6–11)

## 2023-05-03 PROCEDURE — 94664 DEMO&/EVAL PT USE INHALER: CPT

## 2023-05-03 PROCEDURE — 94660 CPAP INITIATION&MGMT: CPT

## 2023-05-03 PROCEDURE — 80048 BASIC METABOLIC PNL TOTAL CA: CPT

## 2023-05-03 PROCEDURE — 74011000250 HC RX REV CODE- 250: Performed by: HOSPITALIST

## 2023-05-03 PROCEDURE — 74011250637 HC RX REV CODE- 250/637: Performed by: HOSPITALIST

## 2023-05-03 PROCEDURE — 74011250637 HC RX REV CODE- 250/637: Performed by: INTERNAL MEDICINE

## 2023-05-03 PROCEDURE — 9990 CHARGE CONVERSION

## 2023-05-03 PROCEDURE — 74011250636 HC RX REV CODE- 250/636: Performed by: HOSPITALIST

## 2023-05-03 PROCEDURE — 65660000001 HC RM ICU INTERMED STEPDOWN

## 2023-05-03 PROCEDURE — 74011636637 HC RX REV CODE- 636/637: Performed by: HOSPITALIST

## 2023-05-03 PROCEDURE — 36415 COLL VENOUS BLD VENIPUNCTURE: CPT

## 2023-05-03 PROCEDURE — 77010033678 HC OXYGEN DAILY

## 2023-05-03 PROCEDURE — 74011250636 HC RX REV CODE- 250/636: Performed by: INTERNAL MEDICINE

## 2023-05-03 PROCEDURE — 85025 COMPLETE CBC W/AUTO DIFF WBC: CPT

## 2023-05-03 PROCEDURE — 94640 AIRWAY INHALATION TREATMENT: CPT

## 2023-05-03 RX ORDER — FUROSEMIDE 40 MG/1
40 TABLET ORAL
Status: DISCONTINUED | OUTPATIENT
Start: 2023-05-03 | End: 2023-05-03

## 2023-05-03 RX ORDER — POTASSIUM CHLORIDE 750 MG/1
40 TABLET, FILM COATED, EXTENDED RELEASE ORAL
Status: COMPLETED | OUTPATIENT
Start: 2023-05-03 | End: 2023-05-03

## 2023-05-03 RX ORDER — FUROSEMIDE 40 MG/1
40 TABLET ORAL DAILY
Status: DISCONTINUED | OUTPATIENT
Start: 2023-05-04 | End: 2023-05-06 | Stop reason: HOSPADM

## 2023-05-03 RX ADMIN — BUDESONIDE 500 MCG: 0.5 INHALANT RESPIRATORY (INHALATION) at 20:17

## 2023-05-03 RX ADMIN — POTASSIUM CHLORIDE 40 MEQ: 750 TABLET, FILM COATED, EXTENDED RELEASE ORAL at 08:57

## 2023-05-03 RX ADMIN — ENOXAPARIN SODIUM 30 MG: 100 INJECTION SUBCUTANEOUS at 08:33

## 2023-05-03 RX ADMIN — ENOXAPARIN SODIUM 30 MG: 100 INJECTION SUBCUTANEOUS at 20:46

## 2023-05-03 RX ADMIN — EMPAGLIFLOZIN 10 MG: 10 TABLET, FILM COATED ORAL at 08:33

## 2023-05-03 RX ADMIN — PREDNISONE 40 MG: 20 TABLET ORAL at 08:33

## 2023-05-03 RX ADMIN — SACUBITRIL AND VALSARTAN 1 TABLET: 24; 26 TABLET, FILM COATED ORAL at 20:46

## 2023-05-03 RX ADMIN — IPRATROPIUM BROMIDE AND ALBUTEROL SULFATE 3 ML: 2.5; .5 SOLUTION RESPIRATORY (INHALATION) at 07:14

## 2023-05-03 RX ADMIN — SERTRALINE HYDROCHLORIDE 100 MG: 50 TABLET ORAL at 08:33

## 2023-05-03 RX ADMIN — SODIUM CHLORIDE, PRESERVATIVE FREE 10 ML: 5 INJECTION INTRAVENOUS at 20:46

## 2023-05-03 RX ADMIN — IPRATROPIUM BROMIDE AND ALBUTEROL SULFATE 3 ML: 2.5; .5 SOLUTION RESPIRATORY (INHALATION) at 02:11

## 2023-05-03 RX ADMIN — IPRATROPIUM BROMIDE AND ALBUTEROL SULFATE 3 ML: 2.5; .5 SOLUTION RESPIRATORY (INHALATION) at 15:24

## 2023-05-03 RX ADMIN — FUROSEMIDE 60 MG: 10 INJECTION, SOLUTION INTRAMUSCULAR; INTRAVENOUS at 08:33

## 2023-05-03 RX ADMIN — ARFORMOTEROL TARTRATE 15 MCG: 15 SOLUTION RESPIRATORY (INHALATION) at 20:17

## 2023-05-03 RX ADMIN — SPIRONOLACTONE 12.5 MG: 25 TABLET ORAL at 08:33

## 2023-05-03 RX ADMIN — BUDESONIDE 500 MCG: 0.5 INHALANT RESPIRATORY (INHALATION) at 07:14

## 2023-05-03 RX ADMIN — SODIUM CHLORIDE, PRESERVATIVE FREE 10 ML: 5 INJECTION INTRAVENOUS at 06:49

## 2023-05-03 RX ADMIN — SODIUM CHLORIDE, PRESERVATIVE FREE 10 ML: 5 INJECTION INTRAVENOUS at 13:46

## 2023-05-03 RX ADMIN — SACUBITRIL AND VALSARTAN 1 TABLET: 24; 26 TABLET, FILM COATED ORAL at 08:33

## 2023-05-03 RX ADMIN — ARFORMOTEROL TARTRATE 15 MCG: 15 SOLUTION RESPIRATORY (INHALATION) at 07:14

## 2023-05-03 RX ADMIN — IPRATROPIUM BROMIDE AND ALBUTEROL SULFATE 3 ML: 2.5; .5 SOLUTION RESPIRATORY (INHALATION) at 20:17

## 2023-05-03 RX ADMIN — CARVEDILOL 12.5 MG: 12.5 TABLET, FILM COATED ORAL at 18:12

## 2023-05-03 RX ADMIN — CARVEDILOL 12.5 MG: 12.5 TABLET, FILM COATED ORAL at 08:33

## 2023-05-04 LAB
ANION GAP SERPL CALC-SCNC: 4 MMOL/L (ref 5–15)
BASOPHILS # BLD: 0 K/UL (ref 0–0.1)
BASOPHILS NFR BLD: 0 % (ref 0–1)
BUN SERPL-MCNC: 38 MG/DL (ref 6–20)
BUN/CREAT SERPL: 31 (ref 12–20)
CALCIUM SERPL-MCNC: 9.9 MG/DL (ref 8.5–10.1)
CHLORIDE SERPL-SCNC: 102 MMOL/L (ref 97–108)
CO2 SERPL-SCNC: 30 MMOL/L (ref 21–32)
CREAT SERPL-MCNC: 1.22 MG/DL (ref 0.55–1.02)
DIFFERENTIAL METHOD BLD: NORMAL
EOSINOPHIL # BLD: 0.1 K/UL (ref 0–0.4)
EOSINOPHIL NFR BLD: 1 % (ref 0–7)
ERYTHROCYTE [DISTWIDTH] IN BLOOD BY AUTOMATED COUNT: 12.7 % (ref 11.5–14.5)
GLUCOSE BLD STRIP.AUTO-MCNC: 98 MG/DL (ref 65–117)
GLUCOSE SERPL-MCNC: 105 MG/DL (ref 65–100)
HCT VFR BLD AUTO: 43.2 % (ref 35–47)
HGB BLD-MCNC: 13.5 G/DL (ref 11.5–16)
IMM GRANULOCYTES # BLD AUTO: 0 K/UL (ref 0–0.04)
IMM GRANULOCYTES NFR BLD AUTO: 0 % (ref 0–0.5)
LYMPHOCYTES # BLD: 1.6 K/UL (ref 0.8–3.5)
LYMPHOCYTES NFR BLD: 20 % (ref 12–49)
MCH RBC QN AUTO: 28.4 PG (ref 26–34)
MCHC RBC AUTO-ENTMCNC: 31.3 G/DL (ref 30–36.5)
MCV RBC AUTO: 90.9 FL (ref 80–99)
MONOCYTES # BLD: 1 K/UL (ref 0–1)
MONOCYTES NFR BLD: 12 % (ref 5–13)
NEUTS SEG # BLD: 5.3 K/UL (ref 1.8–8)
NEUTS SEG NFR BLD: 67 % (ref 32–75)
NRBC # BLD: 0 K/UL (ref 0–0.01)
NRBC BLD-RTO: 0 PER 100 WBC
PLATELET # BLD AUTO: 294 K/UL (ref 150–400)
PMV BLD AUTO: 10.1 FL (ref 8.9–12.9)
POTASSIUM SERPL-SCNC: 3.8 MMOL/L (ref 3.5–5.1)
RBC # BLD AUTO: 4.75 M/UL (ref 3.8–5.2)
SERVICE CMNT-IMP: NORMAL
SODIUM SERPL-SCNC: 136 MMOL/L (ref 136–145)
WBC # BLD AUTO: 8 K/UL (ref 3.6–11)

## 2023-05-04 PROCEDURE — 80048 BASIC METABOLIC PNL TOTAL CA: CPT

## 2023-05-04 PROCEDURE — 82962 GLUCOSE BLOOD TEST: CPT

## 2023-05-04 PROCEDURE — 74011000250 HC RX REV CODE- 250: Performed by: FAMILY MEDICINE

## 2023-05-04 PROCEDURE — 74011250637 HC RX REV CODE- 250/637: Performed by: HOSPITALIST

## 2023-05-04 PROCEDURE — 74011250636 HC RX REV CODE- 250/636: Performed by: HOSPITALIST

## 2023-05-04 PROCEDURE — 85025 COMPLETE CBC W/AUTO DIFF WBC: CPT

## 2023-05-04 PROCEDURE — 74011636637 HC RX REV CODE- 636/637: Performed by: HOSPITALIST

## 2023-05-04 PROCEDURE — 94640 AIRWAY INHALATION TREATMENT: CPT

## 2023-05-04 PROCEDURE — 36415 COLL VENOUS BLD VENIPUNCTURE: CPT

## 2023-05-04 PROCEDURE — 74011000250 HC RX REV CODE- 250: Performed by: HOSPITALIST

## 2023-05-04 PROCEDURE — 9990 CHARGE CONVERSION

## 2023-05-04 PROCEDURE — 74011250637 HC RX REV CODE- 250/637: Performed by: INTERNAL MEDICINE

## 2023-05-04 PROCEDURE — 65660000001 HC RM ICU INTERMED STEPDOWN

## 2023-05-04 PROCEDURE — 74011250637 HC RX REV CODE- 250/637: Performed by: FAMILY MEDICINE

## 2023-05-04 RX ORDER — IPRATROPIUM BROMIDE AND ALBUTEROL SULFATE 2.5; .5 MG/3ML; MG/3ML
3 SOLUTION RESPIRATORY (INHALATION)
Status: DISCONTINUED | OUTPATIENT
Start: 2023-05-04 | End: 2023-05-06 | Stop reason: HOSPADM

## 2023-05-04 RX ORDER — VALSARTAN 40 MG/1
80 TABLET ORAL DAILY
Status: DISCONTINUED | OUTPATIENT
Start: 2023-05-04 | End: 2023-05-06 | Stop reason: HOSPADM

## 2023-05-04 RX ADMIN — ENOXAPARIN SODIUM 30 MG: 100 INJECTION SUBCUTANEOUS at 20:57

## 2023-05-04 RX ADMIN — FUROSEMIDE 40 MG: 40 TABLET ORAL at 08:25

## 2023-05-04 RX ADMIN — SACUBITRIL AND VALSARTAN 1 TABLET: 24; 26 TABLET, FILM COATED ORAL at 08:25

## 2023-05-04 RX ADMIN — IPRATROPIUM BROMIDE AND ALBUTEROL SULFATE 3 ML: .5; 3 SOLUTION RESPIRATORY (INHALATION) at 22:26

## 2023-05-04 RX ADMIN — SODIUM CHLORIDE, PRESERVATIVE FREE 10 ML: 5 INJECTION INTRAVENOUS at 07:02

## 2023-05-04 RX ADMIN — VALSARTAN 80 MG: 40 TABLET, FILM COATED ORAL at 10:51

## 2023-05-04 RX ADMIN — PREDNISONE 40 MG: 20 TABLET ORAL at 08:25

## 2023-05-04 RX ADMIN — ENOXAPARIN SODIUM 30 MG: 100 INJECTION SUBCUTANEOUS at 08:26

## 2023-05-04 RX ADMIN — SPIRONOLACTONE 12.5 MG: 25 TABLET ORAL at 08:25

## 2023-05-04 RX ADMIN — EMPAGLIFLOZIN 10 MG: 10 TABLET, FILM COATED ORAL at 08:25

## 2023-05-04 RX ADMIN — IPRATROPIUM BROMIDE AND ALBUTEROL SULFATE 3 ML: 2.5; .5 SOLUTION RESPIRATORY (INHALATION) at 02:23

## 2023-05-04 RX ADMIN — CARVEDILOL 12.5 MG: 12.5 TABLET, FILM COATED ORAL at 08:25

## 2023-05-04 RX ADMIN — SERTRALINE HYDROCHLORIDE 100 MG: 50 TABLET ORAL at 08:25

## 2023-05-04 RX ADMIN — BUDESONIDE 500 MCG: 0.5 INHALANT RESPIRATORY (INHALATION) at 07:55

## 2023-05-04 RX ADMIN — ARFORMOTEROL TARTRATE 15 MCG: 15 SOLUTION RESPIRATORY (INHALATION) at 07:55

## 2023-05-04 RX ADMIN — SODIUM CHLORIDE, PRESERVATIVE FREE 10 ML: 5 INJECTION INTRAVENOUS at 20:57

## 2023-05-04 RX ADMIN — CARVEDILOL 12.5 MG: 12.5 TABLET, FILM COATED ORAL at 18:00

## 2023-05-04 RX ADMIN — BUDESONIDE 500 MCG: 0.5 INHALANT RESPIRATORY (INHALATION) at 22:27

## 2023-05-04 RX ADMIN — ARFORMOTEROL TARTRATE 15 MCG: 15 SOLUTION RESPIRATORY (INHALATION) at 22:27

## 2023-05-04 RX ADMIN — IPRATROPIUM BROMIDE AND ALBUTEROL SULFATE 3 ML: 2.5; .5 SOLUTION RESPIRATORY (INHALATION) at 07:55

## 2023-05-05 VITALS
BODY MASS INDEX: 39.68 KG/M2 | WEIGHT: 293 LBS | DIASTOLIC BLOOD PRESSURE: 67 MMHG | SYSTOLIC BLOOD PRESSURE: 168 MMHG | HEIGHT: 72 IN | RESPIRATION RATE: 20 BRPM | HEART RATE: 70 BPM | TEMPERATURE: 98.1 F | OXYGEN SATURATION: 93 %

## 2023-05-05 LAB
ANION GAP SERPL CALC-SCNC: 1 MMOL/L (ref 5–15)
BUN SERPL-MCNC: 38 MG/DL (ref 6–20)
BUN/CREAT SERPL: 35 (ref 12–20)
CALCIUM SERPL-MCNC: 10 MG/DL (ref 8.5–10.1)
CHLORIDE SERPL-SCNC: 102 MMOL/L (ref 97–108)
CO2 SERPL-SCNC: 33 MMOL/L (ref 21–32)
CREAT SERPL-MCNC: 1.08 MG/DL (ref 0.55–1.02)
EST. AVERAGE GLUCOSE BLD GHB EST-MCNC: 105 MG/DL
GLUCOSE SERPL-MCNC: 87 MG/DL (ref 65–100)
HBA1C MFR BLD: 5.3 % (ref 4–5.6)
POTASSIUM SERPL-SCNC: 3.9 MMOL/L (ref 3.5–5.1)
SODIUM SERPL-SCNC: 136 MMOL/L (ref 136–145)

## 2023-05-05 PROCEDURE — 83036 HEMOGLOBIN GLYCOSYLATED A1C: CPT

## 2023-05-05 PROCEDURE — 74011250637 HC RX REV CODE- 250/637: Performed by: HOSPITALIST

## 2023-05-05 PROCEDURE — 94640 AIRWAY INHALATION TREATMENT: CPT

## 2023-05-05 PROCEDURE — 74011250637 HC RX REV CODE- 250/637: Performed by: INTERNAL MEDICINE

## 2023-05-05 PROCEDURE — 74011250637 HC RX REV CODE- 250/637: Performed by: FAMILY MEDICINE

## 2023-05-05 PROCEDURE — 74011000250 HC RX REV CODE- 250: Performed by: HOSPITALIST

## 2023-05-05 PROCEDURE — 74011250636 HC RX REV CODE- 250/636: Performed by: HOSPITALIST

## 2023-05-05 PROCEDURE — 36415 COLL VENOUS BLD VENIPUNCTURE: CPT

## 2023-05-05 PROCEDURE — 9990 CHARGE CONVERSION

## 2023-05-05 PROCEDURE — 65660000001 HC RM ICU INTERMED STEPDOWN

## 2023-05-05 PROCEDURE — 80048 BASIC METABOLIC PNL TOTAL CA: CPT

## 2023-05-05 PROCEDURE — 77010033678 HC OXYGEN DAILY

## 2023-05-05 PROCEDURE — 74011636637 HC RX REV CODE- 636/637: Performed by: HOSPITALIST

## 2023-05-05 PROCEDURE — 74011250636 HC RX REV CODE- 250/636: Performed by: INTERNAL MEDICINE

## 2023-05-05 PROCEDURE — 74011000250 HC RX REV CODE- 250: Performed by: FAMILY MEDICINE

## 2023-05-05 RX ORDER — ARFORMOTEROL TARTRATE 15 UG/2ML
15 SOLUTION RESPIRATORY (INHALATION) 2 TIMES DAILY
Status: DISCONTINUED | OUTPATIENT
Start: 2023-05-06 | End: 2023-05-05

## 2023-05-05 RX ORDER — BUDESONIDE 0.5 MG/2ML
0.5 INHALANT ORAL 2 TIMES DAILY
Status: DISCONTINUED | OUTPATIENT
Start: 2023-05-06 | End: 2023-05-06 | Stop reason: HOSPADM

## 2023-05-05 RX ORDER — SODIUM CHLORIDE 0.9 % (FLUSH) 0.9 %
5-40 SYRINGE (ML) INJECTION PRN
Status: DISCONTINUED | OUTPATIENT
Start: 2023-05-06 | End: 2023-05-06 | Stop reason: HOSPADM

## 2023-05-05 RX ORDER — IPRATROPIUM BROMIDE AND ALBUTEROL SULFATE 2.5; .5 MG/3ML; MG/3ML
1 SOLUTION RESPIRATORY (INHALATION) EVERY 6 HOURS PRN
Status: DISCONTINUED | OUTPATIENT
Start: 2023-05-06 | End: 2023-05-06 | Stop reason: HOSPADM

## 2023-05-05 RX ORDER — ENOXAPARIN SODIUM 100 MG/ML
30 INJECTION SUBCUTANEOUS EVERY 12 HOURS
Status: DISCONTINUED | OUTPATIENT
Start: 2023-05-06 | End: 2023-05-06 | Stop reason: HOSPADM

## 2023-05-05 RX ORDER — IPRATROPIUM BROMIDE AND ALBUTEROL SULFATE 2.5; .5 MG/3ML; MG/3ML
1 SOLUTION RESPIRATORY (INHALATION) 2 TIMES DAILY
Status: DISCONTINUED | OUTPATIENT
Start: 2023-05-06 | End: 2023-05-06 | Stop reason: HOSPADM

## 2023-05-05 RX ORDER — CARVEDILOL 12.5 MG/1
12.5 TABLET ORAL 2 TIMES DAILY WITH MEALS
Status: DISCONTINUED | OUTPATIENT
Start: 2023-05-06 | End: 2023-05-06 | Stop reason: HOSPADM

## 2023-05-05 RX ORDER — FUROSEMIDE 40 MG/1
40 TABLET ORAL DAILY
Status: DISCONTINUED | OUTPATIENT
Start: 2023-05-06 | End: 2023-05-06 | Stop reason: HOSPADM

## 2023-05-05 RX ORDER — ONDANSETRON 4 MG/1
4 TABLET, ORALLY DISINTEGRATING ORAL EVERY 8 HOURS PRN
Status: DISCONTINUED | OUTPATIENT
Start: 2023-05-05 | End: 2023-05-06 | Stop reason: HOSPADM

## 2023-05-05 RX ORDER — SODIUM CHLORIDE 9 MG/ML
INJECTION, SOLUTION INTRAVENOUS PRN
Status: DISCONTINUED | OUTPATIENT
Start: 2023-05-06 | End: 2023-05-06 | Stop reason: HOSPADM

## 2023-05-05 RX ORDER — SPIRONOLACTONE 25 MG/1
12.5 TABLET ORAL DAILY
Status: DISCONTINUED | OUTPATIENT
Start: 2023-05-06 | End: 2023-05-06 | Stop reason: HOSPADM

## 2023-05-05 RX ORDER — ACETAMINOPHEN 325 MG/1
650 TABLET ORAL EVERY 6 HOURS PRN
Status: DISCONTINUED | OUTPATIENT
Start: 2023-05-06 | End: 2023-05-06 | Stop reason: HOSPADM

## 2023-05-05 RX ORDER — FUROSEMIDE 10 MG/ML
20 INJECTION INTRAMUSCULAR; INTRAVENOUS ONCE
Status: COMPLETED | OUTPATIENT
Start: 2023-05-05 | End: 2023-05-05

## 2023-05-05 RX ORDER — FUROSEMIDE 40 MG/1
40 TABLET ORAL ONCE
Status: DISCONTINUED | OUTPATIENT
Start: 2023-05-05 | End: 2023-05-05

## 2023-05-05 RX ORDER — ARFORMOTEROL TARTRATE 15 UG/2ML
15 SOLUTION RESPIRATORY (INHALATION) 2 TIMES DAILY
Status: DISCONTINUED | OUTPATIENT
Start: 2023-05-06 | End: 2023-05-06 | Stop reason: HOSPADM

## 2023-05-05 RX ORDER — POLYETHYLENE GLYCOL 3350 17 G/17G
17 POWDER, FOR SOLUTION ORAL DAILY PRN
Status: DISCONTINUED | OUTPATIENT
Start: 2023-05-06 | End: 2023-05-06 | Stop reason: HOSPADM

## 2023-05-05 RX ORDER — PREDNISONE 20 MG/1
40 TABLET ORAL
Status: DISCONTINUED | OUTPATIENT
Start: 2023-05-06 | End: 2023-05-06 | Stop reason: HOSPADM

## 2023-05-05 RX ORDER — SODIUM CHLORIDE 0.9 % (FLUSH) 0.9 %
5-40 SYRINGE (ML) INJECTION EVERY 8 HOURS
Status: DISCONTINUED | OUTPATIENT
Start: 2023-05-06 | End: 2023-05-06 | Stop reason: HOSPADM

## 2023-05-05 RX ORDER — ACETAMINOPHEN 650 MG/1
650 SUPPOSITORY RECTAL EVERY 6 HOURS PRN
Status: DISCONTINUED | OUTPATIENT
Start: 2023-05-06 | End: 2023-05-06 | Stop reason: HOSPADM

## 2023-05-05 RX ORDER — ONDANSETRON 2 MG/ML
4 INJECTION INTRAMUSCULAR; INTRAVENOUS EVERY 6 HOURS PRN
Status: DISCONTINUED | OUTPATIENT
Start: 2023-05-05 | End: 2023-05-06 | Stop reason: HOSPADM

## 2023-05-05 RX ORDER — VALSARTAN 40 MG/1
80 TABLET ORAL DAILY
Status: DISCONTINUED | OUTPATIENT
Start: 2023-05-06 | End: 2023-05-06 | Stop reason: HOSPADM

## 2023-05-05 RX ADMIN — SODIUM CHLORIDE, PRESERVATIVE FREE 10 ML: 5 INJECTION INTRAVENOUS at 14:01

## 2023-05-05 RX ADMIN — FUROSEMIDE 20 MG: 10 INJECTION, SOLUTION INTRAMUSCULAR; INTRAVENOUS at 14:01

## 2023-05-05 RX ADMIN — BUDESONIDE 500 MCG: 0.5 INHALANT RESPIRATORY (INHALATION) at 08:13

## 2023-05-05 RX ADMIN — SODIUM CHLORIDE, PRESERVATIVE FREE 10 ML: 5 INJECTION INTRAVENOUS at 22:00

## 2023-05-05 RX ADMIN — ENOXAPARIN SODIUM 30 MG: 100 INJECTION SUBCUTANEOUS at 21:39

## 2023-05-05 RX ADMIN — PREDNISONE 40 MG: 20 TABLET ORAL at 08:32

## 2023-05-05 RX ADMIN — BUDESONIDE 500 MCG: 0.5 INHALANT RESPIRATORY (INHALATION) at 20:14

## 2023-05-05 RX ADMIN — FUROSEMIDE 40 MG: 40 TABLET ORAL at 08:33

## 2023-05-05 RX ADMIN — ARFORMOTEROL TARTRATE 15 MCG: 15 SOLUTION RESPIRATORY (INHALATION) at 20:13

## 2023-05-05 RX ADMIN — SODIUM CHLORIDE, PRESERVATIVE FREE 10 ML: 5 INJECTION INTRAVENOUS at 06:18

## 2023-05-05 RX ADMIN — IPRATROPIUM BROMIDE AND ALBUTEROL SULFATE 3 ML: .5; 3 SOLUTION RESPIRATORY (INHALATION) at 20:13

## 2023-05-05 RX ADMIN — ENOXAPARIN SODIUM 30 MG: 100 INJECTION SUBCUTANEOUS at 08:32

## 2023-05-05 RX ADMIN — CARVEDILOL 12.5 MG: 12.5 TABLET, FILM COATED ORAL at 08:32

## 2023-05-05 RX ADMIN — EMPAGLIFLOZIN 10 MG: 10 TABLET, FILM COATED ORAL at 08:33

## 2023-05-05 RX ADMIN — VALSARTAN 80 MG: 40 TABLET, FILM COATED ORAL at 09:44

## 2023-05-05 RX ADMIN — SPIRONOLACTONE 12.5 MG: 25 TABLET ORAL at 08:32

## 2023-05-05 RX ADMIN — ARFORMOTEROL TARTRATE 15 MCG: 15 SOLUTION RESPIRATORY (INHALATION) at 08:13

## 2023-05-05 RX ADMIN — CARVEDILOL 12.5 MG: 12.5 TABLET, FILM COATED ORAL at 16:58

## 2023-05-05 RX ADMIN — IPRATROPIUM BROMIDE AND ALBUTEROL SULFATE 3 ML: .5; 3 SOLUTION RESPIRATORY (INHALATION) at 08:12

## 2023-05-05 RX ADMIN — SERTRALINE HYDROCHLORIDE 100 MG: 50 TABLET ORAL at 08:33

## 2023-05-06 VITALS
RESPIRATION RATE: 22 BRPM | WEIGHT: 293 LBS | HEART RATE: 77 BPM | OXYGEN SATURATION: 92 % | BODY MASS INDEX: 39.89 KG/M2

## 2023-05-06 LAB
ANION GAP SERPL CALC-SCNC: 4 MMOL/L (ref 5–15)
BUN SERPL-MCNC: 41 MG/DL (ref 6–20)
BUN/CREAT SERPL: 35 (ref 12–20)
CALCIUM SERPL-MCNC: 10 MG/DL (ref 8.5–10.1)
CHLORIDE SERPL-SCNC: 101 MMOL/L (ref 97–108)
CO2 SERPL-SCNC: 31 MMOL/L (ref 21–32)
CREAT SERPL-MCNC: 1.18 MG/DL (ref 0.55–1.02)
ERYTHROCYTE [DISTWIDTH] IN BLOOD BY AUTOMATED COUNT: 12.7 % (ref 11.5–14.5)
GLUCOSE SERPL-MCNC: 83 MG/DL (ref 65–100)
HCT VFR BLD AUTO: 43.5 % (ref 35–47)
HGB BLD-MCNC: 13.5 G/DL (ref 11.5–16)
MCH RBC QN AUTO: 28.1 PG (ref 26–34)
MCHC RBC AUTO-ENTMCNC: 31 G/DL (ref 30–36.5)
MCV RBC AUTO: 90.6 FL (ref 80–99)
NRBC # BLD: 0 K/UL (ref 0–0.01)
NRBC BLD-RTO: 0 PER 100 WBC
NT PRO BNP: 799 PG/ML
PLATELET # BLD AUTO: 284 K/UL (ref 150–400)
PMV BLD AUTO: 10 FL (ref 8.9–12.9)
POTASSIUM SERPL-SCNC: 4.1 MMOL/L (ref 3.5–5.1)
RBC # BLD AUTO: 4.8 M/UL (ref 3.8–5.2)
SODIUM SERPL-SCNC: 136 MMOL/L (ref 136–145)
WBC # BLD AUTO: 8.2 K/UL (ref 3.6–11)

## 2023-05-06 PROCEDURE — 94640 AIRWAY INHALATION TREATMENT: CPT

## 2023-05-06 PROCEDURE — 36415 COLL VENOUS BLD VENIPUNCTURE: CPT

## 2023-05-06 PROCEDURE — 6360000002 HC RX W HCPCS: Performed by: HOSPITALIST

## 2023-05-06 PROCEDURE — 80048 BASIC METABOLIC PNL TOTAL CA: CPT

## 2023-05-06 PROCEDURE — 6370000000 HC RX 637 (ALT 250 FOR IP): Performed by: HOSPITALIST

## 2023-05-06 PROCEDURE — 85027 COMPLETE CBC AUTOMATED: CPT

## 2023-05-06 PROCEDURE — 2580000003 HC RX 258: Performed by: HOSPITALIST

## 2023-05-06 PROCEDURE — 83880 ASSAY OF NATRIURETIC PEPTIDE: CPT

## 2023-05-06 RX ORDER — CARVEDILOL 12.5 MG/1
12.5 TABLET ORAL 2 TIMES DAILY WITH MEALS
Qty: 60 TABLET | Refills: 3 | Status: SHIPPED | OUTPATIENT
Start: 2023-05-06

## 2023-05-06 RX ORDER — CHLORTHALIDONE 25 MG/1
12.5 TABLET ORAL DAILY
Qty: 15 TABLET | Refills: 5 | Status: SHIPPED | OUTPATIENT
Start: 2023-05-06

## 2023-05-06 RX ORDER — OLMESARTAN MEDOXOMIL 40 MG/1
40 TABLET ORAL DAILY
Qty: 30 TABLET | Refills: 3 | Status: SHIPPED | OUTPATIENT
Start: 2023-05-06 | End: 2023-05-12

## 2023-05-06 RX ORDER — FERROUS SULFATE 325(65) MG
325 TABLET ORAL EVERY OTHER DAY
Qty: 30 TABLET | Refills: 3 | Status: SHIPPED | OUTPATIENT
Start: 2023-05-06

## 2023-05-06 RX ORDER — FOLIC ACID 1 MG/1
1 TABLET ORAL
COMMUNITY
Start: 2022-08-15

## 2023-05-06 RX ORDER — FERROUS SULFATE 325(65) MG
325 TABLET ORAL EVERY OTHER DAY
Status: ON HOLD | COMMUNITY
End: 2023-05-06 | Stop reason: SDUPTHER

## 2023-05-06 RX ORDER — FUROSEMIDE 40 MG/1
40 TABLET ORAL DAILY
Qty: 30 TABLET | Refills: 3 | Status: SHIPPED | OUTPATIENT
Start: 2023-05-06

## 2023-05-06 RX ORDER — PREDNISONE 10 MG/1
TABLET ORAL
Qty: 20 TABLET | Refills: 0 | Status: SHIPPED | OUTPATIENT
Start: 2023-05-07 | End: 2023-05-15

## 2023-05-06 RX ADMIN — EMPAGLIFLOZIN 10 MG: 10 TABLET, FILM COATED ORAL at 08:31

## 2023-05-06 RX ADMIN — CARVEDILOL 12.5 MG: 12.5 TABLET, FILM COATED ORAL at 08:31

## 2023-05-06 RX ADMIN — ENOXAPARIN SODIUM 30 MG: 100 INJECTION SUBCUTANEOUS at 08:34

## 2023-05-06 RX ADMIN — VALSARTAN 80 MG: 40 TABLET, FILM COATED ORAL at 10:44

## 2023-05-06 RX ADMIN — SODIUM CHLORIDE, PRESERVATIVE FREE 10 ML: 5 INJECTION INTRAVENOUS at 10:44

## 2023-05-06 RX ADMIN — FUROSEMIDE 40 MG: 40 TABLET ORAL at 08:32

## 2023-05-06 RX ADMIN — PREDNISONE 40 MG: 20 TABLET ORAL at 08:31

## 2023-05-06 RX ADMIN — ARFORMOTEROL TARTRATE 15 MCG: 15 SOLUTION RESPIRATORY (INHALATION) at 09:17

## 2023-05-06 RX ADMIN — SPIRONOLACTONE 12.5 MG: 25 TABLET ORAL at 08:32

## 2023-05-06 RX ADMIN — SERTRALINE HYDROCHLORIDE 100 MG: 50 TABLET ORAL at 08:31

## 2023-05-06 RX ADMIN — IPRATROPIUM BROMIDE AND ALBUTEROL SULFATE 1 AMPULE: .5; 3 SOLUTION RESPIRATORY (INHALATION) at 09:17

## 2023-05-06 RX ADMIN — BUDESONIDE 500 MCG: 0.5 INHALANT RESPIRATORY (INHALATION) at 09:17

## 2023-05-06 NOTE — DISCHARGE SUMMARY
General : alert x 3, awake, no acute distress,   HEENT: PEERL, EOMI, moist mucus membrane, TM clear  Neck: supple, no JVD, no meningeal signs  Chest: decreased breath sounds with faint crackles in bases   CVS: S1 S2 heard, Capillary refill less than 2 seconds  Abd: soft/ Non tender, non distended, BS physiological,   Ext: no clubbing, no cyanosis, 1+ bilat LE edema,   Neuro/Psych: pleasant mood and affect, CN 2-12 grossly intact, sensory grossly within normal limit, Strength 5/5 in all extremities,   Skin: warm        Data Review:      Review and/or order of clinical lab test      I have independently reviewed and interpreted patient's lab and all other diagnostic data      Notes reviewed from all clinical/nonclinical/nursing services involved in patient's clinical care. Care coordination discussions were held with appropriate clinical/nonclinical/ nursing providers  based on care coordination needs. Labs:               Recent Labs            05/04/23 0333 05/03/23 0417     WBC  8.0  7.6     HGB  13.5  13.4     HCT  43.2  42.6         PLT  294  268                   Recent Labs             05/05/23   0434  05/04/23   0333  05/03/23 0417     NA  136  136  135*     K  3.9  3.8  3.3*     CL  102  102  99     CO2  33*  30  34*     BUN  38*  38*  30*     CREA  1.08*  1.22*  1.15*     GLU  87  105*  106*          CA  10.0  9.9  10.1           No results for input(s): ALT, AP, TBIL, TBILI, TP, ALB, GLOB, GGT, AML, LPSE in the last 72 hours. No lab exists for component: SGOT, GPT, AMYP, HLPSE      No results for input(s): INR, PTP, APTT, INREXT, INREXT in the last 72 hours. No results for input(s): FE, TIBC, PSAT, FERR in the last 72 hours. Lab Results         Component  Value  Date/Time            Folate  71.2 (H)  01/06/2023 09:50 AM            No results for input(s): PH, PCO2, PO2 in the last 72 hours. No results for input(s): CPK, CKNDX, TROIQ in the last 72 hours.       No lab

## 2023-05-06 NOTE — PROGRESS NOTES
1545  Pt given discharge paperwork. Reviewed med updates, BEFAST and After Visit Report. Discussed follow-up visits and informed pt of where and how to get meds. Removed lines/leads, packed pt belongings. Pt will call out when ride arrives. 8435  Pt awake and alert watching [employer]. Weaned O2 to RA, pt tolerating well, sats remain 94%. 0730  Verbal bedside report received from Trisha Jean Baptiste RN off-going nurse by Raghav Banegas. LEYLA Madden oncoming nurse. Report included current pt status and condition, recent results, hx of present illness, heart rate and rhythm (SB/NSR), and respiratory status. Greeted pt and enquired about present needs and goals for the day.
Pulse oximetry assessment   95% at rest on room air (if 88% or less, skip next steps)  92% while ambulating on room air
To Whom it may concern: This note is to serve as both a work excuse note and a return to work note for patient Monica hayes 23. Please excuse her from work from 5/3/23 through 23 due to an acute medical illness. She was admitted to [facility] on 23  and discharged home on 23. She will need additional time to recover and will be medically cleared to return to work on Monday 5/15/23. She would benefit from having a less strenuous work schedule when she returns and she may have some periods of fatigue and/ or shortness of breath due to her medical condition.     Ambrosioy [employer] BYRD  Adult [facility]
no acute distress. Ears/Nose/Mouth/Throat:    Normal MM; anicteric. JVP:  WNL     Resp:    Distant        Cardiovascular:   RRR, S1, S2 normal, no new murmur. No gallop or rub. Abdomen:    Soft, non-tender, bowel sounds are present. Extremities:  No edema bilaterally. Skin:   Neuro:  Warm and dry.    A/O x3, grossly nonfocal                            Data Review:      Telemetry independently reviewed :    NSR       ECG independently reviewed: NSR   Labs:      Recent Results (from the past 24 hour(s))     METABOLIC PANEL, BASIC          Collection Time: 05/05/23  4:34 AM         Result  Value  Ref Range            Sodium  136  136 - 145 mmol/L       Potassium  3.9  3.5 - 5.1 mmol/L            Chloride  102  97 - 108 mmol/L            CO2  33 (H)  21 - 32 mmol/L       Anion gap  1 (L)  5 - 15 mmol/L       Glucose  87  65 - 100 mg/dL       BUN  38 (H)  6 - 20 MG/DL       Creatinine  1.08 (H)  0.55 - 1.02 MG/DL       BUN/Creatinine ratio  35 (H)  12 - 20         eGFR  58 (L)  >60 ml/min/1.73m2       Calcium  10.0  8.5 - 10.1 MG/DL       HEMOGLOBIN A1C WITH EAG          Collection Time: 05/05/23  4:34 AM         Result  Value  Ref Range            Hemoglobin A1c  5.3  4.0 - 5.6 %            Est. average glucose  105  mg/dL         Current medications reviewed          Virgen Elena MD

## 2023-05-06 NOTE — DISCHARGE INSTRUCTIONS
You were admitted with shortness of breath due to right heart failure-/ pulmonary hypertension-   Recommendations on discharge- monitor oxygen levels at home  Monitor blood pressure and weight daily  Avoid excessive salt and water intake which may contribute to worsening shortness of breath  Take cardiac medications as directed  Follow up with cardiology as instructed  Recommend slowly increasing your activity levels over the next week but avoid exertional activities for 2 weeks  Work note given- return to work Monday 5/15/23  Recommend follow up with pulmonary associates- for pulmonary function testing and for sleep study results or to get a new sleep study scheduled to be evaluated for sleep apnea

## 2023-05-11 ENCOUNTER — TELEPHONE (OUTPATIENT)
Age: 63
End: 2023-05-11

## 2023-05-11 NOTE — TELEPHONE ENCOUNTER
Called back Jorge and informed her we were able to schedule pt to be seen for [place] tomorrow 5/12/23. She was glad to hear this as pt's discharge note from the hospital said they wanted her to go back to work on Monday so Jorge wanted to have pt be seen before this.

## 2023-05-11 NOTE — TELEPHONE ENCOUNTER
----- Message from Jaelyn Messer sent at 5/11/2023 11:16 AM EDT -----  Subject: Message to Provider    QUESTIONS  Information for Provider? Myrtle Castillo the pts RN Manager at [employer]   just wanted to make sure that the pt was admitted to [facility]   on 5/1/2023 for congestive heart failure pt was discharged on 5/6/2023 and   followed up with her cardiologists on 5/9/2023. Myrtle Castillo is just trying to   work on the pts transition of care and would like for the office to   contact her at [phone] at ext [phone]. Myrtle Castillo would like for the office to   call the pt to schedule a hospital if possible as instructed on her   discharged paper. ---------------------------------------------------------------------------  --------------  Anthony Mares INFO  [phone]; OK to leave message on voicemail  ---------------------------------------------------------------------------  --------------  SCRIPT ANSWERS  Relationship to Patient? Covered Entity  Covered Entity Type? Health Insurance? Representative Name?  Myrtle Castillo

## 2023-05-12 ENCOUNTER — OFFICE VISIT (OUTPATIENT)
Age: 63
End: 2023-05-12

## 2023-05-12 ENCOUNTER — PATIENT MESSAGE (OUTPATIENT)
Age: 63
End: 2023-05-12

## 2023-05-12 VITALS
TEMPERATURE: 97.6 F | SYSTOLIC BLOOD PRESSURE: 96 MMHG | RESPIRATION RATE: 16 BRPM | OXYGEN SATURATION: 99 % | DIASTOLIC BLOOD PRESSURE: 58 MMHG | HEART RATE: 62 BPM | BODY MASS INDEX: 39.04 KG/M2 | HEIGHT: 72 IN | WEIGHT: 288.2 LBS

## 2023-05-12 DIAGNOSIS — M25.511 ACUTE PAIN OF RIGHT SHOULDER: ICD-10-CM

## 2023-05-12 DIAGNOSIS — R87.610 ASCUS WITH POSITIVE HIGH RISK HPV CERVICAL: ICD-10-CM

## 2023-05-12 DIAGNOSIS — I50.23 CHF (CONGESTIVE HEART FAILURE), NYHA CLASS IV, ACUTE ON CHRONIC, SYSTOLIC (HCC): ICD-10-CM

## 2023-05-12 DIAGNOSIS — Z09 HOSPITAL DISCHARGE FOLLOW-UP: Primary | ICD-10-CM

## 2023-05-12 DIAGNOSIS — J44.1 CHRONIC OBSTRUCTIVE PULMONARY DISEASE WITH ACUTE EXACERBATION (HCC): ICD-10-CM

## 2023-05-12 DIAGNOSIS — N17.9 AKI (ACUTE KIDNEY INJURY) (HCC): ICD-10-CM

## 2023-05-12 DIAGNOSIS — E78.5 DYSLIPIDEMIA: ICD-10-CM

## 2023-05-12 DIAGNOSIS — R87.810 ASCUS WITH POSITIVE HIGH RISK HPV CERVICAL: ICD-10-CM

## 2023-05-12 RX ORDER — SACUBITRIL AND VALSARTAN 24; 26 MG/1; MG/1
1 TABLET, FILM COATED ORAL 2 TIMES DAILY
COMMUNITY
Start: 2023-05-10

## 2023-05-12 SDOH — ECONOMIC STABILITY: FOOD INSECURITY: WITHIN THE PAST 12 MONTHS, THE FOOD YOU BOUGHT JUST DIDN'T LAST AND YOU DIDN'T HAVE MONEY TO GET MORE.: PATIENT DECLINED

## 2023-05-12 SDOH — ECONOMIC STABILITY: TRANSPORTATION INSECURITY
IN THE PAST 12 MONTHS, HAS LACK OF TRANSPORTATION KEPT YOU FROM MEETINGS, WORK, OR FROM GETTING THINGS NEEDED FOR DAILY LIVING?: PATIENT DECLINED

## 2023-05-12 SDOH — ECONOMIC STABILITY: INCOME INSECURITY: HOW HARD IS IT FOR YOU TO PAY FOR THE VERY BASICS LIKE FOOD, HOUSING, MEDICAL CARE, AND HEATING?: PATIENT DECLINED

## 2023-05-12 SDOH — ECONOMIC STABILITY: HOUSING INSECURITY
IN THE LAST 12 MONTHS, WAS THERE A TIME WHEN YOU DID NOT HAVE A STEADY PLACE TO SLEEP OR SLEPT IN A SHELTER (INCLUDING NOW)?: PATIENT REFUSED

## 2023-05-12 SDOH — ECONOMIC STABILITY: FOOD INSECURITY: WITHIN THE PAST 12 MONTHS, YOU WORRIED THAT YOUR FOOD WOULD RUN OUT BEFORE YOU GOT MONEY TO BUY MORE.: PATIENT DECLINED

## 2023-05-12 NOTE — PROGRESS NOTES
[facility] Note     Post-Discharge Transitional Care  Follow Up      Pilar Arias   YOB: 1960    Date of Office Visit:  5/12/2023  Date of Hospital Admission: 5/1/23  Date of Hospital Discharge: 5/6/23  Risk of hospital readmission (high >=14%. Medium >=10%) :Readmission Risk Score: 9.9      Care management risk score Rising risk (score 2-5) and Complex Care (Scores >=6): No Risk Score On File     Non face to face  following discharge, date last encounter closed (first attempt may have been earlier): 05/08/2023    Call initiated 2 business days of discharge: Yes    ASSESSMENT/PLAN:   Hospital discharge follow-up  -     [place] DISCHARGE MEDS RECONCILED W/ CURRENT OUTPATIENT MED LIST    CHF (congestive heart failure), NYHA class IV, acute on chronic, systolic (HCC)  -     Comprehensive Metabolic Panel; Future  -     Brain Natriuretic Peptide; Future  -Restarted Entresto 24-26 mg twice a day. Empagliflozin discontinued by cardiology  -Weight trending down. Peripheral edema resolved  -Continue carvedilol 12.5 mg twice daily and chlorthalidone  -Request last visit note from cardiology Dr. Indiana Huertas    Chronic obstructive pulmonary disease with acute exacerbation ([facility] [address]  -Continue prednisone taper as noted on discharge instructions  -Albuterol inhaler as needed  -Patient to contact insurance provider to inquire to nebulizer device coverage and DME supplier    Dyslipidemia  -     Lipid Panel; Future  - ?? Statin    CAIO (acute kidney injury) ([facility] [address]  -Follow-up creatinine     Lab Results   Component Value Date    CREATININE 1.18 (H) 05/06/2023    CREATININE 1.08 (H) 05/05/2023    CREATININE 1.22 (H) 05/04/2023       ASCUS with positive high risk HPV cervical  -     [facility] - Jarrell Mcgill MD, Ob-Gyn, [facility]), overdue for cervical cancer screening    Acute pain of right shoulder  -     diclofenac sodium (VOLTAREN) 1 % GEL;  Apply 2 g topically 4 times daily  - suspect likely

## 2023-05-12 NOTE — PROGRESS NOTES
Chief Complaint   Patient presents with    Follow-Up from JOE DENNIS     5/1/23-5/6/23 at [facility] for congestive heart failure         1. \"Have you been to the ER, urgent care clinic since your last visit? Hospitalized since your last visit? \" Yes above    2. \"Have you seen or consulted any other health care providers outside of the [address] [place] [address] since your last visit? \" No     3. For patients over 39: Has the patient had a colorectal cancer screening? Yes     If the patient is female:    4. For patients over 40: Has the patient had a mammogram? Yes    5. For patients over 21: Has the patient had a pap smear?  Yes overdue    No data recorded    Health Maintenance Due   Topic Date Due    HIV screen  Never done    Shingles vaccine (1 of 2) Never done    COVID-19 Vaccine (3 - Booster for Pfizer series) 05/18/2021    Cervical cancer screen  04/17/2023

## 2023-05-17 ENCOUNTER — NURSE ONLY (OUTPATIENT)
Age: 63
End: 2023-05-17

## 2023-05-17 DIAGNOSIS — I50.23 CHF (CONGESTIVE HEART FAILURE), NYHA CLASS IV, ACUTE ON CHRONIC, SYSTOLIC (HCC): ICD-10-CM

## 2023-05-17 DIAGNOSIS — N18.32 STAGE 3B CHRONIC KIDNEY DISEASE (HCC): Primary | ICD-10-CM

## 2023-05-17 DIAGNOSIS — E78.5 DYSLIPIDEMIA: ICD-10-CM

## 2023-05-17 LAB
ALBUMIN SERPL-MCNC: 3.9 G/DL (ref 3.5–5)
ALBUMIN/GLOB SERPL: 1.1 (ref 1.1–2.2)
ALP SERPL-CCNC: 89 U/L (ref 45–117)
ALT SERPL-CCNC: 38 U/L (ref 12–78)
ANION GAP SERPL CALC-SCNC: 1 MMOL/L (ref 5–15)
AST SERPL-CCNC: 15 U/L (ref 15–37)
BILIRUB SERPL-MCNC: 0.6 MG/DL (ref 0.2–1)
BUN SERPL-MCNC: 70 MG/DL (ref 6–20)
BUN/CREAT SERPL: 40 (ref 12–20)
CALCIUM SERPL-MCNC: 10.3 MG/DL (ref 8.5–10.1)
CHLORIDE SERPL-SCNC: 103 MMOL/L (ref 97–108)
CHOLEST SERPL-MCNC: 206 MG/DL
CO2 SERPL-SCNC: 31 MMOL/L (ref 21–32)
CREAT SERPL-MCNC: 1.73 MG/DL (ref 0.55–1.02)
GLOBULIN SER CALC-MCNC: 3.4 G/DL (ref 2–4)
GLUCOSE SERPL-MCNC: 88 MG/DL (ref 65–100)
HDLC SERPL-MCNC: 37 MG/DL
HDLC SERPL: 5.6 (ref 0–5)
LDLC SERPL CALC-MCNC: 139.4 MG/DL (ref 0–100)
NT PRO BNP: 265 PG/ML
POTASSIUM SERPL-SCNC: 5.1 MMOL/L (ref 3.5–5.1)
PROT SERPL-MCNC: 7.3 G/DL (ref 6.4–8.2)
SODIUM SERPL-SCNC: 135 MMOL/L (ref 136–145)
TRIGL SERPL-MCNC: 148 MG/DL
VLDLC SERPL CALC-MCNC: 29.6 MG/DL

## 2023-05-25 ENCOUNTER — NURSE ONLY (OUTPATIENT)
Age: 63
End: 2023-05-25

## 2023-05-25 DIAGNOSIS — N18.32 STAGE 3B CHRONIC KIDNEY DISEASE (HCC): ICD-10-CM

## 2023-05-26 LAB
ANION GAP SERPL CALC-SCNC: 7 MMOL/L (ref 5–15)
BUN SERPL-MCNC: 42 MG/DL (ref 6–20)
BUN/CREAT SERPL: 34 (ref 12–20)
CALCIUM SERPL-MCNC: 10.3 MG/DL (ref 8.5–10.1)
CHLORIDE SERPL-SCNC: 105 MMOL/L (ref 97–108)
CO2 SERPL-SCNC: 26 MMOL/L (ref 21–32)
CREAT SERPL-MCNC: 1.23 MG/DL (ref 0.55–1.02)
GLUCOSE SERPL-MCNC: 104 MG/DL (ref 65–100)
POTASSIUM SERPL-SCNC: 4.3 MMOL/L (ref 3.5–5.1)
SODIUM SERPL-SCNC: 138 MMOL/L (ref 136–145)

## 2023-06-02 ENCOUNTER — PATIENT MESSAGE (OUTPATIENT)
Age: 63
End: 2023-06-02

## 2023-06-07 ENCOUNTER — PATIENT MESSAGE (OUTPATIENT)
Age: 63
End: 2023-06-07

## 2023-06-07 DIAGNOSIS — J44.9 CHRONIC OBSTRUCTIVE PULMONARY DISEASE, UNSPECIFIED COPD TYPE (HCC): Primary | ICD-10-CM

## 2023-06-08 RX ORDER — ALBUTEROL SULFATE 1.25 MG/3ML
1.25 SOLUTION RESPIRATORY (INHALATION) EVERY 6 HOURS PRN
Qty: 360 ML | Refills: 1 | Status: SHIPPED | OUTPATIENT
Start: 2023-06-08

## 2023-06-16 ENCOUNTER — PATIENT MESSAGE (OUTPATIENT)
Age: 63
End: 2023-06-16

## 2023-06-20 RX ORDER — ALBUTEROL SULFATE 90 UG/1
2 AEROSOL, METERED RESPIRATORY (INHALATION) EVERY 6 HOURS PRN
Qty: 18 G | Refills: 3 | Status: SHIPPED | OUTPATIENT
Start: 2023-06-20

## 2023-06-20 NOTE — TELEPHONE ENCOUNTER
PCP: CHARO Sanchez NP    Last appt: 5/12/2023   Future Appointments   Date Time Provider Katrein Lopez   7/6/2023  9:00 AM CHARO Church NP BS AMB   11/13/2023  8:30 AM CHARO Church NP BS AMB       Requested Prescriptions     Pending Prescriptions Disp Refills    albuterol sulfate HFA (PROVENTIL;VENTOLIN;PROAIR) 108 (90 Base) MCG/ACT inhaler 18 g      Sig: Inhale 2 puffs into the lungs every 6 hours as needed for Shortness of Breath         Prior labs and Blood pressures:  BP Readings from Last 3 Encounters:   05/12/23 (!) 96/58   05/05/23 (!) 168/67   01/06/23 135/68     Lab Results   Component Value Date/Time     05/25/2023 09:10 AM    K 4.3 05/25/2023 09:10 AM     05/25/2023 09:10 AM    CO2 26 05/25/2023 09:10 AM    BUN 42 05/25/2023 09:10 AM    GFRAA 59 07/25/2022 10:36 AM     No results found for: HBA1C, [id number]  Lab Results   Component Value Date/Time    CHOL 206 05/17/2023 08:40 AM    HDL 37 05/17/2023 08:40 AM     No results found for: VITD3, VD3RIA    Lab Results   Component Value Date/Time    TSH 0.73 05/11/2021 03:11 PM

## 2023-07-06 ENCOUNTER — OFFICE VISIT (OUTPATIENT)
Age: 63
End: 2023-07-06
Payer: COMMERCIAL

## 2023-07-06 ENCOUNTER — TELEPHONE (OUTPATIENT)
Age: 63
End: 2023-07-06

## 2023-07-06 VITALS
WEIGHT: 287.8 LBS | BODY MASS INDEX: 38.98 KG/M2 | OXYGEN SATURATION: 97 % | TEMPERATURE: 97.3 F | HEIGHT: 72 IN | DIASTOLIC BLOOD PRESSURE: 60 MMHG | HEART RATE: 62 BPM | RESPIRATION RATE: 18 BRPM | SYSTOLIC BLOOD PRESSURE: 140 MMHG

## 2023-07-06 DIAGNOSIS — J44.9 CHRONIC OBSTRUCTIVE PULMONARY DISEASE, UNSPECIFIED COPD TYPE (HCC): Primary | ICD-10-CM

## 2023-07-06 DIAGNOSIS — I50.42 CHRONIC COMBINED SYSTOLIC AND DIASTOLIC CONGESTIVE HEART FAILURE (HCC): ICD-10-CM

## 2023-07-06 DIAGNOSIS — F41.8 DEPRESSION WITH ANXIETY: ICD-10-CM

## 2023-07-06 DIAGNOSIS — I10 ESSENTIAL HYPERTENSION: ICD-10-CM

## 2023-07-06 DIAGNOSIS — M25.562 ACUTE PAIN OF LEFT KNEE: ICD-10-CM

## 2023-07-06 DIAGNOSIS — Z02.89 ENCOUNTER FOR COMPLETION OF FORM WITH PATIENT: ICD-10-CM

## 2023-07-06 DIAGNOSIS — N18.32 STAGE 3B CHRONIC KIDNEY DISEASE (HCC): ICD-10-CM

## 2023-07-06 DIAGNOSIS — I77.89 ASCENDING AORTA ENLARGEMENT (HCC): ICD-10-CM

## 2023-07-06 PROBLEM — N18.30 CHRONIC RENAL DISEASE, STAGE III (HCC): Status: ACTIVE | Noted: 2023-07-06

## 2023-07-06 LAB
ALBUMIN SERPL-MCNC: 4 G/DL (ref 3.5–5)
ALBUMIN/GLOB SERPL: 1.1 (ref 1.1–2.2)
ALP SERPL-CCNC: 120 U/L (ref 45–117)
ALT SERPL-CCNC: 19 U/L (ref 12–78)
ANION GAP SERPL CALC-SCNC: 4 MMOL/L (ref 5–15)
AST SERPL-CCNC: 12 U/L (ref 15–37)
BILIRUB SERPL-MCNC: 0.4 MG/DL (ref 0.2–1)
BUN SERPL-MCNC: 38 MG/DL (ref 6–20)
BUN/CREAT SERPL: 36 (ref 12–20)
CALCIUM SERPL-MCNC: 10.5 MG/DL (ref 8.5–10.1)
CHLORIDE SERPL-SCNC: 106 MMOL/L (ref 97–108)
CO2 SERPL-SCNC: 30 MMOL/L (ref 21–32)
COMMENT:: NORMAL
CREAT SERPL-MCNC: 1.07 MG/DL (ref 0.55–1.02)
GLOBULIN SER CALC-MCNC: 3.8 G/DL (ref 2–4)
GLUCOSE SERPL-MCNC: 99 MG/DL (ref 65–100)
NT PRO BNP: 707 PG/ML
POTASSIUM SERPL-SCNC: 4.4 MMOL/L (ref 3.5–5.1)
PROT SERPL-MCNC: 7.8 G/DL (ref 6.4–8.2)
SODIUM SERPL-SCNC: 140 MMOL/L (ref 136–145)
SPECIMEN HOLD: NORMAL

## 2023-07-06 PROCEDURE — 3077F SYST BP >= 140 MM HG: CPT | Performed by: NURSE PRACTITIONER

## 2023-07-06 PROCEDURE — 99080 SPECIAL REPORTS OR FORMS: CPT | Performed by: NURSE PRACTITIONER

## 2023-07-06 PROCEDURE — 3078F DIAST BP <80 MM HG: CPT | Performed by: NURSE PRACTITIONER

## 2023-07-06 PROCEDURE — 99215 OFFICE O/P EST HI 40 MIN: CPT | Performed by: NURSE PRACTITIONER

## 2023-07-06 RX ORDER — SERTRALINE HYDROCHLORIDE 100 MG/1
100 TABLET, FILM COATED ORAL DAILY
Qty: 90 TABLET | Refills: 0 | Status: SHIPPED | OUTPATIENT
Start: 2023-07-06

## 2023-07-06 RX ORDER — NEBULIZER AND COMPRESSOR
EACH MISCELLANEOUS
COMMUNITY
Start: 2023-06-01

## 2023-07-06 ASSESSMENT — ENCOUNTER SYMPTOMS
GASTROINTESTINAL NEGATIVE: 1
SHORTNESS OF BREATH: 1
CHEST TIGHTNESS: 1
COUGH: 0
WHEEZING: 0

## 2023-07-06 NOTE — TELEPHONE ENCOUNTER
Adult Called patient. Two patient identifiers verified. Informed her paperwork was completed and ready to be picked up at front office. Pt verbalized understanding.

## 2023-08-02 ENCOUNTER — TELEPHONE (OUTPATIENT)
Age: 63
End: 2023-08-02

## 2023-08-02 NOTE — TELEPHONE ENCOUNTER
Patient is needing [place] paperwork filled out before August 10th, and is also needing an appointment for this. ROLANDO Askew doesn't have any VV or OV for this, and I was wondering if there was anywhere I could fit this patient in for?

## 2023-08-02 NOTE — TELEPHONE ENCOUNTER
----- Message from Nichole Tellez sent at 8/2/2023  3:39 PM EDT -----  Subject: Appointment Request    Reason for Call: Established Patient Appointment needed: Routine Existing   Condition Follow Up    QUESTIONS    Reason for appointment request? Available appointments did not meet   patient need     Additional Information for Provider? Patient requesting that her FMLA   papework she has to be filled out without seeing Misha Minaya.  Her due date   to get paperwork back is August 10th.   ---------------------------------------------------------------------------  --------------  Candace MASTERS  [phone]; OK to leave message on voicemail,OK to respond with electronic   message via [employer] portal (only for patients who have registered [employer]   account)  ---------------------------------------------------------------------------  --------------  SCRIPT ANSWERS

## 2023-08-09 ENCOUNTER — OFFICE VISIT (OUTPATIENT)
Age: 63
End: 2023-08-09
Payer: COMMERCIAL

## 2023-08-09 VITALS
TEMPERATURE: 97.8 F | HEART RATE: 73 BPM | HEIGHT: 72 IN | RESPIRATION RATE: 16 BRPM | WEIGHT: 293 LBS | BODY MASS INDEX: 39.68 KG/M2 | DIASTOLIC BLOOD PRESSURE: 70 MMHG | OXYGEN SATURATION: 95 % | SYSTOLIC BLOOD PRESSURE: 136 MMHG

## 2023-08-09 DIAGNOSIS — M23.301 DEGENERATIVE TEAR OF LATERAL MENISCUS OF LEFT KNEE: ICD-10-CM

## 2023-08-09 DIAGNOSIS — I50.42 CHRONIC COMBINED SYSTOLIC AND DIASTOLIC CONGESTIVE HEART FAILURE (HCC): ICD-10-CM

## 2023-08-09 DIAGNOSIS — J44.9 CHRONIC OBSTRUCTIVE PULMONARY DISEASE, UNSPECIFIED COPD TYPE (HCC): Primary | ICD-10-CM

## 2023-08-09 DIAGNOSIS — Z02.89 ENCOUNTER FOR COMPLETION OF FORM WITH PATIENT: ICD-10-CM

## 2023-08-09 PROCEDURE — 99080 SPECIAL REPORTS OR FORMS: CPT | Performed by: NURSE PRACTITIONER

## 2023-08-09 PROCEDURE — 3075F SYST BP GE 130 - 139MM HG: CPT | Performed by: NURSE PRACTITIONER

## 2023-08-09 PROCEDURE — 99214 OFFICE O/P EST MOD 30 MIN: CPT | Performed by: NURSE PRACTITIONER

## 2023-08-09 PROCEDURE — 3078F DIAST BP <80 MM HG: CPT | Performed by: NURSE PRACTITIONER

## 2023-08-09 ASSESSMENT — ENCOUNTER SYMPTOMS: SHORTNESS OF BREATH: 1

## 2023-08-09 NOTE — PROGRESS NOTES
Chief Complaint   Patient presents with    Forms     FMLA paperwork         1. \"Have you been to the ER, urgent care clinic since your last visit? Hospitalized since your last visit? \" No    2. \"Have you seen or consulted any other health care providers outside of the [address] [place] [address] since your last visit? \" No    3. For patients age 43-73: Has the patient had a colorectal cancer screening? Yes     If the patient is female:    4. For patients age 52-65: Has the patient had a mammogram? Yes    5. For patients age 21-65: Has the patient had a pap smear?  Yes     PHQ-9  1/6/2023   Little interest or pleasure in doing things 0   Little interest or pleasure in doing things -   Feeling down, depressed, or hopeless 3   PHQ-2 Score 3   Total Score PHQ 2 -   PHQ-9 Total Score 3       Health Maintenance Due   Topic Date Due    Shingles vaccine (1 of 2) Never done    COVID-19 Vaccine (3 - Booster for Pfizer series) 05/18/2021    Cervical cancer screen  04/17/2023    Flu vaccine (1) 08/01/2023

## 2023-08-09 NOTE — PROGRESS NOTES
[facility] Note     Geremias Abdi (: 1960) is a 59 y.o. female, established patient, here for evaluation of the following chief complaint(s):  Forms (FMLA paperwork)       ASSESSMENT/PLAN:  1. Chronic obstructive pulmonary disease, unspecified COPD type (HCC)  -Stable  - Nebs PRN    2. Chronic combined systolic and diastolic congestive heart failure ([address])  -Stable  -Working to reschedule appointment with cardiology Dr. Linda Augustin  -Continue carvedilol, chlorthalidone, Entresto and Lasix  - on 2023, 707 on     3. Degenerative tear of lateral meniscus of left knee  - Continual pain, not controlled      - [facility] - Physical Therapy at [facility] - Adrián BECKER  -Has prescription for meloxicam to be used on as-needed basis    4. Encounter for completion of form with patient  -Forms completed for [facility]. Patient first day of work missed was July third 2023 with plans to return back to work full-time 2023 with modifications. Intermittent leave up to 2 days/week per flare after she completes her continuously. Return in about 3 months (around 2023), or if symptoms worsen or fail to improve. SUBJECTIVE/OBJECTIVE:    Geremias Abdi is a 59 y.o. female seen today for COPD, CHF, CKD and form completion    Since last encounter Ms. Chester Jewell has schedule an appointment to meet with nephrology on 2023 with Dr. Judge Alpers. She is also working to schedule her follow-up with Dr. East/cardiology. Continues to have left knee pain. She met with orthopedics which deemed her knee pain nonsurgical issue and recommended anti-inflammatory and physical therapy. Ms. Chester Jewell is ready to move forward with physical therapy. REVIEW OF SYSTEMS:    Review of Systems   Respiratory:  Positive for shortness of breath. Musculoskeletal:  Positive for arthralgias (Left knee). All other systems reviewed and are negative.       VITAL SIGNS:    Wt

## 2023-08-21 ENCOUNTER — TELEPHONE (OUTPATIENT)
Age: 63
End: 2023-08-21

## 2023-08-21 NOTE — TELEPHONE ENCOUNTER
Zeke Garcia called from [facility] and requested Pt's 8/9/23 office note.     Fax# [phone]  BCB# [phone]    Claim # [id number]

## 2023-09-06 ENCOUNTER — TELEPHONE (OUTPATIENT)
Age: 63
End: 2023-09-06

## 2023-09-06 NOTE — TELEPHONE ENCOUNTER
Pt came into the office this morning and dropped off Return to Work form for [employer] to review as per previous messages. Please contact Pt at [phone] if appt is needed or when form is faxed. Thank you!     SC PAFP 9.6.23

## 2023-09-15 ENCOUNTER — HOSPITAL ENCOUNTER (OUTPATIENT)
Facility: HOSPITAL | Age: 63
Setting detail: RECURRING SERIES
Discharge: HOME | End: 2023-09-18
Payer: COMMERCIAL

## 2023-09-15 PROCEDURE — 97161 PT EVAL LOW COMPLEX 20 MIN: CPT

## 2023-09-15 NOTE — THERAPY EVALUATION
Physical Therapy at [facility],   a part of [address] [place] [address] Leticia Pineda, [address]  [facility][phone] FWY:[phone]                                                                            PHYSICAL THERAPY - EVALUATION/PLAN OF CARE NOTE (updated 3/23)      Date: 9/15/2023          Patient Name:  Lico Arboleda :  1960   Medical   Diagnosis:  Left knee pain [M25.562] Treatment Diagnosis:  M25.562  LEFT KNEE PAIN    Referral Source:  Martene Landau, APRN - * Provider #:  [id number]                  Insurance: Payor: Kirstin Islas / Plan: [employer] / Product Type: *No Product type* /      Patient  verified yes     Visit #   Current  / Total 1 24   Time   In / Out 9:05 A 9:35 A   Total Treatment Time 30   Total Timed Codes 5   1:1 Treatment Time 5      [facility] BC Totals Reminder:  bill using total billable   min of TIMED therapeutic procedures and modalities. 8-22 min = 1 unit; 23-37 min = 2 units; 38-52 min = 3 units;  53-67 min = 4 units; 68-82 min = 5 units           SUBJECTIVE  Pain Level (0-10 scale): 7/10 today  []constant []intermittent [x]improving []worsening []no change since onset    Any medication changes, allergies to medications, adverse drug reactions, diagnosis change, or new procedure performed?: [x] No    [] Yes (see summary sheet for update)  Medications: Verified on Patient Summary List    Subjective functional status/changes:     Pt presents with L knee pain. She reports that she has been getting on a stepper/stationary bike (?) at home which has helped symptoms. Takes tylenol as needed. She went to see Dr. Juan Diego Cooper-- x-rays, given anti-inflammatory. \"No surgery\".  No cortisone injection  Aggravating symptoms: steps, prolonged standing, pain at night  She does have voltaran cream from PCP but unsure if it helps    Start of Care: 9/15/2023  Onset Date: 2023  Current symptoms/Complaints: see above  Mechanism of Injury: no injury;

## 2023-09-19 ENCOUNTER — HOSPITAL ENCOUNTER (OUTPATIENT)
Facility: HOSPITAL | Age: 63
Setting detail: RECURRING SERIES
Discharge: HOME | End: 2023-09-22
Payer: COMMERCIAL

## 2023-09-19 PROCEDURE — 97110 THERAPEUTIC EXERCISES: CPT

## 2023-09-19 NOTE — PROGRESS NOTES
PHYSICAL THERAPY - DAILY TREATMENT NOTE (updated 3/23)      Date: 2023          Patient Name:  Lazarus Half :  1960   Medical   Diagnosis:  Left knee pain [M25.562] Treatment Diagnosis:  M25.562  LEFT KNEE PAIN    Referral Source:  Chula CaldwellCHARO - * Insurance:   Payor: [employer] / Plan: [employer] / Product Type: *No Product type* /                     Patient  verified yes     Visit #   Current  / Total 2 20   Time   In / Out 12:00 P 1240 P   Total Treatment Time 40   Total Timed Codes 30   1:1 Treatment Time 30      [facility] Totals Reminder:  bill using total billable   min of TIMED therapeutic procedures and modalities. 8-22 min = 1 unit; 23-37 min = 2 units; 38-52 min = 3 units; 53-67 min = 4 units; 68-82 min = 5 units            SUBJECTIVE    Pain Level (0-10 scale): 4-5    Any medication changes, allergies to medications, adverse drug reactions, diagnosis change, or new procedure performed?: [x] No    [] Yes (see summary sheet for update)  Medications: Verified on Patient Summary List    Subjective functional status/changes:     Reports good compliance with exercises, \"it's doing better\"    OBJECTIVE      Therapeutic Procedures: Tx Min Billable or 1:1 Min (if diff from Tx Min) Procedure, Rationale, Specifics   30  [id number] Therapeutic Exercise (timed):  increase ROM, strength, coordination, balance, and proprioception to improve patient's ability to progress to PLOF and address remaining functional goals. (see flow sheet as applicable)     Details if applicable:          30     Total Total         Modalities Rationale:     decrease inflammation and decrease pain to improve patient's ability to progress to PLOF and address remaining functional goals.        min [] Estim Unattended,             type/location:       []  w/ice    []  w/heat        min [] Estim Attended,             type/location:       []  w/ice   []  w/heat         []  w/US   []  TENS insruct            min []  Mechanical

## 2023-09-22 ENCOUNTER — APPOINTMENT (OUTPATIENT)
Facility: HOSPITAL | Age: 63
End: 2023-09-22
Payer: COMMERCIAL

## 2023-09-25 ENCOUNTER — TELEPHONE (OUTPATIENT)
Age: 63
End: 2023-09-25

## 2023-09-25 NOTE — TELEPHONE ENCOUNTER
Daniela with [facility] called and requesting a copy of OV notes 9/4/2023. She would like it to be faxed to [phone]. Best call back number [phone].

## 2023-09-26 ENCOUNTER — APPOINTMENT (OUTPATIENT)
Facility: HOSPITAL | Age: 63
End: 2023-09-26
Payer: COMMERCIAL

## 2023-09-29 ENCOUNTER — HOSPITAL ENCOUNTER (OUTPATIENT)
Facility: HOSPITAL | Age: 63
Setting detail: RECURRING SERIES
End: 2023-09-29
Payer: COMMERCIAL

## 2023-09-29 PROCEDURE — 97110 THERAPEUTIC EXERCISES: CPT

## 2023-09-29 NOTE — PROGRESS NOTES
PHYSICAL THERAPY - DAILY TREATMENT NOTE (updated 3/23)      Date: 2023          Patient Name:  Francisco Gomez :  1960   Medical   Diagnosis:  Left knee pain [M25.562] Treatment Diagnosis:  M25.562  LEFT KNEE PAIN    Referral Source:  CHARO Burroughs - * Insurance:   Payor: Brisa White / Plan: [facility] / Product Type: *No Product type* /                     Patient  verified yes     Visit #   Current  / Total 3 20   Time   In / Out 730 A 820 A   Total Treatment Time 50   Total Timed Codes 40   1:1 Treatment Time 30      [facility] Totals Reminder:  bill using total billable   min of TIMED therapeutic procedures and modalities. 8-22 min = 1 unit; 23-37 min = 2 units; 38-52 min = 3 units; 53-67 min = 4 units; 68-82 min = 5 units            SUBJECTIVE    Pain Level (0-10 scale): 4-5 at night    Any medication changes, allergies to medications, adverse drug reactions, diagnosis change, or new procedure performed?: [x] No    [] Yes (see summary sheet for update)  Medications: Verified on Patient Summary List    Subjective functional status/changes: \"No pain during the day, but it aches at night. It's like a toothache\"    OBJECTIVE      Therapeutic Procedures: Tx Min Billable or 1:1 Min (if diff from Tx Min) Procedure, Rationale, Specifics   40 30 [id number] Therapeutic Exercise (timed):  increase ROM, strength, coordination, balance, and proprioception to improve patient's ability to progress to PLOF and address remaining functional goals. (see flow sheet as applicable)     Details if applicable:          40 30    Total Total         Modalities Rationale:     decrease inflammation and decrease pain to improve patient's ability to progress to PLOF and address remaining functional goals.        min [] Estim Unattended,             type/location:       []  w/ice    []  w/heat        min [] Estim Attended,             type/location:       []  w/ice   []  w/heat         []  w/US   []  TENS insruct

## 2023-10-03 ENCOUNTER — HOSPITAL ENCOUNTER (OUTPATIENT)
Facility: HOSPITAL | Age: 63
Setting detail: RECURRING SERIES
Discharge: HOME | End: 2023-10-06
Payer: COMMERCIAL

## 2023-10-03 PROCEDURE — 97110 THERAPEUTIC EXERCISES: CPT

## 2023-10-03 NOTE — PROGRESS NOTES
PHYSICAL THERAPY - DAILY TREATMENT NOTE (updated 3/23)      Date: 10/3/2023          Patient Name:  Lazarus Half :  1960   Medical   Diagnosis:  Left knee pain [M25.562] Treatment Diagnosis:  M25.562  LEFT KNEE PAIN    Referral Source:  Chula CaldwellCHARO - * Insurance:   Payor: [address] [place] [address] Drive / Plan: [employer] / Product Type: *No Product type* /                     Patient  verified yes     Visit #   Current  / Total 4 20   Time   In / Out 10:00 A 1045 A   Total Treatment Time 45   Total Timed Codes 35   1:1 Treatment Time 30      [facility] Totals Reminder:  bill using total billable   min of TIMED therapeutic procedures and modalities. 8-22 min = 1 unit; 23-37 min = 2 units; 38-52 min = 3 units; 53-67 min = 4 units; 68-82 min = 5 units            SUBJECTIVE    Pain Level (0-10 scale): 4    Any medication changes, allergies to medications, adverse drug reactions, diagnosis change, or new procedure performed?: [x] No    [] Yes (see summary sheet for update)  Medications: Verified on Patient Summary List    Subjective functional status/changes:     She reports \"stiffness\" in knee at night    OBJECTIVE      Therapeutic Procedures: Tx Min Billable or 1:1 Min (if diff from Tx Min) Procedure, Rationale, Specifics   35 30 00552 Therapeutic Exercise (timed):  increase ROM, strength, coordination, balance, and proprioception to improve patient's ability to progress to PLOF and address remaining functional goals. (see flow sheet as applicable)     Details if applicable:          35 30    Total Total         Modalities Rationale:     decrease inflammation and decrease pain to improve patient's ability to progress to PLOF and address remaining functional goals.        min [] Estim Unattended,             type/location:       []  w/ice    []  w/heat        min [] Estim Attended,             type/location:       []  w/ice   []  w/heat         []  w/US   []  TENS insruct            min []  Mechanical Traction,

## 2023-10-04 ENCOUNTER — OFFICE VISIT (OUTPATIENT)
Age: 63
End: 2023-10-04
Payer: COMMERCIAL

## 2023-10-04 VITALS
HEART RATE: 60 BPM | RESPIRATION RATE: 16 BRPM | HEIGHT: 72 IN | SYSTOLIC BLOOD PRESSURE: 162 MMHG | OXYGEN SATURATION: 98 % | BODY MASS INDEX: 39.68 KG/M2 | TEMPERATURE: 97.5 F | DIASTOLIC BLOOD PRESSURE: 74 MMHG | WEIGHT: 293 LBS

## 2023-10-04 DIAGNOSIS — Z23 NEEDS FLU SHOT: ICD-10-CM

## 2023-10-04 DIAGNOSIS — Z02.89 ENCOUNTER FOR COMPLETION OF FORM WITH PATIENT: ICD-10-CM

## 2023-10-04 DIAGNOSIS — N18.31 STAGE 3A CHRONIC KIDNEY DISEASE (HCC): ICD-10-CM

## 2023-10-04 DIAGNOSIS — I50.42 CHRONIC COMBINED SYSTOLIC AND DIASTOLIC CONGESTIVE HEART FAILURE (HCC): ICD-10-CM

## 2023-10-04 DIAGNOSIS — Z23 NEED FOR SHINGLES VACCINE: ICD-10-CM

## 2023-10-04 DIAGNOSIS — J44.9 CHRONIC OBSTRUCTIVE PULMONARY DISEASE, UNSPECIFIED COPD TYPE (HCC): Primary | ICD-10-CM

## 2023-10-04 DIAGNOSIS — Z23 NEED FOR PNEUMOCOCCAL 20-VALENT CONJUGATE VACCINATION: ICD-10-CM

## 2023-10-04 PROCEDURE — 3074F SYST BP LT 130 MM HG: CPT | Performed by: NURSE PRACTITIONER

## 2023-10-04 PROCEDURE — 90674 CCIIV4 VAC NO PRSV 0.5 ML IM: CPT | Performed by: NURSE PRACTITIONER

## 2023-10-04 PROCEDURE — 99213 OFFICE O/P EST LOW 20 MIN: CPT | Performed by: NURSE PRACTITIONER

## 2023-10-04 PROCEDURE — 99080 SPECIAL REPORTS OR FORMS: CPT | Performed by: NURSE PRACTITIONER

## 2023-10-04 PROCEDURE — 90677 PCV20 VACCINE IM: CPT | Performed by: NURSE PRACTITIONER

## 2023-10-04 PROCEDURE — 3078F DIAST BP <80 MM HG: CPT | Performed by: NURSE PRACTITIONER

## 2023-10-04 PROCEDURE — 90472 IMMUNIZATION ADMIN EACH ADD: CPT | Performed by: NURSE PRACTITIONER

## 2023-10-04 PROCEDURE — 90471 IMMUNIZATION ADMIN: CPT | Performed by: NURSE PRACTITIONER

## 2023-10-04 RX ORDER — ZOSTER VACCINE RECOMBINANT, ADJUVANTED 50 MCG/0.5
0.5 KIT INTRAMUSCULAR SEE ADMIN INSTRUCTIONS
Qty: 0.5 ML | Refills: 0 | Status: SHIPPED | OUTPATIENT
Start: 2023-10-04 | End: 2024-04-01

## 2023-10-04 NOTE — PROGRESS NOTES
[facility] Note     Rubén Null (: 1960) is a 59 y.o. female, established patient, here for evaluation of the following chief complaint(s):  Forms (Leave paperwork)       ASSESSMENT/PLAN:  1. Chronic obstructive pulmonary disease, unspecified COPD type ([address])  -Stable    2. Chronic combined systolic and diastolic congestive heart failure (HCC)  -Stable, on Entresto. Has appoint with Dr. Chayo Rosen later this month.  - ?? will need updated echocardiogram    3. Stage 3a chronic kidney disease [facility])  -Reviewed new patient encounter with nephrology, Dr. Audrey Carlson on 23. Renal US has been ordered.  -2023 creatinine 1.07/BUN 38/EGFR 59, previously on 2023 creatinine 1.23/BUN 42/eGFR 50    4. Encounter for completion of form with patient  - Return to work form completed. Ms. Caleb Almeida may return to work on at  as planned with permanent restrictions to include: Max lifting to 50 pounds. Frequent lifting ,walking, standing, pushing, pulling, stooping ,climbing, grasping, twisting and reaching. Occasional overhead lifting, standing, bending, squatting and overhead reaching. She will require frequent breaks throughout this shift for up to three 15-minute breaks. 5. Needs flu shot  -     Influenza, FLUCELVAX, (age 10 mo+), IM, PF, 0.5 mL  -     GA IM ADM PRQ ID SUBQ/IM NJXS EA VACCINE    6. Need for pneumococcal 20-valent conjugate vaccination  -     Pneumococcal, PCV20, PREVNAR 20, (age 6w+), IM, PF    7. Need for shingles vaccine  -     zoster recombinant adjuvanted vaccine [facility]) 50 MCG/0.5ML SUSR injection; Inject 0.5 mLs into the muscle See Admin Instructions 1 dose now & repeat in 2-6 months, fax document to [phone], Disp-0.5 mL, R-0Print         Return in about 6 months (around 2024), or if symptoms worsen or fail to improve, for as needed or if no improvement.         SUBJECTIVE/OBJECTIVE:    Rubén Null is a 59 y.o. female

## 2023-10-05 DIAGNOSIS — F41.8 DEPRESSION WITH ANXIETY: ICD-10-CM

## 2023-10-05 RX ORDER — SERTRALINE HYDROCHLORIDE 100 MG/1
100 TABLET, FILM COATED ORAL DAILY
Qty: 90 TABLET | Refills: 1 | Status: SHIPPED | OUTPATIENT
Start: 2023-10-05 | End: 2023-11-13 | Stop reason: SDUPTHER

## 2023-10-05 ASSESSMENT — ENCOUNTER SYMPTOMS
SHORTNESS OF BREATH: 1
GASTROINTESTINAL NEGATIVE: 1

## 2023-10-05 NOTE — TELEPHONE ENCOUNTER
PCP: CHARO Mena NP    Last appt: 10/4/2023   Future Appointments   Date Time Provider [address]   10/6/2023  7:30 AM Tanisha Castro, PT [facility] Re   10/13/2023  7:30 AM Tanisha Castro, PT [facility] Re   11/13/2023  8:30 AM CHARO Mena NP [facility] BS AMB   4/3/2024  9:00 AM Kira Mcgill MD [facility] BS AMB       Requested Prescriptions     Pending Prescriptions Disp Refills    sertraline (ZOLOFT) 100 MG tablet 90 tablet 0     Sig: Take 1 tablet by mouth daily         Prior labs and Blood pressures:  BP Readings from Last 3 Encounters:   10/04/23 (!) 162/74   08/09/23 136/70   07/06/23 (!) 140/60     Lab Results   Component Value Date/Time     07/06/2023 10:02 AM    K 4.4 07/06/2023 10:02 AM     07/06/2023 10:02 AM    CO2 30 07/06/2023 10:02 AM    BUN 38 07/06/2023 10:02 AM    GFRAA 59 07/25/2022 10:36 AM     No results found for: \"HBA1C\", \"QHH6CZEX\"  Lab Results   Component Value Date/Time    CHOL 206 05/17/2023 08:40 AM    HDL 37 05/17/2023 08:40 AM     No results found for: \"VITD3\", \"VD3RIA\"    Lab Results   Component Value Date/Time    TSH 0.73 05/11/2021 03:11 PM

## 2023-10-06 ENCOUNTER — HOSPITAL ENCOUNTER (OUTPATIENT)
Facility: HOSPITAL | Age: 63
Setting detail: RECURRING SERIES
Discharge: HOME | End: 2023-10-09
Payer: COMMERCIAL

## 2023-10-06 PROCEDURE — 97110 THERAPEUTIC EXERCISES: CPT

## 2023-10-06 NOTE — PROGRESS NOTES
PHYSICAL THERAPY - DAILY TREATMENT NOTE (updated 3/23)      Date: 10/6/2023          Patient Name:  Paul Calix :  1960   Medical   Diagnosis:  Left knee pain [M25.562] Treatment Diagnosis:  M25.562  LEFT KNEE PAIN    Referral Source:  CHARO Hand - * Insurance:   Payor: Michael Sood / Plan: Kirstin Savage / Product Type: *No Product type* /                     Patient  verified yes     Visit #   Current  / Total 5 20   Time   In / Out 730 A 8:25 A   Total Treatment Time 55   Total Timed Codes 45   1:1 Treatment Time 30      [facility] Totals Reminder:  bill using total billable   min of TIMED therapeutic procedures and modalities. 8-22 min = 1 unit; 23-37 min = 2 units; 38-52 min = 3 units; 53-67 min = 4 units; 68-82 min = 5 units            SUBJECTIVE    Pain Level (0-10 scale): 4    Any medication changes, allergies to medications, adverse drug reactions, diagnosis change, or new procedure performed?: [x] No    [] Yes (see summary sheet for update)  Medications: Verified on Patient Summary List    Subjective functional status/changes:     She states that she still has pain, rates as 4/10, at night. She has been icing. She lays on her back, also on her side. She occasionally uses a pillow when on her side which helps some    OBJECTIVE      Therapeutic Procedures: Tx Min Billable or 1:1 Min (if diff from Tx Min) Procedure, Rationale, Specifics   45 30 40215 Therapeutic Exercise (timed):  increase ROM, strength, coordination, balance, and proprioception to improve patient's ability to progress to PLOF and address remaining functional goals. (see flow sheet as applicable)     Details if applicable:          45 30    Total Total         Modalities Rationale:     decrease inflammation and decrease pain to improve patient's ability to progress to PLOF and address remaining functional goals.        min [] Estim Unattended,             type/location:       []  w/ice    []  w/heat        min [] Estim

## 2023-10-13 ENCOUNTER — HOSPITAL ENCOUNTER (OUTPATIENT)
Facility: HOSPITAL | Age: 63
Setting detail: RECURRING SERIES
Discharge: HOME | End: 2023-10-16
Payer: COMMERCIAL

## 2023-10-13 PROCEDURE — 97110 THERAPEUTIC EXERCISES: CPT

## 2023-10-13 NOTE — PROGRESS NOTES
modify for postural abnormalities to address functional deficits and attain remaining goals. Progress toward goals / Updated goals:  []  See Progress Note/Recertification  Short Term Goals: To be accomplished in 6-8 treatments. 1) Pt will be I in initial HEP met  2) Pt will report >/=25% improvement in symptoms met  3) Pt will report compliance with proper footwear while at work has not returned to work     Long Term Goals: To be accomplished in 19-20 treatments.   1) Pt will descend stairs with reciprocal gait pattern without L knee pain met  2) Pt will perform L SLS for >/=5 sec in order to dem improvement in proprioception progressing  3) Pt will report being able to walk for >/=1 mile without onset of L knee pain progressing  4) Pt will be I in final HEP nt      PLAN  Yes  Continue plan of care  Re-Cert Due: --  []  Upgrade activities as tolerated  []  Discharge due to :  []  Other:      Leonard Doss, PT       10/13/2023       7:41 AM

## 2023-10-18 ENCOUNTER — APPOINTMENT (OUTPATIENT)
Facility: HOSPITAL | Age: 63
End: 2023-10-18
Payer: COMMERCIAL

## 2023-10-24 ENCOUNTER — HOSPITAL ENCOUNTER (OUTPATIENT)
Facility: HOSPITAL | Age: 63
Setting detail: RECURRING SERIES
Discharge: HOME | End: 2023-10-27
Payer: COMMERCIAL

## 2023-10-24 PROCEDURE — 97110 THERAPEUTIC EXERCISES: CPT

## 2023-10-24 NOTE — PROGRESS NOTES
PHYSICAL THERAPY - DAILY TREATMENT NOTE (updated 3/23)      Date: 10/24/2023          Patient Name:  Xiang Roberts :  1960   Medical   Diagnosis:  Left knee pain [M25.562] Treatment Diagnosis:  M25.562  LEFT KNEE PAIN    Referral Source:  Beto YoungCHARO - * Insurance:   Payor: [employer] [address] / Plan: [place] / Product Type: *No Product type* /                     Patient  verified yes     Visit #   Current  / Total 7 20   Time   In / Out 930 A 10:25 A   Total Treatment Time 55   Total Timed Codes 45   1:1 Treatment Time 30      [facility] Totals Reminder:  bill using total billable   min of TIMED therapeutic procedures and modalities. 8-22 min = 1 unit; 23-37 min = 2 units; 38-52 min = 3 units; 53-67 min = 4 units; 68-82 min = 5 units            SUBJECTIVE    Pain Level (0-10 scale): 0    Any medication changes, allergies to medications, adverse drug reactions, diagnosis change, or new procedure performed?: [x] No    [] Yes (see summary sheet for update)  Medications: Verified on Patient Summary List    Subjective functional status/changes:     Pt reports that she has been feeling good. No more knee pain. She has been using a pillow under her knee at night and icing. Good compliance with HEP    OBJECTIVE      Therapeutic Procedures: Tx Min Billable or 1:1 Min (if diff from Tx Min) Procedure, Rationale, Specifics   45 30 [id number] Therapeutic Exercise (timed):  increase ROM, strength, coordination, balance, and proprioception to improve patient's ability to progress to PLOF and address remaining functional goals. (see flow sheet as applicable)     Details if applicable:          45 30    Total Total         Modalities Rationale:     decrease inflammation and decrease pain to improve patient's ability to progress to PLOF and address remaining functional goals.        min [] Estim Unattended,             type/location:       []  w/ice    []  w/heat        min [] Estim Attended,             type/location:

## 2023-10-31 ENCOUNTER — HOSPITAL ENCOUNTER (OUTPATIENT)
Facility: HOSPITAL | Age: 63
Setting detail: RECURRING SERIES
Discharge: HOME | End: 2023-11-03
Payer: COMMERCIAL

## 2023-10-31 PROCEDURE — 97110 THERAPEUTIC EXERCISES: CPT

## 2023-10-31 NOTE — THERAPY DISCHARGE
Physical Therapy at [facility],   a part of [address] [place] [address] Leticia Pineda, [address]  Phone: [phone]  Fax: [phone]     DISCHARGE SUMMARY  Patient Name: Pratima Suresh : 1960   Treatment/Medical Diagnosis: Left knee pain [M25.562]   Referral Source: CHARO Whitehead - *     Date of Initial Visit: 9/15/23 Attended Visits: 8 Missed Visits: See CC     SUMMARY OF TREATMENT  Gait: no significant gait abnormalities   Stairs: ascends with reciprocal gait pattern  Descends with reciprocal gait pattern, no knee pain                                        ROM  AROM R knee WNL, p! End range flex and ext     Strength (1-5)            Right Left   Hip flexion 5 5   Knee ext 5 5   Knee flex 5 5   Ankle DF 5 5                                                               Joint Mobility:  L patellar mobs WNL     Tandem stance 10 sec ea dir, mod sway  L SLS ~2 sec        Pain Level at end of session (0-10 scale): 0     Assessment   Pt has completed 8 skilled PT visits. She reports 99% improvement in symptoms since beginning PT. She is able to perform all ADL's, daily chores, standing/walking, transfers without knee pain. Reviewed HEP today and discussed continuing with exercise program. She is ready for DC to [facility] at this time. Short Term Goals: To be accomplished in 6-8 treatments. 1) Pt will be I in initial HEP met  2) Pt will report >/=25% improvement in symptoms met  3) Pt will report compliance with proper footwear while at work has not returned to work     Long Term Goals: To be accomplished in 19-20 treatments.   1) Pt will descend stairs with reciprocal gait pattern without L knee pain met  2) Pt will perform L SLS for >/=5 sec in order to dem improvement in proprioception not met  3) Pt will report being able to walk for >/=1 mile without onset of L knee pain met  4) Pt will be I in final HEP met       PLAN  [x]  Discharge due to : 0/10 knee pain;

## 2023-10-31 NOTE — PROGRESS NOTES
PHYSICAL THERAPY - DAILY TREATMENT/progress and DC NOTE (updated 3/23)      Date: 10/31/2023          Patient Name:  Haja Wolff :  1960   Medical   Diagnosis:  Left knee pain [M25.562] Treatment Diagnosis:  M25.562  LEFT KNEE PAIN    Referral Source:  Yan SyCHARO - * Insurance:   Payor: Aparna Arreaga / Plan: [employer] / Product Type: *No Product type* /                     Patient  verified yes     Visit #   Current  / Total 8 20   Time   In / Out 930 A 10:30 A   Total Treatment Time 60   Total Timed Codes 50   1:1 Treatment Time 40      [facility] Totals Reminder:  bill using total billable   min of TIMED therapeutic procedures and modalities. 8-22 min = 1 unit; 23-37 min = 2 units; 38-52 min = 3 units; 53-67 min = 4 units; 68-82 min = 5 units            SUBJECTIVE    Pain Level (0-10 scale): 0    Any medication changes, allergies to medications, adverse drug reactions, diagnosis change, or new procedure performed?: [x] No    [] Yes (see summary sheet for update)  Medications: Verified on Patient Summary List    Subjective functional status/changes:     \"I feel good! \" No knee pain. She is compliant with HEP    OBJECTIVE      Therapeutic Procedures: Tx Min Billable or 1:1 Min (if diff from Tx Min) Procedure, Rationale, Specifics   50 40 35074 Therapeutic Exercise (timed):  increase ROM, strength, coordination, balance, and proprioception to improve patient's ability to progress to PLOF and address remaining functional goals. (see flow sheet as applicable)     Details if applicable:          50 40    Total Total         Modalities Rationale:     decrease inflammation and decrease pain to improve patient's ability to progress to PLOF and address remaining functional goals.        min [] Estim Unattended,             type/location:       []  w/ice    []  w/heat        min [] Estim Attended,             type/location:       []  w/ice   []  w/heat         []  w/US   []  TENS insruct            min

## 2023-11-13 ENCOUNTER — OFFICE VISIT (OUTPATIENT)
Age: 63
End: 2023-11-13
Payer: COMMERCIAL

## 2023-11-13 VITALS
RESPIRATION RATE: 16 BRPM | HEART RATE: 60 BPM | OXYGEN SATURATION: 96 % | WEIGHT: 293 LBS | TEMPERATURE: 97.2 F | DIASTOLIC BLOOD PRESSURE: 69 MMHG | SYSTOLIC BLOOD PRESSURE: 137 MMHG | BODY MASS INDEX: 39.68 KG/M2 | HEIGHT: 72 IN

## 2023-11-13 DIAGNOSIS — I50.42 CHRONIC COMBINED SYSTOLIC AND DIASTOLIC CONGESTIVE HEART FAILURE (HCC): Primary | ICD-10-CM

## 2023-11-13 DIAGNOSIS — N18.32 STAGE 3B CHRONIC KIDNEY DISEASE (HCC): ICD-10-CM

## 2023-11-13 DIAGNOSIS — F41.8 DEPRESSION WITH ANXIETY: ICD-10-CM

## 2023-11-13 DIAGNOSIS — J44.9 CHRONIC OBSTRUCTIVE PULMONARY DISEASE, UNSPECIFIED COPD TYPE (HCC): ICD-10-CM

## 2023-11-13 PROCEDURE — 3075F SYST BP GE 130 - 139MM HG: CPT | Performed by: NURSE PRACTITIONER

## 2023-11-13 PROCEDURE — 99214 OFFICE O/P EST MOD 30 MIN: CPT | Performed by: NURSE PRACTITIONER

## 2023-11-13 PROCEDURE — 3078F DIAST BP <80 MM HG: CPT | Performed by: NURSE PRACTITIONER

## 2023-11-13 RX ORDER — SACUBITRIL AND VALSARTAN 49; 51 MG/1; MG/1
TABLET, FILM COATED ORAL
COMMUNITY
Start: 2023-11-10

## 2023-11-13 RX ORDER — SERTRALINE HYDROCHLORIDE 100 MG/1
100 TABLET, FILM COATED ORAL DAILY
Qty: 90 TABLET | Refills: 1 | Status: SHIPPED | OUTPATIENT
Start: 2023-11-13

## 2023-11-13 ASSESSMENT — ENCOUNTER SYMPTOMS: SHORTNESS OF BREATH: 1

## 2023-11-13 NOTE — PROGRESS NOTES
[facility] Note     Amanda Lakhani (: 1960) is a 59 y.o. female, established patient, here for evaluation of the following chief complaint(s):  COPD and Hypertension       ASSESSMENT/PLAN:  1. Chronic combined systolic and diastolic congestive heart failure ([address])  - ? Elevated BNP  per cardiology. Will request last labs and visit note  - Last BNP in our office was 707 on 2023  -Entresto dose increased by cardiology office. Patient has plans for close follow-up and reassessment  -Recommended discussion with cardiology team whether or not she would benefit from cardiopulmonary rehab given her diminished endurance    2. Stage 3b chronic kidney disease ([address])  -Reviewed last BUN and creatinine from 2023: Creatinine 1.22/BUN 38/eGFR 50  -Renal dose medications when appropriate  -Avoid nephrotoxins and dehydration  -Followed by nephrology Dr. Mela Clarke    3. Chronic obstructive pulmonary disease, unspecified COPD type ([address])  -Recommended follow-up with pulmonary as it has been 2 years. Continues to feel short of breath with little activity. Likely multifactorial.  -Consider pulmonary rehab. Mrs. Bhavna Farr will discuss with her pulmonologist.    4. Depression with anxiety  -Stable no dose adjustment today  -     sertraline (ZOLOFT) 100 MG tablet; Take 1 tablet by mouth daily, Disp-90 tablet, R-1Normal        Return in about 6 months (around 2024). SUBJECTIVE/OBJECTIVE:    Amanda Lakhani is a 59 y.o. female seen today for HTN, COPD, CKD, depression/anxiety    Cardiovascular Review:  She has hypertension, hyperlipidemia, and CHF. Remains out of work due to multiple health conditions. Saw Cardiology, told she had some extra fluid  / fluid build up increase she had her Entresto dose with plans for close follow up. Diet and Lifestyle: not attempting to follow a low fat, low cholesterol diet, sedentary  Home BP Monitoring: is not measured at home.   Pertinent

## 2023-11-13 NOTE — PATIENT INSTRUCTIONS
- Speak to your Cardiologist about Cardiac Rehab / Cardio-Pulmonary rehab    - Schedule an appointment with your Pulmonologist for re-evaluation  / ask about Pulmonary Rehab

## 2024-01-25 ENCOUNTER — OFFICE VISIT (OUTPATIENT)
Age: 64
End: 2024-01-25
Payer: COMMERCIAL

## 2024-01-25 VITALS
DIASTOLIC BLOOD PRESSURE: 73 MMHG | RESPIRATION RATE: 16 BRPM | HEIGHT: 72 IN | TEMPERATURE: 96.5 F | SYSTOLIC BLOOD PRESSURE: 128 MMHG | OXYGEN SATURATION: 95 % | BODY MASS INDEX: 39.68 KG/M2 | WEIGHT: 293 LBS | HEART RATE: 64 BPM

## 2024-01-25 DIAGNOSIS — M54.2 NECK AND SHOULDER PAIN: Primary | ICD-10-CM

## 2024-01-25 DIAGNOSIS — M25.519 NECK AND SHOULDER PAIN: Primary | ICD-10-CM

## 2024-01-25 PROCEDURE — 3074F SYST BP LT 130 MM HG: CPT | Performed by: NURSE PRACTITIONER

## 2024-01-25 PROCEDURE — 99213 OFFICE O/P EST LOW 20 MIN: CPT | Performed by: NURSE PRACTITIONER

## 2024-01-25 PROCEDURE — 3078F DIAST BP <80 MM HG: CPT | Performed by: NURSE PRACTITIONER

## 2024-01-25 RX ORDER — TIZANIDINE 2 MG/1
2 TABLET ORAL 3 TIMES DAILY PRN
Qty: 30 TABLET | Refills: 1 | Status: SHIPPED | OUTPATIENT
Start: 2024-01-25

## 2024-01-25 NOTE — PROGRESS NOTES
[facility]  Clinic Note     Evelio Marroquin (: 1960) is a 63 y.o. female, established patient, here for evaluation of the following chief complaint(s):  Neck Pain (Right side, goes down right arm x2 weeks, Tylenol does not help)       ASSESSMENT/PLAN:  1. Neck and shoulder pain  Comments:  supraspinitis tenderness  -     tiZANidine (ZANAFLEX) 2 MG tablet; Take 1 tablet by mouth 3 times daily as needed (neck spasm), Disp-30 tablet, R-1Normal  -Has existing prescription from meloxicam and milligrams.  Continue that may alternate with Tylenol up to 3000 mg in a 24-hour period  -Heat or ice as needed  -Handout on stretches that can be performed at home provided to patient  -Should pain persist further consideration towards imaging and physical therapy would be made at that time        Return if symptoms worsen or fail to improve, for as needed or if no improvement.              SUBJECTIVE/OBJECTIVE:    Evelio Marroquin is a 63 y.o. female seen today for right side neck and shoulder pain that will occasionally travel to the right arm x 2 weeks. \"It feels like someone stepping on me\". Has tried taking 2 tabs of tylenol 3 times a time without improvement. Biofreeze has not helped. Denies known injury.            REVIEW OF SYSTEMS:    Review of Systems   Musculoskeletal:  Positive for neck pain.   All other systems reviewed and are negative.        VITAL SIGNS:    Wt Readings from Last 3 Encounters:   24 (!) 140.6 kg (310 lb)   23 (!) 141.1 kg (311 lb)   10/04/23 135 kg (297 lb 9.6 oz)     Temp Readings from Last 3 Encounters:   24 (!) 96.5 °F (35.8 °C) (Infrared)   23 97.2 °F (36.2 °C) (Infrared)   10/04/23 97.5 °F (36.4 °C) (Infrared)     BP Readings from Last 3 Encounters:   24 128/73   23 137/69   10/04/23 (!) 162/74     Pulse Readings from Last 3 Encounters:   24 64   23 60   10/04/23 60           PHYSICAL EXAMINATION:       General: Alert,

## 2024-01-25 NOTE — PROGRESS NOTES
Chief Complaint   Patient presents with    Neck Pain     Right side, goes down right arm x2 weeks, Tylenol does not help       \"Have you been to the ER, urgent care clinic since your last visit?  Hospitalized since your last visit?\"    NO    “Have you seen or consulted any other health care providers outside of [facility] since your last visit?”    NO        “Have you had a pap smear?”    NO            1/6/2023     8:57 AM   PHQ-9    Little interest or pleasure in doing things 0   Feeling down, depressed, or hopeless 3   PHQ-2 Score 3   PHQ-9 Total Score 3       Health Maintenance Due   Topic Date Due    Shingles vaccine (1 of 2) Never done    Respiratory Syncytial Virus (RSV) Pregnant or age 60 yrs+ (1 - 1-dose 60+ series) Never done    Cervical cancer screen  04/17/2023    COVID-19 Vaccine (3 - 2023-24 season) 09/01/2023    Depression Monitoring  01/06/2024

## 2024-03-08 ENCOUNTER — OFFICE VISIT (OUTPATIENT)
Age: 64
End: 2024-03-08
Payer: COMMERCIAL

## 2024-03-08 VITALS
BODY MASS INDEX: 39.68 KG/M2 | WEIGHT: 293 LBS | TEMPERATURE: 97.4 F | RESPIRATION RATE: 16 BRPM | HEART RATE: 67 BPM | HEIGHT: 72 IN | DIASTOLIC BLOOD PRESSURE: 64 MMHG | SYSTOLIC BLOOD PRESSURE: 138 MMHG | OXYGEN SATURATION: 96 %

## 2024-03-08 DIAGNOSIS — Z20.1 EXPOSURE TO TB: Primary | ICD-10-CM

## 2024-03-08 PROCEDURE — 99213 OFFICE O/P EST LOW 20 MIN: CPT | Performed by: NURSE PRACTITIONER

## 2024-03-08 PROCEDURE — 3075F SYST BP GE 130 - 139MM HG: CPT | Performed by: NURSE PRACTITIONER

## 2024-03-08 PROCEDURE — 3078F DIAST BP <80 MM HG: CPT | Performed by: NURSE PRACTITIONER

## 2024-03-08 ASSESSMENT — ENCOUNTER SYMPTOMS: COUGH: 1

## 2024-03-08 NOTE — PROGRESS NOTES
[facility]  Clinic Note     Evelio Marroquin (: 1960) is a 63 y.o. female, established patient, here for evaluation of the following chief complaint(s):  TB Exposure       ASSESSMENT/PLAN:  1. Exposure to TB  -     QuantiFERON In Tube ([facility] Default); Future  - Await results  - Consider serial testing should initial return negative.   - Counseling provided on signs/symptoms to monitor for in regards to TB  - Consider imaging should symptoms develop        Return if symptoms worsen or fail to improve.        SUBJECTIVE/OBJECTIVE:    Evelio Marroquin is a 63 y.o. female seen today for TB exposure. Exposure to TB by cousin approximately 1 month ago. Cousin had been experiencing a frequent cough. Ms. Marroquin recently evaluated at an urgent care center for cough and laryngitis. Recommended to be tested for TB. Reports on-going scratchy through and cough. No unexplained weight loss. Denies sweating.Denies blood in sputum.           REVIEW OF SYSTEMS:    Review of Systems   Respiratory:  Positive for cough.    All other systems reviewed and are negative.        VITAL SIGNS:    Wt Readings from Last 3 Encounters:   24 (!) 142.6 kg (314 lb 6.4 oz)   24 (!) 140.6 kg (310 lb)   23 (!) 141.1 kg (311 lb)     Temp Readings from Last 3 Encounters:   24 97.4 °F (36.3 °C) (Infrared)   24 (!) 96.5 °F (35.8 °C) (Infrared)   23 97.2 °F (36.2 °C) (Infrared)     BP Readings from Last 3 Encounters:   24 138/64   24 128/73   23 137/69     Pulse Readings from Last 3 Encounters:   24 67   24 64   23 60           PHYSICAL EXAMINATION:       General: Alert, cooperative, no distress  Respiratory: Breathing comfortably, in no acute respiratory distress. Clear to auscultation bilaterally.   Cardiovascular: Regular rate and rhythm, S1, S2 normal, no murmur, click, rub or gallop.   Extremities: no edema.   Abdomen: Soft, non-tender, not distended.

## 2024-03-08 NOTE — PROGRESS NOTES
Chief Complaint   Patient presents with    TB Exposure       \"Have you been to the ER, urgent care clinic since your last visit?  Hospitalized since your last visit?\"    NO    “Have you seen or consulted any other health care providers outside of [facility] since your last visit?”    NO        “Have you had a pap smear?”    NO            1/6/2023     8:57 AM   PHQ-9    Little interest or pleasure in doing things 0   Feeling down, depressed, or hopeless 3   PHQ-2 Score 3   PHQ-9 Total Score 3       Health Maintenance Due   Topic Date Due    Shingles vaccine (1 of 2) Never done    Respiratory Syncytial Virus (RSV) Pregnant or age 60 yrs+ (1 - 1-dose 60+ series) Never done    Cervical cancer screen  04/17/2023    COVID-19 Vaccine (3 - 2023-24 season) 09/01/2023    Depression Monitoring  01/06/2024

## 2024-03-17 LAB
M TB IFN-G BLD-IMP: NEGATIVE
M TB IFN-G CD4+ T-CELLS BLD-ACNC: 0.07 IU/ML
M TBIFN-G CD4+ CD8+T-CELLS BLD-ACNC: 0.06 IU/ML
QUANTIFERON CRITERIA: NORMAL
QUANTIFERON MITOGEN VALUE: >10 IU/ML
QUANTIFERON NIL VALUE: 0.02 IU/ML

## 2024-04-15 ENCOUNTER — HOSPITAL ENCOUNTER (OUTPATIENT)
Facility: HOSPITAL | Age: 64
Discharge: HOME | End: 2024-04-18
Attending: INTERNAL MEDICINE
Payer: COMMERCIAL

## 2024-04-15 DIAGNOSIS — N18.31 STAGE 3A CHRONIC KIDNEY DISEASE (HCC): ICD-10-CM

## 2024-04-15 PROCEDURE — 76770 US EXAM ABDO BACK WALL COMP: CPT

## 2024-05-13 ENCOUNTER — OFFICE VISIT (OUTPATIENT)
Age: 64
End: 2024-05-13
Payer: COMMERCIAL

## 2024-05-13 VITALS
RESPIRATION RATE: 16 BRPM | DIASTOLIC BLOOD PRESSURE: 70 MMHG | HEIGHT: 72 IN | HEART RATE: 67 BPM | TEMPERATURE: 97.4 F | OXYGEN SATURATION: 97 % | BODY MASS INDEX: 39.68 KG/M2 | WEIGHT: 293 LBS | SYSTOLIC BLOOD PRESSURE: 152 MMHG

## 2024-05-13 DIAGNOSIS — N18.32 ANEMIA IN STAGE 3B CHRONIC KIDNEY DISEASE (HCC): ICD-10-CM

## 2024-05-13 DIAGNOSIS — D63.1 ANEMIA IN STAGE 3B CHRONIC KIDNEY DISEASE (HCC): ICD-10-CM

## 2024-05-13 DIAGNOSIS — Z20.1 EXPOSURE TO TB: ICD-10-CM

## 2024-05-13 DIAGNOSIS — I50.42 CHRONIC COMBINED SYSTOLIC AND DIASTOLIC CONGESTIVE HEART FAILURE (HCC): ICD-10-CM

## 2024-05-13 DIAGNOSIS — M53.3 CHRONIC LEFT SACROILIAC JOINT PAIN: ICD-10-CM

## 2024-05-13 DIAGNOSIS — E66.01 OBESITY, CLASS III, BMI 40-49.9 (MORBID OBESITY) (HCC): ICD-10-CM

## 2024-05-13 DIAGNOSIS — G89.29 CHRONIC LEFT SACROILIAC JOINT PAIN: ICD-10-CM

## 2024-05-13 DIAGNOSIS — N18.32 STAGE 3B CHRONIC KIDNEY DISEASE (HCC): ICD-10-CM

## 2024-05-13 DIAGNOSIS — R73.01 IMPAIRED FASTING GLUCOSE: ICD-10-CM

## 2024-05-13 DIAGNOSIS — E78.5 DYSLIPIDEMIA: ICD-10-CM

## 2024-05-13 DIAGNOSIS — I77.89 ASCENDING AORTA ENLARGEMENT (HCC): ICD-10-CM

## 2024-05-13 DIAGNOSIS — J44.9 CHRONIC OBSTRUCTIVE PULMONARY DISEASE, UNSPECIFIED COPD TYPE (HCC): Primary | ICD-10-CM

## 2024-05-13 DIAGNOSIS — F41.8 DEPRESSION WITH ANXIETY: ICD-10-CM

## 2024-05-13 PROBLEM — I50.41 ACUTE COMBINED SYSTOLIC AND DIASTOLIC CONGESTIVE HEART FAILURE (HCC): Status: RESOLVED | Noted: 2022-07-28 | Resolved: 2024-05-13

## 2024-05-13 PROBLEM — J96.00 ACUTE RESPIRATORY FAILURE (HCC): Status: RESOLVED | Noted: 2023-05-01 | Resolved: 2024-05-13

## 2024-05-13 PROBLEM — I50.9 ACUTE CHF (CONGESTIVE HEART FAILURE) (HCC): Status: RESOLVED | Noted: 2023-05-01 | Resolved: 2024-05-13

## 2024-05-13 PROBLEM — J41.0 SIMPLE CHRONIC BRONCHITIS (HCC): Status: RESOLVED | Noted: 2019-07-25 | Resolved: 2024-05-13

## 2024-05-13 PROBLEM — M79.672 LEFT FOOT PAIN: Status: RESOLVED | Noted: 2018-04-18 | Resolved: 2024-05-13

## 2024-05-13 LAB
ALBUMIN SERPL-MCNC: 3.6 G/DL (ref 3.5–5)
ALBUMIN/GLOB SERPL: 0.9 (ref 1.1–2.2)
ALP SERPL-CCNC: 108 U/L (ref 45–117)
ALT SERPL-CCNC: 21 U/L (ref 12–78)
ANION GAP SERPL CALC-SCNC: 4 MMOL/L (ref 5–15)
AST SERPL-CCNC: 11 U/L (ref 15–37)
BASOPHILS # BLD: 0 K/UL (ref 0–0.1)
BASOPHILS NFR BLD: 1 % (ref 0–1)
BILIRUB SERPL-MCNC: 0.4 MG/DL (ref 0.2–1)
BUN SERPL-MCNC: 36 MG/DL (ref 6–20)
BUN/CREAT SERPL: 33 (ref 12–20)
CALCIUM SERPL-MCNC: 10.5 MG/DL (ref 8.5–10.1)
CHLORIDE SERPL-SCNC: 106 MMOL/L (ref 97–108)
CHOLEST SERPL-MCNC: 188 MG/DL
CO2 SERPL-SCNC: 30 MMOL/L (ref 21–32)
CREAT SERPL-MCNC: 1.09 MG/DL (ref 0.55–1.02)
DIFFERENTIAL METHOD BLD: NORMAL
EOSINOPHIL # BLD: 0.3 K/UL (ref 0–0.4)
EOSINOPHIL NFR BLD: 5 % (ref 0–7)
ERYTHROCYTE [DISTWIDTH] IN BLOOD BY AUTOMATED COUNT: 13 % (ref 11.5–14.5)
EST. AVERAGE GLUCOSE BLD GHB EST-MCNC: 100 MG/DL
FERRITIN SERPL-MCNC: 203 NG/ML (ref 8–252)
GLOBULIN SER CALC-MCNC: 4.1 G/DL (ref 2–4)
GLUCOSE SERPL-MCNC: 96 MG/DL (ref 65–100)
HBA1C MFR BLD: 5.1 % (ref 4–5.6)
HCT VFR BLD AUTO: 38.2 % (ref 35–47)
HDLC SERPL-MCNC: 60 MG/DL
HDLC SERPL: 3.1 (ref 0–5)
HGB BLD-MCNC: 12.6 G/DL (ref 11.5–16)
IMM GRANULOCYTES # BLD AUTO: 0 K/UL (ref 0–0.04)
IMM GRANULOCYTES NFR BLD AUTO: 0 % (ref 0–0.5)
IRON SATN MFR SERPL: 23 % (ref 20–50)
IRON SERPL-MCNC: 72 UG/DL (ref 35–150)
LDLC SERPL CALC-MCNC: 115.4 MG/DL (ref 0–100)
LYMPHOCYTES # BLD: 1.2 K/UL (ref 0.8–3.5)
LYMPHOCYTES NFR BLD: 24 % (ref 12–49)
MCH RBC QN AUTO: 29.4 PG (ref 26–34)
MCHC RBC AUTO-ENTMCNC: 33 G/DL (ref 30–36.5)
MCV RBC AUTO: 89.3 FL (ref 80–99)
MONOCYTES # BLD: 0.5 K/UL (ref 0–1)
MONOCYTES NFR BLD: 10 % (ref 5–13)
NEUTS SEG # BLD: 3 K/UL (ref 1.8–8)
NEUTS SEG NFR BLD: 60 % (ref 32–75)
NRBC # BLD: 0 K/UL (ref 0–0.01)
NRBC BLD-RTO: 0 PER 100 WBC
PLATELET # BLD AUTO: 196 K/UL (ref 150–400)
PMV BLD AUTO: 10.8 FL (ref 8.9–12.9)
POTASSIUM SERPL-SCNC: 4.6 MMOL/L (ref 3.5–5.1)
PROT SERPL-MCNC: 7.7 G/DL (ref 6.4–8.2)
RBC # BLD AUTO: 4.28 M/UL (ref 3.8–5.2)
SODIUM SERPL-SCNC: 140 MMOL/L (ref 136–145)
TIBC SERPL-MCNC: 316 UG/DL (ref 250–450)
TRIGL SERPL-MCNC: 63 MG/DL
VLDLC SERPL CALC-MCNC: 12.6 MG/DL
WBC # BLD AUTO: 5 K/UL (ref 3.6–11)

## 2024-05-13 PROCEDURE — 3078F DIAST BP <80 MM HG: CPT | Performed by: NURSE PRACTITIONER

## 2024-05-13 PROCEDURE — 3077F SYST BP >= 140 MM HG: CPT | Performed by: NURSE PRACTITIONER

## 2024-05-13 PROCEDURE — 99214 OFFICE O/P EST MOD 30 MIN: CPT | Performed by: NURSE PRACTITIONER

## 2024-05-13 RX ORDER — MELOXICAM 7.5 MG/1
7.5 TABLET ORAL DAILY PRN
Qty: 30 TABLET | Refills: 1 | Status: SHIPPED | OUTPATIENT
Start: 2024-05-13

## 2024-05-13 RX ORDER — SERTRALINE HYDROCHLORIDE 100 MG/1
100 TABLET, FILM COATED ORAL DAILY
Qty: 90 TABLET | Refills: 1 | Status: SHIPPED | OUTPATIENT
Start: 2024-05-13

## 2024-05-13 RX ORDER — TIZANIDINE 4 MG/1
4 TABLET ORAL 3 TIMES DAILY PRN
Qty: 30 TABLET | Refills: 0 | Status: SHIPPED | OUTPATIENT
Start: 2024-05-13

## 2024-05-13 SDOH — ECONOMIC STABILITY: HOUSING INSECURITY
IN THE LAST 12 MONTHS, WAS THERE A TIME WHEN YOU DID NOT HAVE A STEADY PLACE TO SLEEP OR SLEPT IN A SHELTER (INCLUDING NOW)?: PATIENT DECLINED

## 2024-05-13 SDOH — ECONOMIC STABILITY: FOOD INSECURITY: WITHIN THE PAST 12 MONTHS, YOU WORRIED THAT YOUR FOOD WOULD RUN OUT BEFORE YOU GOT MONEY TO BUY MORE.: PATIENT DECLINED

## 2024-05-13 SDOH — ECONOMIC STABILITY: INCOME INSECURITY: HOW HARD IS IT FOR YOU TO PAY FOR THE VERY BASICS LIKE FOOD, HOUSING, MEDICAL CARE, AND HEATING?: PATIENT DECLINED

## 2024-05-13 SDOH — ECONOMIC STABILITY: FOOD INSECURITY: WITHIN THE PAST 12 MONTHS, THE FOOD YOU BOUGHT JUST DIDN'T LAST AND YOU DIDN'T HAVE MONEY TO GET MORE.: PATIENT DECLINED

## 2024-05-13 ASSESSMENT — PATIENT HEALTH QUESTIONNAIRE - PHQ9
1. LITTLE INTEREST OR PLEASURE IN DOING THINGS: NOT AT ALL
2. FEELING DOWN, DEPRESSED OR HOPELESS: NOT AT ALL
SUM OF ALL RESPONSES TO PHQ QUESTIONS 1-9: 0
SUM OF ALL RESPONSES TO PHQ9 QUESTIONS 1 & 2: 0
SUM OF ALL RESPONSES TO PHQ QUESTIONS 1-9: 0

## 2024-05-13 NOTE — PROGRESS NOTES
[facility]  Clinic Note     Evelio Marroquin (: 1960) is a 63 y.o. female, established patient, here for evaluation of the following chief complaint(s):  Hypertension and COPD       ASSESSMENT/PLAN:    1. Chronic obstructive pulmonary disease, unspecified COPD type (HCC)  -     [facility]  - Re-establish care w/ pulmonary    2. Chronic combined systolic and diastolic congestive heart failure (HCC)  Assessment & Plan:   Monitored by specialist- no acute findings meriting change in the plan.  Sees cardiology.  Last echocardiogram available for review 2022: LV cavity dilated.  Wall thickness increased in a pattern of moderate LVH.  Systolic function with moderate reduced, EF 30-35%.  Diffuse hypokinesis.  Mild AR.  Mild aortic dilatation with aortic root 4.3 cm.  Ascending aorta 4.7 cm.  Ascending aorta is mild to severely dilated.  PA pressure 41 mmHg.    3. Ascending aorta enlargement (HCC)  - Stable    4. Stage 3b chronic kidney disease ([facility])  Comments:  Reviewed labs from nephrology completed 2024:Creatinine 1.87/BUN 39/eGFR 30  Orders:  -     Comprehensive Metabolic Panel; Future    5. Obesity, Class III, BMI 40-49.9 (morbid obesity) ([facility])    6. Exposure to TB  -     QuantiFERON In Tube ([facility] Default); Future  -QuantiFERON gold negative on 3/18/2024    7. Depression with anxiety  - Stable  -     sertraline (ZOLOFT) 100 MG tablet; Take 1 tablet by mouth daily, Disp-90 tablet, R-1Normal    8. Chronic left sacroiliac joint pain  - Not improving  -     meloxicam (MOBIC) 7.5 MG tablet; Take 1 tablet by mouth daily as needed for Pain, Disp-30 tablet, R-1Normal  -     tiZANidine (ZANAFLEX) 4 MG tablet; Take 1 tablet by mouth 3 times daily as needed (neck spasm), Disp-30 tablet, R-0Normal  -     [facility]  The patient will be referred to physical therapy, and stretching exercises will be

## 2024-05-13 NOTE — ASSESSMENT & PLAN NOTE
Monitored by specialist- no acute findings meriting change in the plan.  Sees cardiology.  Last echocardiogram available for review 7/21/2022: LV cavity dilated.  Wall thickness increased in a pattern of moderate LVH.  Systolic function with moderate reduced, EF 30-35%.  Diffuse hypokinesis.  Mild AR.  Mild aortic dilatation with aortic root 4.3 cm.  Ascending aorta 4.7 cm.  Ascending aorta is mild to severely dilated.  PA pressure 41 mmHg.

## 2024-05-13 NOTE — PROGRESS NOTES
Chief Complaint   Patient presents with    Hypertension    COPD       \"Have you been to the ER, urgent care clinic since your last visit?  Hospitalized since your last visit?\"    NO    “Have you seen or consulted any other health care providers outside of [facility] since your last visit?”    NO     “Have you had a pap smear?”    NO    Date of last Cervical Cancer screen (HPV or PAP): 4/17/2018       Click Here for Release of Records Request          5/13/2024     8:09 AM   PHQ-9    Little interest or pleasure in doing things 0   Feeling down, depressed, or hopeless 0   PHQ-2 Score 0   PHQ-9 Total Score 0       Health Maintenance Due   Topic Date Due    Shingles vaccine (1 of 2) Never done    Respiratory Syncytial Virus (RSV) Pregnant or age 60 yrs+ (1 - 1-dose 60+ series) Never done    Cervical cancer screen  04/17/2023    COVID-19 Vaccine (3 - 2023-24 season) 09/01/2023    Depression Monitoring  01/06/2024

## 2024-05-17 LAB
GAMMA INTERFERON BACKGROUND BLD IA-ACNC: 0.01 IU/ML
M TB IFN-G BLD-IMP: NEGATIVE
M TB IFN-G CD4+ BCKGRND COR BLD-ACNC: 0.02 IU/ML
M TB IFN-G CD4+CD8+ BCKGRND COR BLD-ACNC: 0.02 IU/ML
MITOGEN IGNF BCKGRD COR BLD-ACNC: >10 IU/ML
SERVICE CMNT-IMP: NORMAL

## 2024-06-10 ENCOUNTER — HOSPITAL ENCOUNTER (OUTPATIENT)
Facility: HOSPITAL | Age: 64
Setting detail: RECURRING SERIES
Discharge: HOME | End: 2024-06-13
Payer: COMMERCIAL

## 2024-06-10 PROCEDURE — 97110 THERAPEUTIC EXERCISES: CPT | Performed by: PHYSICAL THERAPIST

## 2024-06-10 PROCEDURE — 97162 PT EVAL MOD COMPLEX 30 MIN: CPT | Performed by: PHYSICAL THERAPIST

## 2024-06-10 NOTE — THERAPY EVALUATION
Physical Therapy at [facility],   a part of   [address], [place] [address]  Phone:[phone] Fax:[phone]                                                                            PHYSICAL THERAPY - MEDICARE EVALUATION/PLAN OF CARE NOTE (updated 3/23)      Date: 6/10/2024          Patient Name:  Evelio Marroquin :  1960   Medical   Diagnosis:  Back pain [M54.9] Treatment Diagnosis:  M54.59  OTHER LOWER BACK PAIN    Referral Source:  Yesica Askew APRN - * Provider #:  [id number]                  Insurance: Payor: BCBS / Plan: BCBS OUT OF STATE / Product Type: *No Product type* /      Patient  verified yes     Visit #   Current  / Total 1 24   Time   In / Out 936 AM 1020 AM   Total Treatment Time 44   Total Timed Codes 15   1:1 Treatment Time 15       BC Totals Reminder:  bill using total billable   min of TIMED therapeutic procedures and modalities.   8-22 min = 1 unit; 23-37 min = 2 units; 38-52 min = 3 units;  53-67 min = 4 units; 68-82 min = 5 units           SUBJECTIVE  Pain Level (0-10 scale): 8/10  []constant []intermittent []improving []worsening []no change since onset    Any medication changes, allergies to medications, adverse drug reactions, diagnosis change, or new procedure performed?: [x] No    [] Yes (see summary sheet for update)  Medications: Verified on Patient Summary List    Subjective functional status/changes:     Pt reports that on 24 she began having L sided low back pain and pain and tingling down LLE. States she has to sleep in a chair at night to get comfortable. Standing and walking increase pain. Sitting in recliner with feet down is most comfortable position. Pt had lumbar diskectomy \"years ago\" and until recently has not had any issues with her back.      Start of Care: 6/10/2024  Onset Date: 2024  Current symptoms/Complaints: L side low back pain, left lateral hip pain and N/T  Mechanism of Injury:

## 2024-06-12 ENCOUNTER — HOSPITAL ENCOUNTER (OUTPATIENT)
Facility: HOSPITAL | Age: 64
Setting detail: RECURRING SERIES
Discharge: HOME | End: 2024-06-15
Payer: COMMERCIAL

## 2024-06-12 PROCEDURE — 97110 THERAPEUTIC EXERCISES: CPT | Performed by: PHYSICAL THERAPIST

## 2024-06-12 PROCEDURE — 97140 MANUAL THERAPY 1/> REGIONS: CPT | Performed by: PHYSICAL THERAPIST

## 2024-06-12 NOTE — PROGRESS NOTES
PHYSICAL THERAPY - MEDICARE DAILY TREATMENT NOTE (updated 3/23)      Date: 2024          Patient Name:  Evelio Marroquin :  1960   Medical   Diagnosis:  Back pain [M54.9] Treatment Diagnosis:  M54.59  OTHER LOWER BACK PAIN    Referral Source:  Yesica Askew APRN - * Insurance:   Payor: BCBS / Plan: BCBS OUT OF STATE / Product Type: *No Product type* /                     Patient  verified yes     Visit #   Current  / Total 2 24   Time   In / Out 1104 AM 1148 AM   Total Treatment Time 44   Total Timed Codes 34   1:1 Treatment Time 34      [facility] Totals Reminder:  bill using total billable   min of TIMED therapeutic procedures and modalities.   8-22 min = 1 unit; 23-37 min = 2 units; 38-52 min = 3 units; 53-67 min = 4 units; 68-82 min = 5 units            SUBJECTIVE    Pain Level (0-10 scale): 7/10    Any medication changes, allergies to medications, adverse drug reactions, diagnosis change, or new procedure performed?: [x] No    [] Yes (see summary sheet for update)  Medications: Verified on Patient Summary List    Subjective functional status/changes:     Pt reports that pain levels remain about the same and is having a lot of trouble getting comfortable at night.     OBJECTIVE      Therapeutic Procedures:  Tx Min Billable or 1:1 Min (if diff from Tx Min) Procedure, Rationale, Specifics   10  [id number] Therapeutic Exercise (timed):  increase ROM, strength, coordination, balance, and proprioception to improve patient's ability to progress to PLOF and address remaining functional goals. (see flow sheet as applicable)     Details if applicable:     24  [id number] Manual Therapy (timed):  decrease pain, increase ROM, and increase tissue extensibility to improve patient's ability to progress to PLOF and address remaining functional goals.  The manual therapy interventions were performed at a separate and distinct time from the therapeutic activities interventions . (see flow sheet as applicable)     Details if

## 2024-06-19 ENCOUNTER — HOSPITAL ENCOUNTER (OUTPATIENT)
Facility: HOSPITAL | Age: 64
Setting detail: RECURRING SERIES
Discharge: HOME | End: 2024-06-22
Payer: COMMERCIAL

## 2024-06-19 PROCEDURE — 97110 THERAPEUTIC EXERCISES: CPT | Performed by: PHYSICAL THERAPIST

## 2024-06-19 PROCEDURE — 97140 MANUAL THERAPY 1/> REGIONS: CPT | Performed by: PHYSICAL THERAPIST

## 2024-06-19 NOTE — PROGRESS NOTES
PHYSICAL THERAPY - MEDICARE DAILY TREATMENT NOTE (updated 3/23)      Date: 2024          Patient Name:  Evelio Marroquin :  1960   Medical   Diagnosis:  Back pain [M54.9] Treatment Diagnosis:  M54.59  OTHER LOWER BACK PAIN    Referral Source:  Yesica Askew APRN - * Insurance:   Payor: BCBS / Plan: BCBS OUT OF STATE / Product Type: *No Product type* /                     Patient  verified yes     Visit #   Current  / Total 3 24   Time   In / Out 900 AM 1000 AM   Total Treatment Time 60   Total Timed Codes 50   1:1 Treatment Time 40       BC Totals Reminder:  bill using total billable   min of TIMED therapeutic procedures and modalities.   8-22 min = 1 unit; 23-37 min = 2 units; 38-52 min = 3 units; 53-67 min = 4 units; 68-82 min = 5 units            SUBJECTIVE    Pain Level (0-10 scale):8/10    Any medication changes, allergies to medications, adverse drug reactions, diagnosis change, or new procedure performed?: [x] No    [] Yes (see summary sheet for update)  Medications: Verified on Patient Summary List    Subjective functional status/changes:     Pt reports that she hasn't been able to sleep which is making things worse.    OBJECTIVE      Therapeutic Procedures:  Tx Min Billable or 1:1 Min (if diff from Tx Min) Procedure, Rationale, Specifics   35 23 [id number] Therapeutic Exercise (timed):  increase ROM, strength, coordination, balance, and proprioception to improve patient's ability to progress to PLOF and address remaining functional goals. (see flow sheet as applicable)     Details if applicable:     15 15 [id number] Manual Therapy (timed):  decrease pain, increase ROM, and increase tissue extensibility to improve patient's ability to progress to PLOF and address remaining functional goals.  The manual therapy interventions were performed at a separate and distinct time from the therapeutic activities interventions . (see flow sheet as applicable)     Details if applicable:  STM QL and piriformis

## 2024-06-21 ENCOUNTER — HOSPITAL ENCOUNTER (OUTPATIENT)
Facility: HOSPITAL | Age: 64
Setting detail: RECURRING SERIES
End: 2024-06-21
Payer: COMMERCIAL

## 2024-06-25 ENCOUNTER — HOSPITAL ENCOUNTER (OUTPATIENT)
Facility: HOSPITAL | Age: 64
Setting detail: RECURRING SERIES
Discharge: HOME | End: 2024-06-28
Payer: COMMERCIAL

## 2024-06-25 PROCEDURE — 97140 MANUAL THERAPY 1/> REGIONS: CPT

## 2024-06-25 PROCEDURE — 97110 THERAPEUTIC EXERCISES: CPT

## 2024-06-25 NOTE — PROGRESS NOTES
PHYSICAL THERAPY - MEDICARE DAILY TREATMENT NOTE (updated 3/23)      Date: 2024          Patient Name:  Evelio Marroquin :  1960   Medical   Diagnosis:  Back pain [M54.9] Treatment Diagnosis:  M54.59  OTHER LOWER BACK PAIN    Referral Source:  Yesica Askew APRN - * Insurance:   Payor: BCBS / Plan: BCBS OUT OF STATE / Product Type: *No Product type* /                     Patient  verified yes     Visit #   Current  / Total 4 24   Time   In / Out 800  AM   Total Treatment Time 50   Total Timed Codes 40   1:1 Treatment Time 30      [facility] Totals Reminder:  bill using total billable   min of TIMED therapeutic procedures and modalities.   8-22 min = 1 unit; 23-37 min = 2 units; 38-52 min = 3 units; 53-67 min = 4 units; 68-82 min = 5 units            SUBJECTIVE    Pain Level (0-10 scale): 7/10    Any medication changes, allergies to medications, adverse drug reactions, diagnosis change, or new procedure performed?: [x] No    [] Yes (see summary sheet for update)  Medications: Verified on Patient Summary List    Subjective functional status/changes:     Pt reports that she is still hurting. Hasn't been able to sleep due to the pain - can't get comfortable.     OBJECTIVE      Therapeutic Procedures:  Tx Min Billable or 1:1 Min (if diff from Tx Min) Procedure, Rationale, Specifics   35 29 [id number] Therapeutic Exercise (timed):  increase ROM, strength, coordination, balance, and proprioception to improve patient's ability to progress to PLOF and address remaining functional goals. (see flow sheet as applicable)     Details if applicable:     15 15 [id number] Manual Therapy (timed):  decrease pain, increase ROM, and increase tissue extensibility to improve patient's ability to progress to PLOF and address remaining functional goals.  The manual therapy interventions were performed at a separate and distinct time from the therapeutic activities interventions . (see flow sheet as applicable)     Details if

## 2024-06-28 ENCOUNTER — HOSPITAL ENCOUNTER (OUTPATIENT)
Facility: HOSPITAL | Age: 64
Setting detail: RECURRING SERIES
Discharge: HOME | End: 2024-07-01
Payer: COMMERCIAL

## 2024-06-28 PROCEDURE — G0283 ELEC STIM OTHER THAN WOUND: HCPCS

## 2024-06-28 PROCEDURE — 97110 THERAPEUTIC EXERCISES: CPT

## 2024-06-28 PROCEDURE — 97140 MANUAL THERAPY 1/> REGIONS: CPT

## 2024-06-28 NOTE — PROGRESS NOTES
PHYSICAL THERAPY - MEDICARE DAILY TREATMENT NOTE (updated 3/23)      Date: 2024          Patient Name:  Evelio Marroquin :  1960   Medical   Diagnosis:  Back pain [M54.9] Treatment Diagnosis:  M54.59  OTHER LOWER BACK PAIN    Referral Source:  Yesica Askew APRN - * Insurance:   Payor: BCBS / Plan: BCBS OUT OF STATE / Product Type: *No Product type* /                     Patient  verified yes     Visit #   Current  / Total 5 24   Time   In / Out 800  AM   Total Treatment Time 53   Total Timed Codes 38   1:1 Treatment Time 30      [facility] Totals Reminder:  bill using total billable   min of TIMED therapeutic procedures and modalities.   8-22 min = 1 unit; 23-37 min = 2 units; 38-52 min = 3 units; 53-67 min = 4 units; 68-82 min = 5 units            SUBJECTIVE    Pain Level (0-10 scale): 10    Any medication changes, allergies to medications, adverse drug reactions, diagnosis change, or new procedure performed?: [x] No    [] Yes (see summary sheet for update)  Medications: Verified on Patient Summary List    Subjective functional status/changes:     Pt reports that she is still hurting. Hasn't been able to sleep due to the pain - can't get comfortable.     OBJECTIVE      Therapeutic Procedures:  Tx Min Billable or 1:1 Min (if diff from Tx Min) Procedure, Rationale, Specifics   23 15 [id number] Therapeutic Exercise (timed):  increase ROM, strength, coordination, balance, and proprioception to improve patient's ability to progress to PLOF and address remaining functional goals. (see flow sheet as applicable)     Details if applicable:     15 15 [id number] Manual Therapy (timed):  decrease pain, increase ROM, and increase tissue extensibility to improve patient's ability to progress to PLOF and address remaining functional goals.  The manual therapy interventions were performed at a separate and distinct time from the therapeutic activities interventions . (see flow sheet as applicable)     Details if

## 2024-10-21 DIAGNOSIS — F41.8 DEPRESSION WITH ANXIETY: ICD-10-CM

## 2024-10-21 NOTE — TELEPHONE ENCOUNTER
PCP: Yesica Askew, APRN - NP    Last appt: 5/13/2024   No future appointments.    Requested Prescriptions     Pending Prescriptions Disp Refills    albuterol sulfate HFA (PROVENTIL;VENTOLIN;PROAIR) 108 (90 Base) MCG/ACT inhaler 18 g 3     Sig: Inhale 2 puffs into the lungs every 6 hours as needed for Shortness of Breath    sertraline (ZOLOFT) 100 MG tablet 90 tablet 1     Sig: Take 1 tablet by mouth daily         Prior labs and Blood pressures:  BP Readings from Last 3 Encounters:   05/13/24 (!) 152/70   03/08/24 138/64   01/25/24 128/73     Lab Results   Component Value Date/Time     05/13/2024 08:54 AM    K 4.6 05/13/2024 08:54 AM     05/13/2024 08:54 AM    CO2 30 05/13/2024 08:54 AM    BUN 36 05/13/2024 08:54 AM    GFRAA 59 07/25/2022 10:36 AM     No results found for: \"HBA1C\", \"[id number]\"  Lab Results   Component Value Date/Time    CHOL 188 05/13/2024 08:54 AM    HDL 60 05/13/2024 08:54 AM    .4 05/13/2024 08:54 AM    .4 05/17/2023 08:40 AM    VLDL 12.6 05/13/2024 08:54 AM     No results found for: \"VITD3\"    Lab Results   Component Value Date/Time    TSH 0.73 05/11/2021 03:11 PM

## 2024-10-22 RX ORDER — SERTRALINE HYDROCHLORIDE 100 MG/1
100 TABLET, FILM COATED ORAL DAILY
Qty: 90 TABLET | Refills: 1 | Status: SHIPPED | OUTPATIENT
Start: 2024-10-22

## 2024-10-22 RX ORDER — ALBUTEROL SULFATE 90 UG/1
2 INHALANT RESPIRATORY (INHALATION) EVERY 6 HOURS PRN
Qty: 18 G | Refills: 3 | Status: SHIPPED | OUTPATIENT
Start: 2024-10-22

## 2024-10-24 NOTE — DISCHARGE INSTRUCTIONS
Kymberly Torres is a 35 y.o. male is here today for medication management follow-up. Patient's PCP is [facility] located in [facility]    Chief Complaint:  Recheck on behaviors     History of Present Illness:     Presents with caretaker Enoch.  Enoch is the historian.  Enoch states that patient has had a couple of negative behaviors since last visit.  He has smashed a TV when he became angry.  He continues to ask questions constantly and will repeat the questions over and over.  Pt is sleeping well at night.  Occasionally napping during the day but denies pt is drowsy during the day.  Pt is able to be redirected most of the time.  Not always cooperative.  Body mass index is 27.11 kg/m².             The following portions of the patient's history were reviewed and updated as appropriate: allergies, current medications, past family history, past medical history, past social history, past surgical history and problem list.    Review of Systems   Constitutional:  Negative for activity change, appetite change and fatigue.   HENT: Negative.     Eyes:  Negative for visual disturbance.   Respiratory: Negative.     Cardiovascular: Negative.    Gastrointestinal:  Negative for nausea.   Endocrine: Negative.    Genitourinary: Negative.    Musculoskeletal:  Negative for arthralgias.   Skin: Negative.    Allergic/Immunologic: Negative.    Neurological:  Negative for dizziness, seizures and headaches.   Hematological: Negative.    Psychiatric/Behavioral:  Positive for behavioral problems. Negative for agitation, confusion, decreased concentration, dysphoric mood, hallucinations, self-injury, sleep disturbance and suicidal ideas. The patient is not nervous/anxious and is not hyperactive.    Objective       Physical Exam  Vitals reviewed.   Constitutional:       Appearance: He is normal weight.   Musculoskeletal:      Cervical back: Normal range of motion and neck supple.   Neurological:      Mental Status: He  Thank you for allowing us to provide you with medical care today. We realize that you have many choices for your emergency care needs. We thank you for choosing [facility].  Please choose us in the future for any continued health care needs. We hope we addressed all of your medical concerns. We strive to provide excellent quality care in the Emergency Department. Anything less than excellent does not meet our expectations. The exam and treatment you received in the Emergency Department were for an emergent problem and are not intended as complete care. It is important that you follow up with a doctor, nurse practitioner, or  [id number] assistant for ongoing care. If your symptoms worsen or you do not improve as expected and you are unable to reach your usual health care provider, you should return to the Emergency Department. We are available 24 hours a day. Take this sheet with you when you go to your follow-up visit. If you have any problem arranging the follow-up visit, contact the Emergency Department immediately. Make an appointment your family doctor for follow up of this visit. Return to the ER if you are unable to be seen in a timely manner. "is alert.   Psychiatric:         Attention and Perception: He is inattentive.         Mood and Affect: Mood is not anxious. Affect is blunt. Affect is not angry.         Behavior: Behavior is not agitated, aggressive or combative.         Cognition and Memory: Cognition is impaired.         Judgment: Judgment is impulsive.      Comments: Today pt was not making eye contact and would not acknowledge me.  He went into bathroom to try and give UDS but defecated in the specimen cup.         Blood pressure 138/94, pulse 108, height 162.6 cm (64.02\"), weight 71.7 kg (158 lb), SpO2 99%.    Medication List:   Current Outpatient Medications   Medication Sig Dispense Refill    clonazePAM (KlonoPIN) 0.5 MG tablet Take 1 tablet by mouth 2 (Two) Times a Day. 60 tablet 3    DULoxetine (CYMBALTA) 60 MG capsule 2 po daily 60 capsule 3    lamoTRIgine (LaMICtal) 100 MG tablet Take 1 tablet by mouth 2 (Two) Times a Day. 60 tablet 3    QUEtiapine (SEROquel) 200 MG tablet One in the morning and one in the afternoon 60 tablet 3    QUEtiapine (SEROquel) 400 MG tablet Take 1 tablet by mouth Every Night. 30 tablet 3    benzoyl peroxide 5 % external wash Apply 148 doses topically to the appropriate area as directed 2 (two) times a day.      carbamide peroxide (DEBROX) 6.5 % otic solution Administer 5 drops into ear(s) as directed by provider.      cholecalciferol (VITAMIN D3) 10 MCG (400 UNIT) tablet Take 400 Units by mouth.      docusate sodium 250 MG capsule Take  by mouth.      gabapentin (NEURONTIN) 300 MG capsule Take 1 capsule by mouth 4 (Four) Times a Day. One in the AM and 2 in the PM      levothyroxine (SYNTHROID, LEVOTHROID) 150 MCG tablet Take 150 mcg by mouth Daily.      Linzess 145 MCG capsule capsule Take 145 mcg by mouth Daily.      tretinoin (RETIN-A) 0.1 % cream APPLY PEA-SIZED AMOUNT TO THE AFFECTED AREA AT BEDTIME MIX WITH CLINDAMYCIN IN MORNINGS       No current facility-administered medications for this visit. "     Reviewed copied data and there are no changes    Mental Status Exam:   Hygiene:   fair  Cooperation:  Cooperative  Eye Contact:  Poor  Psychomotor Behavior:  Restless  Affect:  Blunted  Hopelessness: Denies  Speech:   difficult to understand  Thought Process:  Unable to demonstrate  Thought Content:  Unable to demonstrate  Suicidal:  None  Homicidal:  None  Hallucinations:  Not demonstrated today  Delusion:  Unable to demonstrate  Memory:  Unable to evaluate  Orientation:  Unable to evaluate  Reliability:  poor  Insight:  Poor  Judgement:  Poor  Impulse Control:  Poor  Physical/Medical Issues:  Yes hypothyroidism    Assessment & Plan   Problems Addressed this Visit          Neuro    Intellectual disability     Other Visit Diagnoses       Intermittent explosive disorder in adult    -  Primary    Relevant Medications    clonazePAM (KlonoPIN) 0.5 MG tablet    DULoxetine (CYMBALTA) 60 MG capsule    lamoTRIgine (LaMICtal) 100 MG tablet    QUEtiapine (SEROquel) 200 MG tablet    QUEtiapine (SEROquel) 400 MG tablet    Mixed obsessional thoughts and acts        Relevant Medications    DULoxetine (CYMBALTA) 60 MG capsule          Diagnoses         Codes Comments    Intermittent explosive disorder in adult    -  Primary ICD-10-CM: F63.81  ICD-9-CM: 312.34     Mixed obsessional thoughts and acts     ICD-10-CM: F42.2  ICD-9-CM: 300.3     Intellectual disability     ICD-10-CM: F79  ICD-9-CM: 319             Functionality: pt having significant impairment in important areas of daily functioning.  Prognosis: Guarded dependent on medication/follow up and treatment plan compliance.  Champ reviewed.  Patient has Uds in system reviewed.    Patient was unable to urinate for urine drug screen.  Due to his intellectual disability I do not feel an oral swab is needed as patient gets upset very easily and has to sit in the car awaiting appointment as he can get so easily agitated.  I feel it is more detrimental to try and push the pt  to give a uds and he will not understand giving an oral swab.          Pt seems at his baseline behavior.  I do not feel a medication change is necessary today.  He will continue cymbalta for the obsessional thoughts, continue the Seroquel for the intermittent explosive disorder, continue Lamictal for intermittent explosive disorder, continue Klonopin for the intermittent explosive disorder.  Refills of all been submitted.RTC 4 months.  Sooner if needed.   Caregiver will notify me should any problems develop and I can see him sooner.                 This document has been electronically signed by TAHIR Galloway on   October 24, 2024 14:10 EDT.

## 2024-10-30 ENCOUNTER — TELEPHONE (OUTPATIENT)
Age: 64
End: 2024-10-30

## 2024-10-30 NOTE — TELEPHONE ENCOUNTER
Pt called back stating that ROLANDO Askew told pt to call office to speak to nurse. To try to get pt an early appt do to pt leg still be swolllen.        BCBN [phone]

## 2024-11-01 ENCOUNTER — HOSPITAL ENCOUNTER (INPATIENT)
Facility: HOSPITAL | Age: 64
LOS: 3 days | Discharge: HOME | DRG: 291 | End: 2024-11-04
Attending: EMERGENCY MEDICINE | Admitting: FAMILY MEDICINE
Payer: COMMERCIAL

## 2024-11-01 ENCOUNTER — OFFICE VISIT (OUTPATIENT)
Age: 64
End: 2024-11-01

## 2024-11-01 ENCOUNTER — APPOINTMENT (OUTPATIENT)
Facility: HOSPITAL | Age: 64
DRG: 291 | End: 2024-11-01

## 2024-11-01 VITALS
OXYGEN SATURATION: 94 % | HEART RATE: 75 BPM | RESPIRATION RATE: 12 BRPM | SYSTOLIC BLOOD PRESSURE: 154 MMHG | HEIGHT: 72 IN | DIASTOLIC BLOOD PRESSURE: 75 MMHG | TEMPERATURE: 97.5 F | BODY MASS INDEX: 39.68 KG/M2 | WEIGHT: 293 LBS

## 2024-11-01 DIAGNOSIS — I50.9 CONGESTIVE HEART FAILURE, UNSPECIFIED HF CHRONICITY, UNSPECIFIED HEART FAILURE TYPE (HCC): ICD-10-CM

## 2024-11-01 DIAGNOSIS — I50.23 CHF (CONGESTIVE HEART FAILURE), NYHA CLASS IV, ACUTE ON CHRONIC, SYSTOLIC (HCC): Primary | ICD-10-CM

## 2024-11-01 DIAGNOSIS — I50.9 ACUTE DECOMPENSATED HEART FAILURE (HCC): Primary | ICD-10-CM

## 2024-11-01 DIAGNOSIS — Z79.899 MEDICATION MANAGEMENT: ICD-10-CM

## 2024-11-01 DIAGNOSIS — N18.32 STAGE 3B CHRONIC KIDNEY DISEASE (HCC): ICD-10-CM

## 2024-11-01 LAB
ALBUMIN SERPL-MCNC: 3.5 G/DL (ref 3.5–5)
ALBUMIN/GLOB SERPL: 0.8 (ref 1.1–2.2)
ALP SERPL-CCNC: 103 U/L (ref 45–117)
ALT SERPL-CCNC: 45 U/L (ref 12–78)
ANION GAP SERPL CALC-SCNC: 3 MMOL/L (ref 2–12)
AST SERPL-CCNC: 25 U/L (ref 15–37)
BASOPHILS # BLD: 0 K/UL (ref 0–0.1)
BASOPHILS NFR BLD: 1 % (ref 0–1)
BILIRUB SERPL-MCNC: 0.5 MG/DL (ref 0.2–1)
BUN SERPL-MCNC: 21 MG/DL (ref 6–20)
BUN/CREAT SERPL: 19 (ref 12–20)
CALCIUM SERPL-MCNC: 10.2 MG/DL (ref 8.5–10.1)
CHLORIDE SERPL-SCNC: 111 MMOL/L (ref 97–108)
CO2 SERPL-SCNC: 27 MMOL/L (ref 21–32)
COMMENT:: NORMAL
CREAT SERPL-MCNC: 1.08 MG/DL (ref 0.55–1.02)
DIFFERENTIAL METHOD BLD: NORMAL
EKG ATRIAL RATE: 80 BPM
EKG DIAGNOSIS: NORMAL
EKG P AXIS: 17 DEGREES
EKG P-R INTERVAL: 180 MS
EKG Q-T INTERVAL: 386 MS
EKG QRS DURATION: 106 MS
EKG QTC CALCULATION (BAZETT): 445 MS
EKG R AXIS: -30 DEGREES
EKG T AXIS: 91 DEGREES
EKG VENTRICULAR RATE: 80 BPM
EOSINOPHIL # BLD: 0.2 K/UL (ref 0–0.4)
EOSINOPHIL NFR BLD: 3 % (ref 0–7)
ERYTHROCYTE [DISTWIDTH] IN BLOOD BY AUTOMATED COUNT: 12.2 % (ref 11.5–14.5)
GLOBULIN SER CALC-MCNC: 4.2 G/DL (ref 2–4)
GLUCOSE SERPL-MCNC: 99 MG/DL (ref 65–100)
HCT VFR BLD AUTO: 37.5 % (ref 35–47)
HGB BLD-MCNC: 12 G/DL (ref 11.5–16)
IMM GRANULOCYTES # BLD AUTO: 0 K/UL (ref 0–0.04)
IMM GRANULOCYTES NFR BLD AUTO: 0 % (ref 0–0.5)
LYMPHOCYTES # BLD: 1 K/UL (ref 0.8–3.5)
LYMPHOCYTES NFR BLD: 17 % (ref 12–49)
MAGNESIUM SERPL-MCNC: 2.2 MG/DL (ref 1.6–2.4)
MCH RBC QN AUTO: 29.1 PG (ref 26–34)
MCHC RBC AUTO-ENTMCNC: 32 G/DL (ref 30–36.5)
MCV RBC AUTO: 91 FL (ref 80–99)
MONOCYTES # BLD: 0.5 K/UL (ref 0–1)
MONOCYTES NFR BLD: 9 % (ref 5–13)
NEUTS SEG # BLD: 4.1 K/UL (ref 1.8–8)
NEUTS SEG NFR BLD: 70 % (ref 32–75)
NRBC # BLD: 0 K/UL (ref 0–0.01)
NRBC BLD-RTO: 0 PER 100 WBC
NT PRO BNP: 7262 PG/ML
PLATELET # BLD AUTO: 221 K/UL (ref 150–400)
PMV BLD AUTO: 10.7 FL (ref 8.9–12.9)
POTASSIUM SERPL-SCNC: 3.9 MMOL/L (ref 3.5–5.1)
PROT SERPL-MCNC: 7.7 G/DL (ref 6.4–8.2)
RBC # BLD AUTO: 4.12 M/UL (ref 3.8–5.2)
SODIUM SERPL-SCNC: 141 MMOL/L (ref 136–145)
SPECIMEN HOLD: NORMAL
TROPONIN I SERPL HS-MCNC: 201 NG/L (ref 0–51)
WBC # BLD AUTO: 5.8 K/UL (ref 3.6–11)

## 2024-11-01 PROCEDURE — 6360000002 HC RX W HCPCS: Performed by: FAMILY MEDICINE

## 2024-11-01 PROCEDURE — 6360000002 HC RX W HCPCS: Performed by: EMERGENCY MEDICINE

## 2024-11-01 PROCEDURE — 84484 ASSAY OF TROPONIN QUANT: CPT

## 2024-11-01 PROCEDURE — 85025 COMPLETE CBC W/AUTO DIFF WBC: CPT

## 2024-11-01 PROCEDURE — 83880 ASSAY OF NATRIURETIC PEPTIDE: CPT

## 2024-11-01 PROCEDURE — 2580000003 HC RX 258: Performed by: FAMILY MEDICINE

## 2024-11-01 PROCEDURE — 99285 EMERGENCY DEPT VISIT HI MDM: CPT

## 2024-11-01 PROCEDURE — 93010 ELECTROCARDIOGRAM REPORT: CPT | Performed by: INTERNAL MEDICINE

## 2024-11-01 PROCEDURE — 93005 ELECTROCARDIOGRAM TRACING: CPT | Performed by: EMERGENCY MEDICINE

## 2024-11-01 PROCEDURE — 83735 ASSAY OF MAGNESIUM: CPT

## 2024-11-01 PROCEDURE — 2060000000 HC ICU INTERMEDIATE R&B

## 2024-11-01 PROCEDURE — 6370000000 HC RX 637 (ALT 250 FOR IP): Performed by: FAMILY MEDICINE

## 2024-11-01 PROCEDURE — 80053 COMPREHEN METABOLIC PANEL: CPT

## 2024-11-01 PROCEDURE — 96374 THER/PROPH/DIAG INJ IV PUSH: CPT

## 2024-11-01 PROCEDURE — 71045 X-RAY EXAM CHEST 1 VIEW: CPT

## 2024-11-01 PROCEDURE — 36415 COLL VENOUS BLD VENIPUNCTURE: CPT

## 2024-11-01 RX ORDER — ENOXAPARIN SODIUM 100 MG/ML
30 INJECTION SUBCUTANEOUS 2 TIMES DAILY
Status: DISCONTINUED | OUTPATIENT
Start: 2024-11-01 | End: 2024-11-04 | Stop reason: HOSPADM

## 2024-11-01 RX ORDER — CHLORTHALIDONE 25 MG/1
12.5 TABLET ORAL DAILY
Status: DISCONTINUED | OUTPATIENT
Start: 2024-11-01 | End: 2024-11-02

## 2024-11-01 RX ORDER — SODIUM CHLORIDE 0.9 % (FLUSH) 0.9 %
5-40 SYRINGE (ML) INJECTION PRN
Status: DISCONTINUED | OUTPATIENT
Start: 2024-11-01 | End: 2024-11-04 | Stop reason: HOSPADM

## 2024-11-01 RX ORDER — ACETAMINOPHEN 325 MG/1
650 TABLET ORAL EVERY 6 HOURS PRN
Status: DISCONTINUED | OUTPATIENT
Start: 2024-11-01 | End: 2024-11-04 | Stop reason: HOSPADM

## 2024-11-01 RX ORDER — ONDANSETRON 4 MG/1
4 TABLET, ORALLY DISINTEGRATING ORAL EVERY 8 HOURS PRN
Status: DISCONTINUED | OUTPATIENT
Start: 2024-11-01 | End: 2024-11-04 | Stop reason: HOSPADM

## 2024-11-01 RX ORDER — FUROSEMIDE 10 MG/ML
40 INJECTION INTRAMUSCULAR; INTRAVENOUS DAILY
Status: DISCONTINUED | OUTPATIENT
Start: 2024-11-02 | End: 2024-11-03

## 2024-11-01 RX ORDER — FUROSEMIDE 10 MG/ML
80 INJECTION INTRAMUSCULAR; INTRAVENOUS ONCE
Status: COMPLETED | OUTPATIENT
Start: 2024-11-01 | End: 2024-11-01

## 2024-11-01 RX ORDER — ALBUTEROL SULFATE 0.83 MG/ML
1.25 SOLUTION RESPIRATORY (INHALATION) EVERY 6 HOURS PRN
Status: DISCONTINUED | OUTPATIENT
Start: 2024-11-01 | End: 2024-11-04 | Stop reason: HOSPADM

## 2024-11-01 RX ORDER — POLYETHYLENE GLYCOL 3350 17 G/17G
17 POWDER, FOR SOLUTION ORAL DAILY PRN
Status: DISCONTINUED | OUTPATIENT
Start: 2024-11-01 | End: 2024-11-04 | Stop reason: HOSPADM

## 2024-11-01 RX ORDER — SODIUM CHLORIDE 9 MG/ML
INJECTION, SOLUTION INTRAVENOUS PRN
Status: DISCONTINUED | OUTPATIENT
Start: 2024-11-01 | End: 2024-11-04 | Stop reason: HOSPADM

## 2024-11-01 RX ORDER — MAGNESIUM SULFATE IN WATER 40 MG/ML
2000 INJECTION, SOLUTION INTRAVENOUS PRN
Status: DISCONTINUED | OUTPATIENT
Start: 2024-11-01 | End: 2024-11-04 | Stop reason: HOSPADM

## 2024-11-01 RX ORDER — POTASSIUM CHLORIDE 7.45 MG/ML
10 INJECTION INTRAVENOUS PRN
Status: DISCONTINUED | OUTPATIENT
Start: 2024-11-01 | End: 2024-11-04 | Stop reason: HOSPADM

## 2024-11-01 RX ORDER — SODIUM CHLORIDE 0.9 % (FLUSH) 0.9 %
5-40 SYRINGE (ML) INJECTION EVERY 12 HOURS SCHEDULED
Status: DISCONTINUED | OUTPATIENT
Start: 2024-11-01 | End: 2024-11-04 | Stop reason: HOSPADM

## 2024-11-01 RX ORDER — ACETAMINOPHEN 650 MG/1
650 SUPPOSITORY RECTAL EVERY 6 HOURS PRN
Status: DISCONTINUED | OUTPATIENT
Start: 2024-11-01 | End: 2024-11-04 | Stop reason: HOSPADM

## 2024-11-01 RX ORDER — FERROUS SULFATE 325(65) MG
325 TABLET ORAL EVERY OTHER DAY
Status: DISCONTINUED | OUTPATIENT
Start: 2024-11-01 | End: 2024-11-04 | Stop reason: HOSPADM

## 2024-11-01 RX ORDER — CARVEDILOL 12.5 MG/1
12.5 TABLET ORAL 2 TIMES DAILY WITH MEALS
Status: DISCONTINUED | OUTPATIENT
Start: 2024-11-01 | End: 2024-11-04 | Stop reason: HOSPADM

## 2024-11-01 RX ORDER — POTASSIUM CHLORIDE 750 MG/1
40 TABLET, EXTENDED RELEASE ORAL PRN
Status: DISCONTINUED | OUTPATIENT
Start: 2024-11-01 | End: 2024-11-04 | Stop reason: HOSPADM

## 2024-11-01 RX ORDER — ONDANSETRON 2 MG/ML
4 INJECTION INTRAMUSCULAR; INTRAVENOUS EVERY 6 HOURS PRN
Status: DISCONTINUED | OUTPATIENT
Start: 2024-11-01 | End: 2024-11-04 | Stop reason: HOSPADM

## 2024-11-01 RX ADMIN — FUROSEMIDE 80 MG: 10 INJECTION, SOLUTION INTRAMUSCULAR; INTRAVENOUS at 14:35

## 2024-11-01 RX ADMIN — SACUBITRIL AND VALSARTAN 1 TABLET: 49; 51 TABLET, FILM COATED ORAL at 19:56

## 2024-11-01 RX ADMIN — ENOXAPARIN SODIUM 30 MG: 100 INJECTION SUBCUTANEOUS at 16:56

## 2024-11-01 RX ADMIN — ENOXAPARIN SODIUM 30 MG: 100 INJECTION SUBCUTANEOUS at 19:57

## 2024-11-01 RX ADMIN — CARVEDILOL 12.5 MG: 12.5 TABLET, FILM COATED ORAL at 16:56

## 2024-11-01 RX ADMIN — CHLORTHALIDONE 12.5 MG: 25 TABLET ORAL at 16:56

## 2024-11-01 RX ADMIN — SODIUM CHLORIDE, PRESERVATIVE FREE 5 ML: 5 INJECTION INTRAVENOUS at 19:56

## 2024-11-01 ASSESSMENT — PAIN - FUNCTIONAL ASSESSMENT: PAIN_FUNCTIONAL_ASSESSMENT: NONE - DENIES PAIN

## 2024-11-01 NOTE — ED NOTES
No   Repeat LA: Time Due   Antibiotic Given: No  Fluid Resuscitation: Total needed , Status other      VS x 2 post-fluid resuscitation:     Recommendation    Plan for next 24 hours: observation  Additional Comments:      Pending orders   Consults ordered: IP CONSULT TO CARDIOLOGY    Consulted provider:      If any further questions, please call Sending RN at [phone]  Electronically signed by: Electronically signed by Zandra Buchanan RN on 11/1/2024 at 5:08 PM

## 2024-11-01 NOTE — PROGRESS NOTES
Chief Complaint   Patient presents with    Foot Swelling     Ankles and legs; winded from walking as well         \"Have you been to the ER, urgent care clinic since your last visit?  Hospitalized since your last visit?\"    Urgent Care - Covid sx    “Have you seen or consulted any other health care providers outside of [facility] since your last visit?”    no     “Have you had a pap smear?”    NO    Date of last Cervical Cancer screen (HPV or PAP): 4/17/2018             Click Here for Release of Records Request           5/13/2024     8:09 AM   PHQ-9    Little interest or pleasure in doing things 0   Feeling down, depressed, or hopeless 0   PHQ-2 Score 0   PHQ-9 Total Score 0           Financial Resource Strain: Patient Declined (5/13/2024)    Overall Financial Resource Strain (CARDIA)     Difficulty of Paying Living Expenses: Patient declined      Food Insecurity: Patient Declined (5/13/2024)    Hunger Vital Sign     Worried About Running Out of Food in the Last Year: Patient declined     Ran Out of Food in the Last Year: Patient declined          Health Maintenance Due   Topic Date Due    Shingles vaccine (1 of 2) Never done    Respiratory Syncytial Virus (RSV) Pregnant or age 60 yrs+ (1 - 1-dose 60+ series) Never done    Cervical cancer screen  04/17/2023    Flu vaccine (1) 08/01/2024    COVID-19 Vaccine (3 - 2023-24 season) 09/01/2024        
patient (or guardian, if applicable) and other individuals in attendance at the appointment consented to the use of AI, including the recording.                    An electronic signature was used to authenticate this note.  -- CHARO Smith NP

## 2024-11-01 NOTE — H&P
time of admission. Route is PO if not otherwise noted.     Family and social history were personally reviewed, all pertinent and relevant details are outlined as above.    Objective:   BP (!) 173/82   Pulse 75   Temp 98.1 °F (36.7 °C) (Oral)   Resp 22   Ht 1.829 m (6')   SpO2 93%   BMI 41.96 kg/m²         PHYSICAL EXAM:   General: Alert x oriented x 3, awake, no acute distress,   HEENT: PEERL, EOMI, moist mucus membranes  Neck: Supple, no JVD, no meningeal signs  Chest: Clear to auscultation bilaterally   CVS: RRR, S1 S2 heard, no murmurs/rubs/gallops  Abd: Soft, non-tender, non-distended, +bowel sounds   Ext: No clubbing, no cyanosis, pitting edema bilateral lower extremities  Neuro/Psych: Pleasant mood and affect, CN 2-12 grossly intact, sensory grossly within normal limit, Strength 5/5 in all extremities, DTR 1+ x 4  Cap refill: Brisk, less than 3 seconds  Pulses: 2+, symmetric in all extremities  Skin: Warm, dry, without rashes or lesions    Data Review:   I have independently reviewed and interpreted patient's lab and all other diagnostic data    Abnormal Labs Reviewed   TROPONIN - Abnormal; Notable for the following components:       Result Value    Troponin, High Sensitivity 201 (*)     All other components within normal limits   COMPREHENSIVE METABOLIC PANEL - Abnormal; Notable for the following components:    Chloride 111 (*)     BUN 21 (*)     Creatinine 1.08 (*)     Est, Glom Filt Rate 58 (*)     Calcium 10.2 (*)     Globulin 4.2 (*)     Albumin/Globulin Ratio 0.8 (*)     All other components within normal limits   BRAIN NATRIURETIC PEPTIDE - Abnormal; Notable for the following components:    NT Pro-BNP 7,262 (*)     All other components within normal limits       [unfilled]    IMAGING:   XR CHEST PORTABLE    (Results Pending)        ECG/ECHO:  [unfilled]       Notes reviewed from all clinical/nonclinical/nursing services involved in patient's clinical care. Care coordination discussions

## 2024-11-01 NOTE — ED TRIAGE NOTES
Pt was at doctors and they ambulating pt noting O2 85%    CC SOB  Hx of CHF  Pt has not taken her lasix for about a month.     AOx4

## 2024-11-01 NOTE — ED PROVIDER NOTES
other components within normal limits   BRAIN NATRIURETIC PEPTIDE - Abnormal; Notable for the following components:    NT Pro-BNP 7,262 (*)     All other components within normal limits   EXTRA TUBES HOLD   MAGNESIUM   CBC WITH AUTO DIFFERENTIAL       All other labs were within normal range or not returned as of this dictation.    EMERGENCY DEPARTMENT COURSE and DIFFERENTIAL DIAGNOSIS/MDM:   Vitals:    Vitals:    11/01/24 1252   BP: (!) 177/88   Pulse: 84   Resp: 18   Temp: 98.1 °F (36.7 °C)   TempSrc: Oral   SpO2: 93%   Height: 1.829 m (6')           Medical Decision Making  64 y.o. female presents with increased leg swelling and now shortness of breath and orthopnea. Suspect decompensated heart failure due to noncompliance. Needing oxygen so will need admission. CXR shows pulmonary edema which fits pattern. Good kidney function. Will begin diuresis and admit to hospitalist.    Problems Addressed:  Acute decompensated heart failure (HCC): acute illness or injury    Amount and/or Complexity of Data Reviewed  Independent Historian: spouse  Labs: ordered. Decision-making details documented in ED Course.  Radiology: ordered and independent interpretation performed. Decision-making details documented in ED Course.  ECG/medicine tests: ordered and independent interpretation performed. Decision-making details documented in ED Course.    Risk  Prescription drug management.  Decision regarding hospitalization.  Diagnosis or treatment significantly limited by social determinants of health.  Risk Details: Loss of insurance and insufficient funds for current medication regimen            REASSESSMENT     ED Course as of 11/01/24 1422   Fri Nov 01, 2024   1258 EKG 1249: Georgette 80, NSR, left axis deviation. LVH by voltage. Nonspecific TWA.  [DK]      ED Course User Index  [DK] Enmanuel Gotti MD           CONSULTS:  None    PROCEDURES:  Unless otherwise noted below, none     Procedures      FINAL IMPRESSION      1. Acute

## 2024-11-02 ENCOUNTER — APPOINTMENT (OUTPATIENT)
Facility: HOSPITAL | Age: 64
DRG: 291 | End: 2024-11-02
Attending: FAMILY MEDICINE

## 2024-11-02 LAB
ANION GAP SERPL CALC-SCNC: 3 MMOL/L (ref 2–12)
BUN SERPL-MCNC: 19 MG/DL (ref 6–20)
BUN/CREAT SERPL: 18 (ref 12–20)
CALCIUM SERPL-MCNC: 10.2 MG/DL (ref 8.5–10.1)
CHLORIDE SERPL-SCNC: 106 MMOL/L (ref 97–108)
CO2 SERPL-SCNC: 31 MMOL/L (ref 21–32)
COMMENT:: NORMAL
CREAT SERPL-MCNC: 1.07 MG/DL (ref 0.55–1.02)
ECHO AR MAX VEL PISA: 4.6 M/S
ECHO AV AREA PEAK VELOCITY: 1.8 CM2
ECHO AV AREA PEAK VELOCITY: 4.1 CM2
ECHO AV PEAK GRADIENT: 23 MMHG
ECHO AV PEAK VELOCITY: 2.4 M/S
ECHO AV REGURGITANT PHT: 647.6 MILLISECOND
ECHO BSA: 2.67 M2
ECHO LV E' LATERAL VELOCITY: 3.7 CM/S
ECHO LV E' SEPTAL VELOCITY: 4.3 CM/S
ECHO LV EF PHYSICIAN: 50 %
ECHO LVOT AREA: 4.2 CM2
ECHO LVOT DIAM: 2.3 CM
ECHO LVOT MEAN GRADIENT: 12 MMHG
ECHO LVOT PEAK GRADIENT: 21 MMHG
ECHO LVOT PEAK GRADIENT: 4 MMHG
ECHO LVOT PEAK VELOCITY: 1 M/S
ECHO LVOT PEAK VELOCITY: 2.3 M/S
ECHO LVOT STROKE VOLUME INDEX: 74.6 ML/M2
ECHO LVOT SV: 190.2 ML
ECHO LVOT VTI: 45.8 CM
ECHO MV A VELOCITY: 0.81 M/S
ECHO MV AREA PHT: 1.9 CM2
ECHO MV AREA VTI: 3.1 CM2
ECHO MV E VELOCITY: 0.37 M/S
ECHO MV E/A RATIO: 0.46
ECHO MV E/E' LATERAL: 10
ECHO MV E/E' RATIO (AVERAGED): 9.3
ECHO MV E/E' SEPTAL: 8.6
ECHO MV LVOT VTI INDEX: 1.34
ECHO MV MAX VELOCITY: 2 M/S
ECHO MV MEAN GRADIENT: 6 MMHG
ECHO MV MEAN VELOCITY: 1.2 M/S
ECHO MV PEAK GRADIENT: 16 MMHG
ECHO MV PRESSURE HALF TIME (PHT): 115.1 MS
ECHO MV VTI: 61.2 CM
GLUCOSE SERPL-MCNC: 88 MG/DL (ref 65–100)
POTASSIUM SERPL-SCNC: 3.9 MMOL/L (ref 3.5–5.1)
SODIUM SERPL-SCNC: 140 MMOL/L (ref 136–145)
SPECIMEN HOLD: NORMAL

## 2024-11-02 PROCEDURE — 2060000000 HC ICU INTERMEDIATE R&B

## 2024-11-02 PROCEDURE — 2580000003 HC RX 258: Performed by: FAMILY MEDICINE

## 2024-11-02 PROCEDURE — 94760 N-INVAS EAR/PLS OXIMETRY 1: CPT

## 2024-11-02 PROCEDURE — 6370000000 HC RX 637 (ALT 250 FOR IP): Performed by: INTERNAL MEDICINE

## 2024-11-02 PROCEDURE — 6370000000 HC RX 637 (ALT 250 FOR IP): Performed by: FAMILY MEDICINE

## 2024-11-02 PROCEDURE — 6360000002 HC RX W HCPCS: Performed by: FAMILY MEDICINE

## 2024-11-02 PROCEDURE — 2700000000 HC OXYGEN THERAPY PER DAY

## 2024-11-02 PROCEDURE — 93306 TTE W/DOPPLER COMPLETE: CPT

## 2024-11-02 PROCEDURE — 80048 BASIC METABOLIC PNL TOTAL CA: CPT

## 2024-11-02 RX ORDER — SPIRONOLACTONE 25 MG/1
25 TABLET ORAL DAILY
Status: DISCONTINUED | OUTPATIENT
Start: 2024-11-02 | End: 2024-11-04 | Stop reason: HOSPADM

## 2024-11-02 RX ADMIN — CARVEDILOL 12.5 MG: 12.5 TABLET, FILM COATED ORAL at 16:56

## 2024-11-02 RX ADMIN — ENOXAPARIN SODIUM 30 MG: 100 INJECTION SUBCUTANEOUS at 21:37

## 2024-11-02 RX ADMIN — SACUBITRIL AND VALSARTAN 1 TABLET: 49; 51 TABLET, FILM COATED ORAL at 21:37

## 2024-11-02 RX ADMIN — FERROUS SULFATE TAB 325 MG (65 MG ELEMENTAL FE) 325 MG: 325 (65 FE) TAB at 08:01

## 2024-11-02 RX ADMIN — CARVEDILOL 12.5 MG: 12.5 TABLET, FILM COATED ORAL at 08:01

## 2024-11-02 RX ADMIN — FUROSEMIDE 40 MG: 10 INJECTION, SOLUTION INTRAVENOUS at 08:02

## 2024-11-02 RX ADMIN — ENOXAPARIN SODIUM 30 MG: 100 INJECTION SUBCUTANEOUS at 08:02

## 2024-11-02 RX ADMIN — SODIUM CHLORIDE, PRESERVATIVE FREE 10 ML: 5 INJECTION INTRAVENOUS at 21:37

## 2024-11-02 RX ADMIN — SPIRONOLACTONE 25 MG: 25 TABLET ORAL at 12:20

## 2024-11-02 RX ADMIN — CHLORTHALIDONE 12.5 MG: 25 TABLET ORAL at 08:01

## 2024-11-02 RX ADMIN — SODIUM CHLORIDE, PRESERVATIVE FREE 10 ML: 5 INJECTION INTRAVENOUS at 08:04

## 2024-11-02 RX ADMIN — SACUBITRIL AND VALSARTAN 1 TABLET: 49; 51 TABLET, FILM COATED ORAL at 08:02

## 2024-11-02 ASSESSMENT — PAIN SCALES - GENERAL: PAINLEVEL_OUTOF10: 0

## 2024-11-02 NOTE — CONSULTS
Plan:    Cont IV diuretic  Stopped chlorthalidone as on loop  Added spironolactone  Cont other cardiac meds  Echo pending  Daily labs    Please call with questions

## 2024-11-03 LAB
ANION GAP SERPL CALC-SCNC: 5 MMOL/L (ref 2–12)
BUN SERPL-MCNC: 27 MG/DL (ref 6–20)
BUN/CREAT SERPL: 19 (ref 12–20)
CALCIUM SERPL-MCNC: 10.1 MG/DL (ref 8.5–10.1)
CHLORIDE SERPL-SCNC: 103 MMOL/L (ref 97–108)
CO2 SERPL-SCNC: 32 MMOL/L (ref 21–32)
CREAT SERPL-MCNC: 1.42 MG/DL (ref 0.55–1.02)
ERYTHROCYTE [DISTWIDTH] IN BLOOD BY AUTOMATED COUNT: 12 % (ref 11.5–14.5)
GLUCOSE SERPL-MCNC: 136 MG/DL (ref 65–100)
HCT VFR BLD AUTO: 39.9 % (ref 35–47)
HGB BLD-MCNC: 12.6 G/DL (ref 11.5–16)
MAGNESIUM SERPL-MCNC: 1.9 MG/DL (ref 1.6–2.4)
MCH RBC QN AUTO: 28.8 PG (ref 26–34)
MCHC RBC AUTO-ENTMCNC: 31.6 G/DL (ref 30–36.5)
MCV RBC AUTO: 91.1 FL (ref 80–99)
NRBC # BLD: 0 K/UL (ref 0–0.01)
NRBC BLD-RTO: 0 PER 100 WBC
NT PRO BNP: 2520 PG/ML
PHOSPHATE SERPL-MCNC: 4.2 MG/DL (ref 2.6–4.7)
PLATELET # BLD AUTO: 211 K/UL (ref 150–400)
PMV BLD AUTO: 10.1 FL (ref 8.9–12.9)
POTASSIUM SERPL-SCNC: 4.3 MMOL/L (ref 3.5–5.1)
RBC # BLD AUTO: 4.38 M/UL (ref 3.8–5.2)
SODIUM SERPL-SCNC: 140 MMOL/L (ref 136–145)
WBC # BLD AUTO: 5.3 K/UL (ref 3.6–11)

## 2024-11-03 PROCEDURE — 83735 ASSAY OF MAGNESIUM: CPT

## 2024-11-03 PROCEDURE — 6360000002 HC RX W HCPCS: Performed by: FAMILY MEDICINE

## 2024-11-03 PROCEDURE — 6370000000 HC RX 637 (ALT 250 FOR IP): Performed by: FAMILY MEDICINE

## 2024-11-03 PROCEDURE — 36415 COLL VENOUS BLD VENIPUNCTURE: CPT

## 2024-11-03 PROCEDURE — 80048 BASIC METABOLIC PNL TOTAL CA: CPT

## 2024-11-03 PROCEDURE — 6370000000 HC RX 637 (ALT 250 FOR IP): Performed by: INTERNAL MEDICINE

## 2024-11-03 PROCEDURE — 2060000000 HC ICU INTERMEDIATE R&B

## 2024-11-03 PROCEDURE — 94760 N-INVAS EAR/PLS OXIMETRY 1: CPT

## 2024-11-03 PROCEDURE — 83880 ASSAY OF NATRIURETIC PEPTIDE: CPT

## 2024-11-03 PROCEDURE — 84100 ASSAY OF PHOSPHORUS: CPT

## 2024-11-03 PROCEDURE — 85027 COMPLETE CBC AUTOMATED: CPT

## 2024-11-03 PROCEDURE — 6360000002 HC RX W HCPCS: Performed by: INTERNAL MEDICINE

## 2024-11-03 PROCEDURE — 2580000003 HC RX 258: Performed by: FAMILY MEDICINE

## 2024-11-03 RX ORDER — FUROSEMIDE 10 MG/ML
20 INJECTION INTRAMUSCULAR; INTRAVENOUS ONCE
Status: COMPLETED | OUTPATIENT
Start: 2024-11-03 | End: 2024-11-03

## 2024-11-03 RX ADMIN — ENOXAPARIN SODIUM 30 MG: 100 INJECTION SUBCUTANEOUS at 09:01

## 2024-11-03 RX ADMIN — SACUBITRIL AND VALSARTAN 1 TABLET: 49; 51 TABLET, FILM COATED ORAL at 20:04

## 2024-11-03 RX ADMIN — CARVEDILOL 12.5 MG: 12.5 TABLET, FILM COATED ORAL at 06:53

## 2024-11-03 RX ADMIN — SACUBITRIL AND VALSARTAN 1 TABLET: 49; 51 TABLET, FILM COATED ORAL at 09:01

## 2024-11-03 RX ADMIN — FERROUS SULFATE TAB 325 MG (65 MG ELEMENTAL FE) 325 MG: 325 (65 FE) TAB at 09:09

## 2024-11-03 RX ADMIN — FUROSEMIDE 20 MG: 10 INJECTION, SOLUTION INTRAMUSCULAR; INTRAVENOUS at 09:02

## 2024-11-03 RX ADMIN — ENOXAPARIN SODIUM 30 MG: 100 INJECTION SUBCUTANEOUS at 20:04

## 2024-11-03 RX ADMIN — SPIRONOLACTONE 25 MG: 25 TABLET ORAL at 09:01

## 2024-11-03 RX ADMIN — SODIUM CHLORIDE, PRESERVATIVE FREE 10 ML: 5 INJECTION INTRAVENOUS at 20:04

## 2024-11-03 ASSESSMENT — PAIN SCALES - GENERAL
PAINLEVEL_OUTOF10: 0

## 2024-11-04 ENCOUNTER — TELEPHONE (OUTPATIENT)
Dept: PHARMACY | Facility: CLINIC | Age: 64
End: 2024-11-04

## 2024-11-04 VITALS
HEIGHT: 72 IN | HEART RATE: 65 BPM | BODY MASS INDEX: 39.68 KG/M2 | WEIGHT: 293 LBS | RESPIRATION RATE: 18 BRPM | TEMPERATURE: 98.4 F | DIASTOLIC BLOOD PRESSURE: 57 MMHG | SYSTOLIC BLOOD PRESSURE: 127 MMHG | OXYGEN SATURATION: 92 %

## 2024-11-04 LAB
ANION GAP SERPL CALC-SCNC: 7 MMOL/L (ref 2–12)
BUN SERPL-MCNC: 29 MG/DL (ref 6–20)
BUN/CREAT SERPL: 23 (ref 12–20)
CALCIUM SERPL-MCNC: 10.1 MG/DL (ref 8.5–10.1)
CHLORIDE SERPL-SCNC: 104 MMOL/L (ref 97–108)
CO2 SERPL-SCNC: 30 MMOL/L (ref 21–32)
CREAT SERPL-MCNC: 1.28 MG/DL (ref 0.55–1.02)
ERYTHROCYTE [DISTWIDTH] IN BLOOD BY AUTOMATED COUNT: 12.3 % (ref 11.5–14.5)
GLUCOSE SERPL-MCNC: 124 MG/DL (ref 65–100)
HCT VFR BLD AUTO: 39 % (ref 35–47)
HGB BLD-MCNC: 12.3 G/DL (ref 11.5–16)
MAGNESIUM SERPL-MCNC: 1.9 MG/DL (ref 1.6–2.4)
MCH RBC QN AUTO: 29 PG (ref 26–34)
MCHC RBC AUTO-ENTMCNC: 31.5 G/DL (ref 30–36.5)
MCV RBC AUTO: 92 FL (ref 80–99)
NRBC # BLD: 0 K/UL (ref 0–0.01)
NRBC BLD-RTO: 0 PER 100 WBC
PHOSPHATE SERPL-MCNC: 4.5 MG/DL (ref 2.6–4.7)
PLATELET # BLD AUTO: 211 K/UL (ref 150–400)
PMV BLD AUTO: 10.1 FL (ref 8.9–12.9)
POTASSIUM SERPL-SCNC: 3.8 MMOL/L (ref 3.5–5.1)
RBC # BLD AUTO: 4.24 M/UL (ref 3.8–5.2)
SODIUM SERPL-SCNC: 141 MMOL/L (ref 136–145)
WBC # BLD AUTO: 6.1 K/UL (ref 3.6–11)

## 2024-11-04 PROCEDURE — 97535 SELF CARE MNGMENT TRAINING: CPT

## 2024-11-04 PROCEDURE — 97161 PT EVAL LOW COMPLEX 20 MIN: CPT

## 2024-11-04 PROCEDURE — 97116 GAIT TRAINING THERAPY: CPT

## 2024-11-04 PROCEDURE — 6360000002 HC RX W HCPCS: Performed by: FAMILY MEDICINE

## 2024-11-04 PROCEDURE — 2580000003 HC RX 258: Performed by: FAMILY MEDICINE

## 2024-11-04 PROCEDURE — 84100 ASSAY OF PHOSPHORUS: CPT

## 2024-11-04 PROCEDURE — 6360000002 HC RX W HCPCS: Performed by: INTERNAL MEDICINE

## 2024-11-04 PROCEDURE — 83735 ASSAY OF MAGNESIUM: CPT

## 2024-11-04 PROCEDURE — 36415 COLL VENOUS BLD VENIPUNCTURE: CPT

## 2024-11-04 PROCEDURE — 80048 BASIC METABOLIC PNL TOTAL CA: CPT

## 2024-11-04 PROCEDURE — 6370000000 HC RX 637 (ALT 250 FOR IP): Performed by: FAMILY MEDICINE

## 2024-11-04 PROCEDURE — 6370000000 HC RX 637 (ALT 250 FOR IP): Performed by: INTERNAL MEDICINE

## 2024-11-04 PROCEDURE — 97165 OT EVAL LOW COMPLEX 30 MIN: CPT

## 2024-11-04 PROCEDURE — 85027 COMPLETE CBC AUTOMATED: CPT

## 2024-11-04 RX ORDER — CHLORTHALIDONE 25 MG/1
12.5 TABLET ORAL DAILY
Qty: 15 TABLET | Refills: 0 | Status: SHIPPED | OUTPATIENT
Start: 2024-11-04 | End: 2024-11-07 | Stop reason: SDUPTHER

## 2024-11-04 RX ORDER — CARVEDILOL 12.5 MG/1
12.5 TABLET ORAL 2 TIMES DAILY
Qty: 60 TABLET | Refills: 0 | Status: SHIPPED | OUTPATIENT
Start: 2024-11-04 | End: 2024-11-07 | Stop reason: SDUPTHER

## 2024-11-04 RX ORDER — SPIRONOLACTONE 25 MG/1
25 TABLET ORAL DAILY
Qty: 30 TABLET | Refills: 0 | Status: SHIPPED | OUTPATIENT
Start: 2024-11-05 | End: 2024-11-07 | Stop reason: SDUPTHER

## 2024-11-04 RX ORDER — FUROSEMIDE 10 MG/ML
20 INJECTION INTRAMUSCULAR; INTRAVENOUS ONCE
Status: COMPLETED | OUTPATIENT
Start: 2024-11-04 | End: 2024-11-04

## 2024-11-04 RX ORDER — FUROSEMIDE 20 MG/1
40 TABLET ORAL DAILY
Qty: 60 TABLET | Refills: 0 | Status: CANCELLED | OUTPATIENT
Start: 2024-11-04 | End: 2024-12-04

## 2024-11-04 RX ORDER — SACUBITRIL AND VALSARTAN 49; 51 MG/1; MG/1
1 TABLET, FILM COATED ORAL 2 TIMES DAILY
Qty: 60 TABLET | Refills: 0 | Status: SHIPPED | OUTPATIENT
Start: 2024-11-04 | End: 2024-11-07 | Stop reason: SDUPTHER

## 2024-11-04 RX ADMIN — FUROSEMIDE 20 MG: 10 INJECTION, SOLUTION INTRAMUSCULAR; INTRAVENOUS at 10:34

## 2024-11-04 RX ADMIN — ENOXAPARIN SODIUM 30 MG: 100 INJECTION SUBCUTANEOUS at 08:39

## 2024-11-04 RX ADMIN — CARVEDILOL 12.5 MG: 12.5 TABLET, FILM COATED ORAL at 06:31

## 2024-11-04 RX ADMIN — SACUBITRIL AND VALSARTAN 1 TABLET: 49; 51 TABLET, FILM COATED ORAL at 08:39

## 2024-11-04 RX ADMIN — SODIUM CHLORIDE, PRESERVATIVE FREE 10 ML: 5 INJECTION INTRAVENOUS at 08:43

## 2024-11-04 RX ADMIN — SPIRONOLACTONE 25 MG: 25 TABLET ORAL at 08:39

## 2024-11-04 ASSESSMENT — PAIN SCALES - GENERAL
PAINLEVEL_OUTOF10: 0
PAINLEVEL_OUTOF10: 0

## 2024-11-04 NOTE — PLAN OF CARE
Problem: Chronic Conditions and Co-morbidities  Goal: Patient's chronic conditions and co-morbidity symptoms are monitored and maintained or improved  Outcome: Progressing     Problem: Discharge Planning  Goal: Discharge to home or other facility with appropriate resources  Outcome: Progressing     Problem: Pain  Goal: Verbalizes/displays adequate comfort level or baseline comfort level  Outcome: Progressing     Problem: Safety - Adult  Goal: Free from fall injury  Outcome: Progressing     
  Problem: Chronic Conditions and Co-morbidities  Goal: Patient's chronic conditions and co-morbidity symptoms are monitored and maintained or improved  Outcome: Progressing  Flowsheets (Taken 11/1/2024 2000)  Care Plan - Patient's Chronic Conditions and Co-Morbidity Symptoms are Monitored and Maintained or Improved:   Monitor and assess patient's chronic conditions and comorbid symptoms for stability, deterioration, or improvement   Collaborate with multidisciplinary team to address chronic and comorbid conditions and prevent exacerbation or deterioration   Update acute care plan with appropriate goals if chronic or comorbid symptoms are exacerbated and prevent overall improvement and discharge     Problem: Discharge Planning  Goal: Discharge to home or other facility with appropriate resources  Outcome: Progressing  Flowsheets (Taken 11/1/2024 2000)  Discharge to home or other facility with appropriate resources:   Identify barriers to discharge with patient and caregiver   Arrange for needed discharge resources and transportation as appropriate   Identify discharge learning needs (meds, wound care, etc)   Refer to discharge planning if patient needs post-hospital services based on physician order or complex needs related to functional status, cognitive ability or social support system     
level  Home Access:  (one step)  Bathroom Shower/Tub: Tub/Shower unit  Bathroom Toilet: Standard  Bathroom Equipment: Grab bars in shower  Bathroom Accessibility: Accessible  Home Equipment: Crutches  Has the patient had two or more falls in the past year or any fall with injury in the past year?: No  ADL Assistance: Independent  Homemaking Assistance: Independent  Ambulation Assistance: Independent  Transfer Assistance: Independent  Active : Yes    Cognitive/Behavioral Status:  Orientation  Orientation Level: Oriented X4  Cognition  Overall Cognitive Status: WNL    Skin: refer to MD and nursing notes    Edema: refer to MD and nursing notes, was admitted with edema in legs, none noted today    Hearing:        Vision/Perceptual:                  Strength:    Strength: Generally decreased, functional    Tone & Sensation:           Coordination:       Range Of Motion:  AROM: Generally decreased, functional       Functional Mobility:  Bed Mobility:     Bed Mobility Training  Bed Mobility Training: No (received and left in sitting)  Transfers:     Transfer Training  Transfer Training: Yes  Sit to Stand: Supervision  Stand to Sit: Supervision  Balance:               Balance  Sitting: Intact  Standing: Impaired  Standing - Static: Good  Standing - Dynamic: Good  Ambulation/Gait Training:                       Gait  Gait Training: Yes  Distance (ft): 100 Feet  Assistive Device: Gait belt  Interventions: Verbal cues  Base of Support: Widened  Speed/Danette: Slow  Step Length: Left shortened;Right shortened  Gait Abnormalities: Trunk sway increased                                                                                                                                                                                                                                                              [facility] AM-PAC®      Basic Mobility Inpatient Short Form (6-Clicks) Version 2  How much HELP from another person do 
[x]  4   5.  Taking care of personal grooming such as brushing teeth? []  1 []  2 [x]  3 []  4   6.  Eating meals? []  1 []  2 []  3 [x]  4   © , Trustees of [facility], under license to [employer]. All rights reserved     Score: 21     Interpretation of Tool:  Represents clinically-significant functional categories (i.e. Activities of daily living).    Cutoff score 39.4 (19) correlates to a good likelihood of discharging home versus a facility  Negar Lieberman, Ruthann Vargas, Oli Garcia, Edith Olivares, Stephan Biswas, Sacha Lieberman, -PAC “6-Clicks” Functional Assessment Scores Predict Acute Care Hospital Discharge Destination, Physical Therapy, Volume 94, Issue 9, 2014, Pages 0341-6281, [email or web address]    Pain Ratin/10       Activity Tolerance:   Fair     After treatment:   Patient left in no apparent distress sitting up in chair, Call bell within reach, and Caregiver / family present    COMMUNICATION/EDUCATION:   The patient's plan of care was discussed with: physical therapist and registered nurse         Thank you for this referral.  Christine Kennedy OT  Minutes: 11    Occupational Therapy Evaluation Charge Determination   History Examination Decision-Making   LOW Complexity : Brief history review  LOW Complexity: 1-3 Performance deficits relating to physical, cognitive, or psychosocial skills that result in activity limitations and/or participation restrictions LOW Complexity: No comorbidities that affect functional and  no verbal  or physical assist needed to complete eval tasks   Based on the above components, the patient evaluation is determined to be of the following complexity level: Low

## 2024-11-04 NOTE — DISCHARGE INSTRUCTIONS
To access the [employer]'s Interactive Workbook \"Healthier Living with Heart Failure - Managing Symptoms and Reducing Risk\"  Scan the QR code below.

## 2024-11-04 NOTE — PROGRESS NOTES
[facility]                                                                                          Hospitalist Progress Note  Venus Dawson MD  Answering service: [phone] OR [phone] from in house phone        Date of Service:  11/3/2024  NAME:  Evelio Marroquin  :  1960  MRN:  [id number]       Admission Summary:   Evelio Marroquin is a 64 y.o. female who was sent to the emergency room by her PCP with concern of decompensated CHF.  Patient with known history of systolic CHF, last echo in May 2023 shows EF of 45 to 50%.  She has been out of her heart failure medications for about 2 months due to loss of her insurance.  Reports increasing shortness of breath as well as progressive leg swelling for the past few weeks.  She went to see her primary care today and she was noted to be in acute decompensated heart failure and also hypoxic.  Subsequently patient was sent to the emergency room.  On presentation to the ER, patient was hypoxic, requiring 2 L of oxygen.  Patient was noted with BNP of 7262, elevated troponin of 201.  Chest x-ray result pending at the time of my evaluation.  Patient was started on Lasix 80 mg in the emergency room and hospitalist service requested to admit the patient for further evaluation management.        Interval history / Subjective:   Patient seen and examined.   Creatinine slightly up this morning.  Received a one-time dose of 20 mg of IV Lasix.  Plan to repeat lab work tomorrow and switch to p.o. diuretics.  Results of echo discussed with her.     Assessment & Plan:          Congestive heart failure  Hypoxia  -Acute on chronic HFrEF, NYHA class IV, last echo with EF 45 to 50% (2023);  -Elevated BNP over 7000;  -CXR: Moderate pulmonary edema.. Cardiomegaly..   -Start diuresis with IV Lasix;  -continue GDMT: Entresto, Coreg, Aldactone;  -Monitor intake and output, monitor daily weight, cardiology to evaluate;  -Appreciate Cardiology 
0650: Confirmed with covering cardiac MD that lasix is to be held . Order for spirolactone still active.   Will continue to follow plan of care and maintain pt safety.   
0730: Bedside shift change report given to Samantha (oncoming nurse) by RN (offgoing nurse). Report included the following information Nurse Handoff Report, Index, Intake/Output, and MAR.       1930: Bedside shift change report given to Arleen (oncoming nurse) by Samantha RN (offgoing nurse). Report included the following information Nurse Handoff Report, Index, Intake/Output, and MAR.     
Orders received, chart reviewed and patient evaluated by physical therapy. Pending progression with skilled acute physical therapy, recommend:    Intermittent physical therapy up to 2-3x/week in previous living setting    Recommend with nursing OOB to chair 3x/day and walking daily with X 1 assist and  gait belt . Thank you for completing as able in order to maintain patient strength, endurance and independence.     Full evaluation to follow.  Kate Fitzgerald, PT  
Patient discharged in stable condition.  Discharge instructions given and understood and opportunity given for questions.  No reports of pain, bleeding or sob at dc.  
Hemoglobin 12.6 11.5 - 16.0 g/dL    Hematocrit 39.9 35.0 - 47.0 %    MCV 91.1 80.0 - 99.0 FL    MCH 28.8 26.0 - 34.0 PG    MCHC 31.6 30.0 - 36.5 g/dL    RDW 12.0 11.5 - 14.5 %    Platelets 211 150 - 400 K/uL    MPV 10.1 8.9 - 12.9 FL    Nucleated RBCs 0.0 0  WBC    nRBC 0.00 0.00 - 0.01 K/uL   Basic Metabolic Panel    Collection Time: 11/03/24  2:22 AM   Result Value Ref Range    Sodium 140 136 - 145 mmol/L    Potassium 4.3 3.5 - 5.1 mmol/L    Chloride 103 97 - 108 mmol/L    CO2 32 21 - 32 mmol/L    Anion Gap 5 2 - 12 mmol/L    Glucose 136 (H) 65 - 100 mg/dL    BUN 27 (H) 6 - 20 MG/DL    Creatinine 1.42 (H) 0.55 - 1.02 MG/DL    BUN/Creatinine Ratio 19 12 - 20      Est, Glom Filt Rate 42 (L) >60 ml/min/1.73m2    Calcium 10.1 8.5 - 10.1 MG/DL   Magnesium    Collection Time: 11/03/24  2:22 AM   Result Value Ref Range    Magnesium 1.9 1.6 - 2.4 mg/dL   Phosphorus    Collection Time: 11/03/24  2:22 AM   Result Value Ref Range    Phosphorus 4.2 2.6 - 4.7 MG/DL   Brain Natriuretic Peptide    Collection Time: 11/03/24  2:22 AM   Result Value Ref Range    NT Pro-BNP 2,520 (H) <125 PG/ML      Current medications reviewed       Kadeem Mo MD        
Results (from the past 24 hour(s))   CBC    Collection Time: 11/04/24 12:19 AM   Result Value Ref Range    WBC 6.1 3.6 - 11.0 K/uL    RBC 4.24 3.80 - 5.20 M/uL    Hemoglobin 12.3 11.5 - 16.0 g/dL    Hematocrit 39.0 35.0 - 47.0 %    MCV 92.0 80.0 - 99.0 FL    MCH 29.0 26.0 - 34.0 PG    MCHC 31.5 30.0 - 36.5 g/dL    RDW 12.3 11.5 - 14.5 %    Platelets 211 150 - 400 K/uL    MPV 10.1 8.9 - 12.9 FL    Nucleated RBCs 0.0 0  WBC    nRBC 0.00 0.00 - 0.01 K/uL   Basic Metabolic Panel    Collection Time: 11/04/24 12:19 AM   Result Value Ref Range    Sodium 141 136 - 145 mmol/L    Potassium 3.8 3.5 - 5.1 mmol/L    Chloride 104 97 - 108 mmol/L    CO2 30 21 - 32 mmol/L    Anion Gap 7 2 - 12 mmol/L    Glucose 124 (H) 65 - 100 mg/dL    BUN 29 (H) 6 - 20 MG/DL    Creatinine 1.28 (H) 0.55 - 1.02 MG/DL    BUN/Creatinine Ratio 23 (H) 12 - 20      Est, Glom Filt Rate 47 (L) >60 ml/min/1.73m2    Calcium 10.1 8.5 - 10.1 MG/DL   Magnesium    Collection Time: 11/04/24 12:19 AM   Result Value Ref Range    Magnesium 1.9 1.6 - 2.4 mg/dL   Phosphorus    Collection Time: 11/04/24 12:19 AM   Result Value Ref Range    Phosphorus 4.5 2.6 - 4.7 MG/DL      Current medications reviewed       Kadeem Mo MD        
IntraVENous PRN    magnesium sulfate 2000 mg in 50 mL IVPB premix  2,000 mg IntraVENous PRN    enoxaparin Sodium (LOVENOX) injection 30 mg  30 mg SubCUTAneous BID    ondansetron (ZOFRAN-ODT) disintegrating tablet 4 mg  4 mg Oral Q8H PRN    Or    ondansetron (ZOFRAN) injection 4 mg  4 mg IntraVENous Q6H PRN    polyethylene glycol (GLYCOLAX) packet 17 g  17 g Oral Daily PRN    acetaminophen (TYLENOL) tablet 650 mg  650 mg Oral Q6H PRN    Or    acetaminophen (TYLENOL) suppository 650 mg  650 mg Rectal Q6H PRN     ______________________________________________________________________  EXPECTED LENGTH OF STAY: Unable to retrieve estimated LOS  ACTUAL LENGTH OF STAY:          1                 Venus Dawson MD

## 2024-11-04 NOTE — CARE COORDINATION
Care Management Initial Assessment       RUR:  Readmission? No  1st IM letter given? No  1st  letter given: No     11/04/24 1212   Service Assessment   Patient Orientation Alert and Oriented   Cognition Alert   History Provided By Patient   Primary Caregiver Self   Support Systems Spouse/Significant Other   Patient's Healthcare Decision Maker is: Legal Next of Kin   PCP Verified by CM Yes   Last Visit to PCP Within last 3 months   Current Functional Level Independent in ADLs/IADLs   Can patient return to prior living arrangement Yes   Ability to make needs known: Good   Family able to assist with home care needs: Yes   Social/Functional History   Lives With Spouse   Type of Home House   Home Layout One level   Bathroom Shower/Tub Tub/Shower unit   Bathroom Toilet Standard   Bathroom Equipment Grab bars in shower   Bathroom Accessibility Accessible   Home Equipment Crutches   ADL Assistance Independent   Homemaking Assistance Independent   Ambulation Assistance Independent   Transfer Assistance Independent   Active  Yes   Mode of Transportation Car   Occupation Retired   Discharge Planning   Type of Residence House   Living Arrangements Spouse/Significant Other   DME Ordered? No   Potential Assistance Purchasing Medications Yes  (pt has difficulty paying for Entresto. She is in the process of completing application for financial assist with manafacturer)   Patient expects to be discharged to: House   History of falls? 0   Services At/After Discharge   Transition of Care Consult (CM Consult) N/A    Resource Information Provided? No   Mode of Transport at Discharge   ( will transport)   Confirm Follow Up Transport Self   Condition of Participation: Discharge Planning   The Patient and/or Patient Representative was provided with a Choice of Provider? Patient   The Patient and/Or Patient Representative agree with the Discharge Plan? Yes   Freedom of Choice list was provided with basic dialogue

## 2024-11-04 NOTE — TELEPHONE ENCOUNTER
Reason for referral: Medication management   Referring provider: Yesica Askew  Referring provider office: [facility]  Referred to: Cary Kingston, PharmD, BCGP, BCACP  Status of Patient: New Patient  Length of Appt: 60 minutes  Type of Appt: In Person   Patient need address: No

## 2024-11-04 NOTE — TELEPHONE ENCOUNTER
**[facility] with [facility]**    Attempt made to contact patient at the mobile number.    Left a message requesting a call back at [phone] to schedule the appointment        Aracelis Palacio [facility]   Ambulatory Pharmacy Clinical   [phone]  Department, toll free: [phone], option 2

## 2024-11-05 ENCOUNTER — TELEPHONE (OUTPATIENT)
Age: 64
End: 2024-11-05

## 2024-11-05 NOTE — TELEPHONE ENCOUNTER
Care Transitions Initial Follow Up Call    Outreach made within 2 business days of discharge: Yes    Patient: Evelio Marroquin Patient : 1960   MRN: [id number]  Reason for Admission: Heart Failure  Discharge Date: 24       Spoke with: LVM    Discharge department/facility: [facility] Interactive Patient Contact:      Scheduled appointment with PCP within 7-14 days    Follow Up  Future Appointments   Date Time Provider Department Center   2024  2:10 PM Yesica Askew, APRN - NP [facility] DEP       Sherly Lomeli RN

## 2024-11-05 NOTE — TELEPHONE ENCOUNTER
**Patient is to be scheduled with the [facility]**    Incoming Call    Spoke to patient at mobile number and advised them of the previous message.    Patient verified understanding and scheduled a in person appointment .  Appointment scheduled for 11/7/24 at 10:00 am with Cary Kingston.    Aracelis Palacio [facility]   Ambulatory Pharmacy Clinical   [phone]  Department, toll free: [phone], option 2       For Pharmacy Admin Tracking Only    Program: Medical Group  Recommendation Provided To: Patient/Caregiver: 3 via Telephone  Intervention Detail: Scheduled Appointment  Intervention Accepted By: 3  Gap Closed?: Yes   Time Spent (min): 10

## 2024-11-05 NOTE — TELEPHONE ENCOUNTER
**Patient is to be scheduled with the [facility]**     Second Attempt made to contact patient at the mobile number.     Left a message requesting a call back at [phone] to schedule the appointment           Aracelis Palacio [facility]   Ambulatory Pharmacy Clinical   [phone]  Department, toll free: [phone], option 2

## 2024-11-07 ENCOUNTER — ENROLLMENT (OUTPATIENT)
Age: 64
End: 2024-11-07

## 2024-11-07 ENCOUNTER — TELEPHONE (OUTPATIENT)
Age: 64
End: 2024-11-07

## 2024-11-07 ENCOUNTER — PHARMACY VISIT (OUTPATIENT)
Age: 64
End: 2024-11-07

## 2024-11-07 ENCOUNTER — OFFICE VISIT (OUTPATIENT)
Age: 64
End: 2024-11-07

## 2024-11-07 VITALS
WEIGHT: 293 LBS | TEMPERATURE: 96.9 F | RESPIRATION RATE: 12 BRPM | DIASTOLIC BLOOD PRESSURE: 70 MMHG | HEIGHT: 72 IN | BODY MASS INDEX: 39.68 KG/M2 | OXYGEN SATURATION: 94 % | HEART RATE: 70 BPM | SYSTOLIC BLOOD PRESSURE: 142 MMHG

## 2024-11-07 DIAGNOSIS — F41.8 DEPRESSION WITH ANXIETY: ICD-10-CM

## 2024-11-07 DIAGNOSIS — I50.9 DECOMPENSATED HEART FAILURE (HCC): ICD-10-CM

## 2024-11-07 DIAGNOSIS — N18.32 STAGE 3B CHRONIC KIDNEY DISEASE (HCC): ICD-10-CM

## 2024-11-07 DIAGNOSIS — I50.23 CHF (CONGESTIVE HEART FAILURE), NYHA CLASS IV, ACUTE ON CHRONIC, SYSTOLIC (HCC): Primary | ICD-10-CM

## 2024-11-07 DIAGNOSIS — Z09 HOSPITAL DISCHARGE FOLLOW-UP: Primary | ICD-10-CM

## 2024-11-07 DIAGNOSIS — Z23 NEEDS FLU SHOT: ICD-10-CM

## 2024-11-07 DIAGNOSIS — I35.1 NONRHEUMATIC AORTIC VALVE INSUFFICIENCY: ICD-10-CM

## 2024-11-07 LAB
ALBUMIN SERPL-MCNC: 3.9 G/DL (ref 3.5–5)
ALBUMIN/GLOB SERPL: 1 (ref 1.1–2.2)
ALP SERPL-CCNC: 106 U/L (ref 45–117)
ALT SERPL-CCNC: 35 U/L (ref 12–78)
ANION GAP SERPL CALC-SCNC: 2 MMOL/L (ref 2–12)
AST SERPL-CCNC: 19 U/L (ref 15–37)
BILIRUB SERPL-MCNC: 0.6 MG/DL (ref 0.2–1)
BUN SERPL-MCNC: 35 MG/DL (ref 6–20)
BUN/CREAT SERPL: 25 (ref 12–20)
CALCIUM SERPL-MCNC: 10.7 MG/DL (ref 8.5–10.1)
CHLORIDE SERPL-SCNC: 105 MMOL/L (ref 97–108)
CO2 SERPL-SCNC: 32 MMOL/L (ref 21–32)
CREAT SERPL-MCNC: 1.38 MG/DL (ref 0.55–1.02)
GLOBULIN SER CALC-MCNC: 4 G/DL (ref 2–4)
GLUCOSE SERPL-MCNC: 84 MG/DL (ref 65–100)
POTASSIUM SERPL-SCNC: 4.5 MMOL/L (ref 3.5–5.1)
PROT SERPL-MCNC: 7.9 G/DL (ref 6.4–8.2)
SODIUM SERPL-SCNC: 139 MMOL/L (ref 136–145)

## 2024-11-07 RX ORDER — CHLORTHALIDONE 25 MG/1
12.5 TABLET ORAL DAILY
Qty: 45 TABLET | Refills: 0 | Status: SHIPPED | OUTPATIENT
Start: 2024-11-07 | End: 2024-12-07

## 2024-11-07 RX ORDER — CARVEDILOL 12.5 MG/1
12.5 TABLET ORAL 2 TIMES DAILY
Qty: 180 TABLET | Refills: 0 | Status: SHIPPED | OUTPATIENT
Start: 2024-11-07

## 2024-11-07 RX ORDER — SACUBITRIL AND VALSARTAN 49; 51 MG/1; MG/1
1 TABLET, FILM COATED ORAL 2 TIMES DAILY
Qty: 180 TABLET | Refills: 0 | Status: SHIPPED | OUTPATIENT
Start: 2024-11-07 | End: 2024-12-07

## 2024-11-07 RX ORDER — SERTRALINE HYDROCHLORIDE 100 MG/1
100 TABLET, FILM COATED ORAL DAILY
Qty: 90 TABLET | Refills: 1 | Status: SHIPPED | OUTPATIENT
Start: 2024-11-07

## 2024-11-07 RX ORDER — SPIRONOLACTONE 25 MG/1
25 TABLET ORAL DAILY
Qty: 90 TABLET | Refills: 0 | Status: SHIPPED | OUTPATIENT
Start: 2024-11-07

## 2024-11-07 NOTE — PROGRESS NOTES
Chief Complaint   Patient presents with    Follow-Up from Hospital     Hospital told pt to stop sertaline         \"Have you been to the ER, urgent care clinic since your last visit?  Hospitalized since your last visit?\"    NO    “Have you seen or consulted any other health care providers outside of [facility] since your last visit?”    NO     “Have you had a pap smear?”    NO    Date of last Cervical Cancer screen (HPV or PAP): 4/17/2018             Click Here for Release of Records Request           5/13/2024     8:09 AM   PHQ-9    Little interest or pleasure in doing things 0   Feeling down, depressed, or hopeless 0   PHQ-2 Score 0   PHQ-9 Total Score 0           Financial Resource Strain: Patient Declined (5/13/2024)    Overall Financial Resource Strain (CARDIA)     Difficulty of Paying Living Expenses: Patient declined      Food Insecurity: Patient Declined (5/13/2024)    Hunger Vital Sign     Worried About Running Out of Food in the Last Year: Patient declined     Ran Out of Food in the Last Year: Patient declined          Health Maintenance Due   Topic Date Due    Shingles vaccine (1 of 2) Never done    Respiratory Syncytial Virus (RSV) Pregnant or age 60 yrs+ (1 - 1-dose 60+ series) Never done    Cervical cancer screen  04/17/2023    Lung Cancer Screening &/or Counseling  07/20/2023    Flu vaccine (1) 08/01/2024    COVID-19 Vaccine (3 - 2023-24 season) 09/01/2024

## 2024-11-07 NOTE — TELEPHONE ENCOUNTER
Pharmacy Progress Note     Contacted pt's Rx insurance card info to investigate copay cost for Entresto.    Noted that this is not insurance but rather a discount card.  Pt does not have Rx insurance.    She is eligible for Rx assistance for Entresto.    Started application for pt.    Contacted her via phone to inform her and request proof of income for application.    She is coming back later today for PCP appt.  She will bring proof of income and sign her application.    There are no discontinued medications.  No orders of the defined types were placed in this encounter.        Cary Kingston, PharmD, BCGP, BCACP  Clinical Pharmacist Specialist      For Pharmacy Admin Tracking Only    Program: Medical Group  CPA in place:  Yes  Recommendation Provided To: Patient/Caregiver: 1 via Telephone and Other: 1  Intervention Detail: Patient Access Assistance/Sample Provided  Intervention Accepted By: Patient/Caregiver: 1 and Other: 1  Time Spent (min): 30

## 2024-11-07 NOTE — PROGRESS NOTES
had insurance thru the marketplace and was eligible for  coupon cards.  She has other, generic meds that are less expensive.  We spent a significant amount of time discussing the use of GoodRx as another resource for cost of meds.  Pt is currently on GDMT; however, she is missing SGLT2 inhibitor therapy.  She has an upcoming Cardiology appt.  - Coupon for Entresto provided  - Good Rx coupon for generic meds provided  - Will contact Rx insurance to investigate copay for Entresto and call pt with update  - Refills for 90 ds of meds provided      Medication reconciliation was completed during the visit.    Medications Discontinued During This Encounter   Medication Reason    carvedilol (COREG) 12.5 MG tablet REORDER    sacubitril-valsartan (ENTRESTO) 49-51 MG per tablet REORDER    spironolactone (ALDACTONE) 25 MG tablet REORDER    chlorthalidone (HYGROTON) 25 MG tablet REORDER     Orders Placed This Encounter    carvedilol (COREG) 12.5 MG tablet     Sig: Take 1 tablet by mouth 2 times daily     Dispense:  180 tablet     Refill:  0    chlorthalidone (HYGROTON) 25 MG tablet     Sig: Take 0.5 tablets by mouth daily     Dispense:  45 tablet     Refill:  0    sacubitril-valsartan (ENTRESTO) 49-51 MG per tablet     Sig: Take 1 tablet by mouth 2 times daily     Dispense:  180 tablet     Refill:  0    spironolactone (ALDACTONE) 25 MG tablet     Sig: Take 1 tablet by mouth daily     Dispense:  90 tablet     Refill:  0       Patient verbalized understanding of the information presented and all of the patient’s questions were answered.  AVS was handed to the patient. Patient advised to call the office with any additional questions or concerns.    Notifications of recommendations will be sent to Yesica Molina, APRN - NP for review.      Cary Kingston, PharmD, BCGP, BCACP  Clinical Pharmacist Specialist          For Pharmacy Admin Tracking Only    Program: Medical Group  CPA in place:  Yes  Recommendation

## 2024-11-07 NOTE — PATIENT INSTRUCTIONS
Pharmacist Contact Information:  Cary Kingston, PharmD, BCGP, BCACP  Work Phone: [phone] (option #2)

## 2024-11-07 NOTE — PROGRESS NOTES
Pharmacy Progress Note     Pt arrived at office for second appt today to complete paperwork for Rx assistance for Entresto 49-51 mg BID.    Application completed.  Proof of income included.  Submitted to PCP for review, signing, faxing.    There are no discontinued medications.  No orders of the defined types were placed in this encounter.        Cary Kingston, PharmD, BCGP, BCACP  Clinical Pharmacist Specialist      For Pharmacy Admin Tracking Only    Program: Medical Group  CPA in place:  Yes  Recommendation Provided To: Patient/Caregiver: 1 via In person  Intervention Detail: Patient Access Assistance/Sample Provided  Intervention Accepted By: Patient/Caregiver: 1  Time Spent (min): 45

## 2024-11-07 NOTE — PROGRESS NOTES
[facility]  Clinic Note     Post-Discharge Transitional Care  Follow Up      Evelio Marroquin   YOB: 1960    Date of Office Visit:  11/7/2024  Date of Hospital Admission: 11/1/24  Date of Hospital Discharge: 11/4/24  Risk of hospital readmission (high >=14%. Medium >=10%) :Readmission Risk Score: 11.5      Care management risk score Rising risk (score 2-5) and Complex Care (Scores >=6): No Risk Score On File     Non face to face  following discharge, date last encounter closed (first attempt may have been earlier): 11/05/2024    Call initiated 2 business days of discharge: Yes    ASSESSMENT/PLAN:   Hospital discharge follow-up  -     WI DISCHARGE MEDS RECONCILED W/ CURRENT OUTPATIENT MED LIST    Decompensated heart failure (HCC)  -     Comprehensive Metabolic Panel; Future  - Working w/ pharmacist for medication assistance program  - Needs to follow up w/ Cardiology, they plan to re-eval AV due to severa AI on ECHO  11/2/24 ECHO: Left Ventricle: Reduced left ventricular systolic function with a visually estimated EF of 50 - 55%.   Aortic Valve: Moderate to severe regurgitation with an eccentrically directed jet and may underestimate severity. Mild stenosis of the aortic valve.  Mitral Valve: Mild regurgitation. Mild to moderate stenosis noted.   Tricuspid Valve: Mild regurgitation.    11/1/24 BNP: 7,262  11/3/24 BNP: 2,520  11/1/24 CXR: Moderate pulmonary edema    Stage 3b chronic kidney disease (HCC)  -     Comprehensive Metabolic Panel; Future  - Will make appointment w/ Nephrology  -   Lab Results   Component Value Date    CREATININE 1.28 (H) 11/04/2024    CREATININE 1.42 (H) 11/03/2024    CREATININE 1.07 (H) 11/02/2024     Depression with anxiety  -     sertraline (ZOLOFT) 100 MG tablet; Take 1 tablet by mouth daily, Disp-90 tablet, R-1Normal    Needs flu shot  -     Influenza, FLUCELVAX Trivalent, (age 6 mo+) IM, Preservative Free, 0.5mL    Nonrheumatic aortic valve

## 2024-12-02 ENCOUNTER — TELEPHONE (OUTPATIENT)
Age: 64
End: 2024-12-02

## 2024-12-02 NOTE — TELEPHONE ENCOUNTER
Pharmacy Progress Note     Pt had concerns for Entresto cost at pharmacy.    This writer had completed an application for Rx assistance for this med.      Contacted program - rep states proof of income and insurance cards required for application.    Sent [employer] message to pt with update,  Attempted to call as well.  LVMTCB.    There are no discontinued medications.  No orders of the defined types were placed in this encounter.        Cary Kingston, PharmD, BCGP, BCACP  Clinical Pharmacist Specialist      For Pharmacy Admin Tracking Only    Program: Medical Group  CPA in place:  Yes  Recommendation Provided To: Patient/Caregiver: 1 via Telephone and [employer] Message and Other: 1  Intervention Detail: Patient Access Assistance/Sample Provided  Intervention Accepted By: Patient/Caregiver: 1 and Other: 1  Time Spent (min): 15

## 2025-01-02 ENCOUNTER — OFFICE VISIT (OUTPATIENT)
Age: 65
End: 2025-01-02
Payer: COMMERCIAL

## 2025-01-02 VITALS
HEIGHT: 72 IN | SYSTOLIC BLOOD PRESSURE: 135 MMHG | TEMPERATURE: 96.9 F | HEART RATE: 62 BPM | RESPIRATION RATE: 16 BRPM | WEIGHT: 293 LBS | OXYGEN SATURATION: 94 % | BODY MASS INDEX: 39.68 KG/M2 | DIASTOLIC BLOOD PRESSURE: 59 MMHG

## 2025-01-02 DIAGNOSIS — J44.9 CHRONIC OBSTRUCTIVE PULMONARY DISEASE, UNSPECIFIED COPD TYPE (HCC): ICD-10-CM

## 2025-01-02 DIAGNOSIS — N18.32 ANEMIA IN STAGE 3B CHRONIC KIDNEY DISEASE (HCC): ICD-10-CM

## 2025-01-02 DIAGNOSIS — D63.1 ANEMIA IN STAGE 3B CHRONIC KIDNEY DISEASE (HCC): ICD-10-CM

## 2025-01-02 DIAGNOSIS — E83.52 HYPERCALCEMIA: ICD-10-CM

## 2025-01-02 DIAGNOSIS — N18.32 STAGE 3B CHRONIC KIDNEY DISEASE (HCC): ICD-10-CM

## 2025-01-02 DIAGNOSIS — F41.8 DEPRESSION WITH ANXIETY: ICD-10-CM

## 2025-01-02 DIAGNOSIS — I50.23 CHF (CONGESTIVE HEART FAILURE), NYHA CLASS IV, ACUTE ON CHRONIC, SYSTOLIC (HCC): Primary | ICD-10-CM

## 2025-01-02 PROBLEM — I50.42 CHF (CONGESTIVE HEART FAILURE), NYHA CLASS IV, CHRONIC, COMBINED (HCC): Status: ACTIVE | Noted: 2025-01-02

## 2025-01-02 LAB
ALBUMIN SERPL-MCNC: 3.7 G/DL (ref 3.5–5)
ALBUMIN/GLOB SERPL: 0.9 (ref 1.1–2.2)
ALP SERPL-CCNC: 120 U/L (ref 45–117)
ALT SERPL-CCNC: 17 U/L (ref 12–78)
ANION GAP SERPL CALC-SCNC: 1 MMOL/L (ref 2–12)
AST SERPL-CCNC: 9 U/L (ref 15–37)
BASOPHILS # BLD: 0 K/UL (ref 0–0.1)
BASOPHILS NFR BLD: 0 % (ref 0–1)
BILIRUB SERPL-MCNC: 0.7 MG/DL (ref 0.2–1)
BUN SERPL-MCNC: 32 MG/DL (ref 6–20)
BUN/CREAT SERPL: 27 (ref 12–20)
CALCIUM SERPL-MCNC: 10 MG/DL (ref 8.5–10.1)
CALCIUM SERPL-MCNC: 10.2 MG/DL (ref 8.5–10.1)
CHLORIDE SERPL-SCNC: 106 MMOL/L (ref 97–108)
CO2 SERPL-SCNC: 30 MMOL/L (ref 21–32)
CREAT SERPL-MCNC: 1.17 MG/DL (ref 0.55–1.02)
DIFFERENTIAL METHOD BLD: NORMAL
EOSINOPHIL # BLD: 0.2 K/UL (ref 0–0.4)
EOSINOPHIL NFR BLD: 3 % (ref 0–7)
ERYTHROCYTE [DISTWIDTH] IN BLOOD BY AUTOMATED COUNT: 13.7 % (ref 11.5–14.5)
GLOBULIN SER CALC-MCNC: 4 G/DL (ref 2–4)
GLUCOSE SERPL-MCNC: 93 MG/DL (ref 65–100)
HCT VFR BLD AUTO: 39.6 % (ref 35–47)
HGB BLD-MCNC: 12.3 G/DL (ref 11.5–16)
IMM GRANULOCYTES # BLD AUTO: 0 K/UL (ref 0–0.04)
IMM GRANULOCYTES NFR BLD AUTO: 0 % (ref 0–0.5)
LYMPHOCYTES # BLD: 1 K/UL (ref 0.8–3.5)
LYMPHOCYTES NFR BLD: 17 % (ref 12–49)
MCH RBC QN AUTO: 28.4 PG (ref 26–34)
MCHC RBC AUTO-ENTMCNC: 31.1 G/DL (ref 30–36.5)
MCV RBC AUTO: 91.5 FL (ref 80–99)
MONOCYTES # BLD: 0.7 K/UL (ref 0–1)
MONOCYTES NFR BLD: 12 % (ref 5–13)
NEUTS SEG # BLD: 3.8 K/UL (ref 1.8–8)
NEUTS SEG NFR BLD: 68 % (ref 32–75)
NRBC # BLD: 0 K/UL (ref 0–0.01)
NRBC BLD-RTO: 0 PER 100 WBC
PLATELET # BLD AUTO: 200 K/UL (ref 150–400)
PMV BLD AUTO: 10.8 FL (ref 8.9–12.9)
POTASSIUM SERPL-SCNC: 4.9 MMOL/L (ref 3.5–5.1)
PROT SERPL-MCNC: 7.7 G/DL (ref 6.4–8.2)
PTH-INTACT SERPL-MCNC: 170.7 PG/ML (ref 18.4–88)
RBC # BLD AUTO: 4.33 M/UL (ref 3.8–5.2)
SODIUM SERPL-SCNC: 137 MMOL/L (ref 136–145)
WBC # BLD AUTO: 5.6 K/UL (ref 3.6–11)

## 2025-01-02 PROCEDURE — 3075F SYST BP GE 130 - 139MM HG: CPT | Performed by: NURSE PRACTITIONER

## 2025-01-02 PROCEDURE — 3078F DIAST BP <80 MM HG: CPT | Performed by: NURSE PRACTITIONER

## 2025-01-02 PROCEDURE — 99214 OFFICE O/P EST MOD 30 MIN: CPT | Performed by: NURSE PRACTITIONER

## 2025-01-02 RX ORDER — SPIRONOLACTONE 25 MG/1
25 TABLET ORAL DAILY
Qty: 90 TABLET | Refills: 1 | Status: SHIPPED | OUTPATIENT
Start: 2025-01-02

## 2025-01-02 RX ORDER — CHLORTHALIDONE 25 MG/1
12.5 TABLET ORAL DAILY
Qty: 45 TABLET | Refills: 1 | Status: SHIPPED | OUTPATIENT
Start: 2025-01-02 | End: 2025-02-01

## 2025-01-02 RX ORDER — SERTRALINE HYDROCHLORIDE 100 MG/1
100 TABLET, FILM COATED ORAL DAILY
Qty: 90 TABLET | Refills: 1 | Status: SHIPPED | OUTPATIENT
Start: 2025-01-02

## 2025-01-02 RX ORDER — CARVEDILOL 12.5 MG/1
12.5 TABLET ORAL 2 TIMES DAILY
Qty: 180 TABLET | Refills: 1 | Status: SHIPPED | OUTPATIENT
Start: 2025-01-02

## 2025-01-02 ASSESSMENT — PATIENT HEALTH QUESTIONNAIRE - PHQ9
9. THOUGHTS THAT YOU WOULD BE BETTER OFF DEAD, OR OF HURTING YOURSELF: NOT AT ALL
SUM OF ALL RESPONSES TO PHQ9 QUESTIONS 1 & 2: 0
10. IF YOU CHECKED OFF ANY PROBLEMS, HOW DIFFICULT HAVE THESE PROBLEMS MADE IT FOR YOU TO DO YOUR WORK, TAKE CARE OF THINGS AT HOME, OR GET ALONG WITH OTHER PEOPLE: NOT DIFFICULT AT ALL
4. FEELING TIRED OR HAVING LITTLE ENERGY: NOT AT ALL
3. TROUBLE FALLING OR STAYING ASLEEP: NEARLY EVERY DAY
8. MOVING OR SPEAKING SO SLOWLY THAT OTHER PEOPLE COULD HAVE NOTICED. OR THE OPPOSITE, BEING SO FIGETY OR RESTLESS THAT YOU HAVE BEEN MOVING AROUND A LOT MORE THAN USUAL: NOT AT ALL
6. FEELING BAD ABOUT YOURSELF - OR THAT YOU ARE A FAILURE OR HAVE LET YOURSELF OR YOUR FAMILY DOWN: NOT AT ALL
SUM OF ALL RESPONSES TO PHQ QUESTIONS 1-9: 4
SUM OF ALL RESPONSES TO PHQ QUESTIONS 1-9: 4
1. LITTLE INTEREST OR PLEASURE IN DOING THINGS: NOT AT ALL
2. FEELING DOWN, DEPRESSED OR HOPELESS: NOT AT ALL
SUM OF ALL RESPONSES TO PHQ QUESTIONS 1-9: 4
5. POOR APPETITE OR OVEREATING: SEVERAL DAYS
SUM OF ALL RESPONSES TO PHQ QUESTIONS 1-9: 4
7. TROUBLE CONCENTRATING ON THINGS, SUCH AS READING THE NEWSPAPER OR WATCHING TELEVISION: NOT AT ALL

## 2025-01-02 NOTE — ASSESSMENT & PLAN NOTE
Chronic, at goal (stable), needs refills on medication. Monitored by specialist- no acute findings meriting change in the plan.  Sees cardiology.  Last echocardiogram available for review 11/2/2024:   Left Ventricle: Reduced left ventricular systolic function with a visually estimated EF of 50 - 55%.    Aortic Valve: Moderate to severe regurgitation with an eccentrically directed jet and may underestimate severity. Mild stenosis of the aortic valve.    Mitral Valve: Mild regurgitation. Mild to moderate stenosis noted.    Tricuspid Valve: Mild regurgitation.    Image quality is adequate.

## 2025-01-02 NOTE — PROGRESS NOTES
[facility]  Clinic Note     Evelio Marroquin (: 1960) is a 64 y.o. female, established patient, here for evaluation of the following chief complaint(s):  Follow-up       ASSESSMENT/PLAN:    1. CHF (congestive heart failure), NYHA class IV, acute on chronic, systolic (HCC)  Assessment & Plan:    Chronic, at goal (stable), needs refills on medication. Monitored by specialist- no acute findings meriting change in the plan.  Sees cardiology.  Last echocardiogram available for review 2024:   Left Ventricle: Reduced left ventricular systolic function with a visually estimated EF of 50 - 55%.    Aortic Valve: Moderate to severe regurgitation with an eccentrically directed jet and may underestimate severity. Mild stenosis of the aortic valve.    Mitral Valve: Mild regurgitation. Mild to moderate stenosis noted.    Tricuspid Valve: Mild regurgitation.    Image quality is adequate.  Orders:  -     sacubitril-valsartan (ENTRESTO) 49-51 MG per tablet; Take 1 tablet by mouth 2 times daily, Disp-60 tablet, R-3Normal  -     carvedilol (COREG) 12.5 MG tablet; Take 1 tablet by mouth 2 times daily, Disp-180 tablet, R-1Normal  -     chlorthalidone (HYGROTON) 25 MG tablet; Take 0.5 tablets by mouth daily, Disp-45 tablet, R-1Normal  -     spironolactone (ALDACTONE) 25 MG tablet; Take 1 tablet by mouth daily, Disp-90 tablet, R-1Normal  -     Comprehensive Metabolic Panel; Future  - Weight trends reviewed:    Wt Readings from Last 3 Encounters:   25 (!) 139.8 kg (308 lb 3.2 oz)   24 135.2 kg (298 lb)   24 135.3 kg (298 lb 4.5 oz)       2. Anemia in stage 3b chronic kidney disease (HCC)  -     CBC with Auto Differential; Future    3. Chronic obstructive pulmonary disease, unspecified COPD type (HCC)  -Has not formal pulmonary evaluation or pulmonary function test.  Will start referral process for ongoing evaluation.  -     [facility] - Marguerite Gonzales MD, PulmonologyAlejandro

## 2025-01-03 DIAGNOSIS — E21.3 HYPERPARATHYROIDISM (HCC): Primary | ICD-10-CM

## 2025-01-03 DIAGNOSIS — N18.31 STAGE 3A CHRONIC KIDNEY DISEASE (HCC): ICD-10-CM

## 2025-01-13 ENCOUNTER — HOSPITAL ENCOUNTER (OUTPATIENT)
Age: 65
Discharge: HOME | End: 2025-01-16
Payer: COMMERCIAL

## 2025-01-13 DIAGNOSIS — E21.3 HYPERPARATHYROIDISM (HCC): ICD-10-CM

## 2025-01-13 PROCEDURE — 76536 US EXAM OF HEAD AND NECK: CPT

## 2025-01-14 ENCOUNTER — PATIENT MESSAGE (OUTPATIENT)
Age: 65
End: 2025-01-14

## 2025-01-14 DIAGNOSIS — E21.3 HYPERPARATHYROIDISM (HCC): Primary | ICD-10-CM

## 2025-01-14 DIAGNOSIS — D35.2 PITUITARY ADENOMA (HCC): ICD-10-CM

## 2025-01-14 DIAGNOSIS — E83.52 HYPERCALCEMIA: ICD-10-CM

## 2025-01-27 DIAGNOSIS — F41.8 DEPRESSION WITH ANXIETY: ICD-10-CM

## 2025-01-27 NOTE — TELEPHONE ENCOUNTER
PCP: Yesica Askew APRN - NP    Last appt: 1/2/2025   Future Appointments   Date Time Provider Department Center   5/2/2025  9:00 AM Yesica Askew APRN - NP [facility]   5/13/2025  1:50 PM Christine Powell MD RDE [facility]       Requested Prescriptions     Pending Prescriptions Disp Refills    sertraline (ZOLOFT) 100 MG tablet 90 tablet 1     Sig: Take 1 tablet by mouth daily         Prior labs and Blood pressures:  BP Readings from Last 3 Encounters:   01/02/25 (!) 135/59   11/07/24 (!) 142/70   11/04/24 (!) 127/57     Lab Results   Component Value Date/Time     01/02/2025 09:14 AM    K 4.9 01/02/2025 09:14 AM     01/02/2025 09:14 AM    CO2 30 01/02/2025 09:14 AM    BUN 32 01/02/2025 09:14 AM    GFRAA 59 07/25/2022 10:36 AM     No results found for: \"HBA1C\", \"NVH5HRTN\"  Lab Results   Component Value Date/Time    CHOL 188 05/13/2024 08:54 AM    HDL 60 05/13/2024 08:54 AM    .4 05/13/2024 08:54 AM    .4 05/17/2023 08:40 AM    VLDL 12.6 05/13/2024 08:54 AM     No results found for: \"VITD3\"    Lab Results   Component Value Date/Time    TSH 0.73 05/11/2021 03:11 PM

## 2025-01-28 RX ORDER — SERTRALINE HYDROCHLORIDE 100 MG/1
100 TABLET, FILM COATED ORAL DAILY
Qty: 90 TABLET | Refills: 0 | Status: SHIPPED | OUTPATIENT
Start: 2025-01-28

## 2025-01-31 NOTE — PROGRESS NOTES
Rikki Kwong is a 62 y.o. female who was seen by synchronous (real-time) audio-video technology on 1/4/2021 for Allergic Reaction (lisinopril, swollen lips) and Nausea        Assessment & Plan:   Diagnoses and all orders for this visit:    1. Essential hypertension  -     amLODIPine (NORVASC) 5 mg tablet; Take 1 Tab by mouth daily. Sub optimal control on maxzide alone. Will add amlodipine as directed. Recommend interim BP monitoring and record. Report if > 130/80 consistently. 2. Adverse effect of drug, initial encounter  Discontinue lisinopril. Added to allergy list.    Follow up 1 month to recheck BP. I spent at least 15 minutes on this visit with this established patient. 712  Subjective:     Reports reaction to lisinopril. Has been on med on and off x 1 year. States she noticed lips swelled 3 days ago. Stopped medication with resolution of symptoms. Denies cough, SOB, rash. Continues to take maxzide for HTN without problem. Reports interim BP of 140's/90's since stopping lisinopril. Prior to Admission medications    Medication Sig Start Date End Date Taking? Authorizing Provider   amLODIPine (NORVASC) 5 mg tablet Take 1 Tab by mouth daily. 1/4/21  Yes Kayy Alcantar, SOLOMON   triamterene-hydroCHLOROthiazide (MAXZIDE) 75-50 mg per tablet Take 1 Tab by mouth daily. Appointment due for further refills. 10/28/20  Yes Alanis Serrano MD   albuterol sulfate (PROAIR RESPICLICK) 90 mcg/actuation breath activated inhaler Take 2 Puffs by inhalation every four (4) hours as needed for Cough. 7/22/20  Yes Alanis Serrano MD   aclidinium bromide (TUDORZA PRESSAIR) 400 mcg/actuation inhaler Take 1 Puff by inhalation two (2) times a day. 2/18/20  Yes Alanis Serrano MD   ondansetron (ZOFRAN ODT) 4 mg disintegrating tablet Take 1 Tab by mouth every eight (8) hours as needed for Nausea or Nausea or Vomiting.  2/17/20  Yes Alanis Serrano MD   sertraline (ZOLOFT) 100 mg tablet Take 1 tablet She said she will see you then.  by mouth once daily 12/14/20   Willian Hawkins MD   lisinopriL (PRINIVIL, ZESTRIL) 40 mg tablet Take 1 Tab by mouth daily. Schedule office visit for further refills. 11/20/20 1/4/21  Willian Hawkins MD     Patient Active Problem List   Diagnosis Code    Essential hypertension I5    Former smoker Z87.891    Mild intermittent asthma J45.20    Allergic rhinitis J30.9    Class 2 severe obesity due to excess calories with serious comorbidity and body mass index (BMI) of 36.0 to 36.9 in adult ([facility] E66.01, Z68.36    Depression with anxiety F41.8    Left foot pain M79.672    Vitamin D deficiency E55.9    ASCUS with positive high risk HPV cervical R87.610, R87.810    Simple chronic bronchitis ([facility] J41.0       Review of Systems   Constitutional: Negative for chills and fever. HENT:        Swelling of lips as stated. Respiratory: Negative for cough and shortness of breath. Cardiovascular: Negative for chest pain and palpitations. Skin: Negative for rash. Neurological: Negative for dizziness and headaches. All other systems reviewed and are negative. Objective:     Patient-Reported Vitals 1/4/2021   Patient-Reported Temperature 97.2   Patient-Reported Systolic  908   Patient-Reported Diastolic 61      General: alert, cooperative, no distress   Mental  status: normal mood, behavior, speech, dress, motor activity, and thought processes, able to follow commands   HENT: NCAT   Neck: no visualized mass   Resp: no respiratory distress   Neuro: no gross deficits   Skin: no discoloration or lesions of concern on visible areas   Psychiatric: normal affect, consistent with stated mood, no evidence of hallucinations     Additional exam findings: We discussed the expected course, resolution and complications of the diagnosis(es) in detail. Medication risks, benefits, costs, interactions, and alternatives were discussed as indicated.   I advised her to contact the office if her condition worsens, changes or fails to improve as anticipated. She expressed understanding with the diagnosis(es) and plan. Marie Burch, who was evaluated through a patient-initiated, synchronous (real-time) audio-video encounter, and/or her healthcare decision maker, is aware that it is a billable service, with coverage as determined by her insurance carrier. She provided verbal consent to proceed: Yes, and patient identification was verified. It was conducted pursuant to the emergency declaration under the [address] [place] [address], [address] [place] [address] authority and the [employer] and [employer] General Act. A caregiver was present when appropriate. Ability to conduct physical exam was limited. I was in the office. The patient was at home.       Apurva Sampson NP

## 2025-03-21 RX ORDER — ALBUTEROL SULFATE 90 UG/1
2 INHALANT RESPIRATORY (INHALATION) EVERY 6 HOURS PRN
Qty: 18 G | Refills: 3 | Status: SHIPPED | OUTPATIENT
Start: 2025-03-21

## 2025-03-21 NOTE — TELEPHONE ENCOUNTER
PCP: Yesica Askew APRN - NP    Last appt: 1/2/2025   Future Appointments   Date Time Provider Department Center   5/2/2025  9:00 AM Yesica Askew APRN - NP [facility]   5/13/2025  1:50 PM Christine Powell MD [facility]       Requested Prescriptions     Pending Prescriptions Disp Refills    albuterol sulfate HFA (PROVENTIL;VENTOLIN;PROAIR) 108 (90 Base) MCG/ACT inhaler 18 g 3     Sig: Inhale 2 puffs into the lungs every 6 hours as needed for Shortness of Breath

## 2025-03-27 LAB
LEFT VENTRICULAR EJECTION FRACTION HIGH VALUE: 55 %
LEFT VENTRICULAR EJECTION FRACTION MODE: NORMAL
LV EF: 50 %

## 2025-04-29 SDOH — ECONOMIC STABILITY: FOOD INSECURITY: WITHIN THE PAST 12 MONTHS, THE FOOD YOU BOUGHT JUST DIDN'T LAST AND YOU DIDN'T HAVE MONEY TO GET MORE.: NEVER TRUE

## 2025-04-29 SDOH — ECONOMIC STABILITY: INCOME INSECURITY: IN THE LAST 12 MONTHS, WAS THERE A TIME WHEN YOU WERE NOT ABLE TO PAY THE MORTGAGE OR RENT ON TIME?: NO

## 2025-04-29 SDOH — ECONOMIC STABILITY: FOOD INSECURITY: WITHIN THE PAST 12 MONTHS, YOU WORRIED THAT YOUR FOOD WOULD RUN OUT BEFORE YOU GOT MONEY TO BUY MORE.: NEVER TRUE

## 2025-04-29 SDOH — ECONOMIC STABILITY: TRANSPORTATION INSECURITY
IN THE PAST 12 MONTHS, HAS LACK OF TRANSPORTATION KEPT YOU FROM MEETINGS, WORK, OR FROM GETTING THINGS NEEDED FOR DAILY LIVING?: NO

## 2025-04-29 SDOH — ECONOMIC STABILITY: TRANSPORTATION INSECURITY
IN THE PAST 12 MONTHS, HAS THE LACK OF TRANSPORTATION KEPT YOU FROM MEDICAL APPOINTMENTS OR FROM GETTING MEDICATIONS?: NO

## 2025-04-29 NOTE — PROGRESS NOTES
Cardiac Surgery   History and Physical    Subjective:      Evelio Marroquin is a 64 y.o. female who was referred for cardiac evaluation by Dr. Saturnino Mo for ascending aortic aneursym. She has a medical history of AI, CHF, HTN, COPD, CKD stage III, depression/anxiety, obesity.      Recently hospitalized with CHF 11/1/24-11/4/24.   She reports SOB with exertion. Has to stop after walking to catch her breath. Denies LE edema, CP, orthopnea/PND, claudication.     Needs to use basket when walking around grocery store.     Pt lives with . She is on disabilty. He gets around independently without assist devices. She is a former smoker quit in 2018, smoked 1/2PPD for 40 years, drinks beer 3-4 a week, and denies illicit drug use. Pt has a significant family history of mother CHF, stroke, brother with kidney disease.     Dentist: no recent dentist appointment, no current concerns.     Cardiac Testing    Cardiac catheterization: none    Echo 3/27/25:          ECHO: 11/2/2024  Interpretation Summary  Show Result Comparison     Left Ventricle: Reduced left ventricular systolic function with a visually estimated EF of 50 - 55%.    Aortic Valve: Moderate to severe regurgitation with an eccentrically directed jet and may underestimate severity. Mild stenosis of the aortic valve.    Mitral Valve: Mild regurgitation. Mild to moderate stenosis noted.    Tricuspid Valve: Mild regurgitation.    Image quality is adequate.     Echo Findings    Left Ventricle Reduced left ventricular systolic function with a visually estimated EF of 50 - 55%.   Right Ventricle Right ventricle size is normal. Normal systolic function.   Aortic Valve Moderate to severe regurgitation with an eccentrically directed jet and may underestimate severity. Mild stenosis of the aortic valve.   Mitral Valve Not well visualized. Mild regurgitation. Mild to moderate stenosis noted.   Tricuspid Valve Mild regurgitation. No stenosis noted.   Pericardium No

## 2025-05-02 ENCOUNTER — OFFICE VISIT (OUTPATIENT)
Age: 65
End: 2025-05-02
Payer: COMMERCIAL

## 2025-05-02 VITALS
DIASTOLIC BLOOD PRESSURE: 67 MMHG | HEART RATE: 65 BPM | HEIGHT: 72 IN | SYSTOLIC BLOOD PRESSURE: 129 MMHG | WEIGHT: 293 LBS | BODY MASS INDEX: 39.68 KG/M2 | RESPIRATION RATE: 14 BRPM | OXYGEN SATURATION: 95 % | TEMPERATURE: 96.9 F

## 2025-05-02 DIAGNOSIS — F41.8 DEPRESSION WITH ANXIETY: ICD-10-CM

## 2025-05-02 DIAGNOSIS — I77.89 ASCENDING AORTA ENLARGEMENT: ICD-10-CM

## 2025-05-02 DIAGNOSIS — N18.32 STAGE 3B CHRONIC KIDNEY DISEASE (HCC): ICD-10-CM

## 2025-05-02 DIAGNOSIS — I10 ESSENTIAL HYPERTENSION: ICD-10-CM

## 2025-05-02 DIAGNOSIS — E66.01 CLASS 2 SEVERE OBESITY DUE TO EXCESS CALORIES WITH SERIOUS COMORBIDITY AND BODY MASS INDEX (BMI) OF 36.0 TO 36.9 IN ADULT (HCC): Primary | ICD-10-CM

## 2025-05-02 DIAGNOSIS — J44.9 CHRONIC OBSTRUCTIVE PULMONARY DISEASE, UNSPECIFIED COPD TYPE (HCC): ICD-10-CM

## 2025-05-02 DIAGNOSIS — E21.3 HYPERPARATHYROIDISM: ICD-10-CM

## 2025-05-02 DIAGNOSIS — I50.42 CHF (CONGESTIVE HEART FAILURE), NYHA CLASS IV, CHRONIC, COMBINED (HCC): ICD-10-CM

## 2025-05-02 DIAGNOSIS — E66.812 CLASS 2 SEVERE OBESITY DUE TO EXCESS CALORIES WITH SERIOUS COMORBIDITY AND BODY MASS INDEX (BMI) OF 36.0 TO 36.9 IN ADULT (HCC): Primary | ICD-10-CM

## 2025-05-02 PROBLEM — I50.9 HEART FAILURE (HCC): Status: RESOLVED | Noted: 2024-11-01 | Resolved: 2025-05-02

## 2025-05-02 PROCEDURE — 3078F DIAST BP <80 MM HG: CPT | Performed by: NURSE PRACTITIONER

## 2025-05-02 PROCEDURE — 99214 OFFICE O/P EST MOD 30 MIN: CPT | Performed by: NURSE PRACTITIONER

## 2025-05-02 PROCEDURE — 3074F SYST BP LT 130 MM HG: CPT | Performed by: NURSE PRACTITIONER

## 2025-05-02 RX ORDER — CHLORTHALIDONE 25 MG/1
12.5 TABLET ORAL DAILY
Qty: 45 TABLET | Refills: 1 | Status: SHIPPED | OUTPATIENT
Start: 2025-05-02 | End: 2025-06-01

## 2025-05-02 RX ORDER — SPIRONOLACTONE 25 MG/1
25 TABLET ORAL DAILY
Qty: 90 TABLET | Refills: 1 | Status: SHIPPED | OUTPATIENT
Start: 2025-05-02

## 2025-05-02 RX ORDER — CARVEDILOL 12.5 MG/1
12.5 TABLET ORAL 2 TIMES DAILY
Qty: 180 TABLET | Refills: 1 | Status: SHIPPED | OUTPATIENT
Start: 2025-05-02

## 2025-05-02 RX ORDER — EMPAGLIFLOZIN 10 MG/1
10 TABLET, FILM COATED ORAL DAILY
COMMUNITY
Start: 2025-04-07

## 2025-05-02 RX ORDER — SERTRALINE HYDROCHLORIDE 100 MG/1
100 TABLET, FILM COATED ORAL DAILY
Qty: 90 TABLET | Refills: 0 | Status: SHIPPED | OUTPATIENT
Start: 2025-05-02

## 2025-05-02 ASSESSMENT — PATIENT HEALTH QUESTIONNAIRE - PHQ9
2. FEELING DOWN, DEPRESSED OR HOPELESS: NOT AT ALL
SUM OF ALL RESPONSES TO PHQ QUESTIONS 1-9: 5
1. LITTLE INTEREST OR PLEASURE IN DOING THINGS: NOT AT ALL
6. FEELING BAD ABOUT YOURSELF - OR THAT YOU ARE A FAILURE OR HAVE LET YOURSELF OR YOUR FAMILY DOWN: NOT AT ALL
3. TROUBLE FALLING OR STAYING ASLEEP: NEARLY EVERY DAY
4. FEELING TIRED OR HAVING LITTLE ENERGY: NOT AT ALL
10. IF YOU CHECKED OFF ANY PROBLEMS, HOW DIFFICULT HAVE THESE PROBLEMS MADE IT FOR YOU TO DO YOUR WORK, TAKE CARE OF THINGS AT HOME, OR GET ALONG WITH OTHER PEOPLE: NOT DIFFICULT AT ALL
SUM OF ALL RESPONSES TO PHQ QUESTIONS 1-9: 5
9. THOUGHTS THAT YOU WOULD BE BETTER OFF DEAD, OR OF HURTING YOURSELF: NOT AT ALL
SUM OF ALL RESPONSES TO PHQ QUESTIONS 1-9: 5
SUM OF ALL RESPONSES TO PHQ QUESTIONS 1-9: 5
7. TROUBLE CONCENTRATING ON THINGS, SUCH AS READING THE NEWSPAPER OR WATCHING TELEVISION: NOT AT ALL
8. MOVING OR SPEAKING SO SLOWLY THAT OTHER PEOPLE COULD HAVE NOTICED. OR THE OPPOSITE, BEING SO FIGETY OR RESTLESS THAT YOU HAVE BEEN MOVING AROUND A LOT MORE THAN USUAL: NOT AT ALL
5. POOR APPETITE OR OVEREATING: MORE THAN HALF THE DAYS

## 2025-05-02 NOTE — PROGRESS NOTES
[facility]  Clinic Note     Evelio Marroquin (: 1960) is a 64 y.o. female, established patient, here for evaluation of the following chief complaint(s):  Congestive Heart Failure (Follow up)       ASSESSMENT/PLAN:    Assessment & Plan  Class 2 severe obesity due to excess calories with serious comorbidity and body mass index (BMI) of 36.0 to 36.9 in adult ([facility])  - Chronic.  Physical activity limited due to chronic illness including COPD, CHF with NYHA class IV symptoms   2024   Weight Loss Metrics      Weight - Scale 298 lbs  308 lbs 3 oz (H)  309 lbs 10 oz (H)    BMI (Calculated) 40.5 kg/m2  41.9 kg/m2  42.1 kg/m2         Chronic obstructive pulmonary disease, unspecified COPD type ([facility])   - Symptoms stable. No recent exacerbations.     Stage 3b chronic kidney disease (HCC)   - Stable  -Also sees Dr. Crain with nephrology  Lab Results   Component Value Date    CREATININE 1.17 (H) 2025    CREATININE 1.38 (H) 2024    CREATININE 1.28 (H) 2024        CHF (congestive heart failure), NYHA class IV, chronic, combined ([facility])  -Appears euvolemic.  Symptoms stable.  Jardiance recently added.  -Sees cardiology.  - On Entresto, carvedilol, spironolactone and chlorthalidone  - Updated echo performed 3/27/2025: LV severely dilated.  LA moderately dilated.  RV and is normal.  RA mildly dilated.  Posterior LV wall moderately thickened.  Septum normal.  Aortic valve not well-visualized.  Color and Doppler flow show severe aortic regurgitation.  Mild to moderate mitral regurgitation.  Mild tricuspid regurgitation.  Mild nonrheumatic aortic stenosis.  No pericardial effusion.  Ascending aorta measured 5.0 cm       Hyperparathyroidism   -Has appointment to see endocrinology 2025  Lab Results   Component Value Date    IPTH 170.7 (H) 2025    CALCIUM 10.2 (H) 2025    CALCIUM 10.0 2025    PHOS 4.5 2024        Depression with anxiety

## 2025-05-02 NOTE — PROGRESS NOTES
Chief Complaint   Patient presents with    Congestive Heart Failure     Follow up         \"Have you been to the ER, urgent care clinic since your last visit?  Hospitalized since your last visit?\"    NO    “Have you seen or consulted any other health care providers outside of [facility] since your last visit?”    NO     “Have you had a pap smear?”    NO    Date of last Cervical Cancer screen (HPV or PAP): 4/17/2018             Click Here for Release of Records Request           5/2/2025     8:52 AM   PHQ-9    Little interest or pleasure in doing things 0   Feeling down, depressed, or hopeless 0   Trouble falling or staying asleep, or sleeping too much 3   Feeling tired or having little energy 0   Poor appetite or overeating 2   Feeling bad about yourself - or that you are a failure or have let yourself or your family down 0   Trouble concentrating on things, such as reading the newspaper or watching television 0   Moving or speaking so slowly that other people could have noticed. Or the opposite - being so fidgety or restless that you have been moving around a lot more than usual 0   Thoughts that you would be better off dead, or of hurting yourself in some way 0   PHQ-2 Score 0   PHQ-9 Total Score 5   If you checked off any problems, how difficult have these problems made it for you to do your work, take care of things at home, or get along with other people? 0           Financial Resource Strain: Patient Declined (5/13/2024)    Overall Financial Resource Strain (CARDIA)     Difficulty of Paying Living Expenses: Patient declined      Food Insecurity: No Food Insecurity (4/29/2025)    Hunger Vital Sign     Worried About Running Out of Food in the Last Year: Never true     Ran Out of Food in the Last Year: Never true          Health Maintenance Due   Topic Date Due    Shingles vaccine (1 of 2) Never done    Respiratory Syncytial Virus (RSV) Pregnant or age 60 yrs+ (1 - Risk 60-74 years 1-dose series)

## 2025-05-02 NOTE — ASSESSMENT & PLAN NOTE
-Reviewed most recent CTA noting dilated aortic root and ascending aorta.  Aortic root max diameter 4.3-4.4 cm.  Ascending aorta 5.2 x 4.8 cm.  - Has appointment to see CV surgery with Dr. Bradley on 5/5/2025

## 2025-05-02 NOTE — ASSESSMENT & PLAN NOTE
- Chronic.  Physical activity limited due to chronic illness including COPD, CHF with NYHA class IV symptoms   11/7/2024 1/2/2025 5/2/2025   Weight Loss Metrics      Weight - Scale 298 lbs  308 lbs 3 oz (H)  309 lbs 10 oz (H)    BMI (Calculated) 40.5 kg/m2  41.9 kg/m2  42.1 kg/m2

## 2025-05-02 NOTE — ASSESSMENT & PLAN NOTE
-Appears euvolemic.  Symptoms stable.  Jardiance recently added.  -Sees cardiology.  - On Entresto, carvedilol, spironolactone and chlorthalidone  - Updated echo performed 3/27/2025: LV severely dilated.  LA moderately dilated.  RV and is normal.  RA mildly dilated.  Posterior LV wall moderately thickened.  Septum normal.  Aortic valve not well-visualized.  Color and Doppler flow show severe aortic regurgitation.  Mild to moderate mitral regurgitation.  Mild tricuspid regurgitation.  Mild nonrheumatic aortic stenosis.  No pericardial effusion.  Ascending aorta measured 5.0 cm

## 2025-05-02 NOTE — ASSESSMENT & PLAN NOTE
- Stable  -Also sees Dr. Crain with nephrology  Lab Results   Component Value Date    CREATININE 1.17 (H) 01/02/2025    CREATININE 1.38 (H) 11/07/2024    CREATININE 1.28 (H) 11/04/2024

## 2025-05-02 NOTE — ASSESSMENT & PLAN NOTE
Stable. No adjustments at this time  Orders:    sertraline (ZOLOFT) 100 MG tablet; Take 1 tablet by mouth daily

## 2025-05-05 ENCOUNTER — INITIAL CONSULT (OUTPATIENT)
Age: 65
End: 2025-05-05
Payer: COMMERCIAL

## 2025-05-05 ENCOUNTER — PREP FOR PROCEDURE (OUTPATIENT)
Age: 65
End: 2025-05-05

## 2025-05-05 VITALS
HEART RATE: 81 BPM | RESPIRATION RATE: 14 BRPM | SYSTOLIC BLOOD PRESSURE: 143 MMHG | DIASTOLIC BLOOD PRESSURE: 76 MMHG | OXYGEN SATURATION: 97 % | WEIGHT: 293 LBS | BODY MASS INDEX: 42.72 KG/M2

## 2025-05-05 DIAGNOSIS — I77.89 ASCENDING AORTA ENLARGEMENT: Primary | ICD-10-CM

## 2025-05-05 DIAGNOSIS — I25.118 CORONARY ARTERY DISEASE OF NATIVE ARTERY OF NATIVE HEART WITH STABLE ANGINA PECTORIS: Primary | ICD-10-CM

## 2025-05-05 PROBLEM — I71.21 ANEURYSM OF ASCENDING AORTA: Status: ACTIVE | Noted: 2025-05-05

## 2025-05-05 PROCEDURE — 3077F SYST BP >= 140 MM HG: CPT | Performed by: NURSE PRACTITIONER

## 2025-05-05 PROCEDURE — 3078F DIAST BP <80 MM HG: CPT | Performed by: NURSE PRACTITIONER

## 2025-05-05 PROCEDURE — 99205 OFFICE O/P NEW HI 60 MIN: CPT | Performed by: NURSE PRACTITIONER

## 2025-05-05 NOTE — PERIOP NOTE
CSS/PAT: 5-12 @ 8;00    Message  Received: Jhoana Dobbins Ashaki N; Olivia Otoole  PAT REQUEST 5/12@ 8AM SURGERY 5/19

## 2025-05-06 RX ORDER — SODIUM CHLORIDE 0.9 % (FLUSH) 0.9 %
5-40 SYRINGE (ML) INJECTION EVERY 12 HOURS SCHEDULED
Status: CANCELLED | OUTPATIENT
Start: 2025-05-06

## 2025-05-06 RX ORDER — ACETAMINOPHEN 325 MG/1
1000 TABLET ORAL ONCE
Status: CANCELLED | OUTPATIENT
Start: 2025-05-06 | End: 2025-05-06

## 2025-05-06 RX ORDER — GABAPENTIN 100 MG/1
300 CAPSULE ORAL ONCE
Status: CANCELLED | OUTPATIENT
Start: 2025-05-06 | End: 2025-05-06

## 2025-05-06 RX ORDER — SODIUM CHLORIDE 9 MG/ML
INJECTION, SOLUTION INTRAVENOUS PRN
Status: CANCELLED | OUTPATIENT
Start: 2025-05-06

## 2025-05-06 RX ORDER — SODIUM CHLORIDE 0.9 % (FLUSH) 0.9 %
5-40 SYRINGE (ML) INJECTION PRN
Status: CANCELLED | OUTPATIENT
Start: 2025-05-06

## 2025-05-07 ENCOUNTER — TELEPHONE (OUTPATIENT)
Age: 65
End: 2025-05-07

## 2025-05-12 ENCOUNTER — HOSPITAL ENCOUNTER (OUTPATIENT)
Facility: HOSPITAL | Age: 65
Discharge: HOME | End: 2025-05-14
Payer: COMMERCIAL

## 2025-05-12 ENCOUNTER — HOSPITAL ENCOUNTER (OUTPATIENT)
Facility: HOSPITAL | Age: 65
Discharge: HOME | End: 2025-05-15
Payer: COMMERCIAL

## 2025-05-12 ENCOUNTER — OFFICE VISIT (OUTPATIENT)
Age: 65
End: 2025-05-12

## 2025-05-12 VITALS
SYSTOLIC BLOOD PRESSURE: 135 MMHG | DIASTOLIC BLOOD PRESSURE: 64 MMHG | WEIGHT: 293 LBS | HEART RATE: 59 BPM | RESPIRATION RATE: 18 BRPM | HEIGHT: 72 IN | BODY MASS INDEX: 39.68 KG/M2 | TEMPERATURE: 98.2 F

## 2025-05-12 DIAGNOSIS — I25.118 CORONARY ARTERY DISEASE OF NATIVE ARTERY OF NATIVE HEART WITH STABLE ANGINA PECTORIS: ICD-10-CM

## 2025-05-12 DIAGNOSIS — I77.89 ASCENDING AORTA ENLARGEMENT: Primary | ICD-10-CM

## 2025-05-12 DIAGNOSIS — I77.89 ASCENDING AORTA ENLARGEMENT: ICD-10-CM

## 2025-05-12 DIAGNOSIS — Z91.89 RISK FACTORS FOR OBSTRUCTIVE SLEEP APNEA: Primary | ICD-10-CM

## 2025-05-12 LAB
ALBUMIN SERPL-MCNC: 3.6 G/DL (ref 3.5–5)
ALBUMIN/GLOB SERPL: 0.9 (ref 1.1–2.2)
ALP SERPL-CCNC: 120 U/L (ref 45–117)
ALT SERPL-CCNC: 16 U/L (ref 12–78)
ANION GAP SERPL CALC-SCNC: 2 MMOL/L (ref 2–12)
APPEARANCE UR: CLEAR
APTT PPP: 29.3 SEC (ref 22.1–31)
ARTERIAL PATENCY WRIST A: POSITIVE
AST SERPL-CCNC: 12 U/L (ref 15–37)
BACTERIA URNS QL MICRO: NEGATIVE /HPF
BASE DEFICIT BLD-SCNC: 0.4 MMOL/L
BASOPHILS # BLD: 0.03 K/UL (ref 0–0.1)
BASOPHILS NFR BLD: 0.6 % (ref 0–1)
BDY SITE: NORMAL
BILIRUB SERPL-MCNC: 0.5 MG/DL (ref 0.2–1)
BILIRUB UR QL: NEGATIVE
BUN SERPL-MCNC: 43 MG/DL (ref 6–20)
BUN/CREAT SERPL: 31 (ref 12–20)
CALCIUM SERPL-MCNC: 10.3 MG/DL (ref 8.5–10.1)
CHLORIDE SERPL-SCNC: 107 MMOL/L (ref 97–108)
CO2 SERPL-SCNC: 27 MMOL/L (ref 21–32)
COLOR UR: ABNORMAL
CREAT SERPL-MCNC: 1.37 MG/DL (ref 0.55–1.02)
DIFFERENTIAL METHOD BLD: NORMAL
EOSINOPHIL # BLD: 0.15 K/UL (ref 0–0.4)
EOSINOPHIL NFR BLD: 3.1 % (ref 0–7)
EPITH CASTS URNS QL MICRO: ABNORMAL /LPF
ERYTHROCYTE [DISTWIDTH] IN BLOOD BY AUTOMATED COUNT: 12.6 % (ref 11.5–14.5)
EST. AVERAGE GLUCOSE BLD GHB EST-MCNC: 100 MG/DL
GAS FLOW.O2 O2 DELIVERY SYS: NORMAL
GLOBULIN SER CALC-MCNC: 4.1 G/DL (ref 2–4)
GLUCOSE SERPL-MCNC: 97 MG/DL (ref 65–100)
GLUCOSE UR STRIP.AUTO-MCNC: 250 MG/DL
HBA1C MFR BLD: 5.1 % (ref 4–5.6)
HCO3 BLD-SCNC: 25.4 MMOL/L (ref 21–28)
HCT VFR BLD AUTO: 38.8 % (ref 35–47)
HGB BLD-MCNC: 12.6 G/DL (ref 11.5–16)
HGB UR QL STRIP: NEGATIVE
HISTORY CHECK: NORMAL
HYALINE CASTS URNS QL MICRO: ABNORMAL /LPF (ref 0–5)
IMM GRANULOCYTES # BLD AUTO: 0.02 K/UL (ref 0–0.04)
IMM GRANULOCYTES NFR BLD AUTO: 0.4 % (ref 0–0.5)
INR PPP: 1 (ref 0.9–1.1)
KETONES UR QL STRIP.AUTO: NEGATIVE MG/DL
LEUKOCYTE ESTERASE UR QL STRIP.AUTO: NEGATIVE
LYMPHOCYTES # BLD: 0.93 K/UL (ref 0.8–3.5)
LYMPHOCYTES NFR BLD: 19.3 % (ref 12–49)
MAGNESIUM SERPL-MCNC: 2.1 MG/DL (ref 1.6–2.4)
MCH RBC QN AUTO: 29.3 PG (ref 26–34)
MCHC RBC AUTO-ENTMCNC: 32.5 G/DL (ref 30–36.5)
MCV RBC AUTO: 90.2 FL (ref 80–99)
MONOCYTES # BLD: 0.53 K/UL (ref 0–1)
MONOCYTES NFR BLD: 11 % (ref 5–13)
NEUTS SEG # BLD: 3.16 K/UL (ref 1.8–8)
NEUTS SEG NFR BLD: 65.6 % (ref 32–75)
NITRITE UR QL STRIP.AUTO: NEGATIVE
NRBC # BLD: 0 K/UL (ref 0–0.01)
NRBC BLD-RTO: 0 PER 100 WBC
O2/TOTAL GAS SETTING VFR VENT: 21 %
PCO2 BLD: 44.7 MMHG (ref 35–48)
PH BLD: 7.36 (ref 7.35–7.45)
PH UR STRIP: 5.5 (ref 5–8)
PLATELET # BLD AUTO: 187 K/UL (ref 150–400)
PMV BLD AUTO: 10.5 FL (ref 8.9–12.9)
PO2 BLD: 85 MMHG (ref 83–108)
POTASSIUM SERPL-SCNC: 4.9 MMOL/L (ref 3.5–5.1)
PROT SERPL-MCNC: 7.7 G/DL (ref 6.4–8.2)
PROT UR STRIP-MCNC: NEGATIVE MG/DL
PROTHROMBIN TIME: 10.7 SEC (ref 9.2–11.2)
RBC # BLD AUTO: 4.3 M/UL (ref 3.8–5.2)
RBC #/AREA URNS HPF: ABNORMAL /HPF (ref 0–5)
SAO2 % BLD: 95.9 % (ref 92–97)
SODIUM SERPL-SCNC: 136 MMOL/L (ref 136–145)
SP GR UR REFRACTOMETRY: 1.01 (ref 1–1.03)
SPECIMEN TYPE: NORMAL
THERAPEUTIC RANGE: NORMAL SECS (ref 58–77)
TSH SERPL DL<=0.05 MIU/L-ACNC: 1.01 UIU/ML (ref 0.36–3.74)
URINE CULTURE IF INDICATED: ABNORMAL
UROBILINOGEN UR QL STRIP.AUTO: 0.2 EU/DL (ref 0.2–1)
WBC # BLD AUTO: 4.8 K/UL (ref 3.6–11)
WBC URNS QL MICRO: ABNORMAL /HPF (ref 0–4)

## 2025-05-12 PROCEDURE — 86901 BLOOD TYPING SEROLOGIC RH(D): CPT

## 2025-05-12 PROCEDURE — 83036 HEMOGLOBIN GLYCOSYLATED A1C: CPT

## 2025-05-12 PROCEDURE — 82803 BLOOD GASES ANY COMBINATION: CPT

## 2025-05-12 PROCEDURE — 36600 WITHDRAWAL OF ARTERIAL BLOOD: CPT

## 2025-05-12 PROCEDURE — 94010 BREATHING CAPACITY TEST: CPT

## 2025-05-12 PROCEDURE — 80053 COMPREHEN METABOLIC PANEL: CPT

## 2025-05-12 PROCEDURE — 71046 X-RAY EXAM CHEST 2 VIEWS: CPT

## 2025-05-12 PROCEDURE — 94726 PLETHYSMOGRAPHY LUNG VOLUMES: CPT

## 2025-05-12 PROCEDURE — 94729 DIFFUSING CAPACITY: CPT

## 2025-05-12 PROCEDURE — 83735 ASSAY OF MAGNESIUM: CPT

## 2025-05-12 PROCEDURE — 93922 UPR/L XTREMITY ART 2 LEVELS: CPT

## 2025-05-12 PROCEDURE — 86850 RBC ANTIBODY SCREEN: CPT

## 2025-05-12 PROCEDURE — 84443 ASSAY THYROID STIM HORMONE: CPT

## 2025-05-12 PROCEDURE — 85730 THROMBOPLASTIN TIME PARTIAL: CPT

## 2025-05-12 PROCEDURE — 85025 COMPLETE CBC W/AUTO DIFF WBC: CPT

## 2025-05-12 PROCEDURE — 93005 ELECTROCARDIOGRAM TRACING: CPT

## 2025-05-12 PROCEDURE — 85610 PROTHROMBIN TIME: CPT

## 2025-05-12 PROCEDURE — 81001 URINALYSIS AUTO W/SCOPE: CPT

## 2025-05-12 PROCEDURE — 94060 EVALUATION OF WHEEZING: CPT

## 2025-05-12 PROCEDURE — 93880 EXTRACRANIAL BILAT STUDY: CPT

## 2025-05-12 PROCEDURE — 86900 BLOOD TYPING SEROLOGIC ABO: CPT

## 2025-05-12 RX ORDER — CHLORHEXIDINE GLUCONATE ORAL RINSE 1.2 MG/ML
15 SOLUTION DENTAL 2 TIMES DAILY
Qty: 60 ML | Refills: 0 | Status: SHIPPED | OUTPATIENT
Start: 2025-05-17 | End: 2025-05-19

## 2025-05-12 RX ORDER — MUPIROCIN 20 MG/G
OINTMENT TOPICAL 2 TIMES DAILY
Qty: 1 EACH | Refills: 0 | Status: SHIPPED | OUTPATIENT
Start: 2025-05-17 | End: 2025-05-19

## 2025-05-12 NOTE — PERIOP NOTE
INSTRUCTIONS GIVEN AND REVIEWED, 2 BOTTLES OF SOAP GIVEN, PT GIVEN TIME TO ASK QUESTIONS     EKG PERFORMED     REVIEWED DRINK AND SCHEDULE, INSTRUCTED PT TO GO TO [facility] TO COMPLETE HER TESTING AND TO SEE CARDIOLOGIST, PT STATE UNDERSTANDING OF WHAT TO DO     CALLED SPOKE WITH ALEJANDRO IN THE OR UPDATED ON PATIENTS

## 2025-05-12 NOTE — PERIOP NOTE
Evelio Marroquin(1960) was seen at [place] [facility] testing on 5/12/25. They have surgery scheduled with Dr. Vahe Bradley on 5/19/25. The results of their RAJ STOP-BANG Scoring Tool indicates the potential need for a referral to sleep medicine. Pt referred to sleep medicine for follow-up/further recommendations regarding evaluation for sleep apnea.    RAJ STOP-BANG Scoring Tool    [x] Does the patient snore loud enough to be heard through a closed door?  [] Does the patient often feel tired, fatigued or sleep during the daytime or after a \"good\" night's sleep?  [] Has anyone observed the patient stop breathing during their sleep?  [x] Does the patient have or are they treated for HTN?  [x] Is the patient's BMI >35?  [x] Is the patient's neck size >17\"(male) or >16\"(female)?  [x] Is the patient older than 50?  [] Is the patient male?    RAJ Risk Score: 5    CHARO BRYANT - NP

## 2025-05-12 NOTE — H&P
Cardiac Surgery   History and Physical    Subjective:   **information obtained from previous consult note - no changes     Evelio Marroquin is a 64 y.o. female who was referred for cardiac evaluation by Dr. Saturnino Mo for ascending aortic aneursym. She has a medical history of AI, CHF, HTN, COPD, CKD stage III, depression/anxiety, obesity.      Recently hospitalized with CHF 11/1/24-11/4/24.   She reports SOB with exertion. Has to stop after walking to catch her breath. Denies LE edema, CP, orthopnea/PND, claudication.     Needs to use basket when walking around grocery store.     Pt lives with . She is on disabilty. He gets around independently without assist devices. She is a former smoker quit in 2018, smoked 1/2PPD for 40 years, drinks beer 3-4 a week, and denies illicit drug use. Pt has a significant family history of mother CHF, stroke, brother with kidney disease.     Dentist: no recent dentist appointment, no current concerns.     Cardiac Testing    Cardiac catheterization: scheduled for 5/16/25    Echo 3/27/25:          ECHO: 11/2/2024  Interpretation Summary  Show Result Comparison     Left Ventricle: Reduced left ventricular systolic function with a visually estimated EF of 50 - 55%.    Aortic Valve: Moderate to severe regurgitation with an eccentrically directed jet and may underestimate severity. Mild stenosis of the aortic valve.    Mitral Valve: Mild regurgitation. Mild to moderate stenosis noted.    Tricuspid Valve: Mild regurgitation.    Image quality is adequate.     Echo Findings    Left Ventricle Reduced left ventricular systolic function with a visually estimated EF of 50 - 55%.   Right Ventricle Right ventricle size is normal. Normal systolic function.   Aortic Valve Moderate to severe regurgitation with an eccentrically directed jet and may underestimate severity. Mild stenosis of the aortic valve.   Mitral Valve Not well visualized. Mild regurgitation. Mild to moderate stenosis

## 2025-05-12 NOTE — CONSENT
Informed Consent for Blood Component Transfusion Note    I have discussed with the patient the rationale for blood component transfusion; its benefits in treating or preventing fatigue, organ damage, or death; and its risk which includes mild transfusion reactions, rare risk of blood borne infection, or more serious but rare reactions. I have discussed the alternatives to transfusion, including the risk and consequences of not receiving transfusion. The patient had an opportunity to ask questions and had agreed to proceed with transfusion of blood components.    Electronically signed by CHARO Mcgowan NP on 5/12/25 at 2:50 PM EDT

## 2025-05-12 NOTE — FLOWSHEET NOTE
PT WOULD LIKE TO HAVE A REFERRAL FOR SLEEP APNEA RAJ 5, MESSAGE SENT TO MARGARET HAMLIN NP VIA CC

## 2025-05-12 NOTE — PROGRESS NOTES
H&P encounter only.    Test results reviewed with patient.  Consents discussed and signed.    Discussed starting:  Bactroban on 5/17  Peridex on 5/17    Discussed stopping:  Jardiance last dose 5/15  Chlorthalidone last dose 5/16  Spironolactone last dose 5/16  Entresto last dose 5/16    Verbal and written instruction provided. Pt verbalized understanding and all questions answered.

## 2025-05-12 NOTE — PERIOP NOTE
[address] [address]      MAIN PRE OP             [phone]                                                                                AMBULATORY PRE OP          [phone]     PRE-ADMISSION TESTING    [phone]       Surgery Date:  05/19/25       [facility] staff will call you between 4 and 7pm the day before your surgery with your arrival time. (If your surgery is on a Monday, we will call you the Friday before.)    Call [phone] after 7pm Monday-Friday if you did not receive this call.    INSTRUCTIONS BEFORE YOUR SURGERY   When You  Arrive Arrive at [facility] Patient Access on 1st floor the day of your surgery.    Have your insurance card, photo ID,living will/advanced directive/POA (if applicable),  and any copayment (if needed)     Food   and   Drink NO solid food after midnight the night before surgery. You can drink clear liquids from midnight until ONE hour prior to your arrival at the hospital on the day of your surgery.     Clear liquids include:  Water  Apple juice (no sediment)  Carbonated beverages  Black coffee(no cream/milk)  Tea(no cream/milk)  Gatorade    No alcohol (beer, wine, liquor) or marijuana (smoking) 24 hours prior to surgery.   No edibles for 3 days prior to surgery.    Stop smoking cigarettes 14 days before surgery (helps w/healing and breathing).     Bathing Clothing  Jewelry  Valuables     When you shower the morning of surgery, please do not apply anything to your skin, such as lotions, powders or makeup, (especially mascara).   Remove fingernail polish except for clear.  Do not shave or trim anywhere 24 hours before surgery  Wear your hair loose or down; no pony-tails, buns, or metal hair clips  Wear loose, comfortable, clean clothes  Wear glasses instead of contacts. Bring a case to keep your glasses safe.  Leave money, valuables, and jewelry, including body piercings, at home  If you use inhalers

## 2025-05-13 ENCOUNTER — OFFICE VISIT (OUTPATIENT)
Age: 65
End: 2025-05-13
Payer: COMMERCIAL

## 2025-05-13 VITALS
WEIGHT: 293 LBS | BODY MASS INDEX: 39.68 KG/M2 | DIASTOLIC BLOOD PRESSURE: 63 MMHG | HEART RATE: 60 BPM | SYSTOLIC BLOOD PRESSURE: 159 MMHG | HEIGHT: 72 IN

## 2025-05-13 DIAGNOSIS — Z78.0 MENOPAUSE: ICD-10-CM

## 2025-05-13 DIAGNOSIS — E83.52 HYPERCALCEMIA: ICD-10-CM

## 2025-05-13 DIAGNOSIS — R79.89 ELEVATED PARATHYROID HORMONE: Primary | ICD-10-CM

## 2025-05-13 LAB
ABO + RH BLD: NORMAL
BLOOD GROUP ANTIBODIES SERPL: NORMAL
ECHO BSA: 2.69 M2
ECHO BSA: 2.69 M2
EKG ATRIAL RATE: 50 BPM
EKG DIAGNOSIS: NORMAL
EKG P AXIS: 89 DEGREES
EKG P-R INTERVAL: 228 MS
EKG Q-T INTERVAL: 466 MS
EKG QRS DURATION: 106 MS
EKG QTC CALCULATION (BAZETT): 424 MS
EKG R AXIS: -27 DEGREES
EKG T AXIS: -39 DEGREES
EKG VENTRICULAR RATE: 50 BPM
SPECIMEN EXP DATE BLD: NORMAL
VAS LEFT ABI: 0.88
VAS LEFT ARM BP: 142 MMHG
VAS LEFT CCA DIST EDV: 4.2 CM/S
VAS LEFT CCA DIST PSV: 99.2 CM/S
VAS LEFT CCA PROX EDV: 7.9 CM/S
VAS LEFT CCA PROX PSV: 135.7 CM/S
VAS LEFT DORSALIS PEDIS BP: 82 MMHG
VAS LEFT ECA EDV: 1.4 CM/S
VAS LEFT ECA PSV: 66.3 CM/S
VAS LEFT ICA DIST EDV: 19.9 CM/S
VAS LEFT ICA DIST PSV: 75.1 CM/S
VAS LEFT ICA MID EDV: 12.9 CM/S
VAS LEFT ICA MID PSV: 82.5 CM/S
VAS LEFT ICA PROX EDV: 10.4 CM/S
VAS LEFT ICA PROX PSV: 69.9 CM/S
VAS LEFT ICA/CCA PSV: 0.8 NO UNITS
VAS LEFT PTA BP: 125 MMHG
VAS LEFT VERTEBRAL EDV: 6.8 CM/S
VAS LEFT VERTEBRAL PSV: 56.4 CM/S
VAS RIGHT ABI: 1.2
VAS RIGHT ARM BP: 132 MMHG
VAS RIGHT CCA DIST EDV: 5.6 CM/S
VAS RIGHT CCA DIST PSV: 73.4 CM/S
VAS RIGHT CCA PROX EDV: 6.9 CM/S
VAS RIGHT CCA PROX PSV: 93 CM/S
VAS RIGHT DORSALIS PEDIS BP: 152 MMHG
VAS RIGHT ECA EDV: 0 CM/S
VAS RIGHT ECA PSV: 67.4 CM/S
VAS RIGHT ICA DIST EDV: 10.4 CM/S
VAS RIGHT ICA DIST PSV: 105.5 CM/S
VAS RIGHT ICA MID EDV: 10.6 CM/S
VAS RIGHT ICA MID PSV: 54.9 CM/S
VAS RIGHT ICA PROX EDV: 6.9 CM/S
VAS RIGHT ICA PROX PSV: 49.8 CM/S
VAS RIGHT ICA/CCA PSV: 0.7 NO UNITS
VAS RIGHT PTA BP: 170 MMHG
VAS RIGHT VERTEBRAL EDV: 11.4 CM/S
VAS RIGHT VERTEBRAL PSV: 70.4 CM/S

## 2025-05-13 PROCEDURE — 3078F DIAST BP <80 MM HG: CPT | Performed by: INTERNAL MEDICINE

## 2025-05-13 PROCEDURE — 99204 OFFICE O/P NEW MOD 45 MIN: CPT | Performed by: INTERNAL MEDICINE

## 2025-05-13 PROCEDURE — 3077F SYST BP >= 140 MM HG: CPT | Performed by: INTERNAL MEDICINE

## 2025-05-13 PROCEDURE — 93010 ELECTROCARDIOGRAM REPORT: CPT | Performed by: SPECIALIST

## 2025-05-13 PROCEDURE — G2211 COMPLEX E/M VISIT ADD ON: HCPCS | Performed by: INTERNAL MEDICINE

## 2025-05-13 NOTE — PROGRESS NOTES
Chief Complaint   Patient presents with    PARATHYROID     The patient (or guardian, if applicable) and other individuals in attendance with the patient were advised that Artificial Intelligence will be utilized during this visit to record, process the conversation to generate a clinical note, and support improvement of the AI technology. The patient (or guardian, if applicable) and other individuals in attendance at the appointment consented to the use of AI, including the recording.         General:    Calcium levels have been mildly elevated, with a history of levels around 10.2 to 10.7 over the past 10 years.  Vitamin D level was noted to be normal on 04/18/2024.  Parathyroid level is higher than expected for the current calcium level.  Phosphorus levels are not low, which argues against a parathyroid issue.    Meds:  OTC Ca: none  Vit D:  none - normal '24   HCTZ: NO  Lithium: NO    Kidney Stones: NO  Bone loss:  no BMD     Symptoms:  Weakness: no  Bone pain: no  Confusion: no  Lethargy: no  Low concentration: no  GERD: no  Constipation: no  Low appetite no  Nausea/Vomiting: no      Family history:  Kidney stones - no  Bone loss - no  Fractures - no  Calcium issues - no      Kidney disease - yes  -GFR averages in 50s      Labs/Studies    No results found for: \"CAION\"    Lab Results   Component Value Date/Time    CALCIUM 10.3 05/12/2025 08:48 AM    CALCIUM 10.2 01/02/2025 09:14 AM    CALCIUM 10.0 01/02/2025 09:14 AM    CALCIUM 10.7 11/07/2024 02:54 PM    CALCIUM 10.1 11/04/2024 12:19 AM    CALCIUM 10.1 11/03/2024 02:22 AM    CALCIUM 10.2 11/02/2024 06:37 AM    CALCIUM 10.2 11/01/2024 12:59 PM    CALCIUM 10.5 05/13/2024 08:54 AM    CALCIUM 10.5 07/06/2023 10:02 AM    CALCIUM 10.3 05/25/2023 09:10 AM    CALCIUM 10.3 05/17/2023 08:40 AM    CALCIUM 10.0 05/06/2023 04:45 AM    CALCIUM 10.0 05/05/2023 04:34 AM    CALCIUM 9.9 05/04/2023 03:33 AM    CALCIUM 10.1 05/03/2023 04:17 AM    CALCIUM 9.8 05/02/2023 03:50 AM

## 2025-05-14 LAB
25(OH)D3+25(OH)D2 SERPL-MCNC: 29.9 NG/ML (ref 30–100)
BUN SERPL-MCNC: 41 MG/DL (ref 8–27)
BUN/CREAT SERPL: 29 (ref 12–28)
CA-I SERPL ISE-MCNC: 5.6 MG/DL (ref 4.5–5.6)
CALCIUM SERPL-MCNC: 10.6 MG/DL (ref 8.7–10.3)
CHLORIDE SERPL-SCNC: 105 MMOL/L (ref 96–106)
CO2 SERPL-SCNC: 19 MMOL/L (ref 20–29)
CREAT SERPL-MCNC: 1.42 MG/DL (ref 0.57–1)
CREAT UR-MCNC: 94.5 MG/DL
EGFRCR SERPLBLD CKD-EPI 2021: 42 ML/MIN/1.73
GLUCOSE SERPL-MCNC: 90 MG/DL (ref 70–99)
PHOSPHATE SERPL-MCNC: 3.7 MG/DL (ref 3–4.3)
POTASSIUM SERPL-SCNC: 4.9 MMOL/L (ref 3.5–5.2)
PTH-INTACT SERPL-MCNC: 101 PG/ML (ref 15–65)
SODIUM SERPL-SCNC: 140 MMOL/L (ref 134–144)

## 2025-05-15 LAB — CALCIUM 24H UR-MCNC: 1.4 MG/DL

## 2025-05-16 ENCOUNTER — HOSPITAL ENCOUNTER (OUTPATIENT)
Facility: HOSPITAL | Age: 65
Discharge: HOME | DRG: 216 | End: 2025-05-16
Attending: INTERNAL MEDICINE | Admitting: INTERNAL MEDICINE
Payer: COMMERCIAL

## 2025-05-16 VITALS
WEIGHT: 293 LBS | DIASTOLIC BLOOD PRESSURE: 70 MMHG | BODY MASS INDEX: 39.68 KG/M2 | SYSTOLIC BLOOD PRESSURE: 147 MMHG | RESPIRATION RATE: 15 BRPM | TEMPERATURE: 98 F | HEART RATE: 68 BPM | OXYGEN SATURATION: 95 % | HEIGHT: 72 IN

## 2025-05-16 DIAGNOSIS — I71.21 ASCENDING AORTIC ANEURYSM: ICD-10-CM

## 2025-05-16 LAB — ECHO BSA: 2.69 M2

## 2025-05-16 PROCEDURE — 4A023N7 MEASUREMENT OF CARDIAC SAMPLING AND PRESSURE, LEFT HEART, PERCUTANEOUS APPROACH: ICD-10-PCS | Performed by: INTERNAL MEDICINE

## 2025-05-16 PROCEDURE — 2500000003 HC RX 250 WO HCPCS: Performed by: INTERNAL MEDICINE

## 2025-05-16 PROCEDURE — 99152 MOD SED SAME PHYS/QHP 5/>YRS: CPT | Performed by: INTERNAL MEDICINE

## 2025-05-16 PROCEDURE — 93458 L HRT ARTERY/VENTRICLE ANGIO: CPT | Performed by: INTERNAL MEDICINE

## 2025-05-16 PROCEDURE — 6360000002 HC RX W HCPCS: Performed by: INTERNAL MEDICINE

## 2025-05-16 PROCEDURE — C1894 INTRO/SHEATH, NON-LASER: HCPCS | Performed by: INTERNAL MEDICINE

## 2025-05-16 PROCEDURE — B2011ZZ PLAIN RADIOGRAPHY OF MULTIPLE CORONARY ARTERIES USING LOW OSMOLAR CONTRAST: ICD-10-PCS | Performed by: INTERNAL MEDICINE

## 2025-05-16 PROCEDURE — 2580000003 HC RX 258: Performed by: INTERNAL MEDICINE

## 2025-05-16 PROCEDURE — 99153 MOD SED SAME PHYS/QHP EA: CPT | Performed by: INTERNAL MEDICINE

## 2025-05-16 PROCEDURE — C1713 ANCHOR/SCREW BN/BN,TIS/BN: HCPCS | Performed by: INTERNAL MEDICINE

## 2025-05-16 PROCEDURE — B2051ZZ PLAIN RADIOGRAPHY OF LEFT HEART USING LOW OSMOLAR CONTRAST: ICD-10-PCS | Performed by: INTERNAL MEDICINE

## 2025-05-16 PROCEDURE — 2709999900 HC NON-CHARGEABLE SUPPLY: Performed by: INTERNAL MEDICINE

## 2025-05-16 RX ORDER — VERAPAMIL HYDROCHLORIDE 2.5 MG/ML
INJECTION INTRAVENOUS PRN
Status: DISCONTINUED | OUTPATIENT
Start: 2025-05-16 | End: 2025-05-16 | Stop reason: HOSPADM

## 2025-05-16 RX ORDER — HEPARIN SODIUM 1000 [USP'U]/ML
INJECTION, SOLUTION INTRAVENOUS; SUBCUTANEOUS PRN
Status: DISCONTINUED | OUTPATIENT
Start: 2025-05-16 | End: 2025-05-16 | Stop reason: HOSPADM

## 2025-05-16 RX ORDER — SODIUM CHLORIDE 9 MG/ML
INJECTION, SOLUTION INTRAVENOUS CONTINUOUS PRN
Status: COMPLETED | OUTPATIENT
Start: 2025-05-16 | End: 2025-05-16

## 2025-05-16 RX ORDER — FENTANYL CITRATE 50 UG/ML
INJECTION, SOLUTION INTRAMUSCULAR; INTRAVENOUS PRN
Status: DISCONTINUED | OUTPATIENT
Start: 2025-05-16 | End: 2025-05-16 | Stop reason: HOSPADM

## 2025-05-16 RX ORDER — HEPARIN SODIUM 200 [USP'U]/100ML
INJECTION, SOLUTION INTRAVENOUS CONTINUOUS PRN
Status: COMPLETED | OUTPATIENT
Start: 2025-05-16 | End: 2025-05-16

## 2025-05-16 RX ORDER — MIDAZOLAM HYDROCHLORIDE 1 MG/ML
INJECTION, SOLUTION INTRAMUSCULAR; INTRAVENOUS PRN
Status: DISCONTINUED | OUTPATIENT
Start: 2025-05-16 | End: 2025-05-16 | Stop reason: HOSPADM

## 2025-05-16 RX ORDER — LIDOCAINE HYDROCHLORIDE 10 MG/ML
INJECTION, SOLUTION INFILTRATION; PERINEURAL PRN
Status: DISCONTINUED | OUTPATIENT
Start: 2025-05-16 | End: 2025-05-16 | Stop reason: HOSPADM

## 2025-05-16 ASSESSMENT — PAIN - FUNCTIONAL ASSESSMENT
PAIN_FUNCTIONAL_ASSESSMENT: NONE - DENIES PAIN

## 2025-05-16 NOTE — PROGRESS NOTES
1400-TRANSFER - IN Cath Lab RR REPORT:    Verbal report received from Madina on Evelio Marroquin  being received from the Cardiac Cath Lab for routine progression of care. Report consisted of patient’s Situation, Background, Assessment and Recommendations(SBAR). Procedural summary and MAR reviewed with receiving nurse. Opportunity for questions and clarification was provided. Assessment completed upon patient’s arrival to Cardiac Cath Lab RECOVERY AREA and care assumed.       Cardiac Cath Lab Recovery Arrival Note:    Evelio Marroquin arrived to [facility] recovery area.  Patient procedure= LHC. Patient on cardiac monitor, non-invasive blood pressure, SPO2 monitor. On RA .  IV  of NS on pump at 100 ml/hr. Patient is A&Ox 3. Patient reports NO CP SOB.    PROCEDURE SITE CHECK:    Procedure site: No bleeding and no hematoma, no pain/discomfort reported at procedure site.     No change in patient status. Continue to   monitor patient and status.     1615- Discharge Note     Ambulated patient to the bathroom with a steady gait, voided without difficulty. Patient denies chest pain, shortness of breath, or dizziness. Returned to [facility]. Vital signs stable.     Procedural site is clean, dry, and intact. No bleeding, no hematoma.     Assisted patient in dressing    Educated patient about their sedation precautions such as not driving, operating any machinery, or signing legal documents 24 hours post procedure.     Reviewed discharge instructions, including medications and site care using the teach back method.  Answered all questions. Verbalized understanding.     Removed peripheral IV.    Escorted to discharge area in a wheelchair with all of their belongings including immobilizer    Patient's spouse present to take patient home. Reviewed discharge instructions with patients' spouse, they verbalized understanding.

## 2025-05-16 NOTE — DISCHARGE INSTRUCTIONS
Patient Discharge Instructions    Evelio Marroquin / [id number] : 1960    Admitted 2025 Discharged: 2025         CARDIAC CATHETERIZATION/ “CATH” STENT PLACEMENT   DISCHARGE INSTRUCTIONS    If possible, have someone stay with you for the first night. It is normal to feel tired for the first couple of days.  Take it easy and follow your physician’s instructions on activity.    CHECK THE CATHETER INSERTION SITE DAILY:    If bleeding at the cath site occurs, take a clean washcloth and apply direct pressure just above the puncture site for at least 15 minutes.  Call 911 immediately if the bleeding is not controlled.  Continue to apply direct pressure until an ambulance gets to your location. Do not try to drive yourself or have someone else drive you to the hospital.  Have the ambulance bring you to the emergency room.    You may shower 24 hours after your procedure. Gently remove the bandage during showering.  Wash with soap and water and pat dry.  To prevent infection, keep the groin area/insertion site clean and dry.  Do not apply powders, creams, lotions, or ointments to the site for 5 days. You may cover the site with a fresh Band-Aid each day until well healed.  You may notice a small lump at the site. This is normal and may last up to 6 weeks.    CALL THE PHYSICIAN:  If the site becomes red, swollen, or feels warm to the touch, or is healing poorly    If you note any large/extending bruise, fever >101.0 or chills  If your extremity has numbness, tingling, discoloration, abnormal swelling, tightness or pain   If you have difficulty with urination or develop nausea, vomiting, or severe abdominal pain    ACTIVITY for the first 24-48 hours, or as instructed by your physician:  No lifting, pushing or pulling over 10 pounds and no straining the insertion site. Do not lift grocery bags or the garbage can; do not run the vacuum  or  for 7 days.  You may start walking short distances  the day of your procedure. Gradually increase as tolerated each day.  Current activity recommendations are 30 minutes of exercise at least 5 days a week. Work up to this as you recover.   Avoid walking outside in extremes of heat or cold.  Walk inside (at home, at the mall, or at a large store) when it is cold and windy or hot and humid.     THINGS TO KEEP IN MIND:   Do not drive, operate any machinery, or sign any legal documents for 24 hours after your procedure, or as directed by your physician. You must have someone drive you home after your procedure.   Drink plenty of fluids for 24-48 hours after your procedure to flush the contrast dye from your kidneys.   Limit the number of times you go up and down the stairs  Take rests and pace yourself with activity  Be careful and do not strain with bowel movements    MEDICATIONS:  Take all medications as prescribed  Call your physician if you have any questions  Keep an updated list of your medications with you at all times and give a list to your primary physician and pharmacist  You may use Tylenol 325mg 1-2 tablets every 6 hours as needed for pain or discomfort, unless otherwise instructed.  If you have significant discomfort more than 48 hours after your procedure, please call your physician’s office.    SIGNS AND SYMPTOMS:  Notify your physician for new or recurrent symptoms of chest discomfort, unusual shortness of breath or fatigue.  These could be signs of a problem and should be discussed with your physician.  For significant chest pain or symptoms of angina not relieved with rest:  if you have been prescribed Nitroglycerin, take as directed (taken under the tongue, one at a time 5 minutes apart for a total of 3 doses, sitting down). If the discomfort is not relieved after the 3rd Nitroglycerin, call 911.  If you have not been prescribed Nitroglycerin and your chest discomfort is significant, call 911. Take the ambulance, do not try to drive yourself or have

## 2025-05-16 NOTE — PROGRESS NOTES
Cardiac Surgery Care Coordinator- Met with Evelio Marroquin, Introduced role of the Cardiac Surgery Care Coordinator. Reviewed plan of care and reviewed pre-op education.  Discussed day of surgery expectations for the pt and family. Encouraged Evelio Marroquin to verbalize and offered emotional support. Will continue to follow for educational and emotional support.

## 2025-05-16 NOTE — PROGRESS NOTES
CATH LAB to RECOVERY ROOM REPORT    Procedure: [facility]    MD: YOVANY Mo MD    The procedure was diagnostic only.    Verbal Report given to Recovery Nurse on patient being transferred to Cardiac Cath Lab RR for routine post-op. Patient stable upon transfer to .  Vitals, mental status, MAR, procedural summary discussed with recovery RN.    Medication given during procedure:    Contrast: 30mL                          Heparin: 5000units     Versed: 1mg                                                               Fentanyl: 25mcg                                                                   Sheaths:    Right radial sheath pulled at 1353 pm, band at 13mL of air

## 2025-05-16 NOTE — PROGRESS NOTES
Cardiac Cath Lab Procedure Area Arrival Note:    Evelio Marroquin arrived to Cardiac Cath Lab, Procedure Area. Patient identifiers verified with NAME and DATE OF BIRTH. Procedure verified with patient. Consent forms verified. Allergies verified. Patient informed of procedure and plan of care. Questions answered with review. Patient voiced understanding of procedure and plan of care.    Patient on cardiac monitor, non-invasive blood pressure, SPO2 monitor. On RA or O2 @ RA lpm via RA.  IV of NS on pump at 25 ml/hr. Patient status doing well without problems. Patient is A&Ox 4. Patient reports no CP or SOB.     Patient medicated during procedure with orders obtained and verified by Dr. Mo.    Refer to patients Cardiac Cath Lab PROCEDURE REPORT for vital signs, assessment, status, and response during procedure, printed at end of case. Printed report on chart or scanned into chart.

## 2025-05-19 ENCOUNTER — ANESTHESIA EVENT (OUTPATIENT)
Facility: HOSPITAL | Age: 65
End: 2025-05-19
Payer: COMMERCIAL

## 2025-05-19 ENCOUNTER — APPOINTMENT (OUTPATIENT)
Facility: HOSPITAL | Age: 65
DRG: 216 | End: 2025-05-19
Attending: THORACIC SURGERY (CARDIOTHORACIC VASCULAR SURGERY)
Payer: COMMERCIAL

## 2025-05-19 ENCOUNTER — HOSPITAL ENCOUNTER (OUTPATIENT)
Facility: HOSPITAL | Age: 65
Discharge: HOME | End: 2025-05-21
Attending: THORACIC SURGERY (CARDIOTHORACIC VASCULAR SURGERY)

## 2025-05-19 ENCOUNTER — ANESTHESIA (OUTPATIENT)
Facility: HOSPITAL | Age: 65
End: 2025-05-19
Payer: COMMERCIAL

## 2025-05-19 ENCOUNTER — HOSPITAL ENCOUNTER (INPATIENT)
Facility: HOSPITAL | Age: 65
LOS: 11 days | Discharge: NURSING, REHAB OR LONG-TERM CARE | DRG: 216 | End: 2025-05-30
Attending: THORACIC SURGERY (CARDIOTHORACIC VASCULAR SURGERY) | Admitting: THORACIC SURGERY (CARDIOTHORACIC VASCULAR SURGERY)
Payer: COMMERCIAL

## 2025-05-19 DIAGNOSIS — Z95.2 S/P AVR: ICD-10-CM

## 2025-05-19 DIAGNOSIS — I35.1 SEVERE AORTIC INSUFFICIENCY: Primary | ICD-10-CM

## 2025-05-19 PROBLEM — Z95.828 S/P ASCENDING AORTIC REPLACEMENT: Status: ACTIVE | Noted: 2025-05-19

## 2025-05-19 LAB
ABO + RH BLD: NORMAL
ALBUMIN SERPL-MCNC: 3.2 G/DL (ref 3.5–5)
ALBUMIN SERPL-MCNC: 3.2 G/DL (ref 3.5–5)
ALBUMIN/GLOB SERPL: 1.2 (ref 1.1–2.2)
ALBUMIN/GLOB SERPL: 1.5 (ref 1.1–2.2)
ALP SERPL-CCNC: 54 U/L (ref 45–117)
ALP SERPL-CCNC: 56 U/L (ref 45–117)
ALT SERPL-CCNC: 18 U/L (ref 12–78)
ALT SERPL-CCNC: 18 U/L (ref 12–78)
ANION GAP BLD CALC-SCNC: 10 (ref 10–20)
ANION GAP BLD CALC-SCNC: 11 (ref 10–20)
ANION GAP BLD CALC-SCNC: 12 (ref 10–20)
ANION GAP BLD CALC-SCNC: 13 (ref 10–20)
ANION GAP BLD CALC-SCNC: 14 (ref 10–20)
ANION GAP BLD CALC-SCNC: 23 (ref 10–20)
ANION GAP BLD CALC-SCNC: 6 (ref 10–20)
ANION GAP BLD CALC-SCNC: 7 (ref 10–20)
ANION GAP BLD CALC-SCNC: 9 (ref 10–20)
ANION GAP BLD CALC-SCNC: 9 (ref 10–20)
ANION GAP SERPL CALC-SCNC: 8 MMOL/L (ref 2–12)
ANION GAP SERPL CALC-SCNC: 9 MMOL/L (ref 2–12)
APTT PPP: 59.2 SEC (ref 22.1–31)
AST SERPL-CCNC: 76 U/L (ref 15–37)
AST SERPL-CCNC: 94 U/L (ref 15–37)
BASE DEFICIT BLD-SCNC: 0.5 MMOL/L
BASE DEFICIT BLD-SCNC: 0.6 MMOL/L
BASE DEFICIT BLD-SCNC: 1.8 MMOL/L
BASE DEFICIT BLD-SCNC: 2.7 MMOL/L
BASE DEFICIT BLD-SCNC: 3.1 MMOL/L
BASE DEFICIT BLD-SCNC: 3.4 MMOL/L
BASE DEFICIT BLD-SCNC: 4.2 MMOL/L
BASE DEFICIT BLD-SCNC: 4.6 MMOL/L
BASE DEFICIT BLD-SCNC: 5 MMOL/L
BASE DEFICIT BLD-SCNC: 5.4 MMOL/L
BASE DEFICIT BLD-SCNC: 5.8 MMOL/L
BASE DEFICIT BLDV-SCNC: 2.3 MMOL/L
BASE EXCESS BLD CALC-SCNC: 3.5 MMOL/L
BASE EXCESS BLD CALC-SCNC: 3.7 MMOL/L
BASE EXCESS BLD CALC-SCNC: 3.8 MMOL/L
BILIRUB SERPL-MCNC: 0.6 MG/DL (ref 0.2–1)
BILIRUB SERPL-MCNC: 0.6 MG/DL (ref 0.2–1)
BLD PROD TYP BPU: NORMAL
BLD PROD TYP BPU: NORMAL
BLOOD BANK DISPENSE STATUS: NORMAL
BLOOD BANK DISPENSE STATUS: NORMAL
BLOOD GROUP ANTIBODIES SERPL: NORMAL
BPU ID: NORMAL
BPU ID: NORMAL
BUN SERPL-MCNC: 41 MG/DL (ref 6–20)
BUN SERPL-MCNC: 46 MG/DL (ref 6–20)
BUN/CREAT SERPL: 23 (ref 12–20)
BUN/CREAT SERPL: 24 (ref 12–20)
CA-I BLD-MCNC: 1.16 MMOL/L (ref 1.15–1.33)
CA-I BLD-MCNC: 1.18 MMOL/L (ref 1.15–1.33)
CA-I BLD-MCNC: 1.21 MMOL/L (ref 1.15–1.33)
CA-I BLD-MCNC: 1.23 MMOL/L (ref 1.15–1.33)
CA-I BLD-MCNC: 1.25 MMOL/L (ref 1.15–1.33)
CA-I BLD-MCNC: 1.26 MMOL/L (ref 1.15–1.33)
CA-I BLD-MCNC: 1.29 MMOL/L (ref 1.15–1.33)
CA-I BLD-MCNC: 1.33 MMOL/L (ref 1.15–1.33)
CA-I BLD-MCNC: 1.33 MMOL/L (ref 1.15–1.33)
CA-I BLD-MCNC: 1.34 MMOL/L (ref 1.15–1.33)
CA-I BLD-MCNC: 1.34 MMOL/L (ref 1.15–1.33)
CA-I BLD-MCNC: 1.38 MMOL/L (ref 1.15–1.33)
CA-I BLD-MCNC: 1.39 MMOL/L (ref 1.15–1.33)
CA-I BLD-MCNC: 1.45 MMOL/L (ref 1.15–1.33)
CA-I BLD-SCNC: 1.38 MMOL/L (ref 1.12–1.32)
CA-I BLD-SCNC: 1.42 MMOL/L (ref 1.12–1.32)
CALCIUM SERPL-MCNC: 9.3 MG/DL (ref 8.5–10.1)
CALCIUM SERPL-MCNC: 9.6 MG/DL (ref 8.5–10.1)
CHLORIDE BLD-SCNC: 103 MMOL/L (ref 100–111)
CHLORIDE BLD-SCNC: 104 MMOL/L (ref 100–111)
CHLORIDE BLD-SCNC: 105 MMOL/L (ref 100–111)
CHLORIDE BLD-SCNC: 106 MMOL/L (ref 100–111)
CHLORIDE BLD-SCNC: 107 MMOL/L (ref 100–111)
CHLORIDE BLD-SCNC: 109 MMOL/L (ref 100–111)
CHLORIDE BLD-SCNC: 110 MMOL/L (ref 100–111)
CHLORIDE BLD-SCNC: 111 MMOL/L (ref 100–111)
CHLORIDE BLD-SCNC: 113 MMOL/L (ref 100–111)
CHLORIDE BLD-SCNC: 115 MMOL/L (ref 100–111)
CHLORIDE SERPL-SCNC: 111 MMOL/L (ref 97–108)
CHLORIDE SERPL-SCNC: 113 MMOL/L (ref 97–108)
CO2 BLD-SCNC: 20 MMOL/L (ref 22–29)
CO2 BLD-SCNC: 21 MMOL/L (ref 22–29)
CO2 BLD-SCNC: 22 MMOL/L (ref 22–29)
CO2 BLD-SCNC: 23 MMOL/L (ref 22–29)
CO2 BLD-SCNC: 24 MMOL/L (ref 22–29)
CO2 BLD-SCNC: 26 MMOL/L (ref 22–29)
CO2 BLD-SCNC: 28 MMOL/L (ref 22–29)
CO2 BLD-SCNC: 29 MMOL/L (ref 22–29)
CO2 SERPL-SCNC: 20 MMOL/L (ref 21–32)
CO2 SERPL-SCNC: 23 MMOL/L (ref 21–32)
CREAT SERPL-MCNC: 1.73 MG/DL (ref 0.55–1.02)
CREAT SERPL-MCNC: 2 MG/DL (ref 0.55–1.02)
CREAT UR-MCNC: 1.1 MG/DL (ref 0.6–1.3)
CREAT UR-MCNC: 1.2 MG/DL (ref 0.6–1.3)
CREAT UR-MCNC: 1.3 MG/DL (ref 0.6–1.3)
CREAT UR-MCNC: 1.3 MG/DL (ref 0.6–1.3)
CREAT UR-MCNC: 1.4 MG/DL (ref 0.6–1.3)
CREAT UR-MCNC: 1.5 MG/DL (ref 0.6–1.3)
CREAT UR-MCNC: 1.7 MG/DL (ref 0.6–1.3)
CROSSMATCH RESULT: NORMAL
CROSSMATCH RESULT: NORMAL
ECHO BSA: 2.67 M2
ERYTHROCYTE [DISTWIDTH] IN BLOOD BY AUTOMATED COUNT: 12.4 % (ref 11.5–14.5)
ERYTHROCYTE [DISTWIDTH] IN BLOOD BY AUTOMATED COUNT: 12.4 % (ref 11.5–14.5)
GLOBULIN SER CALC-MCNC: 2.2 G/DL (ref 2–4)
GLOBULIN SER CALC-MCNC: 2.7 G/DL (ref 2–4)
GLUCOSE BLD STRIP.AUTO-MCNC: 100 MG/DL (ref 74–99)
GLUCOSE BLD STRIP.AUTO-MCNC: 106 MG/DL (ref 65–117)
GLUCOSE BLD STRIP.AUTO-MCNC: 109 MG/DL (ref 65–117)
GLUCOSE BLD STRIP.AUTO-MCNC: 113 MG/DL (ref 65–117)
GLUCOSE BLD STRIP.AUTO-MCNC: 118 MG/DL (ref 74–99)
GLUCOSE BLD STRIP.AUTO-MCNC: 127 MG/DL (ref 65–117)
GLUCOSE BLD STRIP.AUTO-MCNC: 128 MG/DL (ref 65–117)
GLUCOSE BLD STRIP.AUTO-MCNC: 130 MG/DL (ref 74–99)
GLUCOSE BLD STRIP.AUTO-MCNC: 131 MG/DL (ref 74–99)
GLUCOSE BLD STRIP.AUTO-MCNC: 136 MG/DL (ref 65–117)
GLUCOSE BLD STRIP.AUTO-MCNC: 139 MG/DL (ref 74–99)
GLUCOSE BLD STRIP.AUTO-MCNC: 164 MG/DL (ref 74–99)
GLUCOSE BLD STRIP.AUTO-MCNC: 167 MG/DL (ref 74–99)
GLUCOSE BLD STRIP.AUTO-MCNC: 179 MG/DL (ref 74–99)
GLUCOSE BLD STRIP.AUTO-MCNC: 195 MG/DL (ref 74–99)
GLUCOSE BLD STRIP.AUTO-MCNC: 215 MG/DL (ref 74–99)
GLUCOSE BLD STRIP.AUTO-MCNC: 227 MG/DL (ref 74–99)
GLUCOSE BLD STRIP.AUTO-MCNC: 66 MG/DL (ref 74–99)
GLUCOSE BLD STRIP.AUTO-MCNC: 82 MG/DL (ref 74–99)
GLUCOSE BLD STRIP.AUTO-MCNC: 97 MG/DL (ref 65–117)
GLUCOSE BLD STRIP.AUTO-MCNC: 98 MG/DL (ref 74–99)
GLUCOSE SERPL-MCNC: 100 MG/DL (ref 65–100)
GLUCOSE SERPL-MCNC: 134 MG/DL (ref 65–100)
HCO3 BLD-SCNC: 23.6 MMOL/L (ref 21–28)
HCO3 BLD-SCNC: 25.8 MMOL/L (ref 21–28)
HCO3 BLDA-SCNC: 21 MMOL/L
HCO3 BLDA-SCNC: 21 MMOL/L
HCO3 BLDA-SCNC: 23 MMOL/L
HCO3 BLDA-SCNC: 24 MMOL/L
HCO3 BLDA-SCNC: 25 MMOL/L
HCO3 BLDA-SCNC: 25 MMOL/L
HCO3 BLDA-SCNC: 28 MMOL/L
HCO3 BLDA-SCNC: 29 MMOL/L
HCO3 BLDA-SCNC: 30 MMOL/L
HCO3 BLDV-SCNC: 24.4 MMOL/L (ref 23–28)
HCT VFR BLD AUTO: 39.5 % (ref 35–47)
HCT VFR BLD AUTO: 40.1 % (ref 35–47)
HGB BLD-MCNC: 11.1 G/DL (ref 11.5–16)
HGB BLD-MCNC: 11.1 G/DL (ref 11.5–16)
HGB BLD-MCNC: 11.3 G/DL (ref 11.5–16)
HGB BLD-MCNC: 11.5 G/DL (ref 11.5–16)
HGB BLD-MCNC: 11.6 G/DL (ref 11.5–16)
HGB BLD-MCNC: 11.7 G/DL (ref 11.5–16)
HGB BLD-MCNC: 11.8 G/DL (ref 11.5–16)
HGB BLD-MCNC: 12.2 G/DL (ref 11.5–16)
HGB BLD-MCNC: 13 G/DL (ref 11.5–16)
HGB BLD-MCNC: 13.7 G/DL (ref 11.5–16)
HGB BLD-MCNC: 9.5 G/DL (ref 11.5–16)
HGB BLD-MCNC: 9.7 G/DL (ref 11.5–16)
HGB BLD-MCNC: 9.7 G/DL (ref 11.5–16)
INR PPP: 1.4 (ref 0.9–1.1)
LACTATE BLD-SCNC: 0.57 MMOL/L (ref 0.4–2)
LACTATE BLD-SCNC: 1.01 MMOL/L (ref 0.4–2)
LACTATE BLD-SCNC: 1.45 MMOL/L (ref 0.4–2)
LACTATE BLD-SCNC: 1.57 MMOL/L (ref 0.4–2)
LACTATE BLD-SCNC: 2.07 MMOL/L (ref 0.4–2)
LACTATE BLD-SCNC: 3.22 MMOL/L (ref 0.4–2)
LACTATE BLD-SCNC: 3.5 MMOL/L (ref 0.4–2)
LACTATE BLD-SCNC: 3.66 MMOL/L (ref 0.4–2)
LACTATE BLD-SCNC: 3.72 MMOL/L (ref 0.4–2)
LACTATE BLD-SCNC: 3.76 MMOL/L (ref 0.4–2)
LACTATE BLD-SCNC: <0.4 MMOL/L (ref 0.4–2)
MAGNESIUM SERPL-MCNC: 2.4 MG/DL (ref 1.6–2.4)
MCH RBC QN AUTO: 29.3 PG (ref 26–34)
MCH RBC QN AUTO: 29.8 PG (ref 26–34)
MCHC RBC AUTO-ENTMCNC: 32.4 G/DL (ref 30–36.5)
MCHC RBC AUTO-ENTMCNC: 32.9 G/DL (ref 30–36.5)
MCV RBC AUTO: 89.2 FL (ref 80–99)
MCV RBC AUTO: 92 FL (ref 80–99)
NRBC # BLD: 0 K/UL (ref 0–0.01)
NRBC # BLD: 0 K/UL (ref 0–0.01)
NRBC BLD-RTO: 0 PER 100 WBC
NRBC BLD-RTO: 0 PER 100 WBC
PCO2 BLD: 39.7 MMHG (ref 35–48)
PCO2 BLD: 40.8 MMHG (ref 35–48)
PCO2 BLD: 40.9 MMHG (ref 35–48)
PCO2 BLD: 41.7 MMHG (ref 35–48)
PCO2 BLD: 42.2 MMHG (ref 35–48)
PCO2 BLD: 42.5 MMHG (ref 35–48)
PCO2 BLD: 47.6 MMHG (ref 35–48)
PCO2 BLD: 47.9 MMHG (ref 35–48)
PCO2 BLD: 48.9 MMHG (ref 35–48)
PCO2 BLD: 51.4 MMHG (ref 35–48)
PCO2 BLD: 52.1 MMHG (ref 35–48)
PCO2 BLD: 53.4 MMHG (ref 35–48)
PCO2 BLD: 55.1 MMHG (ref 35–48)
PCO2 BLD: 55.4 MMHG (ref 35–48)
PCO2 BLD: 55.5 MMHG (ref 35–48)
PCO2 BLD: 57.3 MMHG (ref 35–48)
PCO2 BLDV: 49.4 MMHG (ref 41–51)
PH BLD: 7.23 (ref 7.35–7.45)
PH BLD: 7.26 (ref 7.35–7.45)
PH BLD: 7.27 (ref 7.35–7.45)
PH BLD: 7.27 (ref 7.35–7.45)
PH BLD: 7.28 (ref 7.35–7.45)
PH BLD: 7.31 (ref 7.35–7.45)
PH BLD: 7.33 (ref 7.35–7.45)
PH BLD: 7.34 (ref 7.35–7.45)
PH BLD: 7.35 (ref 7.35–7.45)
PH BLD: 7.35 (ref 7.35–7.45)
PH BLD: 7.36 (ref 7.35–7.45)
PH BLD: 7.38 (ref 7.35–7.45)
PH BLD: 7.4 (ref 7.35–7.45)
PH BLD: 7.45 (ref 7.35–7.45)
PH BLDV: 7.3 (ref 7.32–7.42)
PLATELET # BLD AUTO: 158 K/UL (ref 150–400)
PLATELET # BLD AUTO: 186 K/UL (ref 150–400)
PMV BLD AUTO: 10.2 FL (ref 8.9–12.9)
PMV BLD AUTO: 10.9 FL (ref 8.9–12.9)
PO2 BLD: 101 MMHG (ref 83–108)
PO2 BLD: 140 MMHG (ref 83–108)
PO2 BLD: 152 MMHG (ref 83–108)
PO2 BLD: 232 MMHG (ref 83–108)
PO2 BLD: 269 MMHG (ref 83–108)
PO2 BLD: 287 MMHG (ref 83–108)
PO2 BLD: 320 MMHG (ref 83–108)
PO2 BLD: 371 MMHG (ref 83–108)
PO2 BLD: 372 MMHG (ref 83–108)
PO2 BLD: 39 MMHG (ref 83–108)
PO2 BLD: 419 MMHG (ref 83–108)
PO2 BLD: 421 MMHG (ref 83–108)
PO2 BLD: 425 MMHG (ref 83–108)
PO2 BLD: 61 MMHG (ref 83–108)
PO2 BLD: <27 MMHG (ref 83–108)
PO2 BLD: >515 MMHG (ref 83–108)
PO2 BLDV: 33 MMHG (ref 25–40)
POTASSIUM BLD-SCNC: 3.5 MMOL/L (ref 3.5–5.5)
POTASSIUM BLD-SCNC: 3.9 MMOL/L (ref 3.5–5.5)
POTASSIUM BLD-SCNC: 4.3 MMOL/L (ref 3.5–5.5)
POTASSIUM BLD-SCNC: 4.5 MMOL/L (ref 3.5–5.5)
POTASSIUM BLD-SCNC: 4.6 MMOL/L (ref 3.5–5.5)
POTASSIUM BLD-SCNC: 4.6 MMOL/L (ref 3.5–5.5)
POTASSIUM BLD-SCNC: 4.9 MMOL/L (ref 3.5–5.5)
POTASSIUM BLD-SCNC: 5.1 MMOL/L (ref 3.5–5.5)
POTASSIUM BLD-SCNC: 5.2 MMOL/L (ref 3.5–5.5)
POTASSIUM BLD-SCNC: 5.3 MMOL/L (ref 3.5–5.5)
POTASSIUM BLD-SCNC: 5.3 MMOL/L (ref 3.5–5.5)
POTASSIUM BLD-SCNC: 5.5 MMOL/L (ref 3.5–5.5)
POTASSIUM SERPL-SCNC: 4 MMOL/L (ref 3.5–5.1)
POTASSIUM SERPL-SCNC: 4.5 MMOL/L (ref 3.5–5.1)
PROT SERPL-MCNC: 5.4 G/DL (ref 6.4–8.2)
PROT SERPL-MCNC: 5.9 G/DL (ref 6.4–8.2)
PROTHROMBIN TIME: 14.9 SEC (ref 9.2–11.2)
RBC # BLD AUTO: 4.36 M/UL (ref 3.8–5.2)
RBC # BLD AUTO: 4.43 M/UL (ref 3.8–5.2)
SAO2 % BLD: 100 % (ref 94–98)
SAO2 % BLD: 66 % (ref 94–98)
SAO2 % BLD: 87 % (ref 94–98)
SAO2 % BLD: 96 % (ref 94–98)
SAO2 % BLD: 99 % (ref 94–98)
SAO2 % BLD: 99 % (ref 94–98)
SAO2 % BLD: 99.9 % (ref 92–97)
SAO2 % BLD: 99.9 % (ref 92–97)
SAO2 % BLDMV: 64 % (ref 65–88)
SAO2 % BLDV: 56 % (ref 65–88)
SERVICE CMNT-IMP: ABNORMAL
SERVICE CMNT-IMP: NORMAL
SODIUM BLD-SCNC: 140 MMOL/L (ref 136–145)
SODIUM BLD-SCNC: 141 MMOL/L (ref 136–145)
SODIUM BLD-SCNC: 141 MMOL/L (ref 136–145)
SODIUM BLD-SCNC: 142 MMOL/L (ref 136–145)
SODIUM BLD-SCNC: 143 MMOL/L (ref 136–145)
SODIUM BLD-SCNC: 144 MMOL/L (ref 136–145)
SODIUM BLD-SCNC: 145 MMOL/L (ref 136–145)
SODIUM BLD-SCNC: 146 MMOL/L (ref 136–145)
SODIUM BLD-SCNC: 148 MMOL/L (ref 136–145)
SODIUM BLD-SCNC: 148 MMOL/L (ref 136–145)
SODIUM SERPL-SCNC: 142 MMOL/L (ref 136–145)
SODIUM SERPL-SCNC: 142 MMOL/L (ref 136–145)
SPECIMEN EXP DATE BLD: NORMAL
SPECIMEN SITE: ABNORMAL
SPECIMEN TYPE: ABNORMAL
THERAPEUTIC RANGE: ABNORMAL SECS (ref 58–77)
UNIT DIVISION: 0
UNIT DIVISION: 0
WBC # BLD AUTO: 20.5 K/UL (ref 3.6–11)
WBC # BLD AUTO: 26.2 K/UL (ref 3.6–11)

## 2025-05-19 PROCEDURE — C1713 ANCHOR/SCREW BN/BN,TIS/BN: HCPCS | Performed by: THORACIC SURGERY (CARDIOTHORACIC VASCULAR SURGERY)

## 2025-05-19 PROCEDURE — 83735 ASSAY OF MAGNESIUM: CPT

## 2025-05-19 PROCEDURE — 3600000018 HC SURGERY OHS ADDTL 15MIN: Performed by: THORACIC SURGERY (CARDIOTHORACIC VASCULAR SURGERY)

## 2025-05-19 PROCEDURE — 84295 ASSAY OF SERUM SODIUM: CPT

## 2025-05-19 PROCEDURE — 3700000001 HC ADD 15 MINUTES (ANESTHESIA): Performed by: THORACIC SURGERY (CARDIOTHORACIC VASCULAR SURGERY)

## 2025-05-19 PROCEDURE — 0BH17EZ INSERTION OF ENDOTRACHEAL AIRWAY INTO TRACHEA, VIA NATURAL OR ARTIFICIAL OPENING: ICD-10-PCS | Performed by: STUDENT IN AN ORGANIZED HEALTH CARE EDUCATION/TRAINING PROGRAM

## 2025-05-19 PROCEDURE — 6370000000 HC RX 637 (ALT 250 FOR IP): Performed by: NURSE PRACTITIONER

## 2025-05-19 PROCEDURE — 2720000010 HC SURG SUPPLY STERILE: Performed by: THORACIC SURGERY (CARDIOTHORACIC VASCULAR SURGERY)

## 2025-05-19 PROCEDURE — 02RF08Z REPLACEMENT OF AORTIC VALVE WITH ZOOPLASTIC TISSUE, OPEN APPROACH: ICD-10-PCS | Performed by: PHYSICIAN ASSISTANT

## 2025-05-19 PROCEDURE — 71045 X-RAY EXAM CHEST 1 VIEW: CPT

## 2025-05-19 PROCEDURE — 2580000003 HC RX 258: Performed by: ANESTHESIOLOGY

## 2025-05-19 PROCEDURE — 88305 TISSUE EXAM BY PATHOLOGIST: CPT

## 2025-05-19 PROCEDURE — 04HY32Z INSERTION OF MONITORING DEVICE INTO LOWER ARTERY, PERCUTANEOUS APPROACH: ICD-10-PCS | Performed by: THORACIC SURGERY (CARDIOTHORACIC VASCULAR SURGERY)

## 2025-05-19 PROCEDURE — 33863 ASCENDING AORTIC GRAFT: CPT | Performed by: THORACIC SURGERY (CARDIOTHORACIC VASCULAR SURGERY)

## 2025-05-19 PROCEDURE — C1889 IMPLANT/INSERT DEVICE, NOC: HCPCS | Performed by: THORACIC SURGERY (CARDIOTHORACIC VASCULAR SURGERY)

## 2025-05-19 PROCEDURE — 85018 HEMOGLOBIN: CPT

## 2025-05-19 PROCEDURE — C1729 CATH, DRAINAGE: HCPCS | Performed by: THORACIC SURGERY (CARDIOTHORACIC VASCULAR SURGERY)

## 2025-05-19 PROCEDURE — 6360000002 HC RX W HCPCS: Performed by: PHYSICIAN ASSISTANT

## 2025-05-19 PROCEDURE — 2500000003 HC RX 250 WO HCPCS: Performed by: STUDENT IN AN ORGANIZED HEALTH CARE EDUCATION/TRAINING PROGRAM

## 2025-05-19 PROCEDURE — 82330 ASSAY OF CALCIUM: CPT

## 2025-05-19 PROCEDURE — 93005 ELECTROCARDIOGRAM TRACING: CPT | Performed by: PHYSICIAN ASSISTANT

## 2025-05-19 PROCEDURE — 94002 VENT MGMT INPAT INIT DAY: CPT

## 2025-05-19 PROCEDURE — P9045 ALBUMIN (HUMAN), 5%, 250 ML: HCPCS

## 2025-05-19 PROCEDURE — 6360000002 HC RX W HCPCS: Performed by: ANESTHESIOLOGY

## 2025-05-19 PROCEDURE — 2000000000 HC ICU R&B

## 2025-05-19 PROCEDURE — 2500000003 HC RX 250 WO HCPCS: Performed by: PHYSICIAN ASSISTANT

## 2025-05-19 PROCEDURE — 6360000002 HC RX W HCPCS: Performed by: STUDENT IN AN ORGANIZED HEALTH CARE EDUCATION/TRAINING PROGRAM

## 2025-05-19 PROCEDURE — 88304 TISSUE EXAM BY PATHOLOGIST: CPT

## 2025-05-19 PROCEDURE — 82947 ASSAY GLUCOSE BLOOD QUANT: CPT

## 2025-05-19 PROCEDURE — 82962 GLUCOSE BLOOD TEST: CPT

## 2025-05-19 PROCEDURE — 6360000002 HC RX W HCPCS: Performed by: THORACIC SURGERY (CARDIOTHORACIC VASCULAR SURGERY)

## 2025-05-19 PROCEDURE — 82803 BLOOD GASES ANY COMBINATION: CPT

## 2025-05-19 PROCEDURE — P9045 ALBUMIN (HUMAN), 5%, 250 ML: HCPCS | Performed by: NURSE ANESTHETIST, CERTIFIED REGISTERED

## 2025-05-19 PROCEDURE — 2709999900 HC NON-CHARGEABLE SUPPLY: Performed by: THORACIC SURGERY (CARDIOTHORACIC VASCULAR SURGERY)

## 2025-05-19 PROCEDURE — 5A1945Z RESPIRATORY VENTILATION, 24-96 CONSECUTIVE HOURS: ICD-10-PCS | Performed by: STUDENT IN AN ORGANIZED HEALTH CARE EDUCATION/TRAINING PROGRAM

## 2025-05-19 PROCEDURE — B24BZZ4 ULTRASONOGRAPHY OF HEART WITH AORTA, TRANSESOPHAGEAL: ICD-10-PCS | Performed by: ANESTHESIOLOGY

## 2025-05-19 PROCEDURE — 5A1221Z PERFORMANCE OF CARDIAC OUTPUT, CONTINUOUS: ICD-10-PCS | Performed by: THORACIC SURGERY (CARDIOTHORACIC VASCULAR SURGERY)

## 2025-05-19 PROCEDURE — 3600000008 HC SURGERY OHS BASE: Performed by: THORACIC SURGERY (CARDIOTHORACIC VASCULAR SURGERY)

## 2025-05-19 PROCEDURE — 6360000002 HC RX W HCPCS: Performed by: NURSE ANESTHETIST, CERTIFIED REGISTERED

## 2025-05-19 PROCEDURE — 6370000000 HC RX 637 (ALT 250 FOR IP): Performed by: NURSE ANESTHETIST, CERTIFIED REGISTERED

## 2025-05-19 PROCEDURE — 6370000000 HC RX 637 (ALT 250 FOR IP): Performed by: PHYSICIAN ASSISTANT

## 2025-05-19 PROCEDURE — 33863 ASCENDING AORTIC GRAFT: CPT | Performed by: PHYSICIAN ASSISTANT

## 2025-05-19 PROCEDURE — 85610 PROTHROMBIN TIME: CPT

## 2025-05-19 PROCEDURE — 03HY32Z INSERTION OF MONITORING DEVICE INTO UPPER ARTERY, PERCUTANEOUS APPROACH: ICD-10-PCS | Performed by: INTERNAL MEDICINE

## 2025-05-19 PROCEDURE — 80053 COMPREHEN METABOLIC PANEL: CPT

## 2025-05-19 PROCEDURE — 85027 COMPLETE CBC AUTOMATED: CPT

## 2025-05-19 PROCEDURE — 2580000003 HC RX 258: Performed by: PHYSICIAN ASSISTANT

## 2025-05-19 PROCEDURE — P9045 ALBUMIN (HUMAN), 5%, 250 ML: HCPCS | Performed by: PHYSICIAN ASSISTANT

## 2025-05-19 PROCEDURE — 2580000003 HC RX 258: Performed by: THORACIC SURGERY (CARDIOTHORACIC VASCULAR SURGERY)

## 2025-05-19 PROCEDURE — 3700000000 HC ANESTHESIA ATTENDED CARE: Performed by: THORACIC SURGERY (CARDIOTHORACIC VASCULAR SURGERY)

## 2025-05-19 PROCEDURE — 2500000003 HC RX 250 WO HCPCS: Performed by: NURSE ANESTHETIST, CERTIFIED REGISTERED

## 2025-05-19 PROCEDURE — 85730 THROMBOPLASTIN TIME PARTIAL: CPT

## 2025-05-19 PROCEDURE — 02HV33Z INSERTION OF INFUSION DEVICE INTO SUPERIOR VENA CAVA, PERCUTANEOUS APPROACH: ICD-10-PCS | Performed by: ANESTHESIOLOGY

## 2025-05-19 PROCEDURE — 84132 ASSAY OF SERUM POTASSIUM: CPT

## 2025-05-19 PROCEDURE — 2580000003 HC RX 258: Performed by: NURSE ANESTHETIST, CERTIFIED REGISTERED

## 2025-05-19 PROCEDURE — 6360000002 HC RX W HCPCS

## 2025-05-19 DEVICE — IMPLANTABLE DEVICE: Type: IMPLANTABLE DEVICE | Site: AORTIC VALVE | Status: FUNCTIONAL

## 2025-05-19 RX ORDER — SODIUM CHLORIDE 0.9 % (FLUSH) 0.9 %
5-40 SYRINGE (ML) INJECTION EVERY 12 HOURS SCHEDULED
Status: DISCONTINUED | OUTPATIENT
Start: 2025-05-19 | End: 2025-05-19 | Stop reason: HOSPADM

## 2025-05-19 RX ORDER — FAMOTIDINE 20 MG/1
20 TABLET, FILM COATED ORAL EVERY 12 HOURS
Status: DISCONTINUED | OUTPATIENT
Start: 2025-05-19 | End: 2025-05-20

## 2025-05-19 RX ORDER — ONDANSETRON 2 MG/ML
4 INJECTION INTRAMUSCULAR; INTRAVENOUS EVERY 4 HOURS PRN
Status: DISCONTINUED | OUTPATIENT
Start: 2025-05-19 | End: 2025-05-27

## 2025-05-19 RX ORDER — SODIUM CHLORIDE 0.9 % (FLUSH) 0.9 %
5-40 SYRINGE (ML) INJECTION PRN
Status: DISCONTINUED | OUTPATIENT
Start: 2025-05-19 | End: 2025-05-19 | Stop reason: HOSPADM

## 2025-05-19 RX ORDER — HYDROCORTISONE SODIUM SUCCINATE 100 MG/2ML
INJECTION INTRAMUSCULAR; INTRAVENOUS
Status: DISCONTINUED | OUTPATIENT
Start: 2025-05-19 | End: 2025-05-19 | Stop reason: SDUPTHER

## 2025-05-19 RX ORDER — PROPOFOL 10 MG/ML
5-50 INJECTION, EMULSION INTRAVENOUS CONTINUOUS
Status: DISCONTINUED | OUTPATIENT
Start: 2025-05-19 | End: 2025-05-22

## 2025-05-19 RX ORDER — ASPIRIN 81 MG/1
81 TABLET ORAL DAILY
Status: DISCONTINUED | OUTPATIENT
Start: 2025-05-20 | End: 2025-05-20

## 2025-05-19 RX ORDER — LIDOCAINE HYDROCHLORIDE 10 MG/ML
1 INJECTION, SOLUTION EPIDURAL; INFILTRATION; INTRACAUDAL; PERINEURAL
Status: CANCELLED | OUTPATIENT
Start: 2025-05-19

## 2025-05-19 RX ORDER — INSULIN LISPRO 100 [IU]/ML
1-20 INJECTION, SOLUTION INTRAVENOUS; SUBCUTANEOUS
Status: DISCONTINUED | OUTPATIENT
Start: 2025-05-19 | End: 2025-05-23

## 2025-05-19 RX ORDER — MIDAZOLAM HYDROCHLORIDE 1 MG/ML
INJECTION, SOLUTION INTRAMUSCULAR; INTRAVENOUS
Status: DISCONTINUED | OUTPATIENT
Start: 2025-05-19 | End: 2025-05-19 | Stop reason: SDUPTHER

## 2025-05-19 RX ORDER — ACETAMINOPHEN 500 MG
1000 TABLET ORAL ONCE
Status: CANCELLED | OUTPATIENT
Start: 2025-05-19 | End: 2025-05-19

## 2025-05-19 RX ORDER — ATORVASTATIN CALCIUM 40 MG/1
40 TABLET, FILM COATED ORAL NIGHTLY
Status: DISCONTINUED | OUTPATIENT
Start: 2025-05-19 | End: 2025-05-30 | Stop reason: HOSPADM

## 2025-05-19 RX ORDER — ALBUMIN HUMAN 50 G/1000ML
12.5 SOLUTION INTRAVENOUS PRN
Status: COMPLETED | OUTPATIENT
Start: 2025-05-19 | End: 2025-05-20

## 2025-05-19 RX ORDER — HEPARIN SODIUM 10000 [USP'U]/ML
INJECTION, SOLUTION INTRAVENOUS; SUBCUTANEOUS PRN
Status: DISCONTINUED | OUTPATIENT
Start: 2025-05-19 | End: 2025-05-19 | Stop reason: ALTCHOICE

## 2025-05-19 RX ORDER — SODIUM CHLORIDE 9 MG/ML
INJECTION, SOLUTION INTRAVENOUS PRN
Status: DISCONTINUED | OUTPATIENT
Start: 2025-05-19 | End: 2025-05-19 | Stop reason: HOSPADM

## 2025-05-19 RX ORDER — DESMOPRESSIN ACETATE 4 UG/ML
INJECTION, SOLUTION INTRAVENOUS; SUBCUTANEOUS
Status: DISCONTINUED | OUTPATIENT
Start: 2025-05-19 | End: 2025-05-19 | Stop reason: SDUPTHER

## 2025-05-19 RX ORDER — AMIODARONE HYDROCHLORIDE 200 MG/1
400 TABLET ORAL 2 TIMES DAILY
Status: DISCONTINUED | OUTPATIENT
Start: 2025-05-20 | End: 2025-05-30 | Stop reason: HOSPADM

## 2025-05-19 RX ORDER — DEXMEDETOMIDINE HYDROCHLORIDE 4 UG/ML
.1-1.5 INJECTION, SOLUTION INTRAVENOUS CONTINUOUS
Status: DISCONTINUED | OUTPATIENT
Start: 2025-05-19 | End: 2025-05-22

## 2025-05-19 RX ORDER — SODIUM CHLORIDE 9 MG/ML
INJECTION, SOLUTION INTRAVENOUS CONTINUOUS
Status: DISCONTINUED | OUTPATIENT
Start: 2025-05-19 | End: 2025-05-19 | Stop reason: HOSPADM

## 2025-05-19 RX ORDER — DIPHENHYDRAMINE HYDROCHLORIDE 50 MG/ML
INJECTION, SOLUTION INTRAMUSCULAR; INTRAVENOUS
Status: DISCONTINUED | OUTPATIENT
Start: 2025-05-19 | End: 2025-05-19 | Stop reason: SDUPTHER

## 2025-05-19 RX ORDER — SODIUM CHLORIDE, SODIUM LACTATE, POTASSIUM CHLORIDE, CALCIUM CHLORIDE 600; 310; 30; 20 MG/100ML; MG/100ML; MG/100ML; MG/100ML
INJECTION, SOLUTION INTRAVENOUS CONTINUOUS
Status: CANCELLED | OUTPATIENT
Start: 2025-05-19

## 2025-05-19 RX ORDER — CEFAZOLIN SODIUM 1 G/3ML
INJECTION, POWDER, FOR SOLUTION INTRAMUSCULAR; INTRAVENOUS
Status: DISCONTINUED | OUTPATIENT
Start: 2025-05-19 | End: 2025-05-19 | Stop reason: SDUPTHER

## 2025-05-19 RX ORDER — OXYCODONE HYDROCHLORIDE 5 MG/1
10 TABLET ORAL EVERY 4 HOURS PRN
Status: DISCONTINUED | OUTPATIENT
Start: 2025-05-19 | End: 2025-05-23

## 2025-05-19 RX ORDER — SODIUM CHLORIDE 9 MG/ML
INJECTION, SOLUTION INTRAVENOUS PRN
Status: CANCELLED | OUTPATIENT
Start: 2025-05-19

## 2025-05-19 RX ORDER — GABAPENTIN 100 MG/1
200 CAPSULE ORAL 3 TIMES DAILY
Status: DISCONTINUED | OUTPATIENT
Start: 2025-05-19 | End: 2025-05-23

## 2025-05-19 RX ORDER — MIDAZOLAM HYDROCHLORIDE 2 MG/2ML
1 INJECTION, SOLUTION INTRAMUSCULAR; INTRAVENOUS
Status: DISCONTINUED | OUTPATIENT
Start: 2025-05-19 | End: 2025-05-23

## 2025-05-19 RX ORDER — ACETAMINOPHEN 500 MG
1000 TABLET ORAL ONCE
Status: COMPLETED | OUTPATIENT
Start: 2025-05-19 | End: 2025-05-19

## 2025-05-19 RX ORDER — POTASSIUM CHLORIDE 29.8 MG/ML
20 INJECTION INTRAVENOUS PRN
Status: DISCONTINUED | OUTPATIENT
Start: 2025-05-19 | End: 2025-05-23

## 2025-05-19 RX ORDER — FENTANYL CITRATE 50 UG/ML
50 INJECTION, SOLUTION INTRAMUSCULAR; INTRAVENOUS PRN
Status: DISCONTINUED | OUTPATIENT
Start: 2025-05-19 | End: 2025-05-19 | Stop reason: HOSPADM

## 2025-05-19 RX ORDER — PROTAMINE SULFATE 10 MG/ML
INJECTION, SOLUTION INTRAVENOUS
Status: DISCONTINUED | OUTPATIENT
Start: 2025-05-19 | End: 2025-05-19 | Stop reason: SDUPTHER

## 2025-05-19 RX ORDER — NOREPINEPHRINE BITARTRATE 0.06 MG/ML
1-100 INJECTION, SOLUTION INTRAVENOUS CONTINUOUS
Status: DISPENSED | OUTPATIENT
Start: 2025-05-19 | End: 2025-05-21

## 2025-05-19 RX ORDER — FENTANYL CITRATE 50 UG/ML
25 INJECTION, SOLUTION INTRAMUSCULAR; INTRAVENOUS PRN
Status: DISCONTINUED | OUTPATIENT
Start: 2025-05-19 | End: 2025-05-19 | Stop reason: HOSPADM

## 2025-05-19 RX ORDER — ONDANSETRON 2 MG/ML
4 INJECTION INTRAMUSCULAR; INTRAVENOUS ONCE
Status: DISCONTINUED | OUTPATIENT
Start: 2025-05-19 | End: 2025-05-23

## 2025-05-19 RX ORDER — ACETAMINOPHEN 500 MG
1000 TABLET ORAL EVERY 6 HOURS
Status: DISCONTINUED | OUTPATIENT
Start: 2025-05-19 | End: 2025-05-30 | Stop reason: HOSPADM

## 2025-05-19 RX ORDER — HYDRALAZINE HYDROCHLORIDE 20 MG/ML
10 INJECTION INTRAMUSCULAR; INTRAVENOUS EVERY 6 HOURS PRN
Status: DISCONTINUED | OUTPATIENT
Start: 2025-05-19 | End: 2025-05-30 | Stop reason: HOSPADM

## 2025-05-19 RX ORDER — FAMOTIDINE 20 MG/1
20 TABLET, FILM COATED ORAL 2 TIMES DAILY
Status: DISCONTINUED | OUTPATIENT
Start: 2025-05-19 | End: 2025-05-19

## 2025-05-19 RX ORDER — BISACODYL 10 MG
10 SUPPOSITORY, RECTAL RECTAL DAILY PRN
Status: DISCONTINUED | OUTPATIENT
Start: 2025-05-19 | End: 2025-05-30 | Stop reason: HOSPADM

## 2025-05-19 RX ORDER — INSULIN LISPRO 100 [IU]/ML
0-12 INJECTION, SOLUTION INTRAVENOUS; SUBCUTANEOUS
Status: DISCONTINUED | OUTPATIENT
Start: 2025-05-21 | End: 2025-05-30 | Stop reason: HOSPADM

## 2025-05-19 RX ORDER — SODIUM CHLORIDE, SODIUM LACTATE, POTASSIUM CHLORIDE, CALCIUM CHLORIDE 600; 310; 30; 20 MG/100ML; MG/100ML; MG/100ML; MG/100ML
INJECTION, SOLUTION INTRAVENOUS CONTINUOUS
Status: DISCONTINUED | OUTPATIENT
Start: 2025-05-19 | End: 2025-05-24

## 2025-05-19 RX ORDER — MIDAZOLAM HYDROCHLORIDE 2 MG/2ML
2 INJECTION, SOLUTION INTRAMUSCULAR; INTRAVENOUS PRN
Status: DISCONTINUED | OUTPATIENT
Start: 2025-05-19 | End: 2025-05-19 | Stop reason: HOSPADM

## 2025-05-19 RX ORDER — SODIUM CHLORIDE 450 MG/100ML
INJECTION, SOLUTION INTRAVENOUS CONTINUOUS
Status: DISCONTINUED | OUTPATIENT
Start: 2025-05-19 | End: 2025-05-24

## 2025-05-19 RX ORDER — ROCURONIUM BROMIDE 10 MG/ML
INJECTION, SOLUTION INTRAVENOUS
Status: DISCONTINUED | OUTPATIENT
Start: 2025-05-19 | End: 2025-05-19 | Stop reason: SDUPTHER

## 2025-05-19 RX ORDER — SODIUM CHLORIDE 9 MG/ML
INJECTION, SOLUTION INTRAVENOUS CONTINUOUS
Status: DISCONTINUED | OUTPATIENT
Start: 2025-05-19 | End: 2025-05-24

## 2025-05-19 RX ORDER — SENNA AND DOCUSATE SODIUM 50; 8.6 MG/1; MG/1
1 TABLET, FILM COATED ORAL 2 TIMES DAILY
Status: DISCONTINUED | OUTPATIENT
Start: 2025-05-19 | End: 2025-05-30 | Stop reason: HOSPADM

## 2025-05-19 RX ORDER — TRANEXAMIC ACID 100 MG/ML
INJECTION, SOLUTION INTRAVENOUS
Status: DISCONTINUED | OUTPATIENT
Start: 2025-05-19 | End: 2025-05-19 | Stop reason: SDUPTHER

## 2025-05-19 RX ORDER — DIPHENHYDRAMINE HCL 25 MG
25 CAPSULE ORAL NIGHTLY PRN
Status: DISCONTINUED | OUTPATIENT
Start: 2025-05-20 | End: 2025-05-30 | Stop reason: HOSPADM

## 2025-05-19 RX ORDER — INSULIN LISPRO 100 [IU]/ML
0-6 INJECTION, SOLUTION INTRAVENOUS; SUBCUTANEOUS NIGHTLY
Status: DISCONTINUED | OUTPATIENT
Start: 2025-05-21 | End: 2025-05-30 | Stop reason: HOSPADM

## 2025-05-19 RX ORDER — SODIUM CHLORIDE 0.9 % (FLUSH) 0.9 %
5-40 SYRINGE (ML) INJECTION EVERY 12 HOURS SCHEDULED
Status: DISCONTINUED | OUTPATIENT
Start: 2025-05-19 | End: 2025-05-30 | Stop reason: HOSPADM

## 2025-05-19 RX ORDER — INDOMETHACIN 25 MG/1
CAPSULE ORAL
Status: DISCONTINUED | OUTPATIENT
Start: 2025-05-19 | End: 2025-05-19 | Stop reason: SDUPTHER

## 2025-05-19 RX ORDER — IPRATROPIUM BROMIDE AND ALBUTEROL SULFATE 2.5; .5 MG/3ML; MG/3ML
1 SOLUTION RESPIRATORY (INHALATION) EVERY 4 HOURS PRN
Status: DISCONTINUED | OUTPATIENT
Start: 2025-05-19 | End: 2025-05-30 | Stop reason: HOSPADM

## 2025-05-19 RX ORDER — SODIUM CHLORIDE, SODIUM LACTATE, POTASSIUM CHLORIDE, CALCIUM CHLORIDE 600; 310; 30; 20 MG/100ML; MG/100ML; MG/100ML; MG/100ML
INJECTION, SOLUTION INTRAVENOUS CONTINUOUS
Status: DISCONTINUED | OUTPATIENT
Start: 2025-05-19 | End: 2025-05-19 | Stop reason: HOSPADM

## 2025-05-19 RX ORDER — MAGNESIUM SULFATE IN WATER 40 MG/ML
2000 INJECTION, SOLUTION INTRAVENOUS PRN
Status: DISCONTINUED | OUTPATIENT
Start: 2025-05-19 | End: 2025-05-30 | Stop reason: HOSPADM

## 2025-05-19 RX ORDER — ACETAMINOPHEN 325 MG/1
650 TABLET ORAL ONCE
Status: DISCONTINUED | OUTPATIENT
Start: 2025-05-19 | End: 2025-05-19 | Stop reason: HOSPADM

## 2025-05-19 RX ORDER — MUPIROCIN 20 MG/G
OINTMENT TOPICAL 2 TIMES DAILY
Status: COMPLETED | OUTPATIENT
Start: 2025-05-19 | End: 2025-05-24

## 2025-05-19 RX ORDER — MIDAZOLAM HYDROCHLORIDE 2 MG/2ML
2 INJECTION, SOLUTION INTRAMUSCULAR; INTRAVENOUS PRN
Status: CANCELLED | OUTPATIENT
Start: 2025-05-19

## 2025-05-19 RX ORDER — OXYCODONE HYDROCHLORIDE 5 MG/1
5 TABLET ORAL EVERY 4 HOURS PRN
Status: DISCONTINUED | OUTPATIENT
Start: 2025-05-19 | End: 2025-05-23

## 2025-05-19 RX ORDER — 0.9 % SODIUM CHLORIDE 0.9 %
INTRAVENOUS SOLUTION INTRAVENOUS
Status: DISCONTINUED | OUTPATIENT
Start: 2025-05-19 | End: 2025-05-19 | Stop reason: SDUPTHER

## 2025-05-19 RX ORDER — CEFAZOLIN SODIUM 1 G/3ML
INJECTION, POWDER, FOR SOLUTION INTRAMUSCULAR; INTRAVENOUS PRN
Status: DISCONTINUED | OUTPATIENT
Start: 2025-05-19 | End: 2025-05-19 | Stop reason: ALTCHOICE

## 2025-05-19 RX ORDER — ALBUMIN HUMAN 50 G/1000ML
SOLUTION INTRAVENOUS
Status: COMPLETED
Start: 2025-05-19 | End: 2025-05-19

## 2025-05-19 RX ORDER — POLYETHYLENE GLYCOL 3350 17 G/17G
17 POWDER, FOR SOLUTION ORAL DAILY
Status: DISCONTINUED | OUTPATIENT
Start: 2025-05-19 | End: 2025-05-27

## 2025-05-19 RX ORDER — GABAPENTIN 300 MG/1
300 CAPSULE ORAL ONCE
Status: COMPLETED | OUTPATIENT
Start: 2025-05-19 | End: 2025-05-19

## 2025-05-19 RX ORDER — ALBUMIN HUMAN 50 G/1000ML
SOLUTION INTRAVENOUS
Status: DISCONTINUED | OUTPATIENT
Start: 2025-05-19 | End: 2025-05-19 | Stop reason: SDUPTHER

## 2025-05-19 RX ORDER — LIDOCAINE HYDROCHLORIDE 20 MG/ML
INJECTION, SOLUTION EPIDURAL; INFILTRATION; INTRACAUDAL; PERINEURAL
Status: DISCONTINUED | OUTPATIENT
Start: 2025-05-19 | End: 2025-05-19 | Stop reason: SDUPTHER

## 2025-05-19 RX ORDER — CALCIUM CHLORIDE 100 MG/ML
INJECTION INTRAVENOUS; INTRAVENTRICULAR
Status: DISCONTINUED | OUTPATIENT
Start: 2025-05-19 | End: 2025-05-19 | Stop reason: SDUPTHER

## 2025-05-19 RX ORDER — FENTANYL CITRATE 50 UG/ML
INJECTION, SOLUTION INTRAMUSCULAR; INTRAVENOUS
Status: DISCONTINUED | OUTPATIENT
Start: 2025-05-19 | End: 2025-05-19 | Stop reason: SDUPTHER

## 2025-05-19 RX ORDER — LIDOCAINE 4 G/G
2 PATCH TOPICAL DAILY
Status: DISCONTINUED | OUTPATIENT
Start: 2025-05-19 | End: 2025-05-30 | Stop reason: HOSPADM

## 2025-05-19 RX ORDER — VASOPRESSIN 20 [USP'U]/ML
INJECTION, SOLUTION INTRAVENOUS
Status: DISCONTINUED | OUTPATIENT
Start: 2025-05-19 | End: 2025-05-19 | Stop reason: SDUPTHER

## 2025-05-19 RX ORDER — HEPARIN SODIUM 1000 [USP'U]/ML
INJECTION, SOLUTION INTRAVENOUS; SUBCUTANEOUS
Status: DISCONTINUED | OUTPATIENT
Start: 2025-05-19 | End: 2025-05-19 | Stop reason: SDUPTHER

## 2025-05-19 RX ORDER — INSULIN GLARGINE 100 [IU]/ML
1-50 INJECTION, SOLUTION SUBCUTANEOUS
Status: DISCONTINUED | OUTPATIENT
Start: 2025-05-21 | End: 2025-05-30 | Stop reason: HOSPADM

## 2025-05-19 RX ORDER — SODIUM CHLORIDE 0.9 % (FLUSH) 0.9 %
5-40 SYRINGE (ML) INJECTION EVERY 12 HOURS SCHEDULED
Status: CANCELLED | OUTPATIENT
Start: 2025-05-19

## 2025-05-19 RX ORDER — SODIUM CHLORIDE 0.9 % (FLUSH) 0.9 %
5-40 SYRINGE (ML) INJECTION PRN
Status: CANCELLED | OUTPATIENT
Start: 2025-05-19

## 2025-05-19 RX ORDER — LANOLIN ALCOHOL/MO/W.PET/CERES
400 CREAM (GRAM) TOPICAL 2 TIMES DAILY
Status: DISCONTINUED | OUTPATIENT
Start: 2025-05-20 | End: 2025-05-23

## 2025-05-19 RX ORDER — LIDOCAINE HYDROCHLORIDE 10 MG/ML
1 INJECTION, SOLUTION EPIDURAL; INFILTRATION; INTRACAUDAL; PERINEURAL
Status: DISCONTINUED | OUTPATIENT
Start: 2025-05-19 | End: 2025-05-19 | Stop reason: HOSPADM

## 2025-05-19 RX ORDER — FENTANYL CITRATE 50 UG/ML
100 INJECTION, SOLUTION INTRAMUSCULAR; INTRAVENOUS
Refills: 0 | Status: CANCELLED | OUTPATIENT
Start: 2025-05-19

## 2025-05-19 RX ORDER — ALBUTEROL SULFATE 90 UG/1
INHALANT RESPIRATORY (INHALATION)
Status: DISCONTINUED | OUTPATIENT
Start: 2025-05-19 | End: 2025-05-19 | Stop reason: SDUPTHER

## 2025-05-19 RX ORDER — SODIUM CHLORIDE, SODIUM LACTATE, POTASSIUM CHLORIDE, CALCIUM CHLORIDE 600; 310; 30; 20 MG/100ML; MG/100ML; MG/100ML; MG/100ML
INJECTION, SOLUTION INTRAVENOUS
Status: DISCONTINUED | OUTPATIENT
Start: 2025-05-19 | End: 2025-05-19 | Stop reason: SDUPTHER

## 2025-05-19 RX ORDER — CHLORHEXIDINE GLUCONATE ORAL RINSE 1.2 MG/ML
15 SOLUTION DENTAL 2 TIMES DAILY
Status: DISCONTINUED | OUTPATIENT
Start: 2025-05-19 | End: 2025-05-30 | Stop reason: HOSPADM

## 2025-05-19 RX ORDER — DEXTROSE MONOHYDRATE 100 MG/ML
INJECTION, SOLUTION INTRAVENOUS CONTINUOUS PRN
Status: DISCONTINUED | OUTPATIENT
Start: 2025-05-19 | End: 2025-05-30 | Stop reason: HOSPADM

## 2025-05-19 RX ORDER — SODIUM CHLORIDE 0.9 % (FLUSH) 0.9 %
5-40 SYRINGE (ML) INJECTION PRN
Status: DISCONTINUED | OUTPATIENT
Start: 2025-05-19 | End: 2025-05-30 | Stop reason: HOSPADM

## 2025-05-19 RX ADMIN — Medication 100 MCG: at 09:42

## 2025-05-19 RX ADMIN — SODIUM CHLORIDE 400 ML: 9 INJECTION, SOLUTION INTRAVENOUS at 12:25

## 2025-05-19 RX ADMIN — TRANEXAMIC ACID 1 MG/KG/HR: 100 INJECTION, SOLUTION INTRAVENOUS at 09:08

## 2025-05-19 RX ADMIN — FENTANYL CITRATE 75 MCG: 50 INJECTION, SOLUTION INTRAMUSCULAR; INTRAVENOUS at 09:26

## 2025-05-19 RX ADMIN — SODIUM CHLORIDE 500 ML: 9 INJECTION, SOLUTION INTRAVENOUS at 14:33

## 2025-05-19 RX ADMIN — Medication 50 MCG: at 09:40

## 2025-05-19 RX ADMIN — VASOPRESSIN 1 UNITS: 20 INJECTION, SOLUTION INTRAVENOUS at 14:20

## 2025-05-19 RX ADMIN — Medication 100 MCG: at 09:39

## 2025-05-19 RX ADMIN — FAMOTIDINE 20 MG: 10 INJECTION, SOLUTION INTRAVENOUS at 18:45

## 2025-05-19 RX ADMIN — MIDAZOLAM 2 MG: 1 INJECTION INTRAMUSCULAR; INTRAVENOUS at 09:18

## 2025-05-19 RX ADMIN — FENTANYL CITRATE 150 MCG: 50 INJECTION, SOLUTION INTRAMUSCULAR; INTRAVENOUS at 09:15

## 2025-05-19 RX ADMIN — NICARDIPINE HYDROCHLORIDE 2.5 MG/HR: 25 INJECTION INTRAVENOUS at 21:30

## 2025-05-19 RX ADMIN — DEXMEDETOMIDINE HYDROCHLORIDE 0.5 MCG/KG/HR: 4 INJECTION, SOLUTION INTRAVENOUS at 23:53

## 2025-05-19 RX ADMIN — ACETAMINOPHEN 1000 MG: 500 TABLET ORAL at 06:50

## 2025-05-19 RX ADMIN — SODIUM CHLORIDE 2.1 UNITS/HR: 9 INJECTION, SOLUTION INTRAVENOUS at 11:06

## 2025-05-19 RX ADMIN — NOREPINEPHRINE BITARTRATE 16 MCG: 1 INJECTION, SOLUTION, CONCENTRATE INTRAVENOUS at 12:25

## 2025-05-19 RX ADMIN — ROCURONIUM BROMIDE 20 MG: 10 INJECTION, SOLUTION INTRAVENOUS at 12:30

## 2025-05-19 RX ADMIN — ALBUMIN (HUMAN) 12.5 G: 12.5 INJECTION, SOLUTION INTRAVENOUS at 16:20

## 2025-05-19 RX ADMIN — SODIUM CHLORIDE 200 ML: 9 INJECTION, SOLUTION INTRAVENOUS at 09:54

## 2025-05-19 RX ADMIN — ALBUMIN (HUMAN) 250 ML: 12.5 INJECTION, SOLUTION INTRAVENOUS at 13:28

## 2025-05-19 RX ADMIN — DESMOPRESSIN ACETATE 40 MCG: 4 INJECTION, SOLUTION INTRAVENOUS; SUBCUTANEOUS at 14:05

## 2025-05-19 RX ADMIN — HEPARIN SODIUM 56000 UNITS: 1000 INJECTION INTRAVENOUS; SUBCUTANEOUS at 09:31

## 2025-05-19 RX ADMIN — ACETAMINOPHEN 1000 MG: 500 TABLET ORAL at 20:53

## 2025-05-19 RX ADMIN — PROPOFOL 20 MCG/KG/MIN: 10 INJECTION, EMULSION INTRAVENOUS at 23:52

## 2025-05-19 RX ADMIN — Medication 100 MCG: at 09:46

## 2025-05-19 RX ADMIN — DEXMEDETOMIDINE HYDROCHLORIDE 0.3 MCG/KG/HR: 4 INJECTION, SOLUTION INTRAVENOUS at 16:40

## 2025-05-19 RX ADMIN — SODIUM CHLORIDE, SODIUM LACTATE, POTASSIUM CHLORIDE, AND CALCIUM CHLORIDE: .6; .31; .03; .02 INJECTION, SOLUTION INTRAVENOUS at 06:41

## 2025-05-19 RX ADMIN — MIDAZOLAM HYDROCHLORIDE 4 MG: 1 INJECTION, SOLUTION INTRAMUSCULAR; INTRAVENOUS at 07:10

## 2025-05-19 RX ADMIN — CHLORHEXIDINE GLUCONATE 15 ML: 1.2 RINSE ORAL at 20:50

## 2025-05-19 RX ADMIN — SODIUM CHLORIDE 400 ML: 9 INJECTION, SOLUTION INTRAVENOUS at 09:32

## 2025-05-19 RX ADMIN — ROCURONIUM BROMIDE 100 MG: 10 INJECTION, SOLUTION INTRAVENOUS at 09:07

## 2025-05-19 RX ADMIN — SODIUM CHLORIDE, POTASSIUM CHLORIDE, SODIUM LACTATE AND CALCIUM CHLORIDE: 600; 310; 30; 20 INJECTION, SOLUTION INTRAVENOUS at 08:38

## 2025-05-19 RX ADMIN — ALBUMIN (HUMAN) 250 ML: 12.5 INJECTION, SOLUTION INTRAVENOUS at 12:47

## 2025-05-19 RX ADMIN — VASOPRESSIN 2 UNITS: 20 INJECTION, SOLUTION INTRAVENOUS at 12:17

## 2025-05-19 RX ADMIN — PROPOFOL 80 MCG/KG/MIN: 10 INJECTION, EMULSION INTRAVENOUS at 14:02

## 2025-05-19 RX ADMIN — ALBUMIN (HUMAN) 12.5 G: 12.5 INJECTION, SOLUTION INTRAVENOUS at 20:52

## 2025-05-19 RX ADMIN — EPINEPHRINE 2 MCG/MIN: 1 INJECTION INTRAMUSCULAR; INTRAVENOUS; SUBCUTANEOUS at 12:15

## 2025-05-19 RX ADMIN — PROTAMINE SULFATE 380 MG: 10 INJECTION, SOLUTION INTRAVENOUS at 13:49

## 2025-05-19 RX ADMIN — HYDROMORPHONE HYDROCHLORIDE 1 MG: 1 INJECTION, SOLUTION INTRAMUSCULAR; INTRAVENOUS; SUBCUTANEOUS at 09:17

## 2025-05-19 RX ADMIN — SODIUM CHLORIDE 200 ML: 9 INJECTION, SOLUTION INTRAVENOUS at 14:53

## 2025-05-19 RX ADMIN — GABAPENTIN 200 MG: 100 CAPSULE ORAL at 20:53

## 2025-05-19 RX ADMIN — ATORVASTATIN CALCIUM 40 MG: 40 TABLET, FILM COATED ORAL at 20:53

## 2025-05-19 RX ADMIN — ALBUTEROL SULFATE 6 PUFF: 90 INHALANT RESPIRATORY (INHALATION) at 13:32

## 2025-05-19 RX ADMIN — ROCURONIUM BROMIDE 30 MG: 10 INJECTION, SOLUTION INTRAVENOUS at 13:17

## 2025-05-19 RX ADMIN — HYDROMORPHONE HYDROCHLORIDE 1 MG: 1 INJECTION, SOLUTION INTRAMUSCULAR; INTRAVENOUS; SUBCUTANEOUS at 09:30

## 2025-05-19 RX ADMIN — FENTANYL CITRATE 100 MCG: 50 INJECTION INTRAMUSCULAR; INTRAVENOUS at 07:10

## 2025-05-19 RX ADMIN — ALBUMIN (HUMAN) 12.5 G: 12.5 INJECTION, SOLUTION INTRAVENOUS at 23:57

## 2025-05-19 RX ADMIN — CALCIUM CHLORIDE 1 G: 100 INJECTION, SOLUTION INTRAVENOUS; INTRAVENTRICULAR at 15:28

## 2025-05-19 RX ADMIN — PROPOFOL 140 MG: 10 INJECTION, EMULSION INTRAVENOUS at 09:07

## 2025-05-19 RX ADMIN — METHYLPREDNISOLONE SODIUM SUCCINATE 40 MG: 40 INJECTION, POWDER, LYOPHILIZED, FOR SOLUTION INTRAMUSCULAR; INTRAVENOUS at 20:52

## 2025-05-19 RX ADMIN — CEFAZOLIN 3 G: 330 INJECTION, POWDER, FOR SOLUTION INTRAMUSCULAR; INTRAVENOUS at 12:45

## 2025-05-19 RX ADMIN — SODIUM CHLORIDE, PRESERVATIVE FREE 10 ML: 5 INJECTION INTRAVENOUS at 20:52

## 2025-05-19 RX ADMIN — PHENYLEPHRINE HYDROCHLORIDE 40 MCG/MIN: 10 INJECTION INTRAVENOUS at 09:02

## 2025-05-19 RX ADMIN — SODIUM CHLORIDE 0.2 UNITS/HR: 9 INJECTION, SOLUTION INTRAVENOUS at 16:47

## 2025-05-19 RX ADMIN — ALBUTEROL SULFATE 6 PUFF: 90 INHALANT RESPIRATORY (INHALATION) at 13:13

## 2025-05-19 RX ADMIN — SODIUM BICARBONATE 50 MEQ: 84 INJECTION, SOLUTION INTRAVENOUS at 15:00

## 2025-05-19 RX ADMIN — ACETAMINOPHEN 1000 MG: 500 TABLET ORAL at 17:35

## 2025-05-19 RX ADMIN — CEFAZOLIN 3000 MG: 3 INJECTION, POWDER, FOR SOLUTION INTRAVENOUS at 20:58

## 2025-05-19 RX ADMIN — GABAPENTIN 300 MG: 300 CAPSULE ORAL at 06:49

## 2025-05-19 RX ADMIN — TRANEXAMIC ACID 1000 MG: 100 INJECTION, SOLUTION INTRAVENOUS at 09:01

## 2025-05-19 RX ADMIN — MUPIROCIN: 20 OINTMENT TOPICAL at 20:50

## 2025-05-19 RX ADMIN — SODIUM CHLORIDE, POTASSIUM CHLORIDE, SODIUM LACTATE AND CALCIUM CHLORIDE: 600; 310; 30; 20 INJECTION, SOLUTION INTRAVENOUS at 08:39

## 2025-05-19 RX ADMIN — ROCURONIUM BROMIDE 30 MG: 10 INJECTION, SOLUTION INTRAVENOUS at 10:29

## 2025-05-19 RX ADMIN — HYDROCORTISONE SODIUM SUCCINATE 100 MG: 100 INJECTION, POWDER, FOR SOLUTION INTRAMUSCULAR; INTRAVENOUS at 12:30

## 2025-05-19 RX ADMIN — CEFAZOLIN 3 G: 330 INJECTION, POWDER, FOR SOLUTION INTRAMUSCULAR; INTRAVENOUS at 09:01

## 2025-05-19 RX ADMIN — VASOPRESSIN 2 UNITS: 20 INJECTION, SOLUTION INTRAVENOUS at 12:18

## 2025-05-19 RX ADMIN — FENTANYL CITRATE 100 MCG: 50 INJECTION, SOLUTION INTRAMUSCULAR; INTRAVENOUS at 08:39

## 2025-05-19 RX ADMIN — LIDOCAINE HYDROCHLORIDE 100 MG: 20 INJECTION, SOLUTION EPIDURAL; INFILTRATION; INTRACAUDAL; PERINEURAL at 09:07

## 2025-05-19 RX ADMIN — DEXMEDETOMIDINE 0.3 MCG/KG/HR: 100 INJECTION, SOLUTION, CONCENTRATE INTRAVENOUS at 09:02

## 2025-05-19 RX ADMIN — VASOPRESSIN 3 UNITS: 20 INJECTION, SOLUTION INTRAVENOUS at 14:00

## 2025-05-19 RX ADMIN — VASOPRESSIN 5 UNITS: 20 INJECTION, SOLUTION INTRAVENOUS at 12:22

## 2025-05-19 RX ADMIN — TRANEXAMIC ACID 1 MG/KG/HR: 100 INJECTION, SOLUTION INTRAVENOUS at 12:57

## 2025-05-19 RX ADMIN — PROPOFOL 60 MG: 10 INJECTION, EMULSION INTRAVENOUS at 09:18

## 2025-05-19 RX ADMIN — SENNOSIDES AND DOCUSATE SODIUM 1 TABLET: 8.6; 5 TABLET ORAL at 20:53

## 2025-05-19 RX ADMIN — DIPHENHYDRAMINE HYDROCHLORIDE 50 MG: 50 INJECTION INTRAMUSCULAR; INTRAVENOUS at 13:28

## 2025-05-19 ASSESSMENT — PAIN SCALES - GENERAL
PAINLEVEL_OUTOF10: 0
PAINLEVEL_OUTOF10: 0

## 2025-05-19 ASSESSMENT — PAIN - FUNCTIONAL ASSESSMENT: PAIN_FUNCTIONAL_ASSESSMENT: 0-10

## 2025-05-19 ASSESSMENT — PULMONARY FUNCTION TESTS
PIF_VALUE: 29
PIF_VALUE: 33

## 2025-05-19 ASSESSMENT — COPD QUESTIONNAIRES: CAT_SEVERITY: MILD

## 2025-05-19 NOTE — BRIEF OP NOTE
BRIEF OP NOTE  Pre-Op Diagnosis: Aneurysm of ascending aorta [I71.21]    Post-Op Diagnosis: Aneurysm of ascending aorta [I71.21]      Procedure: Procedure(s):  AORTIC ROOT REPLACEMENT with 25mm Tissue Valved Conduit  FLEXIBLE BRONCHOSCOPY BY DR BEDOYA  Insertion of Left Femoral Arterial Line    Surgeon:  Dr. Kirk MD    Assistant(s): Kai Hester PA-C    Anesthesia: General      Infusions:  epi, levo, Yan, Precedex, Insulin     Estimated Blood Loss: 400 ml     Cell Saver: 725 ml     Bypass Time: 195 min     Cross Clamp Time: 131 min    Specimens:   ID Type Source Tests Collected by Time Destination   1 : PORTION OF ASCENDING AORTA Tissue Aorta SURGICAL PATHOLOGY Loyd Bradley MD 5/19/2025 1005    2 : native aortic valve leaflets Tissue Tissue SURGICAL PATHOLOGY Loyd Bradley MD 5/19/2025 1030        Drains and pacing wires: 2 Atrial Wires  1 Bipolar Ventricular Wire 4 Jean Drains (2 Mediastinal, 2 Pleural)     Wires completed by: Humberto Hester PA-C    Complications: none    Findings: See full operative note.    Implants:   Implant Name Type Inv. Item Serial No.  Lot No. LRB No. Used Action   VALVE AORT 25 MM CONDUIT KONECT RESILIA - [id number] Aortic valves VALVE AORT 25 MM CONDUIT KONECT RESILIA [id number] [employer] [place]- NA N/A 1 Implanted

## 2025-05-19 NOTE — PROGRESS NOTES
1600: Pt arrived to unit. Vent settings upon arrival: RR 18, Volume 650, FiO2 100%, PEEP 14.   Report received from NADINE and WENDI Mcconnell. Orders received to maintain sedation/vent overnight with systolic goal < 130 and MAP <65 and d/c L femoral art line if correlating with R radial art line.    1630: ABG-- pH 7.36, CO2 41.7, pO2 372, HCO3 23.6, Sat 99.9%  Intensivist YOVANY Winter MD notified. FiO2 100% --> 80%, PEEP 14 --> 12, Volume 650--> 600.    1655: Intensivist and Anesthesia HIMANSHU Diaz MD at bedside. Vent volume adjusted to 550.     1710: [place] placed by anesthesia; no complications noted.    1750: Repeat ABG-- pH 7.33, CO2 48.9, pO2 320.4, HCO3 25.8, Sat 99%  Intensivist notified, rate increased to 20.     1814: Mixed venous collected; 64.     1835: Discussed CO/CI numbers with intensivist. Orders received to complete Kenroy calculation (results; CO 5.3, CI 2.0) and repeat an ABG and Kenroy calculation at midnight.    2000: Bedside shift change report given to Avinash MAYER (oncoming nurse) by Alka MAYER and Mikey MAYER (offgoing nurse). Report included the following information Nurse Handoff Report, Intake/Output, MAR, Recent Results, and Cardiac Rhythm Sinus zohaib .

## 2025-05-19 NOTE — ANESTHESIA PROCEDURE NOTES
Arterial Line:    An arterial line was placed using ultrasound guidance, in the pre-op for the following indication(s): continuous blood pressure monitoring and blood sampling needed.    A 20 gauge (size), 1 and 3/8 inch (length), Arrow (type) catheter was placed, Seldinger technique used, into the right radial artery, secured by tape and Tegaderm.  Anesthesia type: Local  Local infiltration: Injection    Events:  patient tolerated procedure well with no complications.5/19/2025 6:55 AM5/19/2025 7:01 AM  Performed: Resident/CRNA   Preanesthetic Checklist  Completed: patient identified, IV checked, site marked, risks and benefits discussed, surgical/procedural consents, equipment checked, pre-op evaluation, timeout performed, anesthesia consent given, oxygen available and monitors applied/VS acknowledged

## 2025-05-19 NOTE — ANESTHESIA PROCEDURE NOTES
Procedure Performed: DEDRA       Start Time:  5/19/2025 8:30 AM       End Time:   5/19/2025 3:13 PM    Anesthesia Information  Performed Personally        Preanesthesia Checklist:  Patient identified, IV assessed, risks and benefits discussed, monitors and equipment assessed, procedure being performed at surgeon's request and anesthesia consent obtained.    General Procedure Information  Diagnostic Indications for Echo:  assessment of ascending aorta, assessment of surgical repair, hemodynamic monitoring and assessment of valve function  Location performed:  OR  Intubated  Bite block not placed  Heart visualized  Probe Insertion:  Easy  Probe Type:  Mulitplane and epiaortic  Modalities:  2D, pulse wave Doppler, color flow mapping and continuous wave Doppler    Echocardiographic and Doppler Measurements    Ventricles    Ventricle  Cavity Size  Cavity          Dimension  Hypertrophy  Thrombus  Global FXN  EF    RV  normal    No  No  normal      LV  normal    Yes  No  normal (50-70%)  55%       Ventricular Regional Function:  1- Basal Anteroseptal:  normal  2- Basal Anterior:  normal  3- Basal Anterolateral:  normal  4- Basal Inferolateral:  normal  5- Basal Inferior:  normal  6- Basal Inferoseptal:  normal  7- Mid Anteroseptal:  normal  8- Mid Anterior:  normal  9- Mid Anterolateral:  normal  10- Mid Inferolateral:  normal  11- Mid Inferior:  normal  12- Mid Inferoseptal:  normal  13- Apical Anterior:  normal  14- Apical Lateral:  normal  15- Apical Inferior:  normal  16- Apical Septal:  normal  17- Har[place]on:  normal        Valves     Valves  Annulus  Stenosis Measurements   Regurg  Leaflet   Morph  Leaflet   Motion Valve Comments    Aortic dilated Stenosis none.            moderate, severe   normal normal Dilated annulus, Trileaflet valve, Eccentric AI jet- mod-severe AI.      Mitral normal none             none thickened normal    Tricuspid normal   not present         mild   normal   normal      Pulmonic normal   not

## 2025-05-19 NOTE — ANESTHESIA POSTPROCEDURE EVALUATION
Department of Anesthesiology  Postprocedure Note    Patient: Evelio Marroquin  MRN: [id number]  YOB: 1960  Date of evaluation: 5/19/2025    Procedure Summary       Date: 05/19/25 Room / Location: [facility] HEART OR  / [facility] OPEN HEART    Anesthesia Start: 0828 Anesthesia Stop: 1614    Procedure: AORTIC ROOT REPLACEMENT, ECC, DEDRA AND FLEXIBLE BRONCHOSCOPY BY DR BEDOYA (Chest) Diagnosis:       Aneurysm of ascending aorta      (Aneurysm of ascending aorta [I71.21])    Providers: Loyd Bradley MD Responsible Provider: Jim Bedoya MD    Anesthesia Type: General ASA Status: 4            Anesthesia Type: General    Sourav Phase I:      Sourav Phase II:      Anesthesia Post Evaluation    Patient location during evaluation: ICU  Patient participation: complete - patient cannot participate  Level of consciousness: sedated and ventilated  Pain score: 0  Airway patency: patent  Nausea & Vomiting: no nausea and no vomiting  Cardiovascular status: vasoactive/inotropes  Respiratory status: acceptable and ventilator  Hydration status: euvolemic  Comments: Probable intra-op anaphylaxis- swollen lips/tongue after the drapes come down. Improving in the ICU.  Multimodal analgesia pain management approach  There was medical reason for not using a multimodal analgesia pain management approach.Pain management: adequate    No notable events documented.

## 2025-05-19 NOTE — PROGRESS NOTES
Cardiac Surgery Coordinator- Met with the family of Evelio Marroquin, introduced role of the Cardiac Surgery Care Coordinator. Reviewed plan of care and day of surgery expectations. Provided family with an update from OR. Encouraged family to verbalize and emotional support given. Will continue to update throughout the day.    1100- Cardiac Surgery Coordinator- Met with the family of Evelio Marroquin, Provided family with an update from OR. Will continue to update throughout the day.    1330- Met with the family of Evelio Marroquin, provided update from the OR.     1440-Met with Evelio Marroquin's family and Dr. Bradley. Update given, Encouraged family to verbalize and offered emotional support.  Reinforced Surgical waiting room instructions, family to wait in the main surgical waiting room until contacted by the nursing staff.    1600- Escorted family to the fourth floor waiting room. Reviewed post op plan of care. Exchanged contact information. Family will not be visiting at the bedside and will be going home for the day. Will continue to follow.

## 2025-05-19 NOTE — ANESTHESIA PROCEDURE NOTES
Central Venous Line:    A central venous line was placed in the ICU for the following indication(s): 5/19/2025 5:03 PM5/19/2025 5:10 PM    Sterility preparation included the following: provider used sterile gloves, gown, hat and mask, hand hygiene performed prior to central venous catheter insertion, sterile gel and probe cover used in ultrasound-guided central venous catheter insertion and maximum sterile barriers used during central venous catheter insertion.Lulu(n) oximetric, 8 (size) Pulmonary Artery Catheter (PAC) was placed through the Introducer CVL in the right internal jugular vein.  The PAC placement was confirmed by pressure tracing changes and x-ray.  The patient experienced the following events during the procedure: patient tolerated procedure well with no complications.PA Cath placed?: Yes    Additional notes:  Sterile prep and drape of preexisting MAC catheter. 8 Fr CCO PAC floated using waveform guidance.   Staffing  Performed: Anesthesiologist   Performed by: Jim Diaz MD  Authorized by: Jim Diaz MD    Preanesthetic Checklist  Completed: patient identified, IV checked, site marked, risks and benefits discussed, surgical/procedural consents, equipment checked, pre-op evaluation, timeout performed, anesthesia consent given, oxygen available, monitors applied/VS acknowledged, fire risk safety assessment completed and verbalized and blood product R/B/A discussed and consented

## 2025-05-19 NOTE — ANESTHESIA PROCEDURE NOTES
Central Venous Line:    A central venous line was placed using ultrasound guidance, in the pre-op for the following indication(s): central venous access and CVP monitoring.5/19/2025 7:03 AM5/19/2025 7:21 AM    Sterility preparation included the following: provider used sterile gloves, gown, hat and mask, hand hygiene performed prior to central venous catheter insertion, sterile gel and probe cover used in ultrasound-guided central venous catheter insertion and maximum sterile barriers used during central venous catheter insertion.    The patient was placed in Trendelenburg position.The right internal jugular vein was prepped.    The site was prepped with Chloraprep.  A 9 Fr (size), 12 (length), introducer double lumen was placed.    During the procedure, the following specific steps were taken: target vein identified, needle advanced into vein and blood aspirated and guidewire advanced into vein.    Intravenous verification was obtained by ultrasound and venous blood return.    Post insertion care included: all ports aspirated, all ports flushed easily, guidewire removed intact, line sutured in place and dressing applied.    During the procedure the patient experienced: patient tolerated procedure well with no complications.      Outcomes: uncomplicated  Real-time US image taken/store: Yes  Anesthesia type: local..No  Staffing  Performed: Anesthesiologist   Performed by: Jim Diaz MD  Authorized by: Jim Diaz MD    Preanesthetic Checklist  Completed: patient identified, IV checked, site marked, risks and benefits discussed, surgical/procedural consents, equipment checked, pre-op evaluation, timeout performed, anesthesia consent given, oxygen available, monitors applied/VS acknowledged, fire risk safety assessment completed and verbalized and blood product R/B/A discussed and consented

## 2025-05-19 NOTE — ANESTHESIA PRE PROCEDURE
Department of Anesthesiology  Preprocedure Note       Name:  Evelio Marroquin   Age:  64 y.o.  :  1960                                          MRN:  [id number]         Date:  2025      Surgeon: Surgeon(s):  Loyd Bradley MD    Procedure: Procedure(s):  AORTIC ROOT REPLACEMENT WITH LIGATION OF LEFT ATRIAL APPENDAGE  WITH CLIP (NO PAC, E R A S)    Medications prior to admission:   Prior to Admission medications    Medication Sig Start Date End Date Taking? Authorizing Provider   mupirocin (BACTROBAN) 2 % ointment by Each Nostril route 2 times daily for 2 days 25 Yes Dora Mariano APRN - NP   chlorhexidine (PERIDEX) 0.12 % solution Swish and spit 15 mLs 2 times daily for 2 days 25 Yes Dora Mariano APRN - NP   carvedilol (COREG) 12.5 MG tablet Take 1 tablet by mouth 2 times daily 25  Yes Yesica Askew APRN - NP   sertraline (ZOLOFT) 100 MG tablet Take 1 tablet by mouth daily 25  Yes Yesica Askew APRN - NP   albuterol sulfate HFA (PROVENTIL;VENTOLIN;PROAIR) 108 (90 Base) MCG/ACT inhaler Inhale 2 puffs into the lungs every 6 hours as needed for Shortness of Breath 3/21/25  Yes Yesica Askew APRN - NP   Nebulizers (INNOSPIRE ESSENCE NEBULIZER) MISC USE AS DIRECTED UP TO 4 TIMES DAILY (J44.9) 23  Yes ProviderErika MD   albuterol (ACCUNEB) 1.25 MG/3ML nebulizer solution Inhale 3 mLs into the lungs every 6 hours as needed for Wheezing or Shortness of Breath DX: J44.9 23  Yes Yesica Askew APRN - NP   Respiratory Therapy Supplies (FULL KIT NEBULIZER SET) MISC Dispense one nebulizer kit with mask and tubing for use up to 4x/day, DX: J44.9 5/15/23  Yes Yesica Askew APRN - NP   JARDIANCE 10 MG tablet Take 1 tablet by mouth daily 25   ProviderErika MD   chlorthalidone (HYGROTON) 25 MG tablet Take 0.5 tablets by mouth daily 25  Yesica Askew, APRN - NP   spironolactone (ALDACTONE) 25 MG tablet Take 1 tablet by mouth daily  resting in bed asymptomatic

## 2025-05-20 ENCOUNTER — APPOINTMENT (OUTPATIENT)
Facility: HOSPITAL | Age: 65
DRG: 216 | End: 2025-05-20
Attending: THORACIC SURGERY (CARDIOTHORACIC VASCULAR SURGERY)
Payer: COMMERCIAL

## 2025-05-20 DIAGNOSIS — Z95.4 S/P AORTIC VALVE ALLOGRAFT: Primary | ICD-10-CM

## 2025-05-20 PROBLEM — R73.9 TRANSIENT HYPERGLYCEMIA POST PROCEDURE: Status: ACTIVE | Noted: 2025-05-20

## 2025-05-20 LAB
ACUTE KIDNEY INJURY RISK NEPHROCHECK: 0.37 (ref 0–0.3)
ACUTE KIDNEY INJURY RISK NEPHROCHECK: 0.7 (ref 0–0.3)
ACUTE KIDNEY INJURY RISK NEPHROCHECK: 1.41 (ref 0–0.3)
ALBUMIN SERPL-MCNC: 3 G/DL (ref 3.5–5)
ALBUMIN SERPL-MCNC: 3.3 G/DL (ref 3.5–5)
ALBUMIN SERPL-MCNC: 3.4 G/DL (ref 3.5–5)
ALBUMIN/GLOB SERPL: 1.4 (ref 1.1–2.2)
ALBUMIN/GLOB SERPL: 1.4 (ref 1.1–2.2)
ALP SERPL-CCNC: 56 U/L (ref 45–117)
ALP SERPL-CCNC: 57 U/L (ref 45–117)
ALT SERPL-CCNC: 13 U/L (ref 12–78)
ALT SERPL-CCNC: 13 U/L (ref 12–78)
ANION GAP SERPL CALC-SCNC: 10 MMOL/L (ref 2–12)
ANION GAP SERPL CALC-SCNC: 7 MMOL/L (ref 2–12)
ANION GAP SERPL CALC-SCNC: 9 MMOL/L (ref 2–12)
ANION GAP SERPL CALC-SCNC: 9 MMOL/L (ref 2–12)
AST SERPL-CCNC: 88 U/L (ref 15–37)
AST SERPL-CCNC: 91 U/L (ref 15–37)
BASE DEFICIT BLD-SCNC: 1.6 MMOL/L
BASE DEFICIT BLD-SCNC: 2.1 MMOL/L
BASE DEFICIT BLD-SCNC: 2.4 MMOL/L
BDY SITE: ABNORMAL
BILIRUB DIRECT SERPL-MCNC: 0.1 MG/DL (ref 0–0.2)
BILIRUB SERPL-MCNC: 0.3 MG/DL (ref 0.2–1)
BILIRUB SERPL-MCNC: 0.3 MG/DL (ref 0.2–1)
BUN SERPL-MCNC: 53 MG/DL (ref 6–20)
BUN SERPL-MCNC: 55 MG/DL (ref 6–20)
BUN SERPL-MCNC: 57 MG/DL (ref 6–20)
BUN SERPL-MCNC: 61 MG/DL (ref 6–20)
BUN/CREAT SERPL: 17 (ref 12–20)
BUN/CREAT SERPL: 17 (ref 12–20)
BUN/CREAT SERPL: 19 (ref 12–20)
BUN/CREAT SERPL: 20 (ref 12–20)
CA-I BLD-SCNC: 1.3 MMOL/L (ref 1.12–1.32)
CA-I BLD-SCNC: 1.34 MMOL/L (ref 1.12–1.32)
CALCIUM SERPL-MCNC: 9.1 MG/DL (ref 8.5–10.1)
CALCIUM SERPL-MCNC: 9.2 MG/DL (ref 8.5–10.1)
CALCIUM SERPL-MCNC: 9.5 MG/DL (ref 8.5–10.1)
CALCIUM SERPL-MCNC: 9.7 MG/DL (ref 8.5–10.1)
CHLORIDE SERPL-SCNC: 110 MMOL/L (ref 97–108)
CHLORIDE SERPL-SCNC: 110 MMOL/L (ref 97–108)
CHLORIDE SERPL-SCNC: 112 MMOL/L (ref 97–108)
CHLORIDE SERPL-SCNC: 113 MMOL/L (ref 97–108)
CO2 SERPL-SCNC: 19 MMOL/L (ref 21–32)
CO2 SERPL-SCNC: 20 MMOL/L (ref 21–32)
CO2 SERPL-SCNC: 22 MMOL/L (ref 21–32)
CO2 SERPL-SCNC: 22 MMOL/L (ref 21–32)
CREAT SERPL-MCNC: 2.7 MG/DL (ref 0.55–1.02)
CREAT SERPL-MCNC: 2.74 MG/DL (ref 0.55–1.02)
CREAT SERPL-MCNC: 3.27 MG/DL (ref 0.55–1.02)
CREAT SERPL-MCNC: 3.67 MG/DL (ref 0.55–1.02)
EKG ATRIAL RATE: 53 BPM
EKG ATRIAL RATE: 83 BPM
EKG DIAGNOSIS: NORMAL
EKG DIAGNOSIS: NORMAL
EKG P AXIS: 65 DEGREES
EKG P AXIS: 67 DEGREES
EKG P-R INTERVAL: 176 MS
EKG P-R INTERVAL: 208 MS
EKG Q-T INTERVAL: 380 MS
EKG Q-T INTERVAL: 508 MS
EKG QRS DURATION: 78 MS
EKG QRS DURATION: 84 MS
EKG QTC CALCULATION (BAZETT): 446 MS
EKG QTC CALCULATION (BAZETT): 476 MS
EKG R AXIS: 25 DEGREES
EKG R AXIS: 50 DEGREES
EKG T AXIS: -35 DEGREES
EKG T AXIS: -9 DEGREES
EKG VENTRICULAR RATE: 53 BPM
EKG VENTRICULAR RATE: 83 BPM
ERYTHROCYTE [DISTWIDTH] IN BLOOD BY AUTOMATED COUNT: 12.7 % (ref 11.5–14.5)
GLOBULIN SER CALC-MCNC: 2.4 G/DL (ref 2–4)
GLOBULIN SER CALC-MCNC: 2.4 G/DL (ref 2–4)
GLUCOSE BLD STRIP.AUTO-MCNC: 109 MG/DL (ref 65–117)
GLUCOSE BLD STRIP.AUTO-MCNC: 114 MG/DL (ref 65–117)
GLUCOSE BLD STRIP.AUTO-MCNC: 115 MG/DL (ref 65–117)
GLUCOSE BLD STRIP.AUTO-MCNC: 116 MG/DL (ref 65–117)
GLUCOSE BLD STRIP.AUTO-MCNC: 117 MG/DL (ref 65–117)
GLUCOSE BLD STRIP.AUTO-MCNC: 117 MG/DL (ref 65–117)
GLUCOSE BLD STRIP.AUTO-MCNC: 119 MG/DL (ref 65–117)
GLUCOSE BLD STRIP.AUTO-MCNC: 122 MG/DL (ref 65–117)
GLUCOSE BLD STRIP.AUTO-MCNC: 125 MG/DL (ref 65–117)
GLUCOSE BLD STRIP.AUTO-MCNC: 126 MG/DL (ref 65–117)
GLUCOSE BLD STRIP.AUTO-MCNC: 127 MG/DL (ref 65–117)
GLUCOSE BLD STRIP.AUTO-MCNC: 127 MG/DL (ref 65–117)
GLUCOSE BLD STRIP.AUTO-MCNC: 130 MG/DL (ref 65–117)
GLUCOSE BLD STRIP.AUTO-MCNC: 132 MG/DL (ref 65–117)
GLUCOSE BLD STRIP.AUTO-MCNC: 143 MG/DL (ref 65–117)
GLUCOSE BLD STRIP.AUTO-MCNC: 145 MG/DL (ref 65–117)
GLUCOSE BLD STRIP.AUTO-MCNC: 151 MG/DL (ref 65–117)
GLUCOSE BLD STRIP.AUTO-MCNC: 156 MG/DL (ref 65–117)
GLUCOSE SERPL-MCNC: 130 MG/DL (ref 65–100)
GLUCOSE SERPL-MCNC: 149 MG/DL (ref 65–100)
GLUCOSE SERPL-MCNC: 153 MG/DL (ref 65–100)
GLUCOSE SERPL-MCNC: 159 MG/DL (ref 65–100)
HCO3 BLD-SCNC: 21.7 MMOL/L (ref 21–28)
HCO3 BLD-SCNC: 22.2 MMOL/L (ref 21–28)
HCO3 BLD-SCNC: 22.8 MMOL/L (ref 21–28)
HCT VFR BLD AUTO: 36.6 % (ref 35–47)
HGB BLD-MCNC: 11.9 G/DL (ref 11.5–16)
HGB BLD-MCNC: 12.7 G/DL (ref 11.5–16)
LACTATE SERPL-SCNC: 1.2 MMOL/L (ref 0.4–2)
LACTATE SERPL-SCNC: 1.8 MMOL/L (ref 0.4–2)
MAGNESIUM SERPL-MCNC: 2.5 MG/DL (ref 1.6–2.4)
MCH RBC QN AUTO: 29.5 PG (ref 26–34)
MCHC RBC AUTO-ENTMCNC: 32.5 G/DL (ref 30–36.5)
MCV RBC AUTO: 90.8 FL (ref 80–99)
NRBC # BLD: 0 K/UL (ref 0–0.01)
NRBC BLD-RTO: 0 PER 100 WBC
PCO2 BLD: 32.9 MMHG (ref 35–48)
PCO2 BLD: 36.4 MMHG (ref 35–48)
PCO2 BLD: 36.8 MMHG (ref 35–48)
PH BLD: 7.39 (ref 7.35–7.45)
PH BLD: 7.41 (ref 7.35–7.45)
PH BLD: 7.43 (ref 7.35–7.45)
PHOSPHATE SERPL-MCNC: 3.8 MG/DL (ref 2.6–4.7)
PLATELET # BLD AUTO: 143 K/UL (ref 150–400)
PMV BLD AUTO: 10.2 FL (ref 8.9–12.9)
PO2 BLD: 104 MMHG (ref 83–108)
PO2 BLD: 127 MMHG (ref 83–108)
PO2 BLD: 133 MMHG (ref 83–108)
POTASSIUM SERPL-SCNC: 5 MMOL/L (ref 3.5–5.1)
POTASSIUM SERPL-SCNC: 5.4 MMOL/L (ref 3.5–5.1)
POTASSIUM SERPL-SCNC: 5.5 MMOL/L (ref 3.5–5.1)
POTASSIUM SERPL-SCNC: 5.7 MMOL/L (ref 3.5–5.1)
PROT SERPL-MCNC: 5.7 G/DL (ref 6.4–8.2)
PROT SERPL-MCNC: 5.8 G/DL (ref 6.4–8.2)
RBC # BLD AUTO: 4.03 M/UL (ref 3.8–5.2)
SAO2 % BLD: 98.2 % (ref 92–97)
SAO2 % BLD: 98.9 % (ref 92–97)
SAO2 % BLD: 99.1 % (ref 92–97)
SAO2 % BLDMV: 55 % (ref 65–88)
SAO2 % BLDMV: 60 % (ref 65–88)
SERVICE CMNT-IMP: ABNORMAL
SERVICE CMNT-IMP: NORMAL
SODIUM SERPL-SCNC: 139 MMOL/L (ref 136–145)
SODIUM SERPL-SCNC: 141 MMOL/L (ref 136–145)
SODIUM SERPL-SCNC: 141 MMOL/L (ref 136–145)
SODIUM SERPL-SCNC: 142 MMOL/L (ref 136–145)
SPECIMEN TYPE: ABNORMAL
WBC # BLD AUTO: 16.1 K/UL (ref 3.6–11)

## 2025-05-20 PROCEDURE — 6360000002 HC RX W HCPCS: Performed by: STUDENT IN AN ORGANIZED HEALTH CARE EDUCATION/TRAINING PROGRAM

## 2025-05-20 PROCEDURE — 74018 RADEX ABDOMEN 1 VIEW: CPT

## 2025-05-20 PROCEDURE — 6370000000 HC RX 637 (ALT 250 FOR IP): Performed by: PHYSICIAN ASSISTANT

## 2025-05-20 PROCEDURE — 2580000003 HC RX 258: Performed by: INTERNAL MEDICINE

## 2025-05-20 PROCEDURE — 6370000000 HC RX 637 (ALT 250 FOR IP): Performed by: INTERNAL MEDICINE

## 2025-05-20 PROCEDURE — 2500000003 HC RX 250 WO HCPCS: Performed by: STUDENT IN AN ORGANIZED HEALTH CARE EDUCATION/TRAINING PROGRAM

## 2025-05-20 PROCEDURE — 99221 1ST HOSP IP/OBS SF/LOW 40: CPT

## 2025-05-20 PROCEDURE — 2500000003 HC RX 250 WO HCPCS: Performed by: INTERNAL MEDICINE

## 2025-05-20 PROCEDURE — 2500000003 HC RX 250 WO HCPCS: Performed by: PHYSICIAN ASSISTANT

## 2025-05-20 PROCEDURE — 2000000000 HC ICU R&B

## 2025-05-20 PROCEDURE — 2580000003 HC RX 258: Performed by: STUDENT IN AN ORGANIZED HEALTH CARE EDUCATION/TRAINING PROGRAM

## 2025-05-20 PROCEDURE — 83605 ASSAY OF LACTIC ACID: CPT

## 2025-05-20 PROCEDURE — 93005 ELECTROCARDIOGRAM TRACING: CPT | Performed by: PHYSICIAN ASSISTANT

## 2025-05-20 PROCEDURE — 80048 BASIC METABOLIC PNL TOTAL CA: CPT

## 2025-05-20 PROCEDURE — 76770 US EXAM ABDO BACK WALL COMP: CPT

## 2025-05-20 PROCEDURE — 6360000002 HC RX W HCPCS: Performed by: THORACIC SURGERY (CARDIOTHORACIC VASCULAR SURGERY)

## 2025-05-20 PROCEDURE — 6360000002 HC RX W HCPCS: Performed by: INTERNAL MEDICINE

## 2025-05-20 PROCEDURE — 6370000000 HC RX 637 (ALT 250 FOR IP): Performed by: STUDENT IN AN ORGANIZED HEALTH CARE EDUCATION/TRAINING PROGRAM

## 2025-05-20 PROCEDURE — 82962 GLUCOSE BLOOD TEST: CPT

## 2025-05-20 PROCEDURE — 85027 COMPLETE CBC AUTOMATED: CPT

## 2025-05-20 PROCEDURE — 2580000003 HC RX 258: Performed by: PHYSICIAN ASSISTANT

## 2025-05-20 PROCEDURE — 80076 HEPATIC FUNCTION PANEL: CPT

## 2025-05-20 PROCEDURE — 94003 VENT MGMT INPAT SUBQ DAY: CPT

## 2025-05-20 PROCEDURE — 71045 X-RAY EXAM CHEST 1 VIEW: CPT

## 2025-05-20 PROCEDURE — 80053 COMPREHEN METABOLIC PANEL: CPT

## 2025-05-20 PROCEDURE — P9045 ALBUMIN (HUMAN), 5%, 250 ML: HCPCS | Performed by: PHYSICIAN ASSISTANT

## 2025-05-20 PROCEDURE — 6360000002 HC RX W HCPCS: Performed by: PHYSICIAN ASSISTANT

## 2025-05-20 PROCEDURE — 83735 ASSAY OF MAGNESIUM: CPT

## 2025-05-20 PROCEDURE — 80069 RENAL FUNCTION PANEL: CPT

## 2025-05-20 PROCEDURE — 2580000003 HC RX 258: Performed by: THORACIC SURGERY (CARDIOTHORACIC VASCULAR SURGERY)

## 2025-05-20 RX ORDER — INDOMETHACIN 25 MG/1
50 CAPSULE ORAL ONCE
Status: COMPLETED | OUTPATIENT
Start: 2025-05-20 | End: 2025-05-20

## 2025-05-20 RX ORDER — DOBUTAMINE HYDROCHLORIDE 200 MG/100ML
2.5-1 INJECTION INTRAVENOUS CONTINUOUS
Status: DISCONTINUED | OUTPATIENT
Start: 2025-05-20 | End: 2025-05-23

## 2025-05-20 RX ORDER — FAMOTIDINE 20 MG/1
20 TABLET, FILM COATED ORAL DAILY
Status: DISPENSED | OUTPATIENT
Start: 2025-05-21 | End: 2025-05-25

## 2025-05-20 RX ORDER — DEXTROSE MONOHYDRATE 100 MG/ML
INJECTION, SOLUTION INTRAVENOUS CONTINUOUS PRN
Status: DISCONTINUED | OUTPATIENT
Start: 2025-05-20 | End: 2025-05-30 | Stop reason: HOSPADM

## 2025-05-20 RX ORDER — CETIRIZINE HYDROCHLORIDE 10 MG/1
10 TABLET ORAL 2 TIMES DAILY
Status: DISCONTINUED | OUTPATIENT
Start: 2025-05-20 | End: 2025-05-22

## 2025-05-20 RX ORDER — SODIUM CHLORIDE 9 MG/ML
INJECTION, SOLUTION INTRAVENOUS CONTINUOUS
Status: DISPENSED | OUTPATIENT
Start: 2025-05-20 | End: 2025-05-20

## 2025-05-20 RX ORDER — FUROSEMIDE 10 MG/ML
80 INJECTION INTRAMUSCULAR; INTRAVENOUS ONCE
Status: COMPLETED | OUTPATIENT
Start: 2025-05-20 | End: 2025-05-20

## 2025-05-20 RX ORDER — HEPARIN SODIUM 5000 [USP'U]/ML
5000 INJECTION, SOLUTION INTRAVENOUS; SUBCUTANEOUS EVERY 8 HOURS SCHEDULED
Status: DISCONTINUED | OUTPATIENT
Start: 2025-05-20 | End: 2025-05-27

## 2025-05-20 RX ORDER — SODIUM CHLORIDE, SODIUM LACTATE, POTASSIUM CHLORIDE, AND CALCIUM CHLORIDE .6; .31; .03; .02 G/100ML; G/100ML; G/100ML; G/100ML
500 INJECTION, SOLUTION INTRAVENOUS ONCE
Status: COMPLETED | OUTPATIENT
Start: 2025-05-20 | End: 2025-05-20

## 2025-05-20 RX ORDER — ALBUMIN HUMAN 50 G/1000ML
12.5 SOLUTION INTRAVENOUS ONCE
Status: COMPLETED | OUTPATIENT
Start: 2025-05-20 | End: 2025-05-20

## 2025-05-20 RX ORDER — ASPIRIN 81 MG/1
81 TABLET, CHEWABLE ORAL DAILY
Status: DISCONTINUED | OUTPATIENT
Start: 2025-05-20 | End: 2025-05-30 | Stop reason: HOSPADM

## 2025-05-20 RX ORDER — CALCIUM GLUCONATE 20 MG/ML
2000 INJECTION, SOLUTION INTRAVENOUS ONCE
Status: COMPLETED | OUTPATIENT
Start: 2025-05-20 | End: 2025-05-20

## 2025-05-20 RX ADMIN — HYDROMORPHONE HYDROCHLORIDE 0.5 MG: 1 INJECTION, SOLUTION INTRAMUSCULAR; INTRAVENOUS; SUBCUTANEOUS at 21:27

## 2025-05-20 RX ADMIN — SERTRALINE HYDROCHLORIDE 100 MG: 50 TABLET ORAL at 13:47

## 2025-05-20 RX ADMIN — MUPIROCIN: 20 OINTMENT TOPICAL at 21:18

## 2025-05-20 RX ADMIN — SODIUM CHLORIDE: 0.9 INJECTION, SOLUTION INTRAVENOUS at 10:25

## 2025-05-20 RX ADMIN — CHLORHEXIDINE GLUCONATE 15 ML: 1.2 RINSE ORAL at 08:26

## 2025-05-20 RX ADMIN — GABAPENTIN 200 MG: 100 CAPSULE ORAL at 21:16

## 2025-05-20 RX ADMIN — CALCIUM GLUCONATE 2000 MG: 20 INJECTION, SOLUTION INTRAVENOUS at 13:17

## 2025-05-20 RX ADMIN — SENNOSIDES AND DOCUSATE SODIUM 1 TABLET: 8.6; 5 TABLET ORAL at 21:16

## 2025-05-20 RX ADMIN — DEXMEDETOMIDINE HYDROCHLORIDE 0.5 MCG/KG/HR: 4 INJECTION, SOLUTION INTRAVENOUS at 05:52

## 2025-05-20 RX ADMIN — CEFAZOLIN 3000 MG: 3 INJECTION, POWDER, FOR SOLUTION INTRAVENOUS at 12:45

## 2025-05-20 RX ADMIN — DEXTROSE 250 ML: 10 SOLUTION INTRAVENOUS at 13:12

## 2025-05-20 RX ADMIN — HEPARIN SODIUM 5000 UNITS: 5000 INJECTION INTRAVENOUS; SUBCUTANEOUS at 21:30

## 2025-05-20 RX ADMIN — CEFAZOLIN 3000 MG: 3 INJECTION, POWDER, FOR SOLUTION INTRAVENOUS at 21:22

## 2025-05-20 RX ADMIN — ACETAMINOPHEN 1000 MG: 500 TABLET ORAL at 15:02

## 2025-05-20 RX ADMIN — SODIUM BICARBONATE 50 MEQ: 84 INJECTION, SOLUTION INTRAVENOUS at 13:19

## 2025-05-20 RX ADMIN — SODIUM CHLORIDE 4.6 UNITS/HR: 9 INJECTION, SOLUTION INTRAVENOUS at 19:54

## 2025-05-20 RX ADMIN — ASPIRIN 81 MG CHEWABLE TABLET 81 MG: 81 TABLET CHEWABLE at 13:48

## 2025-05-20 RX ADMIN — ATORVASTATIN CALCIUM 40 MG: 40 TABLET, FILM COATED ORAL at 21:16

## 2025-05-20 RX ADMIN — SODIUM CHLORIDE, PRESERVATIVE FREE 10 ML: 5 INJECTION INTRAVENOUS at 21:18

## 2025-05-20 RX ADMIN — OXYCODONE 10 MG: 5 TABLET ORAL at 18:08

## 2025-05-20 RX ADMIN — DOBUTAMINE HYDROCHLORIDE 2 MCG/KG/MIN: 200 INJECTION INTRAVENOUS at 22:38

## 2025-05-20 RX ADMIN — ACETAMINOPHEN 1000 MG: 500 TABLET ORAL at 05:05

## 2025-05-20 RX ADMIN — ACETAMINOPHEN 1000 MG: 500 TABLET ORAL at 21:17

## 2025-05-20 RX ADMIN — METHYLPREDNISOLONE SODIUM SUCCINATE 40 MG: 40 INJECTION, POWDER, LYOPHILIZED, FOR SOLUTION INTRAMUSCULAR; INTRAVENOUS at 21:16

## 2025-05-20 RX ADMIN — PROPOFOL 10 MCG/KG/MIN: 10 INJECTION, EMULSION INTRAVENOUS at 22:36

## 2025-05-20 RX ADMIN — METHYLPREDNISOLONE SODIUM SUCCINATE 40 MG: 40 INJECTION, POWDER, LYOPHILIZED, FOR SOLUTION INTRAMUSCULAR; INTRAVENOUS at 08:58

## 2025-05-20 RX ADMIN — DOBUTAMINE HYDROCHLORIDE 2.5 MCG/KG/MIN: 200 INJECTION INTRAVENOUS at 00:32

## 2025-05-20 RX ADMIN — Medication 400 MG: at 21:17

## 2025-05-20 RX ADMIN — MUPIROCIN: 20 OINTMENT TOPICAL at 08:59

## 2025-05-20 RX ADMIN — OXYCODONE 10 MG: 5 TABLET ORAL at 13:54

## 2025-05-20 RX ADMIN — FUROSEMIDE 80 MG: 10 INJECTION, SOLUTION INTRAMUSCULAR; INTRAVENOUS at 13:32

## 2025-05-20 RX ADMIN — SODIUM CHLORIDE, PRESERVATIVE FREE 10 ML: 5 INJECTION INTRAVENOUS at 08:19

## 2025-05-20 RX ADMIN — CETIRIZINE HYDROCHLORIDE 10 MG: 10 TABLET, FILM COATED ORAL at 21:17

## 2025-05-20 RX ADMIN — FAMOTIDINE 20 MG: 10 INJECTION, SOLUTION INTRAVENOUS at 05:05

## 2025-05-20 RX ADMIN — HEPARIN SODIUM 5000 UNITS: 5000 INJECTION INTRAVENOUS; SUBCUTANEOUS at 13:43

## 2025-05-20 RX ADMIN — AMIODARONE HYDROCHLORIDE 400 MG: 200 TABLET ORAL at 21:16

## 2025-05-20 RX ADMIN — INSULIN HUMAN 10 UNITS: 100 INJECTION, SOLUTION PARENTERAL at 13:12

## 2025-05-20 RX ADMIN — PROPOFOL 30 MCG/KG/MIN: 10 INJECTION, EMULSION INTRAVENOUS at 05:51

## 2025-05-20 RX ADMIN — EPINEPHRINE 5 MCG/MIN: 1 INJECTION INTRAMUSCULAR; INTRAVENOUS; SUBCUTANEOUS at 22:36

## 2025-05-20 RX ADMIN — HYDROMORPHONE HYDROCHLORIDE 0.5 MG: 1 INJECTION, SOLUTION INTRAMUSCULAR; INTRAVENOUS; SUBCUTANEOUS at 14:48

## 2025-05-20 RX ADMIN — ALBUMIN (HUMAN) 12.5 G: 12.5 INJECTION, SOLUTION INTRAVENOUS at 09:24

## 2025-05-20 RX ADMIN — POLYETHYLENE GLYCOL 3350 17 G: 17 POWDER, FOR SOLUTION ORAL at 13:48

## 2025-05-20 RX ADMIN — CHLORHEXIDINE GLUCONATE 15 ML: 1.2 RINSE ORAL at 21:17

## 2025-05-20 RX ADMIN — CEFAZOLIN 3000 MG: 3 INJECTION, POWDER, FOR SOLUTION INTRAVENOUS at 05:31

## 2025-05-20 RX ADMIN — SODIUM CHLORIDE, SODIUM LACTATE, POTASSIUM CHLORIDE, AND CALCIUM CHLORIDE 500 ML: .6; .31; .03; .02 INJECTION, SOLUTION INTRAVENOUS at 04:21

## 2025-05-20 ASSESSMENT — PULMONARY FUNCTION TESTS
PIF_VALUE: 23
PIF_VALUE: 45
PIF_VALUE: 26
PIF_VALUE: 25
PIF_VALUE: 22
PIF_VALUE: 27
PIF_VALUE: 24

## 2025-05-20 ASSESSMENT — PAIN SCALES - GENERAL
PAINLEVEL_OUTOF10: 0

## 2025-05-20 NOTE — OP NOTE
[address]  [address]                            OPERATIVE REPORT      PATIENT NAME: JEROD MARROQUIN             : 1960  MED REC NO: [id number]                       ROOM: 4338  ACCOUNT NO: [id number]                       ADMIT DATE: 2025  PROVIDER: Loyd Bradley MD    DATE OF SERVICE:  2025    PREOPERATIVE DIAGNOSES:       1. Aortic aneurysm and aortic root aneurysm.     2. Moderate-to-severe aortic insufficiency.     3. Hypertension.     4. Chronic obstructive pulmonary disease.     5. Chronic kidney disease, stage 3.     6. Obesity with a BMI of 45.     7. Depression.    POSTOPERATIVE DIAGNOSES:       1. Aortic aneurysm and aortic root aneurysm.     2. Moderate-to-severe aortic insufficiency.     3. Hypertension.     4. Chronic obstructive pulmonary disease.     5. Chronic kidney disease, stage 3.     6. Obesity with a BMI of 45.     7. Depression.    PROCEDURES PERFORMED:       1. Aortic root replacement with #25 Marroquin KONECT valved conduit.     2. Insertion of percutaneous femoral A-line.    SURGEON:  Loyd Bradley MD    ASSISTANT:  Kai Hester PA-C    ANESTHESIA:  General endotracheal.    ESTIMATED BLOOD LOSS:  500 mL.    SPECIMENS REMOVED:  None.    INTRAOPERATIVE FINDINGS:       COMPLICATIONS:  Severe hypoxia postoperatively requiring escalated ventilatory support.    IMPLANTS:  #25 Marroquin KONECT valved conduit.    INDICATIONS:  The patient is a 64-year-old woman who was referred by Dr. Mo for an aortic root aneurysm as well as severe aortic insufficiency.    DESCRIPTION OF PROCEDURE:  She was prepped and draped in a sterile fashion.  Time-out was performed.  Incision was made over the sternum and median sternotomy was performed.  Sternal retractor was placed.  Heparin was administered for cardiopulmonary bypass.  The pericardium was opened widely and laterally.  Ascending aorta was inspected with my

## 2025-05-20 NOTE — PROGRESS NOTES
Spiritual Health History and Assessment/Progress Note  [facility]    Attempted Encounter, Post-Procedural,  ,  ,      Name: Evelio Marroquin MRN: [id number]    Age: 64 y.o.     Sex: female   Language: English   Druze: Gnosticism   Aneurysm of ascending aorta     Date: 5/20/2025            Total Time Calculated: 5 min              Spiritual Assessment began in [facility] 4 CV INTNSV CARE        Referral/Consult From: Rounding   Encounter Overview/Reason: Attempted Encounter, Post-Procedural  Service Provided For: Patient    Mildred, Belief, Meaning:   Patient unable to assess at this time  Family/Friends No family/friends present      Importance and Influence:  Patient unable to assess at this time  Family/Friends No family/friends present    Community:  Patient feels well-supported. Support system includes: Unknown  Family/Friends No family/friends present    Assessment and Plan of Care:     Patient Interventions include: Other: Patient appeared to be sleeping  Family/Friends Interventions include: No family/friends present    Patient Plan of Care: Spiritual Care available upon further referral  Family/Friends Plan of Care: No family/friends present    Electronically signed by Michael Ronquillo Chaplain Resident, M.Div., on 5/20/2025 at 10:56 AM

## 2025-05-20 NOTE — CONSULTS
NEPHROLOGY CONSULT NOTE     Patient: Evelio Marroquin MRN: [id number]  PCP: Yesiac Askew APRN - NP   :     1960  Age:   64 y.o.  Sex:  female      Referring physician: Loyd Bradley MD  Reason for consultation: 64 y.o. female with Aneurysm of ascending aorta [I71.21]  Severe aortic insufficiency [I35.1] complicated by CAIO   Admission Date: 2025  5:21 AM  LOS: 1 day      ASSESSMENT and PLAN :   CAIO:  - 2/2 ATN from shock/hypotension  - renal U/S ordered  - supportive care with pressors  - k shifting agents given  - repeat labs this afternoon, if no improvement, will need CRRT  - consented patient's family, risks and benefits explained    Hyperkalemia:  - insulin, ca, dextrose now  - repeat labs    Hypotension/shock:  - on epi, levo, dobutamine  - wean as able    Ascending aortic aneurysm:  - s/p aortic root replacement   - per CTS    Angioedema/resp failure:  - unclear cause  - stable oxygenation on the vent    CKD 3a:  - baseline Cr 1.1 to 1.2    HFrEF:  - EF dropped to 30%, RV dysfunction on post op ECHO  - on dobutamine    COPD  Obesity  Depression     Active Problems / Assessment AAActive  :   Principal Problem:    Aneurysm of ascending aorta  Active Problems:    Severe aortic insufficiency    S/P ascending aortic replacement    S/P AVR    Transient hyperglycemia post procedure  Resolved Problems:    * No resolved hospital problems. *       Subjective:   HPI: Evelio Marroquin is a 64 y.o.  female who has been admitted to the hospital for an elective repair of ascending aortic aneurysm.  She has a hx of CKD 3a, baseline Cr 1.1 to 1.2, f/b me.  She has obesity, COPD, HFpEF, CAD.  She underwent aortic root replacement on .  Her post op course was c/b shock, angioedema and airway compromise.  She is on the vent, on 3 pressors.  Now with CAIO.   Cr 3.3, K 5.5.  oliguric at 20cc/hr.  Remains sedated on the vent.      Past Medical Hx:   Past Medical History:   Diagnosis Date

## 2025-05-20 NOTE — CARE COORDINATION
Care Management Initial Assessment       RUR: 18%  Readmission? No  1st IM letter given? No-NA  1st  letter given: No-NA   Admission: Aneurysm of ascending ao* Aneurysm of ascending aorta, Severe aortic insufficiency     Patient sedated and on vent w 3 pressors, dobutamine and may need CRRT.  Chart review for initial assessment and it appears patient lives w her husb in 1 level home.  CM will follow up w patient and family once medically appropriate.     05/20/25 4728   Service Assessment   Patient Orientation Unable to Assess   History Provided By Medical Record   Primary Caregiver Self   Support Systems Spouse/Significant Other   Patient's Healthcare Decision Maker is: Legal Next of Kin   PCP Verified by CM Yes  (Yesica Askew, APRN)   Last Visit to PCP Within last 3 months   Can patient return to prior living arrangement Unknown at present   Would you like for me to discuss the discharge plan with any other family members/significant others, and if so, who? Yes  ( Darius Marroquin [phone])   Social/Functional History   Lives With Spouse   Type of Home House   Home Layout One level     REX Arroyo CCM  Care Management   Available on [employer] or [phone]

## 2025-05-20 NOTE — PROGRESS NOTES
4 Eyes Skin Assessment     NAME:  Evelio Marroquin  YOB: 1960  MEDICAL RECORD NUMBER:  [id number]    The patient is being assessed for  Post-Op Surgical    I agree that at least one RN has performed a thorough Head to Toe Skin Assessment on the patient. ALL assessment sites listed below have been assessed.      Areas assessed by both nurses:    Head, Face, Ears, Shoulders, Back, Chest, Arms, Elbows, Hands, Sacrum. Buttock, Coccyx, Ischium, Legs. Feet and Heels, and Under Medical Devices         Does the Patient have a Wound? No noted wound(s)       Michael Prevention initiated by RN: Yes  Wound Care Orders initiated by RN: No    Pressure Injury (Stage 3,4, Unstageable, DTI, NWPT, and Complex wounds) if present, place Wound referral order by RN under : No    New Ostomies, if present place, Ostomy referral order under : No     Nurse 1 eSignature: Electronically signed by Mikey Barahona RN on 5/19/25 at 9:08 PM EDT    **SHARE this note so that the co-signing nurse can place an eSignature**    Nurse 2 eSignature: Electronically signed by Alka Jennings RN on 5/19/25 at 9:20 PM EDT

## 2025-05-20 NOTE — PROGRESS NOTES
I participated in Interdisciplinary Rounds on the CVICU unit where patient care was discussed.    The Interdisciplinary team is aware of  availability and were encouraged to request Spiritual Health support as needed.      The  on-call can be reached at [phone]).     Rev. Michael Ronquillo MDiv  Chaplain Resident

## 2025-05-20 NOTE — PROGRESS NOTES
Occupational Therapy    Chart reviewed, discussed with RN who reports no plans to extubate yet 2/2 oropharyngeal swelling. Will hold and follow up as medically appropriate.     Trini Souza MS, OTR/L

## 2025-05-20 NOTE — PROGRESS NOTES
2000- Bedside shift change report given to LEYLA Da Silva (oncoming nurse) by Alka/LEYLA Jensen (offgoing nurse). Report included the following information Nurse Handoff Report, Index, Adult Overview, Surgery Report, Intake/Output, MAR, Recent Results, Med Rec Status, Cardiac Rhythm A paced, and Alarm Parameters.     2100- 250mL albumin given.     2130- Cardene started. SBP >130, MAPs 100.    2330- Sedation paused. Neuro check performed. Pt following commands, moving extremities x4.    0000- 250mL albumin given.    0010- Kenroy calculated, CI 1.6, matching swan. Dr. Martinez updated, epi to 5 and dobutamine added.    0130- Cardene off. Unable to titrate dobutamine above 3 d/t increased ectopy. Dr. Martinez updated.    0400- 500 LR bolus given.    0530- Levo started for low MAPs.    0800- Bedside shift change report given to LEYLA Ling (oncoming nurse) by LEYLA Da Silva (offgoing nurse). Report included the following information Nurse Handoff Report, Index, Adult Overview, Surgery Report, Intake/Output, MAR, Recent Results, Med Rec Status, and Cardiac Rhythm NSR .

## 2025-05-20 NOTE — CONSULTS
[facility]  DIABETES MANAGEMENT CONSULT    Consulted by Provider - Cardiac surgery team for advanced nursing evaluation and care for inpatient blood glucose management.    Evaluation and Action Plan   Evelio Marroquin is a 65 yo female with HTN, HLD, COPD, CKD3, HFpEF with no history of DM who presented for scheduled aortic aneurysm repair. She underwent aortic aneurysm repair on 5/19 with possible anaphylactic episode unsure of trigger that resulted in acidosis and airway swelling. She remained intubated post op. Insulin infusion was started post op for glycemic control. Intensivist is recommending TF if she remains intubated greater than 24 hours. Will follow notes for recs from nutrition in event TF are started.     24 hour insulin needs were minimal at 16.2 units despite steroid dosing of Methylprednisolone 40 mg q12hrs IV. Will continue insulin infusion for another 24 hours, will assess 24 hour needs and overall clinical status before determining insulin infusion transition dosing. Collaborated with Cardiac surgery NP - Antonieta Donaldson, in agreement to continue insulin infusion and anticipates she will likely be started on TF and have long term ETT given current airway swelling that has not resolved yet.      Blood glucose pattern      Significant diabetes-related events over the past 24-72 hours  A1C 5.1%   On insulin infusion post op for glycemic control  Total daily insulin dose in the last 24 hours: 16.2 units       Management Rationale Action Plan   Medication   Basal needs Using  units/kg/D based on  Continue insulin infusion for another 24 hours    Nutritional needs Covers carb load in meals 1-20 units for carb coverage while eating on insulin infusion    Corrective insulin Using medium dose sensitivity  Q1hr    Overriding steroid effect Using  units/kg/D based on  Will use insulin infusion to cover steroid hyperglycemia    Additional orders  Carb consistent diet (60g CHO/meal)  41* 46*  --  53*  55*   CREATININE 1.73* 2.00* 1.7* 2.74*  2.70*     Lab Results   Component Value Date    ALT 13 05/20/2025    ALT 13 05/20/2025    AST 91 (H) 05/20/2025    AST 88 (H) 05/20/2025    ALKPHOS 56 05/20/2025    ALKPHOS 57 05/20/2025    BILITOT 0.3 05/20/2025    BILITOT 0.3 05/20/2025     Lab Results   Component Value Date    TSH 1.01 05/12/2025         Factors impacting BG management  Factor Dose Comments   Nutrition:  Standard meals   60 grams/meal    Drugs:  Vasopressor load  Steroids  Epogen  Blood transfusion(s)  Atypical antipsychotics   Vasopressors    Affects insulin delivery  Impairs insulin action  A1cs inaccurate  A1cs inaccurate  Weight gain increases insulin resistance   Pain PRN     Infection Ancef    Kidney function CAIO    Liver function Normal           Assessment and Nursing Intervention   Nursing Diagnosis Risk for unstable blood glucose pattern   Nursing Intervention Domain 5250 Decision-making Support   Nursing Interventions Examined current inpatient diabetes/blood glucose control   Explored factors facilitating and impeding inpatient management  Explored corrective strategies with patient and responsible inpatient provider   Informed patient of rational for insulin strategy while hospitalized     Nursing Diagnosis 21542 Ineffective Health Management   Nursing Intervention Domain 5250 Decision-making Support   Nursing Interventions Identified diabetes self-management practices impeding diabetes control  Discussed diabetes survival skills related to  Healthy Plate eating plan; given handouts  Role of physical activity in improving insulin sensitivity and action  Procedure for blood glucose monitoring & options for low-cost products  Medications plan at discharge     Billing Code(s)   [x] 59468 IP initial hospital care - 40 minutes   [] 59999 IP initial hospital care -55 minutes  [] 44851 IP initial hospital care -75 minutes  []        57834 IP subsequent hospital care - 50

## 2025-05-20 NOTE — PROGRESS NOTES
Cardiac Surgery ICU Progress Note    Admit Date: 2025  POD:  1 Day Post-Op    Procedure:  Procedure(s):  AORTIC ROOT REPLACEMENT, ECC, DEDRA AND FLEXIBLE BRONCHOSCOPY BY DR BEDOYA        Subjective:   Patient seen with Dr. Pompa this AM.  Sedated and ventilated.  Remains on pressors: 6 Epi, 10 Norepi, 3 dobutamine.  NSR 90s.  BP /50s.  Tmax overnight 37.8.     Objective:   Vitals:  Blood pressure (!) 87/66, pulse 98, temperature 98.1 °F (36.7 °C), temperature source Core, resp. rate 24, height 1.829 m (6'), weight (!) 150.1 kg (330 lb 14.6 oz), SpO2 96%, not currently breastfeeding.  Temp (24hrs), Av.8 °F (37.1 °C), Min:98.1 °F (36.7 °C), Max:100.1 °F (37.8 °C)        Infusions:  Epi 6 mcg/min  Norepi 10 mcg/min  Dobutamine 3 mcg/kg/min  Insulin    Dex 0.5 mcg/kg/hr  Propofol 20 mcg/kg/min     EKG/Rhythm:        Ventilator settings: AC/VC 50%, rate 20, tidal volume 550 ml, PEEP 8  AB.39/36.8/127/22.2/2.4    Extubation Date / Time: remains ventilated d/t significant airway swelling and moderate pressor requirements    CT Output: 250ml yesterday/ 400ml overnight (650 total)     CXR: Xray Result (most recent):  XR ABDOMEN (KUB) (SINGLE AP VIEW) 2025    Narrative  ABDOMINAL RADIOGRAPHS. 2025 8:59 AM    INDICATION: OG tube placement.    COMPARISON: None.    TECHNIQUE: AP supine  view/s of the abdomen centered around the diaphragms  excludes the bilateral flanks and the pelvis.    Impression  The tip of the OG tube terminates at the GE junction and should be  advanced. A pulmonary venous catheter and mediastinal and pleural drains are  partially imaged. Post aortic valve replacement.      Electronically signed by Saturnino Salinas     CXR  04:53  IMPRESSION:  1. Unchanged right paratracheal/hilar soft tissue density, again CT chest may be  considered for evaluation.  2.  Endotracheal tube 7 cm above the juliano could be advanced 1-2 cm. Other  life-support lines and tubes are

## 2025-05-20 NOTE — PROGRESS NOTES
Famotidine - Pharmacy Renal dose protocol:  Current regimen:  20 mg po/IV Q 12 hr     Recent Labs     05/19/25  1631 05/19/25  1957 05/19/25 2004 05/20/25  0423   CREATININE 1.73* 2.00* 1.7* 2.74*  2.70*   BUN 41* 46*  --  53*  55*       Estimated CrCl:  34 ml/min    Plan:   Change to 20 mg po/IV Q 24 hr with respect to creatinine clearance less than 50 mL/min per [facility]/[employer]-approved Renal Dosing Adjustment protocol.  Pharmacy will continue to monitor this patient daily for changes in clinical status and renal function.

## 2025-05-20 NOTE — PROGRESS NOTES
Cardiac Surgery Coordinator- Placed update call to Mr Marroquin, reviewed plan of care and encouraged him to verbalize. He is without questions at this time.

## 2025-05-20 NOTE — PROGRESS NOTES
CRITICAL CARE PROGRESS NOTE    Name: Evelio Marroquin   : 1960   MRN: [id number]   Date: 2025      ICU Diagnosis      Ascending Aortic Aneurysm   Vasoplegic Shock  Acute HFrEF  Morbid Obesity    Overnight Events   Persistent vasopressor requirement.     ROS negative except as otherwise documented.     A/P   This is a 64 year old female with history of HTN, HLD, COPD, CKD III and HFpEF who presented to the hospital for elective repair of ascending aortic aneurysm - operative course complicated by hypotension and angioedema.     NEUROLOGICAL:    - precedex for sedation; wean as tolerated  - continue propofol   - monitor neuro exam post sedation  - tylenol scheduled for pain   - dilaudid PRN severe pain   - gabapentin 300 TID  - continue home sertraline     PULMONOLOGY:   Intubated post OR. Continued oropharyngeal swelling. Absent cuff leak - barrier to extubation.    - SBT when able  - lung protective ventilation; wean vent as tolerated  - check cuff leak daily   - IS, OOB to chair when extubated      CARDIOVASCULAR:   On epi and levo for mild BiV dysfunction - I suspect that this is due to her acute OR event (acidosis). Possible anaphylactic reaction in the OR - unknown precipitant.   - goal MAP>=65  - continue epi  - continue levo; wean as tolerated  - continue dobutamine   - pacer wires per CTS  - miya per CTS  - CTM  - amiodarone PPX  - hold home BP medications; restart as tolerated     GASTROINTESTINAL:   Nutrition: NPO  -TF if remains intubated > 24 hours  Bowel regimen  -Colace 100mg po daily  -bisacodyl PRN  GI Px  -Pepcid 20mg IV Q12hr (D/c once taking po or TF at goal)                 RENAL/ELECTROLYTE:   CAIO - likely ATN due to pump time. Risk for progression given critical illness.   - goal K>=4, Mg>=2, Phos >3  - daily BMP  - strict I/O's; daily weights  - avoid nephrotoxic medications     ENDOCRINE:   - insulin gtt per protocol  - FSBS ACHS     ID/MICRO:   - no signs or symptoms of active

## 2025-05-20 NOTE — PROGRESS NOTES
0730: Bedside and Verbal shift change report given to LEYLA Ling (oncoming nurse) by LEYLA Da Silva (offgoing nurse). Report included the following information Nurse Handoff Report, Index, Intake/Output, MAR, and Recent Results.     0750: Cardiac surgery team rounding. RN to recheck labs at 10:30. Plan to wean sedation and trial spontaneous     0830: WENDI Fung updated about temp of 102.2. Ice packs applied with fan.    0832: KUB placed due to concerns about OGT placement.     0909: WENDI Fung updated about CVP of 3-5. Per NP give 250 albumin and RN to switch asprin to chewable so can be crushed    0955: MD Moy reviewed KUB. Per MD OGT advanced to 80cm. Repeat KUB placed    1010: Per WENDI Fung, RN to place patient on 75mls/hr of NS for low CVP    1040: ABG: PH 7.429 / CO 32.9 / .6 / HCO 21.7  Kenroy calculated. CI 2.4 matching SWAN     1100: Per WENDI Fung okay to wean dobut to 2.     1114: RT updated about PO2 of 103.6 per RT FIO2 decreased to 40%    1125: WENDI Fung updated about K of 5.5 and Creatinine of 3.27. plan to consult nephrology.     1151: Per radiology recommendation OGT advanced.     1156: Per MD Moy advance ETT by 2cm. RT updated     1200: Patient following commands in all extremities and nodding appropriately.     1220: MD Moy looked at KUB. Per MD pull OGT and replace.     1252: RT advanced ETT by 2cm.     1347: Per Radiology read. OGT in correct position.     1620: MD Moy decrease RR rate to 18. RT updated.     1714: MD Moy updated about low CVP of 4. Per MD no additional fluid at this time.     1730: Patient placed on spontaneous by RT. Patient taking small breaths with title volumes less than 100. Patient placed back on rate     1930: Bedside and Verbal shift change report given to LEYLA Da Silva (oncoming nurse) by LEYLA Ling (offgoing nurse). Report included the following information Nurse Handoff Report, Index, Intake/Output, MAR, and Recent Results.

## 2025-05-21 ENCOUNTER — RESULTS FOLLOW-UP (OUTPATIENT)
Age: 65
End: 2025-05-21

## 2025-05-21 ENCOUNTER — APPOINTMENT (OUTPATIENT)
Facility: HOSPITAL | Age: 65
DRG: 216 | End: 2025-05-21
Attending: THORACIC SURGERY (CARDIOTHORACIC VASCULAR SURGERY)
Payer: COMMERCIAL

## 2025-05-21 LAB
ALBUMIN SERPL-MCNC: 3 G/DL (ref 3.5–5)
ALBUMIN SERPL-MCNC: 3 G/DL (ref 3.5–5)
ALBUMIN/GLOB SERPL: 1.1 (ref 1.1–2.2)
ALP SERPL-CCNC: 46 U/L (ref 45–117)
ALT SERPL-CCNC: 7 U/L (ref 12–78)
ANION GAP SERPL CALC-SCNC: 7 MMOL/L (ref 2–12)
ANION GAP SERPL CALC-SCNC: 8 MMOL/L (ref 2–12)
AST SERPL-CCNC: 56 U/L (ref 15–37)
BASE DEFICIT BLD-SCNC: 0.9 MMOL/L
BASE DEFICIT BLD-SCNC: 1.3 MMOL/L
BDY SITE: ABNORMAL
BILIRUB SERPL-MCNC: 0.2 MG/DL (ref 0.2–1)
BUN SERPL-MCNC: 69 MG/DL (ref 6–20)
BUN SERPL-MCNC: 72 MG/DL (ref 6–20)
BUN/CREAT SERPL: 15 (ref 12–20)
BUN/CREAT SERPL: 16 (ref 12–20)
CA-I BLD-SCNC: 1.31 MMOL/L (ref 1.12–1.32)
CALCIUM SERPL-MCNC: 9.8 MG/DL (ref 8.5–10.1)
CALCIUM SERPL-MCNC: 9.8 MG/DL (ref 8.5–10.1)
CHLORIDE SERPL-SCNC: 109 MMOL/L (ref 97–108)
CHLORIDE SERPL-SCNC: 109 MMOL/L (ref 97–108)
CO2 SERPL-SCNC: 23 MMOL/L (ref 21–32)
CO2 SERPL-SCNC: 23 MMOL/L (ref 21–32)
CREAT SERPL-MCNC: 4.25 MG/DL (ref 0.55–1.02)
CREAT SERPL-MCNC: 4.68 MG/DL (ref 0.55–1.02)
ERYTHROCYTE [DISTWIDTH] IN BLOOD BY AUTOMATED COUNT: 12.9 % (ref 11.5–14.5)
GLOBULIN SER CALC-MCNC: 2.8 G/DL (ref 2–4)
GLUCOSE BLD STRIP.AUTO-MCNC: 101 MG/DL (ref 65–117)
GLUCOSE BLD STRIP.AUTO-MCNC: 102 MG/DL (ref 65–117)
GLUCOSE BLD STRIP.AUTO-MCNC: 102 MG/DL (ref 65–117)
GLUCOSE BLD STRIP.AUTO-MCNC: 105 MG/DL (ref 65–117)
GLUCOSE BLD STRIP.AUTO-MCNC: 109 MG/DL (ref 65–117)
GLUCOSE BLD STRIP.AUTO-MCNC: 110 MG/DL (ref 65–117)
GLUCOSE BLD STRIP.AUTO-MCNC: 112 MG/DL (ref 65–117)
GLUCOSE BLD STRIP.AUTO-MCNC: 115 MG/DL (ref 65–117)
GLUCOSE BLD STRIP.AUTO-MCNC: 120 MG/DL (ref 65–117)
GLUCOSE BLD STRIP.AUTO-MCNC: 124 MG/DL (ref 65–117)
GLUCOSE BLD STRIP.AUTO-MCNC: 124 MG/DL (ref 65–117)
GLUCOSE BLD STRIP.AUTO-MCNC: 92 MG/DL (ref 65–117)
GLUCOSE BLD STRIP.AUTO-MCNC: 94 MG/DL (ref 65–117)
GLUCOSE BLD STRIP.AUTO-MCNC: 95 MG/DL (ref 65–117)
GLUCOSE BLD STRIP.AUTO-MCNC: 96 MG/DL (ref 65–117)
GLUCOSE BLD STRIP.AUTO-MCNC: 98 MG/DL (ref 65–117)
GLUCOSE SERPL-MCNC: 126 MG/DL (ref 65–100)
GLUCOSE SERPL-MCNC: 91 MG/DL (ref 65–100)
HCO3 BLD-SCNC: 22.7 MMOL/L (ref 21–28)
HCO3 BLD-SCNC: 25.5 MMOL/L (ref 21–28)
HCT VFR BLD AUTO: 28.9 % (ref 35–47)
HGB BLD-MCNC: 9.6 G/DL (ref 11.5–16)
LACTATE BLD-SCNC: 0.94 MMOL/L (ref 0.4–2)
LACTATE SERPL-SCNC: 1 MMOL/L (ref 0.4–2)
MAGNESIUM SERPL-MCNC: 2.4 MG/DL (ref 1.6–2.4)
MCH RBC QN AUTO: 29.4 PG (ref 26–34)
MCHC RBC AUTO-ENTMCNC: 33.2 G/DL (ref 30–36.5)
MCV RBC AUTO: 88.4 FL (ref 80–99)
NRBC # BLD: 0 K/UL (ref 0–0.01)
NRBC BLD-RTO: 0 PER 100 WBC
PCO2 BLD: 34.7 MMHG (ref 35–48)
PCO2 BLD: 49.5 MMHG (ref 35–48)
PH BLD: 7.32 (ref 7.35–7.45)
PH BLD: 7.43 (ref 7.35–7.45)
PHOSPHATE SERPL-MCNC: 5.4 MG/DL (ref 2.6–4.7)
PLATELET # BLD AUTO: 111 K/UL (ref 150–400)
PMV BLD AUTO: 10.9 FL (ref 8.9–12.9)
PO2 BLD: 85 MMHG (ref 83–108)
PO2 BLD: 87 MMHG (ref 83–108)
POTASSIUM SERPL-SCNC: 5.1 MMOL/L (ref 3.5–5.1)
POTASSIUM SERPL-SCNC: 5.4 MMOL/L (ref 3.5–5.1)
PROT SERPL-MCNC: 5.8 G/DL (ref 6.4–8.2)
RBC # BLD AUTO: 3.27 M/UL (ref 3.8–5.2)
SAO2 % BLD: 95.7 % (ref 92–97)
SAO2 % BLD: 96.8 % (ref 92–97)
SAO2 % BLDMV: 71 % (ref 65–88)
SERVICE CMNT-IMP: ABNORMAL
SERVICE CMNT-IMP: NORMAL
SODIUM SERPL-SCNC: 139 MMOL/L (ref 136–145)
SODIUM SERPL-SCNC: 140 MMOL/L (ref 136–145)
SPECIMEN TYPE: ABNORMAL
SPECIMEN TYPE: ABNORMAL
VENTILATION MODE VENT: ABNORMAL
WBC # BLD AUTO: 13.6 K/UL (ref 3.6–11)

## 2025-05-21 PROCEDURE — 82962 GLUCOSE BLOOD TEST: CPT

## 2025-05-21 PROCEDURE — 83735 ASSAY OF MAGNESIUM: CPT

## 2025-05-21 PROCEDURE — 2580000003 HC RX 258: Performed by: THORACIC SURGERY (CARDIOTHORACIC VASCULAR SURGERY)

## 2025-05-21 PROCEDURE — 71045 X-RAY EXAM CHEST 1 VIEW: CPT

## 2025-05-21 PROCEDURE — 85027 COMPLETE CBC AUTOMATED: CPT

## 2025-05-21 PROCEDURE — 6360000002 HC RX W HCPCS: Performed by: THORACIC SURGERY (CARDIOTHORACIC VASCULAR SURGERY)

## 2025-05-21 PROCEDURE — 83605 ASSAY OF LACTIC ACID: CPT

## 2025-05-21 PROCEDURE — 80053 COMPREHEN METABOLIC PANEL: CPT

## 2025-05-21 PROCEDURE — P9045 ALBUMIN (HUMAN), 5%, 250 ML: HCPCS | Performed by: STUDENT IN AN ORGANIZED HEALTH CARE EDUCATION/TRAINING PROGRAM

## 2025-05-21 PROCEDURE — 82803 BLOOD GASES ANY COMBINATION: CPT

## 2025-05-21 PROCEDURE — 6360000002 HC RX W HCPCS: Performed by: PHYSICIAN ASSISTANT

## 2025-05-21 PROCEDURE — 2700000000 HC OXYGEN THERAPY PER DAY

## 2025-05-21 PROCEDURE — 2500000003 HC RX 250 WO HCPCS: Performed by: PHYSICIAN ASSISTANT

## 2025-05-21 PROCEDURE — 6370000000 HC RX 637 (ALT 250 FOR IP): Performed by: PHYSICIAN ASSISTANT

## 2025-05-21 PROCEDURE — 2000000000 HC ICU R&B

## 2025-05-21 PROCEDURE — 80069 RENAL FUNCTION PANEL: CPT

## 2025-05-21 PROCEDURE — 2580000003 HC RX 258: Performed by: INTERNAL MEDICINE

## 2025-05-21 PROCEDURE — 6360000002 HC RX W HCPCS: Performed by: STUDENT IN AN ORGANIZED HEALTH CARE EDUCATION/TRAINING PROGRAM

## 2025-05-21 PROCEDURE — 94760 N-INVAS EAR/PLS OXIMETRY 1: CPT

## 2025-05-21 PROCEDURE — 2580000003 HC RX 258: Performed by: PHYSICIAN ASSISTANT

## 2025-05-21 PROCEDURE — 6370000000 HC RX 637 (ALT 250 FOR IP): Performed by: INTERNAL MEDICINE

## 2025-05-21 PROCEDURE — 6360000002 HC RX W HCPCS: Performed by: INTERNAL MEDICINE

## 2025-05-21 PROCEDURE — 6370000000 HC RX 637 (ALT 250 FOR IP): Performed by: STUDENT IN AN ORGANIZED HEALTH CARE EDUCATION/TRAINING PROGRAM

## 2025-05-21 PROCEDURE — 94003 VENT MGMT INPAT SUBQ DAY: CPT

## 2025-05-21 PROCEDURE — 2500000003 HC RX 250 WO HCPCS: Performed by: STUDENT IN AN ORGANIZED HEALTH CARE EDUCATION/TRAINING PROGRAM

## 2025-05-21 RX ORDER — BUMETANIDE 0.25 MG/ML
2 INJECTION, SOLUTION INTRAMUSCULAR; INTRAVENOUS 3 TIMES DAILY
Status: DISCONTINUED | OUTPATIENT
Start: 2025-05-21 | End: 2025-05-21

## 2025-05-21 RX ORDER — NOREPINEPHRINE BITARTRATE 0.06 MG/ML
1-100 INJECTION, SOLUTION INTRAVENOUS CONTINUOUS
Status: DISCONTINUED | OUTPATIENT
Start: 2025-05-21 | End: 2025-05-23

## 2025-05-21 RX ORDER — CALCIUM GLUCONATE 20 MG/ML
2000 INJECTION, SOLUTION INTRAVENOUS ONCE
Status: COMPLETED | OUTPATIENT
Start: 2025-05-21 | End: 2025-05-21

## 2025-05-21 RX ORDER — ALBUMIN HUMAN 50 G/1000ML
12.5 SOLUTION INTRAVENOUS ONCE
Status: COMPLETED | OUTPATIENT
Start: 2025-05-21 | End: 2025-05-21

## 2025-05-21 RX ORDER — HYDROMORPHONE HYDROCHLORIDE 1 MG/ML
1 INJECTION, SOLUTION INTRAMUSCULAR; INTRAVENOUS; SUBCUTANEOUS EVERY 4 HOURS PRN
Status: DISCONTINUED | OUTPATIENT
Start: 2025-05-21 | End: 2025-05-23

## 2025-05-21 RX ADMIN — MUPIROCIN: 20 OINTMENT TOPICAL at 08:47

## 2025-05-21 RX ADMIN — Medication 400 MG: at 08:46

## 2025-05-21 RX ADMIN — CALCIUM GLUCONATE 2000 MG: 20 INJECTION, SOLUTION INTRAVENOUS at 17:13

## 2025-05-21 RX ADMIN — SODIUM CHLORIDE 7 MCG/MIN: 9 INJECTION, SOLUTION INTRAVENOUS at 05:36

## 2025-05-21 RX ADMIN — HYDROMORPHONE HYDROCHLORIDE 1 MG: 1 INJECTION, SOLUTION INTRAMUSCULAR; INTRAVENOUS; SUBCUTANEOUS at 23:40

## 2025-05-21 RX ADMIN — CEFAZOLIN 3000 MG: 3 INJECTION, POWDER, FOR SOLUTION INTRAVENOUS at 05:33

## 2025-05-21 RX ADMIN — SODIUM CHLORIDE, PRESERVATIVE FREE 10 ML: 5 INJECTION INTRAVENOUS at 21:33

## 2025-05-21 RX ADMIN — METHYLPREDNISOLONE SODIUM SUCCINATE 40 MG: 40 INJECTION, POWDER, LYOPHILIZED, FOR SOLUTION INTRAMUSCULAR; INTRAVENOUS at 08:46

## 2025-05-21 RX ADMIN — HEPARIN SODIUM 5000 UNITS: 5000 INJECTION INTRAVENOUS; SUBCUTANEOUS at 21:33

## 2025-05-21 RX ADMIN — HYDROMORPHONE HYDROCHLORIDE 0.5 MG: 1 INJECTION, SOLUTION INTRAMUSCULAR; INTRAVENOUS; SUBCUTANEOUS at 03:58

## 2025-05-21 RX ADMIN — CHLORHEXIDINE GLUCONATE 15 ML: 1.2 RINSE ORAL at 21:33

## 2025-05-21 RX ADMIN — BUMETANIDE 2 MG: 0.25 INJECTION INTRAMUSCULAR; INTRAVENOUS at 09:43

## 2025-05-21 RX ADMIN — ALBUMIN (HUMAN) 12.5 G: 12.5 INJECTION, SOLUTION INTRAVENOUS at 15:02

## 2025-05-21 RX ADMIN — CHLORHEXIDINE GLUCONATE 15 ML: 1.2 RINSE ORAL at 08:47

## 2025-05-21 RX ADMIN — ASPIRIN 81 MG CHEWABLE TABLET 81 MG: 81 TABLET CHEWABLE at 08:46

## 2025-05-21 RX ADMIN — POLYETHYLENE GLYCOL 3350 17 G: 17 POWDER, FOR SOLUTION ORAL at 08:45

## 2025-05-21 RX ADMIN — PROPOFOL 20 MCG/KG/MIN: 10 INJECTION, EMULSION INTRAVENOUS at 05:34

## 2025-05-21 RX ADMIN — HEPARIN SODIUM 5000 UNITS: 5000 INJECTION INTRAVENOUS; SUBCUTANEOUS at 05:35

## 2025-05-21 RX ADMIN — FAMOTIDINE 20 MG: 10 INJECTION, SOLUTION INTRAVENOUS at 08:46

## 2025-05-21 RX ADMIN — MUPIROCIN: 20 OINTMENT TOPICAL at 21:33

## 2025-05-21 RX ADMIN — CETIRIZINE HYDROCHLORIDE 10 MG: 10 TABLET, FILM COATED ORAL at 08:46

## 2025-05-21 RX ADMIN — DEXTROSE 250 ML: 10 SOLUTION INTRAVENOUS at 17:13

## 2025-05-21 RX ADMIN — HYDROMORPHONE HYDROCHLORIDE 0.5 MG: 1 INJECTION, SOLUTION INTRAMUSCULAR; INTRAVENOUS; SUBCUTANEOUS at 11:06

## 2025-05-21 RX ADMIN — INSULIN HUMAN 10 UNITS: 100 INJECTION, SOLUTION PARENTERAL at 17:13

## 2025-05-21 RX ADMIN — HEPARIN SODIUM 5000 UNITS: 5000 INJECTION INTRAVENOUS; SUBCUTANEOUS at 14:09

## 2025-05-21 RX ADMIN — AMIODARONE HYDROCHLORIDE 400 MG: 200 TABLET ORAL at 08:46

## 2025-05-21 RX ADMIN — HYDROMORPHONE HYDROCHLORIDE 0.5 MG: 1 INJECTION, SOLUTION INTRAMUSCULAR; INTRAVENOUS; SUBCUTANEOUS at 14:09

## 2025-05-21 RX ADMIN — SODIUM CHLORIDE, PRESERVATIVE FREE 10 ML: 5 INJECTION INTRAVENOUS at 08:48

## 2025-05-21 RX ADMIN — GABAPENTIN 200 MG: 100 CAPSULE ORAL at 08:55

## 2025-05-21 RX ADMIN — ACETAMINOPHEN 1000 MG: 500 TABLET ORAL at 03:58

## 2025-05-21 RX ADMIN — SENNOSIDES AND DOCUSATE SODIUM 1 TABLET: 8.6; 5 TABLET ORAL at 08:46

## 2025-05-21 RX ADMIN — HYDROMORPHONE HYDROCHLORIDE 0.5 MG: 1 INJECTION, SOLUTION INTRAMUSCULAR; INTRAVENOUS; SUBCUTANEOUS at 08:27

## 2025-05-21 RX ADMIN — ACETAMINOPHEN 1000 MG: 500 TABLET ORAL at 09:43

## 2025-05-21 RX ADMIN — SERTRALINE HYDROCHLORIDE 100 MG: 50 TABLET ORAL at 08:46

## 2025-05-21 RX ADMIN — HYDROMORPHONE HYDROCHLORIDE 1 MG: 1 INJECTION, SOLUTION INTRAMUSCULAR; INTRAVENOUS; SUBCUTANEOUS at 16:20

## 2025-05-21 ASSESSMENT — PAIN DESCRIPTION - DESCRIPTORS
DESCRIPTORS: ACHING
DESCRIPTORS: ACHING;TIGHTNESS

## 2025-05-21 ASSESSMENT — PAIN DESCRIPTION - ORIENTATION
ORIENTATION: MID

## 2025-05-21 ASSESSMENT — PULMONARY FUNCTION TESTS
PIF_VALUE: 29
PIF_VALUE: 26

## 2025-05-21 ASSESSMENT — PAIN SCALES - GENERAL
PAINLEVEL_OUTOF10: 6
PAINLEVEL_OUTOF10: 4
PAINLEVEL_OUTOF10: 9
PAINLEVEL_OUTOF10: 3
PAINLEVEL_OUTOF10: 7

## 2025-05-21 ASSESSMENT — PAIN DESCRIPTION - LOCATION
LOCATION: CHEST

## 2025-05-21 NOTE — PROGRESS NOTES
[address], [place] [address]  Phone: [phone]   [phone]    [email or web address]     Nephrology Progress Note    Patient Name : Evelio Marroquin      : 1960     MRN : [id number]  Date of Admission : 2025  Date of Servive : 25    CC:  Follow up for CAIO       Assessment and Plan   CAIO:  - 2/2 ATN from shock/hypotension  - renal U/S neg   - UOP picking up, K improving  - bumex 2mg IV TID  - labs this PM  - no RRT for now, remains high risk for this     Hyperkalemia:  - resolved after redistributive meds    Hypotension/shock:  - on epi, levo, dobutamine  - wean as able     Ascending aortic aneurysm:  - s/p aortic root replacement   - per CTS     Angioedema/resp failure:  - unclear cause  - stable oxygenation on the vent     CKD 3a:  - baseline Cr 1.1 to 1.2     HFrEF:  - EF dropped to 30%, RV dysfunction on post op ECHO  - on dobutamine     COPD  Obesity  Depression     Interval History:  Seen and examined.  Cr up, K stable.  On levo, epi, dobutamine drips.  Nonoliguric with lasix yesterday evening.      Review of Systems: Pertinent items are noted in HPI.    Current Medications:   Current Facility-Administered Medications   Medication Dose Route Frequency    bumetanide (BUMEX) injection 2 mg  2 mg IntraVENous TID    DOBUTamine (DOBUTREX) 500 mg in dextrose 5 % 250 mL infusion  2.5-10 mcg/kg/min IntraVENous Continuous    famotidine (PEPCID) tablet 20 mg  20 mg Oral Daily    Or    famotidine (PEPCID) 20 MG/2ML 20 mg in sodium chloride (PF) 0.9 % 10 mL injection  20 mg IntraVENous Daily    aspirin chewable tablet 81 mg  81 mg Oral Daily    glucose chewable tablet 16 g  4 tablet Oral PRN    dextrose bolus 10% 125 mL  125 mL IntraVENous PRN    Or    dextrose bolus 10% 250 mL  250 mL IntraVENous PRN    glucagon injection 1 mg  1 mg SubCUTAneous PRN    dextrose 10 % infusion   IntraVENous Continuous PRN    heparin (porcine)

## 2025-05-21 NOTE — PROGRESS NOTES
Physical Therapy 5/21/25    Chart reviewed, patient remains intubated with possible plans to extubate later today. Potential CRRT if CAIO does not improve. Will defer and follow up as appropriate.    Thank you for your consideration,    Ibis Chacko, PT, DPT

## 2025-05-21 NOTE — PROGRESS NOTES
2000- Bedside shift change report given to LEYLA Da Silva (oncoming nurse) by LEYLA Ling (offgoing nurse). Report included the following information Nurse Handoff Report, Index, Adult Overview, Surgery Report, Intake/Output, MAR, Recent Results, Med Rec Status, Cardiac Rhythm NSR, Alarm Parameters, and Pre Procedure Checklist.

## 2025-05-21 NOTE — PROGRESS NOTES
Cardiac Surgery Coordinator- Met with the family of Evelio Marroquin, reviewed plan of care and encouraged them to verbalize.

## 2025-05-21 NOTE — PROGRESS NOTES
Cardiac Surgery ICU Progress Note    Admit Date: 2025  POD:  2 Days Post-Op    Procedure:  Procedure(s):  AORTIC ROOT REPLACEMENT, ECC, DEDRA AND FLEXIBLE BRONCHOSCOPY BY DR BEDOYA       [facility] Course:  DOS : Aortic root replacement complicated by severe hypoxia and airway swelling. Transferred to ICU on Epi, Levo, Yan. Dobutamine and Levo added overnight.  POD1 : Febrile. Albumin x1 + 75 ml/hr NS. Dobutamine weaned to 2. K 5.5. Vent weaned to 40% FiO2. Failed spontaneous. Concern for angioedema and soft tissue swelling around neck. Possible Ct. Lasix 80 mg IV.  POD2 : Neck and angioedema improved. Trial extubate today. Keep CT. Diurese per nephrology     Subjective:   Patient seen with Dr. Pompa and Dr. Bradley this AM.  Sedated and ventilated.  Improvement in pressors from yesterday. On pressors: 5 Epi, 6 Norepi, 2 dobutamine.  NSR 90s.  BP /50s.  Afebrile     Objective:   Vitals:  Blood pressure 98/63, pulse 82, temperature 98.6 °F (37 °C), temperature source Core, resp. rate 18, height 1.829 m (6'), weight (!) 152.2 kg (335 lb 8.6 oz), SpO2 96%, not currently breastfeeding.  Temp (24hrs), Av.2 °F (37.3 °C), Min:98.5 °F (36.9 °C), Max:100.6 °F (38.1 °C)        Infusions:  Epi 5 mcg/min  Norepi 6 mcg/min  Dobutamine 2 mcg/kg/min  Insulin    Dex Off   Propofol 20 mcg/kg/min     EKG/Rhythm:        Ventilator settings: AC/VC 40%, rate 20, tidal volume 550 ml, PEEP 8  AB.43/34.7/96.8/22.7    Extubation Date / Time: remains ventilated d/t significant airway swelling and moderate pressor requirements. Improved today. Attempting extubation today    CT Output: 250 ml past 24 hrs/ 110 ml overnight    CXR: Xray Result (most recent):  XR CHEST PORTABLE 2025    Narrative  EXAM: XR CHEST PORTABLE  ACC#: [id number]    INDICATION: Post op open heart surgery    COMPARISON: 2025    TECHNIQUE: Portable upright view of the chest.    FINDINGS:  Life-support tubes and lines are again  Continue A-wires and V-wires to backup     Vasoplegic shock:  - Maintain MAP > 65; will wean pressors as tolerated today  - Monitor for signs of end organ damage  - Routine labs     HTN: On chlorthalidone, coreg.   - Not currently active; remains on pressors.     Acute on chronic HFpEF: h/o LVEF ~ 30%; was improved on recent TTE (LVEF 50%); DEDRA intra-op initially showed normal bi-ventricular function, but after surgery showed LVEF 30%, RV dysfunction  - Will plan to repeat TTE once more hemodynamically stable      Acute post-op respiratory insufficiency   Oropharyngeal swelling intra-op/presumed anaphylaxis of unclear etiology: Intra-op angioedema, hypoxia and hypotension.    - Remains intubated given airway swelling; audible cuff leak but no tidal volume loss on the ventilator.  Will monitor daily.   - Solu-Medrol 40 mg Q12H for airway/neck swelling  - Wean mechanical ventilation today; Possible extubation trial later today  - Wean oxygen to maintain SpO2> 92%; PaO2 > 60  - ABGs PRN    COPD: Pt reports she does not see lung doctor, per chart review has seen pulmonary associates in the past. PFTs completed. PRN albuterol.   - PRN Duonebs    Post-op fever: Improved. Afebrile. Bladder temp up to 102 yesterday.  Likely inflammatory in the setting of surgery, probable anaphylactic reaction.  WBC peaked at 26; trending down 13.6 this AM.  -Continue to monitor for signs of infection  -Daily CXR, CBC     CAIO on CKD stage III: Pre-op creatinine 1.37, eGFR 43.  Post-op AKIB peaked at 1.41; trending down this AM to 0.70  - Recheck AKIB at 11 AM to trend; daily CMP  - avoid hypotension and nephrotoxic meds, monitor  - Nephrology consulted. Diureses today with Bumex 2mg IV TID.  - Possible CRRT. Attempt to hold off one more day. K improved with diureses. Cr elevated post diuretics. Continue to monitor.     Depression: PTA Zoloft   - Continue home Zoloft 100 mg daily    Pain regimen: scheduled tylenol, lidocaine patches, prn oxy

## 2025-05-21 NOTE — PROGRESS NOTES
0800- bedside report received. Patient intubated and drowsy.    0815- MD Peña and WENDI Simons at bedside for rounds. Plan to extubate patient today if she has a cuff leak.    0855- Paras BYRD at bedside, plan discussed. Labs ordered for 1400 today.    0903- Moy BYRD at bedside, he checked for cuff leak and adjusted vent settings. Will proceed with extubation once patient is more alert.    0906- propofol stopped.    0936- Moy BYRD at bedside; attempting SBT now. Patient is awake and following simple commands (lift head, move extremities to command).    0938- -330, RR 18-20, sats 91-92% on SPONT.    0950- ETT suctioned, scant amount of clear secretions removed.    1010- pressure support slowly decreased from 10 to 5, -365, RR 19, sats 94%. Patient still awake and following commands.    1013- peep decreased from 8 to 5. Vent remained unchanged.    1016- levophed stopped, MAP 87.    1029- ABG performed.    1041- Patient suctioned and extubated to 4 L nasal cannula. Patient able to cough and state name. MD Moy at bedside with intubation cart for high risk extubation.    1043- oxygen increased to 6 L nasal cannula, sats 90%. Lungs diminished, no stridor. Voice hoarse.    1056- epi decreased to 3 mcg for , HR 96. Updated Ronak ADAME.    1108- MD Moy and Ronak ADAME at bedside; drips and hemodynamics discussed. Will wean epi off as tolerated. Order changed for PRN pain medication and to hold swallow exam until tomorrow. Will remain NPO for today.    1135- epi stopped, MAP 98.    1210- Moy BYRD at bedside, hemodynamics discussed. Dobutamine stopped. CI 3.6, , MAP 97.     1401- levophed restarted; MAP 62.    1448- MD Moy at bedside. Discussed low CVP and PAD, ordered albumin.    1540- Yeimi NP on unit. Discussed hemodynamics; orders to leave swan for now and remove in the AM.    1610- Kirk BYRD at bedside; orders to remove swan now.    1650- updated Paras BYRD on renal labs (K 5.4, Cr 4.68, BUN

## 2025-05-21 NOTE — PROGRESS NOTES
CRITICAL CARE PROGRESS NOTE    Name: Evelio Marroquin   : 1960   MRN: [id number]   Date: 2025      ICU Diagnosis      Ascending Aortic Aneurysm   Vasoplegic Shock  Acute HFrEF  Morbid Obesity    Overnight Events   Improved vasopressor requirement and oral swelling.     ROS negative except as otherwise documented.     A/P   This is a 64 year old female with history of HTN, HLD, COPD, CKD III and HFpEF who presented to the hospital for elective repair of ascending aortic aneurysm - operative course complicated by hypotension and angioedema.     NEUROLOGICAL:    - precedex for sedation; wean as tolerated  - monitor neuro exam post sedation  - tylenol scheduled for pain   - dilaudid PRN severe pain   - gabapentin 300 TID  - continue home sertraline     PULMONOLOGY:   Intubated post OR. Improved oropharyngeal swelling and now with cuff leak.   - SBT when able  - lung protective ventilation; wean vent as tolerated  - check cuff leak daily   - IS, OOB to chair when extubated   - continue solumedrol  - continue cetirizine      CARDIOVASCULAR:   On epi and dobutamine for mild BiV dysfunction - I suspect that this is due to her acute OR event (acidosis). Possible anaphylactic reaction in the OR - unknown precipitant.   - goal MAP>=65  - continue epi  - continue levo; wean as tolerated  - continue dobutamine   - pacer wires per CTS  - miya per CTS  - CTM  - amiodarone PPX  - hold home BP medications; restart as tolerated           RENAL/ELECTROLYTE:   CAIO - likely ATN due to pump time. Risk for progression given critical illness.  - nephrology consulted   - goal K>=4, Mg>=2, Phos >3  - daily BMP  - strict I/O's; daily weights  - avoid nephrotoxic medications     ENDOCRINE:   - insulin gtt per protocol  - FSBS ACHS     ID/MICRO:   - no signs or symptoms of active infection  - Ancef PPX per protocol    ICU DAILY CHECKLIST     Code Status: Full  DVT Prophylaxis: SCDs  T/L/D: PIV, CVC, jimmie  SUP: N/A  Diet: advance

## 2025-05-21 NOTE — PROGRESS NOTES
Occupational Therapy    Chart reviewed, discussed with RN who reports plans for extubation today. Will continue to hold and follow up as appropriate.     Trini Souza MS, OTR/L

## 2025-05-22 ENCOUNTER — APPOINTMENT (OUTPATIENT)
Facility: HOSPITAL | Age: 65
DRG: 216 | End: 2025-05-22
Attending: THORACIC SURGERY (CARDIOTHORACIC VASCULAR SURGERY)
Payer: COMMERCIAL

## 2025-05-22 LAB
ALBUMIN SERPL-MCNC: 3.1 G/DL (ref 3.5–5)
ALBUMIN/GLOB SERPL: 1.1 (ref 1.1–2.2)
ALP SERPL-CCNC: 56 U/L (ref 45–117)
ALT SERPL-CCNC: <6 U/L (ref 12–78)
ANION GAP SERPL CALC-SCNC: 4 MMOL/L (ref 2–12)
ANION GAP SERPL CALC-SCNC: 7 MMOL/L (ref 2–12)
AST SERPL-CCNC: 40 U/L (ref 15–37)
BILIRUB SERPL-MCNC: 0.2 MG/DL (ref 0.2–1)
BUN SERPL-MCNC: 78 MG/DL (ref 6–20)
BUN SERPL-MCNC: 80 MG/DL (ref 6–20)
BUN/CREAT SERPL: 16 (ref 12–20)
BUN/CREAT SERPL: 16 (ref 12–20)
CALCIUM SERPL-MCNC: 10.1 MG/DL (ref 8.5–10.1)
CALCIUM SERPL-MCNC: 9.5 MG/DL (ref 8.5–10.1)
CHLORIDE SERPL-SCNC: 109 MMOL/L (ref 97–108)
CHLORIDE SERPL-SCNC: 110 MMOL/L (ref 97–108)
CO2 SERPL-SCNC: 25 MMOL/L (ref 21–32)
CO2 SERPL-SCNC: 27 MMOL/L (ref 21–32)
CREAT SERPL-MCNC: 4.86 MG/DL (ref 0.55–1.02)
CREAT SERPL-MCNC: 4.86 MG/DL (ref 0.55–1.02)
ERYTHROCYTE [DISTWIDTH] IN BLOOD BY AUTOMATED COUNT: 13.1 % (ref 11.5–14.5)
GLOBULIN SER CALC-MCNC: 2.7 G/DL (ref 2–4)
GLUCOSE BLD STRIP.AUTO-MCNC: 107 MG/DL (ref 65–117)
GLUCOSE BLD STRIP.AUTO-MCNC: 108 MG/DL (ref 65–117)
GLUCOSE BLD STRIP.AUTO-MCNC: 108 MG/DL (ref 65–117)
GLUCOSE BLD STRIP.AUTO-MCNC: 109 MG/DL (ref 65–117)
GLUCOSE BLD STRIP.AUTO-MCNC: 110 MG/DL (ref 65–117)
GLUCOSE BLD STRIP.AUTO-MCNC: 110 MG/DL (ref 65–117)
GLUCOSE BLD STRIP.AUTO-MCNC: 114 MG/DL (ref 65–117)
GLUCOSE BLD STRIP.AUTO-MCNC: 116 MG/DL (ref 65–117)
GLUCOSE BLD STRIP.AUTO-MCNC: 119 MG/DL (ref 65–117)
GLUCOSE SERPL-MCNC: 107 MG/DL (ref 65–100)
GLUCOSE SERPL-MCNC: 108 MG/DL (ref 65–100)
HCT VFR BLD AUTO: 28.4 % (ref 35–47)
HGB BLD-MCNC: 8.8 G/DL (ref 11.5–16)
MAGNESIUM SERPL-MCNC: 2.6 MG/DL (ref 1.6–2.4)
MCH RBC QN AUTO: 28.9 PG (ref 26–34)
MCHC RBC AUTO-ENTMCNC: 31 G/DL (ref 30–36.5)
MCV RBC AUTO: 93.1 FL (ref 80–99)
NRBC # BLD: 0 K/UL (ref 0–0.01)
NRBC BLD-RTO: 0 PER 100 WBC
PLATELET # BLD AUTO: 100 K/UL (ref 150–400)
PMV BLD AUTO: 11.4 FL (ref 8.9–12.9)
POTASSIUM SERPL-SCNC: 4.7 MMOL/L (ref 3.5–5.1)
POTASSIUM SERPL-SCNC: 4.9 MMOL/L (ref 3.5–5.1)
PROT SERPL-MCNC: 5.8 G/DL (ref 6.4–8.2)
RBC # BLD AUTO: 3.05 M/UL (ref 3.8–5.2)
SERVICE CMNT-IMP: ABNORMAL
SERVICE CMNT-IMP: NORMAL
SODIUM SERPL-SCNC: 140 MMOL/L (ref 136–145)
SODIUM SERPL-SCNC: 142 MMOL/L (ref 136–145)
WBC # BLD AUTO: 14 K/UL (ref 3.6–11)

## 2025-05-22 PROCEDURE — 70498 CT ANGIOGRAPHY NECK: CPT

## 2025-05-22 PROCEDURE — 2500000003 HC RX 250 WO HCPCS: Performed by: THORACIC SURGERY (CARDIOTHORACIC VASCULAR SURGERY)

## 2025-05-22 PROCEDURE — 70450 CT HEAD/BRAIN W/O DYE: CPT

## 2025-05-22 PROCEDURE — 6360000002 HC RX W HCPCS: Performed by: PHYSICIAN ASSISTANT

## 2025-05-22 PROCEDURE — 71045 X-RAY EXAM CHEST 1 VIEW: CPT

## 2025-05-22 PROCEDURE — 2000000000 HC ICU R&B

## 2025-05-22 PROCEDURE — 6370000000 HC RX 637 (ALT 250 FOR IP): Performed by: PHYSICIAN ASSISTANT

## 2025-05-22 PROCEDURE — 2580000003 HC RX 258: Performed by: THORACIC SURGERY (CARDIOTHORACIC VASCULAR SURGERY)

## 2025-05-22 PROCEDURE — 92612 ENDOSCOPY SWALLOW (FEES) VID: CPT

## 2025-05-22 PROCEDURE — 2580000003 HC RX 258: Performed by: STUDENT IN AN ORGANIZED HEALTH CARE EDUCATION/TRAINING PROGRAM

## 2025-05-22 PROCEDURE — 82962 GLUCOSE BLOOD TEST: CPT

## 2025-05-22 PROCEDURE — 0042T CT BRAIN PERFUSION: CPT

## 2025-05-22 PROCEDURE — 6370000000 HC RX 637 (ALT 250 FOR IP): Performed by: THORACIC SURGERY (CARDIOTHORACIC VASCULAR SURGERY)

## 2025-05-22 PROCEDURE — 97530 THERAPEUTIC ACTIVITIES: CPT

## 2025-05-22 PROCEDURE — 85027 COMPLETE CBC AUTOMATED: CPT

## 2025-05-22 PROCEDURE — 92610 EVALUATE SWALLOWING FUNCTION: CPT

## 2025-05-22 PROCEDURE — 92522 EVALUATE SPEECH PRODUCTION: CPT

## 2025-05-22 PROCEDURE — 97161 PT EVAL LOW COMPLEX 20 MIN: CPT

## 2025-05-22 PROCEDURE — 80053 COMPREHEN METABOLIC PANEL: CPT

## 2025-05-22 PROCEDURE — 6360000002 HC RX W HCPCS: Performed by: STUDENT IN AN ORGANIZED HEALTH CARE EDUCATION/TRAINING PROGRAM

## 2025-05-22 PROCEDURE — 97165 OT EVAL LOW COMPLEX 30 MIN: CPT

## 2025-05-22 PROCEDURE — 4A03X5D MEASUREMENT OF ARTERIAL FLOW, INTRACRANIAL, EXTERNAL APPROACH: ICD-10-PCS | Performed by: INTERNAL MEDICINE

## 2025-05-22 PROCEDURE — 2580000003 HC RX 258: Performed by: PHYSICIAN ASSISTANT

## 2025-05-22 PROCEDURE — 6360000004 HC RX CONTRAST MEDICATION: Performed by: RADIOLOGY

## 2025-05-22 PROCEDURE — 83735 ASSAY OF MAGNESIUM: CPT

## 2025-05-22 PROCEDURE — 2500000003 HC RX 250 WO HCPCS: Performed by: PHYSICIAN ASSISTANT

## 2025-05-22 PROCEDURE — APPNB60 APP NON BILLABLE TIME 46-60 MINS

## 2025-05-22 RX ORDER — IOPAMIDOL 755 MG/ML
100 INJECTION, SOLUTION INTRAVASCULAR
Status: COMPLETED | OUTPATIENT
Start: 2025-05-22 | End: 2025-05-22

## 2025-05-22 RX ADMIN — ATORVASTATIN CALCIUM 40 MG: 40 TABLET, FILM COATED ORAL at 21:06

## 2025-05-22 RX ADMIN — ACETAMINOPHEN 1000 MG: 500 TABLET ORAL at 09:36

## 2025-05-22 RX ADMIN — AMIODARONE HYDROCHLORIDE 150 MG: 50 INJECTION, SOLUTION INTRAVENOUS at 00:52

## 2025-05-22 RX ADMIN — GABAPENTIN 200 MG: 100 CAPSULE ORAL at 21:06

## 2025-05-22 RX ADMIN — FAMOTIDINE 20 MG: 20 TABLET, FILM COATED ORAL at 09:26

## 2025-05-22 RX ADMIN — IOPAMIDOL 80 ML: 755 INJECTION, SOLUTION INTRAVENOUS at 10:37

## 2025-05-22 RX ADMIN — MUPIROCIN: 20 OINTMENT TOPICAL at 21:05

## 2025-05-22 RX ADMIN — HEPARIN SODIUM 5000 UNITS: 5000 INJECTION INTRAVENOUS; SUBCUTANEOUS at 21:04

## 2025-05-22 RX ADMIN — Medication 400 MG: at 09:31

## 2025-05-22 RX ADMIN — SERTRALINE HYDROCHLORIDE 100 MG: 50 TABLET ORAL at 09:29

## 2025-05-22 RX ADMIN — SODIUM CHLORIDE, PRESERVATIVE FREE 10 ML: 5 INJECTION INTRAVENOUS at 09:31

## 2025-05-22 RX ADMIN — AMIODARONE HYDROCHLORIDE 400 MG: 200 TABLET ORAL at 09:28

## 2025-05-22 RX ADMIN — OXYCODONE 10 MG: 5 TABLET ORAL at 18:23

## 2025-05-22 RX ADMIN — IOPAMIDOL 40 ML: 755 INJECTION, SOLUTION INTRAVENOUS at 10:36

## 2025-05-22 RX ADMIN — SODIUM CHLORIDE, PRESERVATIVE FREE 10 ML: 5 INJECTION INTRAVENOUS at 21:05

## 2025-05-22 RX ADMIN — ACETAMINOPHEN 1000 MG: 500 TABLET ORAL at 21:06

## 2025-05-22 RX ADMIN — HEPARIN SODIUM 5000 UNITS: 5000 INJECTION INTRAVENOUS; SUBCUTANEOUS at 14:08

## 2025-05-22 RX ADMIN — GABAPENTIN 200 MG: 100 CAPSULE ORAL at 09:34

## 2025-05-22 RX ADMIN — AMIODARONE HYDROCHLORIDE 400 MG: 200 TABLET ORAL at 21:06

## 2025-05-22 RX ADMIN — CHLORHEXIDINE GLUCONATE 15 ML: 1.2 RINSE ORAL at 09:25

## 2025-05-22 RX ADMIN — SENNOSIDES AND DOCUSATE SODIUM 1 TABLET: 8.6; 5 TABLET ORAL at 09:35

## 2025-05-22 RX ADMIN — HYDROMORPHONE HYDROCHLORIDE 1 MG: 1 INJECTION, SOLUTION INTRAMUSCULAR; INTRAVENOUS; SUBCUTANEOUS at 03:40

## 2025-05-22 RX ADMIN — SODIUM CHLORIDE: 0.45 INJECTION, SOLUTION INTRAVENOUS at 13:16

## 2025-05-22 RX ADMIN — MUPIROCIN: 20 OINTMENT TOPICAL at 09:25

## 2025-05-22 RX ADMIN — GABAPENTIN 200 MG: 100 CAPSULE ORAL at 14:09

## 2025-05-22 RX ADMIN — CHLORHEXIDINE GLUCONATE 15 ML: 1.2 RINSE ORAL at 21:05

## 2025-05-22 RX ADMIN — SENNOSIDES AND DOCUSATE SODIUM 1 TABLET: 8.6; 5 TABLET ORAL at 21:06

## 2025-05-22 RX ADMIN — ONDANSETRON 4 MG: 2 INJECTION INTRAMUSCULAR; INTRAVENOUS at 15:06

## 2025-05-22 RX ADMIN — ASPIRIN 81 MG CHEWABLE TABLET 81 MG: 81 TABLET CHEWABLE at 09:31

## 2025-05-22 RX ADMIN — HYDROMORPHONE HYDROCHLORIDE 1 MG: 1 INJECTION, SOLUTION INTRAMUSCULAR; INTRAVENOUS; SUBCUTANEOUS at 08:30

## 2025-05-22 RX ADMIN — Medication 400 MG: at 21:06

## 2025-05-22 RX ADMIN — NOREPINEPHRINE BITARTRATE 10 MCG/MIN: 1 INJECTION, SOLUTION, CONCENTRATE INTRAVENOUS at 13:10

## 2025-05-22 RX ADMIN — HEPARIN SODIUM 5000 UNITS: 5000 INJECTION INTRAVENOUS; SUBCUTANEOUS at 05:31

## 2025-05-22 ASSESSMENT — PAIN DESCRIPTION - LOCATION
LOCATION: CHEST;INCISION
LOCATION: CHEST;INCISION
LOCATION: CHEST

## 2025-05-22 ASSESSMENT — PAIN SCALES - GENERAL
PAINLEVEL_OUTOF10: 4
PAINLEVEL_OUTOF10: 0
PAINLEVEL_OUTOF10: 3
PAINLEVEL_OUTOF10: 9
PAINLEVEL_OUTOF10: 9
PAINLEVEL_OUTOF10: 5
PAINLEVEL_OUTOF10: 7
PAINLEVEL_OUTOF10: 0

## 2025-05-22 ASSESSMENT — PAIN DESCRIPTION - ORIENTATION
ORIENTATION: MID
ORIENTATION: ANTERIOR
ORIENTATION: ANTERIOR

## 2025-05-22 ASSESSMENT — PAIN DESCRIPTION - DESCRIPTORS
DESCRIPTORS: ACHING
DESCRIPTORS: ACHING;SORE
DESCRIPTORS: ACHING;SHARP

## 2025-05-22 NOTE — PROGRESS NOTES
[address], [place] [address]  Phone: [phone]   [phone]    [email or web address]     Nephrology Progress Note    Patient Name : Evelio Marroquin      : 1960     MRN : [id number]  Date of Admission : 2025  Date of Servive : 25    CC:  Follow up for CAIO       Assessment and Plan   CAIO:  - 2/2 ATN from shock/hypotension  - renal U/S neg   - nonoliguric  - diurese PRN  - BID labs, strict I/Os  - no need for RRT at this time     Hyperkalemia:  - resolved after redistributive meds    Hypotension/shock:  - improving, on levo     Ascending aortic aneurysm:  - s/p aortic root replacement   - per CTS     Angioedema/resp failure:  - unclear cause  - extubated      CKD 3a:  - baseline Cr 1.1 to 1.2     HFrEF:  - EF dropped to 30%, RV dysfunction on post op ECHO     COPD  Obesity  Depression     Interval History:  Seen and examined.  Cr rising, good UOP, K stable. Awake, slow to respond, extubated yesterday      Review of Systems: Pertinent items are noted in HPI.    Current Medications:   Current Facility-Administered Medications   Medication Dose Route Frequency    HYDROmorphone HCl PF (DILAUDID) injection 1 mg  1 mg IntraVENous Q4H PRN    HYDROmorphone (DILAUDID) injection 0.5 mg  0.5 mg IntraVENous Q3H PRN    norepinephrine (LEVOPHED) 16 mg in sodium chloride 0.9 % 250 mL infusion (premix)  1-100 mcg/min IntraVENous Continuous    DOBUTamine (DOBUTREX) 500 mg in dextrose 5 % 250 mL infusion  2.5-10 mcg/kg/min IntraVENous Continuous    famotidine (PEPCID) tablet 20 mg  20 mg Oral Daily    Or    famotidine (PEPCID) 20 MG/2ML 20 mg in sodium chloride (PF) 0.9 % 10 mL injection  20 mg IntraVENous Daily    aspirin chewable tablet 81 mg  81 mg Oral Daily    glucose chewable tablet 16 g  4 tablet Oral PRN    dextrose bolus 10% 125 mL  125 mL IntraVENous PRN    Or    dextrose bolus 10% 250 mL  250 mL IntraVENous PRN    glucagon  injection 1 mg  1 mg SubCUTAneous PRN    dextrose 10 % infusion   IntraVENous Continuous PRN    heparin (porcine) injection 5,000 Units  5,000 Units SubCUTAneous 3 times per day    sertraline (ZOLOFT) tablet 100 mg  100 mg Oral Daily    dexmedeTOMIDine (PRECEDEX) 400 mcg in sodium chloride 0.9 % 100 mL infusion  0.1-1.5 mcg/kg/hr IntraVENous Continuous    propofol infusion  5-50 mcg/kg/min IntraVENous Continuous    ondansetron (ZOFRAN) injection 4 mg  4 mg IntraVENous Once    gabapentin (NEURONTIN) capsule 200 mg  200 mg Oral TID    0.9 % sodium chloride infusion   IntraVENous Continuous    0.45 % sodium chloride infusion   IntraVENous Continuous    sodium chloride flush 0.9 % injection 5-40 mL  5-40 mL IntraVENous 2 times per day    sodium chloride flush 0.9 % injection 5-40 mL  5-40 mL IntraVENous PRN    ondansetron (ZOFRAN) injection 4 mg  4 mg IntraVENous Q4H PRN    acetaminophen (TYLENOL) tablet 1,000 mg  1,000 mg Oral Q6H    lidocaine 4 % external patch 2 patch  2 patch Topical Daily    oxyCODONE (ROXICODONE) immediate release tablet 5 mg  5 mg Oral Q4H PRN    Or    oxyCODONE (ROXICODONE) immediate release tablet 10 mg  10 mg Oral Q4H PRN    amiodarone (CORDARONE) tablet 400 mg  400 mg Oral BID    chlorhexidine (PERIDEX) 0.12 % solution 15 mL  15 mL Mouth/Throat BID    hydrALAZINE (APRESOLINE) injection 10 mg  10 mg IntraVENous Q6H PRN    magnesium oxide (MAG-OX) tablet 400 mg  400 mg Oral BID    mupirocin (BACTROBAN) 2 % ointment   Each Nostril BID    midazolam PF (VERSED) injection 1 mg  1 mg IntraVENous Q1H PRN    diphenhydrAMINE (BENADRYL) capsule 25 mg  25 mg Oral Nightly PRN    polyethylene glycol (GLYCOLAX) packet 17 g  17 g Oral Daily    sennosides-docusate sodium (SENOKOT-S) 8.6-50 MG tablet 1 tablet  1 tablet Oral BID    bisacodyl (DULCOLAX) suppository 10 mg  10 mg Rectal Daily PRN    magnesium hydroxide (MILK OF MAGNESIA) 400 MG/5ML suspension 30 mL  30 mL Oral Daily PRN    atorvastatin (LIPITOR)

## 2025-05-22 NOTE — PROGRESS NOTES
Occupational Therapy  05/22/25    Chart reviewed. Cleared by RN for OT evaluation and RN noted patient is slow to respond. Patient received in bed, OT assessing cognition while PT retrieved equipment. Pt able to state full name and initially repeating \"[birth year]\" when asked birth date. Upon PT return, pt again asked birth date and able to report month of her birth but no further date. Able to state name and \"aorta\" when asked what body part she had surgery on. Notable arm drift when asked to lift arms (able to lift R arm above shoulder height and unable to lift right beyond 30 degrees shoulder flexion). LE strength and command following intact and symmetrical. Patient unable to fixate gaze on therapist's finger despite multiple cues and not tracking. Patient tearful and moaning.      PA present at bedside and performed neuro exam and decision made to call Code Stroke. RN at bedside and stroke team arriving. Will follow up for full evaluation. Thank you.    Zandra Feng, OTR/L

## 2025-05-22 NOTE — PROGRESS NOTES
Cardiac Surgery ICU Progress Note    Admit Date: 2025  POD:  3 Days Post-Op    Procedure:     with Dr. Bradley     1. Aortic root replacement with #25 Marroquin KONECT valved conduit.     2. Insertion of percutaneous femoral A-line.    Hospital Course:  DOS : Aortic root replacement complicated by severe hypoxia and airway swelling. Transferred to ICU on Epi, Levo, Yan. Dobutamine and Levo added overnight.  POD1 : Febrile. Albumin x1 + 75 ml/hr NS. Dobutamine weaned to 2. K 5.5. Vent weaned to 40% FiO2. Failed spontaneous. Concern for angioedema and soft tissue swelling around neck. Possible Ct. Lasix 80 mg IV.  POD2 : Neck and angioedema improved. Trial extubate today. Keep CT. Diurese per nephrology  POD3 :  Stroke code for disconjugate gaze/inability to perform EOM, arm drift, word-finding difficulty.  CT negative for acute process.  Weaning pressors.  Nephrology recommending holding diuresis today.     Subjective:   Patient seen with Dr. Bradley this AM.  Tmax 37.3. Afib to 100s overnight - received 150 mg amio bolus; now NSR 80s.  BP /50-60s.  On 6 Norepi.      Objective:   Vitals:  Blood pressure 115/66, pulse 75, temperature 98.4 °F (36.9 °C), temperature source Oral, resp. rate 16, height 1.829 m (6'), weight (!) 151.6 kg (334 lb 3.5 oz), SpO2 98%, not currently breastfeeding.  Temp (24hrs), Av.6 °F (37 °C), Min:97.7 °F (36.5 °C), Max:99.2 °F (37.3 °C)    Infusions:  Norepi 6 mcg/min  Insulin     EKG/Rhythm:  NSR 80s on telemetry this AM.      Extubation Date / Time:   at 1041; prolonged intubation d/t significant airway swelling    CT Output: 280 ml past 24 hrs/ 220 ml overnight    CXR: Xray Result (most recent):  XR CHEST PORTABLE 2025    Narrative  EXAM: XR CHEST PORTABLE  ACC#: [id number]    INDICATION: Post op open heart surgery    COMPARISON: 2025    TECHNIQUE: Portable AP semiupright view of the chest.    FINDINGS:  The patient has been extubated and the

## 2025-05-22 NOTE — PLAN OF CARE
Speech Language Pathology  Flexible Endoscopic Evaluation of Swallowing-FEES  Patient: Evelio Marroquin (64 y.o. female)  Date: 5/22/2025  Primary Diagnosis: Aneurysm of ascending aorta [I71.21]  Severe aortic insufficiency [I35.1]  Procedure(s) (LRB):  AORTIC ROOT REPLACEMENT, ECC, DEDRA AND FLEXIBLE BRONCHOSCOPY BY DR BEDOYA (N/A) 3 Days Post-Op   Precautions:   Fall Risk           Sternal Precautions: Move in the Tube      ASSESSMENT :  Patient presents with functional oropharyngeal swallow function. Given previous presentation bedside and code stroke earlier this date, will follow up x1 at least to ensure diet tolerance and possible cognitive-linguistic assessment as indicated.     Patient will benefit from skilled intervention to address the above impairments.     PLAN :  Recommendations and Planned Interventions:  Diet: Regular and thin liquids  - General aspiration precautions: upright for all oral intake, small bites/sips, slow rate of intake   - Oral meds per patient preference (likely whole with thin liquids vs whole in applesauce, avoid crushing unless absolutely necessary)   - Oral hygiene BID, encourage self care with toothbrush/toothpaste       Acute SLP Services: Yes, patient will be followed by speech-language pathology 2x/week to address goals. Patient's rehabilitation potential is considered to be Good.  Discharge Recommendations: Continue to assess pending progress     SUBJECTIVE:   Patient stated “No hahaha” when asked if she wanted to see her vocal folds.     OBJECTIVE:       Past Medical History:   Diagnosis Date    Acute combined systolic and diastolic congestive heart failure (HCC) 7/28/2022    CAD (coronary artery disease)     Chronic obstructive pulmonary disease (HCC)     Chronic renal disease, stage III ([facility]) [[id number]] 07/06/2023    Depression     Hypertension     Menopause     LMP-46 years old?     Past Surgical History:   Procedure Laterality Date    BACK SURGERY      DISK REMOVAL AND FUSION

## 2025-05-22 NOTE — PROGRESS NOTES
0730: Bedside and Verbal shift change report given to LEYLA Ling (oncoming nurse) by LEYLA Da Silva (offgoing nurse). Report included the following information Nurse Handoff Report, Index, Intake/Output, MAR, and Recent Results.     0900: Per Speech okay to give meds with applesauce but RN to hold off on placing diet order until Speech can complete a fees    1000: Code stroke called. For L arm deficient and word finding issues. Neuro NP and rapid RN at bedside. Pt taken down for CT with call to tele neuro    1158: Per Diabetes management since insulin needs over last 6hrs has been less than 5units. Okay to turn off insulin drip with no lantus    1330: Patient up to recliner with PT/OT    1600: Patient back to bed x2 assist. BP stable with position change. Patient requiring lots of coaching with position change.     1930: Bedside and Verbal shift change report given to LEYLA Mo (oncoming nurse) by LEYLA Ling (offgoing nurse). Report included the following information Nurse Handoff Report, Index, Intake/Output, MAR, and Recent Results.

## 2025-05-22 NOTE — PROGRESS NOTES
Cardiac Surgery Coordinator- Placed update call to Mr Marroquin. Reviewed plan of care and informed him that she is going down for a CT. He is on his way to the hospital now.     1145- Met with the family of Evelio Marroquin, provided update from the Neurology team. Answered questions and offered emotional support. Will continue to follow.

## 2025-05-22 NOTE — PROGRESS NOTES
2000- Bedside shift change report given to JHOAN Da Silva (oncoming nurse) by Jhoan Sepulveda (offgoing nurse). Report included the following information Nurse Handoff Report, Index, Adult Overview, Surgery Report, Intake/Output, MAR, Recent Results, Med Rec Status, Cardiac Rhythm NSR, and Alarm Parameters.     0030- Pt noted to be in afib, rate 90-100s. Amio bolus given.    0800- Bedside shift change report given to JHOAN Ling (oncoming nurse) by JHOAN Da Silva (offgoing nurse). Report included the following information Nurse Handoff Report, Index, Adult Overview, Surgery Report, Intake/Output, MAR, Recent Results, Med Rec Status, and Cardiac Rhythm NSR .

## 2025-05-22 NOTE — PROGRESS NOTES
CRITICAL CARE PROGRESS NOTE    Name: Evelio Marroquin   : 1960   MRN: [id number]   Date: 2025      ICU Diagnosis      Ascending Aortic Aneurysm   Vasoplegic Shock  Acute HFrEF  Morbid Obesity    Overnight Events   No events. No complaints this AM.     ROS negative except as otherwise documented.     A/P   This is a 64 year old female with history of HTN, HLD, COPD, CKD III and HFpEF who presented to the hospital for elective repair of ascending aortic aneurysm - operative course complicated by hypotension and angioedema.     NEUROLOGICAL:    - monitor neuro exam post sedation  - tylenol scheduled for pain   - dilaudid PRN severe pain   - gabapentin 300 TID  - continue home sertraline     PULMONOLOGY:   Intubated post OR. Extubated on POD 2. Angioedema improved.   - O2 PRN; wean as tolerated  - IS  - OOB to chair  - follow CXR     CARDIOVASCULAR:   Moderate BiV dysfunction - I suspect that this is due to her acute OR event (acidosis). Possible anaphylactic reaction in the OR - unknown precipitant.   - goal MAP>=65  - continue levo; wean as tolerated  - pacer wires per CTS  - miya per CTS  - CTM  - amiodarone PPX  - hold home BP medications; restart as tolerated           RENAL/ELECTROLYTE:   CAIO - likely ATN due to pump time. Risk for progression given critical illness.  - nephrology consulted   - goal K>=4, Mg>=2, Phos >3  - daily BMP  - strict I/O's; daily weights  - avoid nephrotoxic medications     ENDOCRINE:   - insulin gtt per protocol  - FSBS ACHS     ID/MICRO:   - no signs or symptoms of active infection  - Ancef PPX per protocol    ICU DAILY CHECKLIST     Code Status: Full  DVT Prophylaxis: SCDs  T/L/D: PIV, CVC, jimmie  SUP: N/A  Diet: advance as tolerated  ABCDEF Bundle/Checklist Completed:Yes  Disposition: Stay in ICU  Multidisciplinary Rounds Completed:  Yes  Patient/Family Updated: Yes    OBJECTIVE:     Blood pressure 119/71, pulse 79, temperature 99.2 °F (37.3 °C), temperature source Oral,

## 2025-05-22 NOTE — PROGRESS NOTES
Code Stroke Documentation      Symptoms: Word finding, L arm drift   Baseline mRS:   1   Last Known Well: 2025 pre-procedure   Medical hx: Past Medical History:   Diagnosis Date    Acute combined systolic and diastolic congestive heart failure (HCC) 2022    CAD (coronary artery disease)     Chronic obstructive pulmonary disease (HCC)     Chronic renal disease, stage III (HCC) [796517] 2023    Depression     Hypertension     Menopause     LMP-46 years old?      Vitals: Vitals:    25 1000   BP: 115/66   Pulse: 85   Resp: 18   Temp:    SpO2: 94%      AC/APT:  Sub q heparin, ASA 81   VAN: Negative   NIHSS: 1a-LOC:0  1b-Month/Age:1  1c-Open/Close Hand:0  2-Best Gaze:0  3-Visual Fields:0  4-Facial Palsy:0  5a-Left Arm:1  5b-Right Arm:1  6a-Left Le  6b-Right Le  7-Limb Ataxia:0  8-Sensory:0  9-Best Language:1  10-Dysarthria:1  11-Extinction/Inattention:0  TOTAL SCORE:7   Imaging (personally reviewed): CT: No acute intracranial abnormality  CTA: No LVO  CTP: No perfusion deficit    Plan: tNK Candidate: NO  Mechanical thrombectomy Candidate: NO    *Perform dysphagia screening prior to any PO intake*     Discussed with: primary RN, patient, Dr Nasir Hope- tele- neurology     Arrival time: 1004    I have spent 45 of critical care time involved in lab review, consultations with specialist, family decision making and documentation. During this entire length of time I was immediately available to the patient.    Spoke with ROLANDO Donaldson regarding tele-neurology recommendations of MRI brain and delirium precautions.     STACEY Lilly  Neurovascular Nurse Practitioner

## 2025-05-22 NOTE — PROGRESS NOTES
SLP Contact Note    Flexible Endoscopic Evaluation of Swallowing (FEES) completed on this patient. Patient observed with functional oropharyngeal swallow. Recommend patient initiate Regular and thin liquids. Full procedure note to follow.     Thank you,  Atiya Powers M.Ed, CCC-SLP (pronouns: she/her/hers)  Speech-Language Pathologist

## 2025-05-22 NOTE — PLAN OF CARE
Speech LAnguage Pathology EVALUATION    Patient: Evelio Marroquin (64 y.o. female)  Date: 5/22/2025  Primary Diagnosis: Aneurysm of ascending aorta [I71.21]  Severe aortic insufficiency [I35.1]  Procedure(s) (LRB):  AORTIC ROOT REPLACEMENT, ECC, DEDRA AND FLEXIBLE BRONCHOSCOPY BY DR BEDOYA (N/A) 3 Days Post-Op   Precautions:                     ASSESSMENT :  Aspiration risk present in setting of angioedema, COPD, and vocal quality changes s/p intubation and cardiothoracic surgery which could be concerning for damage to the recurrent laryngeal nerve. Patient reports this is not her baseline vocal quality. Patient without overt signs or symptoms of aspiration with ice chips and pureed solids, however with single sips of thin liquids patient has notable change in vocal quality with subjectively wet quality; patient does not report pain or perceived difficulty with deglutition. Given presentation bedside and multiple aspiration risk factors, SLP recommending objective imaging to rule-out pharyngeal dysphagia and aspiration prior to initiating oral diet.     Patient will benefit from skilled intervention to address the above impairments.     PLAN :  Recommendations and Planned Interventions:  Diet: NPO with ice chips after oral care  - Meds whole in applesauce   - Pending FEES         Acute SLP Services: SLP Plan of Care: 4 times/week. Patient's rehabilitation potential is considered to be Good.  Discharge Recommendations: Continue to assess pending progress     SUBJECTIVE:   Patient stated, “Can I have another sip?”    OBJECTIVE:     Past Medical History:   Diagnosis Date    Acute combined systolic and diastolic congestive heart failure (HCC) 7/28/2022    CAD (coronary artery disease)     Chronic obstructive pulmonary disease (HCC)     Chronic renal disease, stage III (HCC) [266945] 07/06/2023    Depression     Hypertension     Menopause     LMP-46 years old?     Past Surgical History:   Procedure Laterality Date    BACK  SURGERY      DISK REMOVAL AND FUSION LUMBAR    CARDIAC PROCEDURE N/A 5/16/2025    Left heart cath / coronary angiography performed by Kadeem Mo MD at [facility] CARDIAC CATH LAB    COLONOSCOPY  2015    THORACIC AORTIC ANEURYSM REPAIR N/A 5/19/2025    AORTIC ROOT REPLACEMENT, ECC, DEDRA AND FLEXIBLE BRONCHOSCOPY BY DR BEDOYA performed by Loyd Bradley MD at [facility] HEART    WISDOM TOOTH EXTRACTION       Prior Level of Function/Home Situation:   Social/Functional History  Lives With: Spouse  Type of Home: House  Home Layout: One level    Baseline Assessment:  Current Diet : NPO  Current Liquid Diet : NPO  Prior Dysphagia History: None apparent on chart review  Patient Complaint: Post-extubation    Cognitive and Communication Status:  Neurologic State: Alert  Orientation Level: Oriented x4  Cognition: Decreased attention/concentration    Dysphagia:  Oral Assessment:  Oral Motor   Labial:  (Angioedema)  Dentition: Upper dentures  Oral Hygiene: Moist  Lingual: Decreased rate  Velum: No Impairment  Mandible: No impairment  P.O. Trials:  PO Trials  Assessment Method(s): Observation  Patient Position: Upright in bed  Vocal Quality: Strain  Consistency Presented: Ice Chips  How Presented: SLP-fed/Presented;Spoon;Straw  Bolus Acceptance: No impairment  Bolus Formation/Control: No impairment  Propulsion: No impairment  Oral Residue: None  Aspiration Signs/Symptoms: Throat clear;Change of vocal quality  Pharyngeal Phase Characteristics: Multiple swallows  Oral Phase  Oral Phase - Comment: Functional with thin liquids  Pharyngeal Phase Comment: Suspicion for pharyngeal involvement    Perceptual Voice Evaluation (GRBAS Scale):  Grade: 3= severe degree  Roughness: 1= mild degree  Breathiness: 3= severe degree  Asthenia: 3= severe degree  Strain: 3= severe degree    Respiratory Status/Airway:  Nasal cannula                         Outcome Measure:  Functional Oral Intake Scale (FOIS): 1--Nothing by mouth (NPO)    After

## 2025-05-22 NOTE — DIABETES MGMT
[facility]  DIABETES MANAGEMENT CONSULT    Consulted by Provider - Cardiac surgery team for advanced nursing evaluation and care for inpatient blood glucose management.    Evaluation and Action Plan   Evelio Marroquin is a 63 yo female with HTN, HLD, COPD, CKD3, HFpEF with no history of DM who presented for scheduled aortic aneurysm repair. She underwent aortic aneurysm repair on 5/19 with possible anaphylactic episode unsure of trigger that resulted in acidosis and airway swelling. She remained intubated post op. Insulin infusion was started post op for glycemic control. Intensivist is recommending TF if she remains intubated greater than 24 hours. Will follow notes for recs from nutrition in event TF are started.     24 hour insulin needs were minimal at 16.2 units despite steroid dosing of Methylprednisolone 40 mg q12hrs IV. Will continue insulin infusion for another 24 hours, will assess 24 hour needs and overall clinical status before determining insulin infusion transition dosing. Collaborated with Cardiac surgery NP - Antonieta Donaldson, in agreement to continue insulin infusion and anticipates she will likely be started on TF and have long term ETT given current airway swelling that has not resolved yet.      5/22- She was extubated and transitioned off the insulin infusion with nurse driven protocol.   5/23- , will continue to monitor BG pattern will not resume scheduled insulin therapy will continue correctional insulin therapy.     Blood glucose pattern      Significant diabetes-related events over the past 24-72 hours  A1C 5.1%   On insulin infusion post op for glycemic control  Total daily insulin dose in the last 24 hours: 16.2 -> 85.4-> 34.9       Management Rationale Action Plan   Medication   Basal needs Using  units/kg/D based on  Not indicated at this time    Nutritional needs Covers carb load in meals If experiencing persistent preprandial hyperglycemia START Humalog  4 units TID with meals   If continues after starting scheduled mealtime insulin increase by 2 units to Humalog 6 units TID with meals     Corrective insulin Using medium dose sensitivity  ACHS   Additional orders  NPO       Diabetes Discharge Plan   Medication  A1c is 5.1% given cardiac history and obesity recommend changes to diet to stick to carb controlled diet and increase exercise as tolerated. Would also consider GLP-1 such as Ozempic given added benefit of cardioprotective properties will assess renal function before discharge to determine appropriateness.    Referral  []        Outpatient diabetes education   Additional orders  Follow up with PCP           HX:   Past Medical History:   Diagnosis Date    Acute combined systolic and diastolic congestive heart failure (HCC) 7/28/2022    CAD (coronary artery disease)     Chronic obstructive pulmonary disease (HCC)     Chronic renal disease, stage III ([facility]) [913306] 07/06/2023    Depression     Hypertension     Menopause     LMP-46 years old?        INITIAL DX: Aneurysm of ascending aorta [I71.21]  Severe aortic insufficiency [I35.1]     Current Treatment     TX: Aortic aneurysm repair     Hospital Course   Clinical progress has been uncomplicated    Lab Results   Component Value Date    LABA1C 5.1 05/12/2025    LABA1C 5.1 05/13/2024    LABA1C 5.3 05/05/2023     Diabetes-related Medical History  Acute complications  Stress hyperglycemia   Neurological complications  none  Microvascular disease  none  Macrovascular disease  CAD  Other associated conditions     Obesity      Subjective   Patient being transferred to [facility] at time of visit      Objective   Physical exam  General Obese female in no acute distress.   Neuro  Alert, oriented   Vital Signs   Vitals:    05/22/25 1400   BP:    Pulse: 83   Resp: 18   Temp:    SpO2: 96%     Skin  Warm and dry.       Laboratory  Recent Labs     05/20/25  0423 05/21/25  0232 05/22/25  0342   WBC 16.1* 13.6* 14.0*   HGB 11.9 9.6*

## 2025-05-22 NOTE — PROGRESS NOTES
Physical Therapy  5/22/2025    Chart reviewed. Cleared by RN for PT evaluation and RN noted patient is slow to respond. Patient received in bed, OT assessing cognition while PT retrieved equipment. Upon return to bedside, OT voicing concerns and PT noted patient able to state month of her birth but no further date. Able to state name and \"aorta\" when asked what body part she had surgery on. Notable arm drift when asked to lift arms (able to lift R arm above shoulder height and unable to lift right beyond 30 degrees shoulder flexion). LE strength and command following intact and symmetrical. Patient unable to fixate gaze on therapist's finger despite multiple cues and not tracking. Patient tearful and moaning.     PA present at bedside and performed neuro exam and decision made to call Code Stroke. RN at bedside and stroke team arriving. Will follow up for full evaluation. Thank you.    Leyda Weldon, PT, DPT, CCS  Time: 16 minutes

## 2025-05-22 NOTE — PLAN OF CARE
Problem: Physical Therapy - Adult  Goal: By Discharge: Performs mobility at highest level of function for planned discharge setting.  See evaluation for individualized goals.  Description: FUNCTIONAL STATUS PRIOR TO ADMISSION: Patient was independent and active without use of DME. Enjoys playing softball.     HOME SUPPORT PRIOR TO ADMISSION: The patient lived with her  but did not require assist.    Physical Therapy Goals  Initiated 5/22/2025  1.  Patient will move from supine to sit and sit to supine, scoot up and down, and roll side to side in bed with minimal assistance within 5 day(s).    2.  Patient will perform sit to stand with minimal assistance within 5 day(s).  3.  Patient will transfer from bed to chair and chair to bed with minimal assistance using the least restrictive device within 5 day(s).  4.  Patient will ambulate with minimal assistance for 50 feet with the least restrictive device within 5 day(s).   5.  Patient will ascend/descend 1 stairs with right handrail(s) with minimal assistance within 5 day(s).  6.  Patient will perform cardiac exercises per protocol with supervision/set-up within 5 days.  7.  Patient will verbally recall and functionally demonstrate mindful-based movements (\"move in the tube\") principles without cues within 5 days.    Outcome: Progressing   PHYSICAL THERAPY EVALUATION    Patient: Eveilo Marroquin (64 y.o. female)  Date: 5/22/2025  Primary Diagnosis: Aneurysm of ascending aorta [I71.21]  Severe aortic insufficiency [I35.1]  Procedure(s) (LRB):  AORTIC ROOT REPLACEMENT, ECC, DEDRA AND FLEXIBLE BRONCHOSCOPY BY DR BEDOYA (N/A) 3 Days Post-Op   Precautions: Restrictions/Precautions  Restrictions/Precautions: Fall Risk     Position Activity Restriction  Sternal Precautions: Move in the Tube      ASSESSMENT :   DEFICITS/IMPAIRMENTS:   The patient is limited by decreased functional mobility, ROM, strength, activity tolerance, endurance, safety awareness, cognition, command  understanding of mindful-based movements (\"move in the tube\") principles of keeping UEs proximal to ribcage to prevent lateral pull on the sternum during load-bearing activities with visual, verbal, and manual cues required for compliance.    Cardiac diagnosis intervention:  Patient instructed and educated on mindful movement principles based on “Move in The Tube” concept to include maintaining bilateral elbows close to rib cage when performing any load-bearing activity such as getting in/out of bed, pushing up from a chair, opening a door, or lifting a box.  Patient was given a handout with diagrams of each correct/incorrect method of performing each of the above tasks.                                                                                                                                                                                                                               [facility]-PAC®      Basic Mobility Inpatient Short Form (6-Clicks) Version 2    How much help is needed turning from your back to your side while in a flat bed without using bedrails?: Total  How much help is needed moving from lying on your back to sitting on the side of a flat bed without using bedrails?: Total  How much help is needed moving to and from a bed to a chair?: A Lot  How much help is needed standing up from a chair using your arms?: A Lot  How much help is needed walking in hospital room?: Total  How much help is needed climbing 3-5 steps with a railing?: Total    -PAC Inpatient Mobility Raw Score : 8  -PAC Inpatient T-Scale Score : 28.52     Cutoff score <=171,2,3 had higher odds of discharging home with home health or need of SNF/IPR.    1. Negar Lieberman, Ruthann Vargas, Oli Garcia, Edith Olivares, Stephan Biswas, Sacha Lieberman.  Validity of the AM-PAC “6-Clicks” Inpatient Daily Activity and Basic Mobility Short Forms. Physical Therapy Mar 2014, [phone]; DOI:

## 2025-05-22 NOTE — PLAN OF CARE
Problem: Occupational Therapy - Adult  Goal: By Discharge: Performs self-care activities at highest level of function for planned discharge setting.  See evaluation for individualized goals.  Description: FUNCTIONAL STATUS PRIOR TO ADMISSION: Patient was (I) with ADLs, IADLs, and functional mobility. Enjoys playing softball.     HOME SUPPORT PRIOR TO ADMISSION: The patient lives with her  and did not require assist.    Occupational Therapy Goals:  Initiated 5/22/2025    1.  Patient will perform ADLs standing 5 mins without fatigue or LOB with Moderate Assist within 7 days.  2.  Patient will perform upper body ADLs with Moderate Assist while adhering to precautions within 7 days.  3.  Patient will perform lower body ADLs with Moderate Assist using tailor sit technique vs AE prn within 7 days.    4.  Patient will perform gathering ADL items high and low 2/2 with Moderate Assist within 7 days.  5.  Patient will perform toilet transfers with Moderate Assist within 7 days.  6.  Patient will perform all aspects of toileting with Moderate Assist within 7 days.  7.  Patient will participate in cardiac/sternal upper extremity therapeutic exercise/activities to increase independence with ADLs with supervision/set-up for 5 minutes within 7 days.   8.  Patient will adhere to Move in the Tube precautions during functional tasks with min verbal cues within 7 days.      Outcome: Progressing  OCCUPATIONAL THERAPY EVALUATION    Patient: Evelio Marroquin (64 y.o. female)  Date: 5/22/2025  Primary Diagnosis: Aneurysm of ascending aorta [I71.21]  Severe aortic insufficiency [I35.1]  Procedure(s) (LRB):  AORTIC ROOT REPLACEMENT, ECC, DEDRA AND FLEXIBLE BRONCHOSCOPY BY DR BEDOYA (N/A) 3 Days Post-Op     Precautions: Fall Risk           Sternal Precautions: Move in the Tube      ASSESSMENT :  Pt rec'd sitting up in bed, agreeable to OT eval. Pt is POD#3 s/p aortic root replacement. She is limited by decreased ROM, strength, activity  exercises \"outside of the tube\" to prevent scar tissue formation around sternal incision site.    Patient instructed on no asymmetrical reaching over head to ensure BUEs are lifted together above 90* of shoulder flexion.    [facility] \"6 Clicks\"                                                       Daily Activity Inpatient Short Form  AM-PAC Daily Activity - Inpatient   How much help is needed for putting on and taking off regular lower body clothing?: Total  How much help is needed for bathing (which includes washing, rinsing, drying)?: A Lot  How much help is needed for toileting (which includes using toilet, bedpan, or urinal)?: Total  How much help is needed for putting on and taking off regular upper body clothing?: Total  How much help is needed for taking care of personal grooming?: A Lot  How much help for eating meals?: A Little  [facility] Inpatient Daily Activity Raw Score: 10  AM-PAC Inpatient ADL T-Scale Score : 27.31  ADL Inpatient CMS 0-100% Score: 74.7  ADL Inpatient CMS G-Code Modifier : CL     Interpretation of Tool:  Represents clinically-significant functional categories (i.e. Activities of daily living).  Cutoff score 39.4 (19) correlates to a good likelihood of discharging home versus a facility  Negar Lieberman, Ruthann Vargas, Oli Garcia, Edith Olivares, Stephan Biswas, Sacha Lieberman, AM-PAC “6-Clicks” Functional Assessment Scores Predict Acute Care Hospital Discharge Destination, Physical Therapy, Volume 94, Issue 9, 1 September 2014, Pages 5198-3657, [email or web address]       Pain Rating:  Pt  Pain Intervention(s):       Activity Tolerance:   Fair     After treatment:   Patient left in no apparent distress sitting up in chair, Call bell within reach, and Heels elevated for pressure relief    COMMUNICATION/EDUCATION:   The patient's plan of care was discussed with: physical therapist and registered nurse    Patient Education  Education Given To:

## 2025-05-23 ENCOUNTER — APPOINTMENT (OUTPATIENT)
Facility: HOSPITAL | Age: 65
DRG: 216 | End: 2025-05-23
Attending: THORACIC SURGERY (CARDIOTHORACIC VASCULAR SURGERY)
Payer: COMMERCIAL

## 2025-05-23 LAB
ALBUMIN SERPL-MCNC: 3 G/DL (ref 3.5–5)
ALBUMIN/GLOB SERPL: 1 (ref 1.1–2.2)
ALP SERPL-CCNC: 62 U/L (ref 45–117)
ALT SERPL-CCNC: <6 U/L (ref 12–78)
AMMONIA PLAS-SCNC: 23 UMOL/L
ANION GAP SERPL CALC-SCNC: 6 MMOL/L (ref 2–12)
APPEARANCE UR: CLEAR
ARTERIAL PATENCY WRIST A: POSITIVE
AST SERPL-CCNC: ABNORMAL U/L (ref 15–37)
BACTERIA URNS QL MICRO: NEGATIVE /HPF
BASE EXCESS BLD CALC-SCNC: 2.3 MMOL/L
BDY SITE: ABNORMAL
BILIRUB SERPL-MCNC: 0.3 MG/DL (ref 0.2–1)
BILIRUB UR QL: NEGATIVE
BUN SERPL-MCNC: 84 MG/DL (ref 6–20)
BUN/CREAT SERPL: 19 (ref 12–20)
CALCIUM SERPL-MCNC: 9.8 MG/DL (ref 8.5–10.1)
CHLORIDE SERPL-SCNC: 110 MMOL/L (ref 97–108)
CHOLEST SERPL-MCNC: 119 MG/DL
CO2 SERPL-SCNC: 25 MMOL/L (ref 21–32)
COLOR UR: ABNORMAL
CREAT SERPL-MCNC: 4.46 MG/DL (ref 0.55–1.02)
EKG DIAGNOSIS: NORMAL
EKG Q-T INTERVAL: 340 MS
EKG QRS DURATION: 100 MS
EKG QTC CALCULATION (BAZETT): 462 MS
EKG R AXIS: 7 DEGREES
EKG T AXIS: 210 DEGREES
EKG VENTRICULAR RATE: 111 BPM
EPITH CASTS URNS QL MICRO: ABNORMAL /LPF
ERYTHROCYTE [DISTWIDTH] IN BLOOD BY AUTOMATED COUNT: 13.2 % (ref 11.5–14.5)
FOLATE SERPL-MCNC: 15.4 NG/ML (ref 5–21)
GAS FLOW.O2 O2 DELIVERY SYS: ABNORMAL
GLOBULIN SER CALC-MCNC: 3.1 G/DL (ref 2–4)
GLUCOSE BLD STRIP.AUTO-MCNC: 116 MG/DL (ref 65–117)
GLUCOSE BLD STRIP.AUTO-MCNC: 117 MG/DL (ref 65–117)
GLUCOSE BLD STRIP.AUTO-MCNC: 122 MG/DL (ref 65–117)
GLUCOSE BLD STRIP.AUTO-MCNC: 126 MG/DL (ref 65–117)
GLUCOSE SERPL-MCNC: 104 MG/DL (ref 65–100)
GLUCOSE UR STRIP.AUTO-MCNC: NEGATIVE MG/DL
HCO3 BLD-SCNC: 30.4 MMOL/L (ref 21–28)
HCT VFR BLD AUTO: 28.6 % (ref 35–47)
HDLC SERPL-MCNC: 34 MG/DL
HDLC SERPL: 3.5 (ref 0–5)
HGB BLD-MCNC: 8.6 G/DL (ref 11.5–16)
HGB UR QL STRIP: ABNORMAL
HYALINE CASTS URNS QL MICRO: ABNORMAL /LPF (ref 0–5)
KETONES UR QL STRIP.AUTO: NEGATIVE MG/DL
LACTATE SERPL-SCNC: 0.4 MMOL/L (ref 0.4–2)
LDLC SERPL CALC-MCNC: 50.8 MG/DL (ref 0–100)
LEUKOCYTE ESTERASE UR QL STRIP.AUTO: NEGATIVE
MAGNESIUM SERPL-MCNC: 2.7 MG/DL (ref 1.6–2.4)
MCH RBC QN AUTO: 29.7 PG (ref 26–34)
MCHC RBC AUTO-ENTMCNC: 30.1 G/DL (ref 30–36.5)
MCV RBC AUTO: 98.6 FL (ref 80–99)
NITRITE UR QL STRIP.AUTO: NEGATIVE
NRBC # BLD: 0 K/UL (ref 0–0.01)
NRBC BLD-RTO: 0 PER 100 WBC
O2/TOTAL GAS SETTING VFR VENT: 4 %
PCO2 BLD: 69.7 MMHG (ref 35–48)
PH BLD: 7.25 (ref 7.35–7.45)
PH UR STRIP: 5 (ref 5–8)
PHOSPHATE SERPL-MCNC: 4.3 MG/DL (ref 2.6–4.7)
PLATELET # BLD AUTO: 139 K/UL (ref 150–400)
PMV BLD AUTO: 12.1 FL (ref 8.9–12.9)
PO2 BLD: 88 MMHG (ref 83–108)
POTASSIUM SERPL-SCNC: 5 MMOL/L (ref 3.5–5.1)
POTASSIUM SERPL-SCNC: ABNORMAL MMOL/L (ref 3.5–5.1)
PROT SERPL-MCNC: 6.1 G/DL (ref 6.4–8.2)
PROT UR STRIP-MCNC: 30 MG/DL
RBC # BLD AUTO: 2.9 M/UL (ref 3.8–5.2)
RBC #/AREA URNS HPF: ABNORMAL /HPF (ref 0–5)
SAO2 % BLD: 94.5 % (ref 92–97)
SERVICE CMNT-IMP: ABNORMAL
SERVICE CMNT-IMP: NORMAL
SERVICE CMNT-IMP: NORMAL
SODIUM SERPL-SCNC: 141 MMOL/L (ref 136–145)
SP GR UR REFRACTOMETRY: 1.02 (ref 1–1.03)
SPECIMEN TYPE: ABNORMAL
TRIGL SERPL-MCNC: 171 MG/DL
TROPONIN I SERPL HS-MCNC: 573 NG/L (ref 0–51)
TROPONIN I SERPL HS-MCNC: 681 NG/L (ref 0–51)
URINE CULTURE IF INDICATED: ABNORMAL
UROBILINOGEN UR QL STRIP.AUTO: 0.2 EU/DL (ref 0.2–1)
VIT B12 SERPL-MCNC: 1159 PG/ML (ref 193–986)
VLDLC SERPL CALC-MCNC: 34.2 MG/DL
WBC # BLD AUTO: 11.1 K/UL (ref 3.6–11)
WBC URNS QL MICRO: ABNORMAL /HPF (ref 0–4)

## 2025-05-23 PROCEDURE — 93005 ELECTROCARDIOGRAM TRACING: CPT | Performed by: THORACIC SURGERY (CARDIOTHORACIC VASCULAR SURGERY)

## 2025-05-23 PROCEDURE — 82607 VITAMIN B-12: CPT

## 2025-05-23 PROCEDURE — 85027 COMPLETE CBC AUTOMATED: CPT

## 2025-05-23 PROCEDURE — 2580000003 HC RX 258

## 2025-05-23 PROCEDURE — 82746 ASSAY OF FOLIC ACID SERUM: CPT

## 2025-05-23 PROCEDURE — 94660 CPAP INITIATION&MGMT: CPT

## 2025-05-23 PROCEDURE — 70551 MRI BRAIN STEM W/O DYE: CPT

## 2025-05-23 PROCEDURE — P9047 ALBUMIN (HUMAN), 25%, 50ML: HCPCS

## 2025-05-23 PROCEDURE — 99231 SBSQ HOSP IP/OBS SF/LOW 25: CPT

## 2025-05-23 PROCEDURE — 6370000000 HC RX 637 (ALT 250 FOR IP)

## 2025-05-23 PROCEDURE — 51798 US URINE CAPACITY MEASURE: CPT

## 2025-05-23 PROCEDURE — 84132 ASSAY OF SERUM POTASSIUM: CPT

## 2025-05-23 PROCEDURE — 2500000003 HC RX 250 WO HCPCS: Performed by: PHYSICIAN ASSISTANT

## 2025-05-23 PROCEDURE — 6370000000 HC RX 637 (ALT 250 FOR IP): Performed by: STUDENT IN AN ORGANIZED HEALTH CARE EDUCATION/TRAINING PROGRAM

## 2025-05-23 PROCEDURE — 82140 ASSAY OF AMMONIA: CPT

## 2025-05-23 PROCEDURE — 6360000002 HC RX W HCPCS: Performed by: PHYSICIAN ASSISTANT

## 2025-05-23 PROCEDURE — 97530 THERAPEUTIC ACTIVITIES: CPT

## 2025-05-23 PROCEDURE — 80053 COMPREHEN METABOLIC PANEL: CPT

## 2025-05-23 PROCEDURE — 51701 INSERT BLADDER CATHETER: CPT

## 2025-05-23 PROCEDURE — 80061 LIPID PANEL: CPT

## 2025-05-23 PROCEDURE — 6370000000 HC RX 637 (ALT 250 FOR IP): Performed by: PHYSICIAN ASSISTANT

## 2025-05-23 PROCEDURE — 84484 ASSAY OF TROPONIN QUANT: CPT

## 2025-05-23 PROCEDURE — 82962 GLUCOSE BLOOD TEST: CPT

## 2025-05-23 PROCEDURE — 83735 ASSAY OF MAGNESIUM: CPT

## 2025-05-23 PROCEDURE — 82803 BLOOD GASES ANY COMBINATION: CPT

## 2025-05-23 PROCEDURE — 36600 WITHDRAWAL OF ARTERIAL BLOOD: CPT

## 2025-05-23 PROCEDURE — 94760 N-INVAS EAR/PLS OXIMETRY 1: CPT

## 2025-05-23 PROCEDURE — 6360000004 HC RX CONTRAST MEDICATION: Performed by: INTERNAL MEDICINE

## 2025-05-23 PROCEDURE — 84100 ASSAY OF PHOSPHORUS: CPT

## 2025-05-23 PROCEDURE — 81001 URINALYSIS AUTO W/SCOPE: CPT

## 2025-05-23 PROCEDURE — 6370000000 HC RX 637 (ALT 250 FOR IP): Performed by: THORACIC SURGERY (CARDIOTHORACIC VASCULAR SURGERY)

## 2025-05-23 PROCEDURE — 83605 ASSAY OF LACTIC ACID: CPT

## 2025-05-23 PROCEDURE — 2700000000 HC OXYGEN THERAPY PER DAY

## 2025-05-23 PROCEDURE — 2100000000 HC CCU R&B

## 2025-05-23 PROCEDURE — 5A09457 ASSISTANCE WITH RESPIRATORY VENTILATION, 24-96 CONSECUTIVE HOURS, CONTINUOUS POSITIVE AIRWAY PRESSURE: ICD-10-PCS | Performed by: INTERNAL MEDICINE

## 2025-05-23 PROCEDURE — 36415 COLL VENOUS BLD VENIPUNCTURE: CPT

## 2025-05-23 PROCEDURE — 71045 X-RAY EXAM CHEST 1 VIEW: CPT

## 2025-05-23 PROCEDURE — 6360000002 HC RX W HCPCS: Performed by: INTERNAL MEDICINE

## 2025-05-23 PROCEDURE — 93308 TTE F-UP OR LMTD: CPT

## 2025-05-23 PROCEDURE — P9045 ALBUMIN (HUMAN), 5%, 250 ML: HCPCS

## 2025-05-23 PROCEDURE — 2000000000 HC ICU R&B

## 2025-05-23 PROCEDURE — 6360000002 HC RX W HCPCS

## 2025-05-23 RX ORDER — ATROPINE SULFATE 0.1 MG/ML
INJECTION INTRAVENOUS
Status: DISCONTINUED
Start: 2025-05-23 | End: 2025-05-23 | Stop reason: WASHOUT

## 2025-05-23 RX ORDER — OXYCODONE HYDROCHLORIDE 5 MG/1
5 TABLET ORAL EVERY 6 HOURS PRN
Refills: 0 | Status: DISCONTINUED | OUTPATIENT
Start: 2025-05-23 | End: 2025-05-23

## 2025-05-23 RX ORDER — GINSENG 100 MG
CAPSULE ORAL ONCE
Status: COMPLETED | OUTPATIENT
Start: 2025-05-23 | End: 2025-05-23

## 2025-05-23 RX ORDER — ALBUMIN (HUMAN) 12.5 G/50ML
25 SOLUTION INTRAVENOUS ONCE
Status: COMPLETED | OUTPATIENT
Start: 2025-05-23 | End: 2025-05-23

## 2025-05-23 RX ORDER — GABAPENTIN 100 MG/1
100 CAPSULE ORAL 3 TIMES DAILY
Status: DISCONTINUED | OUTPATIENT
Start: 2025-05-23 | End: 2025-05-23

## 2025-05-23 RX ORDER — EPINEPHRINE 0.1 MG/ML
INJECTION INTRAVENOUS
Status: DISCONTINUED
Start: 2025-05-23 | End: 2025-05-23 | Stop reason: WASHOUT

## 2025-05-23 RX ORDER — BISACODYL 10 MG
10 SUPPOSITORY, RECTAL RECTAL ONCE
Status: COMPLETED | OUTPATIENT
Start: 2025-05-23 | End: 2025-05-23

## 2025-05-23 RX ORDER — HYDROMORPHONE HYDROCHLORIDE 2 MG/1
1 TABLET ORAL ONCE
Refills: 0 | Status: DISCONTINUED | OUTPATIENT
Start: 2025-05-23 | End: 2025-05-23

## 2025-05-23 RX ORDER — OXYCODONE HYDROCHLORIDE 5 MG/1
10 TABLET ORAL EVERY 4 HOURS PRN
Refills: 0 | Status: DISCONTINUED | OUTPATIENT
Start: 2025-05-23 | End: 2025-05-30 | Stop reason: HOSPADM

## 2025-05-23 RX ORDER — ALBUMIN HUMAN 50 G/1000ML
12.5 SOLUTION INTRAVENOUS ONCE
Status: COMPLETED | OUTPATIENT
Start: 2025-05-23 | End: 2025-05-23

## 2025-05-23 RX ORDER — METOPROLOL TARTRATE 25 MG/1
12.5 TABLET, FILM COATED ORAL 2 TIMES DAILY
Status: DISCONTINUED | OUTPATIENT
Start: 2025-05-23 | End: 2025-05-25

## 2025-05-23 RX ORDER — NOREPINEPHRINE BITARTRATE 0.06 MG/ML
INJECTION, SOLUTION INTRAVENOUS
Status: DISCONTINUED
Start: 2025-05-23 | End: 2025-05-23 | Stop reason: WASHOUT

## 2025-05-23 RX ORDER — TRAZODONE HYDROCHLORIDE 50 MG/1
50 TABLET ORAL NIGHTLY
Status: DISCONTINUED | OUTPATIENT
Start: 2025-05-23 | End: 2025-05-30 | Stop reason: HOSPADM

## 2025-05-23 RX ORDER — AMIODARONE HYDROCHLORIDE 150 MG/3ML
INJECTION, SOLUTION INTRAVENOUS
Status: DISCONTINUED
Start: 2025-05-23 | End: 2025-05-23 | Stop reason: WASHOUT

## 2025-05-23 RX ORDER — OXYCODONE HYDROCHLORIDE 5 MG/1
5 TABLET ORAL EVERY 4 HOURS PRN
Refills: 0 | Status: DISCONTINUED | OUTPATIENT
Start: 2025-05-23 | End: 2025-05-30 | Stop reason: HOSPADM

## 2025-05-23 RX ORDER — INDOMETHACIN 25 MG/1
CAPSULE ORAL
Status: DISCONTINUED
Start: 2025-05-23 | End: 2025-05-23 | Stop reason: WASHOUT

## 2025-05-23 RX ORDER — BUMETANIDE 0.25 MG/ML
2 INJECTION, SOLUTION INTRAMUSCULAR; INTRAVENOUS ONCE
Status: COMPLETED | OUTPATIENT
Start: 2025-05-23 | End: 2025-05-23

## 2025-05-23 RX ADMIN — SENNOSIDES AND DOCUSATE SODIUM 1 TABLET: 8.6; 5 TABLET ORAL at 08:19

## 2025-05-23 RX ADMIN — ALBUMIN (HUMAN) 12.5 G: 12.5 INJECTION, SOLUTION INTRAVENOUS at 08:47

## 2025-05-23 RX ADMIN — HEPARIN SODIUM 5000 UNITS: 5000 INJECTION INTRAVENOUS; SUBCUTANEOUS at 06:26

## 2025-05-23 RX ADMIN — CHLORHEXIDINE GLUCONATE 15 ML: 1.2 RINSE ORAL at 19:44

## 2025-05-23 RX ADMIN — ACETAMINOPHEN 1000 MG: 500 TABLET ORAL at 08:21

## 2025-05-23 RX ADMIN — SERTRALINE HYDROCHLORIDE 100 MG: 50 TABLET ORAL at 08:19

## 2025-05-23 RX ADMIN — ACETAMINOPHEN 1000 MG: 500 TABLET ORAL at 04:24

## 2025-05-23 RX ADMIN — SULFUR HEXAFLUORIDE 2 ML: KIT at 17:24

## 2025-05-23 RX ADMIN — HEPARIN SODIUM 5000 UNITS: 5000 INJECTION INTRAVENOUS; SUBCUTANEOUS at 14:04

## 2025-05-23 RX ADMIN — MAGNESIUM HYDROXIDE 30 ML: 400 SUSPENSION ORAL at 08:19

## 2025-05-23 RX ADMIN — HEPARIN SODIUM 5000 UNITS: 5000 INJECTION INTRAVENOUS; SUBCUTANEOUS at 21:33

## 2025-05-23 RX ADMIN — GABAPENTIN 100 MG: 100 CAPSULE ORAL at 14:04

## 2025-05-23 RX ADMIN — BUMETANIDE 2 MG: 0.25 INJECTION INTRAMUSCULAR; INTRAVENOUS at 09:27

## 2025-05-23 RX ADMIN — CHLORHEXIDINE GLUCONATE 15 ML: 1.2 RINSE ORAL at 08:08

## 2025-05-23 RX ADMIN — POLYETHYLENE GLYCOL 3350 17 G: 17 POWDER, FOR SOLUTION ORAL at 08:19

## 2025-05-23 RX ADMIN — HYDROMORPHONE HYDROCHLORIDE 0.5 MG: 1 INJECTION, SOLUTION INTRAMUSCULAR; INTRAVENOUS; SUBCUTANEOUS at 14:22

## 2025-05-23 RX ADMIN — ASPIRIN 81 MG CHEWABLE TABLET 81 MG: 81 TABLET CHEWABLE at 08:19

## 2025-05-23 RX ADMIN — METOPROLOL TARTRATE 12.5 MG: 25 TABLET, FILM COATED ORAL at 14:04

## 2025-05-23 RX ADMIN — AMIODARONE HYDROCHLORIDE 150 MG: 50 INJECTION, SOLUTION INTRAVENOUS at 14:35

## 2025-05-23 RX ADMIN — SODIUM CHLORIDE, PRESERVATIVE FREE 10 ML: 5 INJECTION INTRAVENOUS at 07:58

## 2025-05-23 RX ADMIN — AMIODARONE HYDROCHLORIDE 1 MG/MIN: 50 INJECTION, SOLUTION INTRAVENOUS at 16:32

## 2025-05-23 RX ADMIN — GABAPENTIN 200 MG: 100 CAPSULE ORAL at 08:30

## 2025-05-23 RX ADMIN — BACITRACIN 1 PACKET: 500 OINTMENT TOPICAL at 09:45

## 2025-05-23 RX ADMIN — MUPIROCIN: 20 OINTMENT TOPICAL at 19:44

## 2025-05-23 RX ADMIN — SODIUM CHLORIDE, PRESERVATIVE FREE 10 ML: 5 INJECTION INTRAVENOUS at 19:44

## 2025-05-23 RX ADMIN — ALBUMIN (HUMAN) 25 G: 0.25 INJECTION, SOLUTION INTRAVENOUS at 16:19

## 2025-05-23 RX ADMIN — MUPIROCIN: 20 OINTMENT TOPICAL at 08:08

## 2025-05-23 RX ADMIN — OXYCODONE 5 MG: 5 TABLET ORAL at 04:25

## 2025-05-23 RX ADMIN — AMIODARONE HYDROCHLORIDE 400 MG: 200 TABLET ORAL at 08:19

## 2025-05-23 RX ADMIN — OXYCODONE 5 MG: 5 TABLET ORAL at 10:52

## 2025-05-23 RX ADMIN — FAMOTIDINE 20 MG: 20 TABLET, FILM COATED ORAL at 08:19

## 2025-05-23 RX ADMIN — BISACODYL 10 MG: 10 SUPPOSITORY RECTAL at 09:45

## 2025-05-23 ASSESSMENT — PAIN SCALES - GENERAL
PAINLEVEL_OUTOF10: 5
PAINLEVEL_OUTOF10: 6
PAINLEVEL_OUTOF10: 0
PAINLEVEL_OUTOF10: 0
PAINLEVEL_OUTOF10: 7
PAINLEVEL_OUTOF10: 0
PAINLEVEL_OUTOF10: 0

## 2025-05-23 ASSESSMENT — PAIN DESCRIPTION - DESCRIPTORS
DESCRIPTORS: SORE
DESCRIPTORS: ACHING;SORE
DESCRIPTORS: ACHING;SHARP

## 2025-05-23 ASSESSMENT — PAIN DESCRIPTION - ORIENTATION
ORIENTATION: MID
ORIENTATION: ANTERIOR
ORIENTATION: ANTERIOR

## 2025-05-23 ASSESSMENT — PAIN DESCRIPTION - LOCATION
LOCATION: INCISION;CHEST
LOCATION: CHEST
LOCATION: CHEST;INCISION

## 2025-05-23 NOTE — PROGRESS NOTES
CRITICAL CARE PROGRESS NOTE    Name: Evelio Marroquin   : 1960   MRN: [id number]   Date: 2025      ICU Diagnosis      Ascending Aortic Aneurysm   Vasoplegic Shock  Acute HFrEF  Morbid Obesity    Overnight Events   No acute events overnight. Denies any issues at this time. Mentation slowly improving but still not at baseline.   - Constitutionally improving overall, CCM to sign off. Please re-consult if needed.    Addendum: worsening mentation, blood pressure, and concerning respiratory distress. Re-escalated to ICU. ABG showing possible CO2 narcosis, placed on BiPAP. Further Neuro workup with MRI pending d/t possible LUE weakness.     A/P   This is a 64 year old female with history of HTN, HLD, COPD, CKD III and HFpEF who presented to the hospital for elective repair of ascending aortic aneurysm - operative course complicated by hypotension and angioedema that has since improved    NEUROLOGICAL:    - neuro exam improving but still not at baseline  - MRI pending  - tylenol scheduled for pain  - oxycodone PRN pain   - gabapentin 300 TID  - continue home sertraline     PULMONOLOGY:   Intubated post OR. Extubated on POD 2. Angioedema improved.   - O2 PRN; wean as tolerated  - IS  - OOB to chair  - follow CXR     CARDIOVASCULAR:   Moderate BiV dysfunction - I suspect that this is due to her acute OR event (acidosis). Possible Anaphylaxis and/or Angioedema in OR - unknown precipitant  - goal MAP>=65  - weaned off levo  - pacer wires per CTS  - miya per CTS  - CTM  - amiodarone PPX  - still holding home BP medications; restart as tolerated           RENAL/ELECTROLYTE:   CAIO on CKD 3  - likely ATN 2/2 prolonged pump time  - nephrology consulted   - goal K>=4, Mg>=2, Phos >3     ENDOCRINE:   - insulin gtt per protocol  - FSBS ACHS     ID/MICRO:   - no signs or symptoms of active infection  - Ancef PPX per protocol    ICU DAILY CHECKLIST     Code Status: Full  DVT Prophylaxis: SCDs  T/L/D: PIV, CVC, jimmie  SUP:

## 2025-05-23 NOTE — PROGRESS NOTES
4 Eyes Skin Assessment     NAME:  Evelio Marroquin  YOB: 1960  MEDICAL RECORD NUMBER:  [id number]    The patient is being assessed for  Transfer to New Unit    I agree that at least one RN has performed a thorough Head to Toe Skin Assessment on the patient. ALL assessment sites listed below have been assessed.      Areas assessed by both nurses:    Head, Face, Ears, Shoulders, Back, Chest, Arms, Elbows, Hands, Sacrum. Buttock, Coccyx, Ischium, Legs. Feet and Heels, and Under Medical Devices         Does the Patient have a Wound? Yes wound(s) were present on assessment. LDA wound assessment was Initiated and completed by RN       Michael Prevention initiated by RN: Yes  Wound Care Orders initiated by RN: Yes    Pressure Injury (Stage 3,4, Unstageable, DTI, NWPT, and Complex wounds) if present, place Wound referral order by RN under : No    New Ostomies, if present place, Ostomy referral order under : No     Nurse 1 eSignature: Electronically signed by Jay Fields RN on 5/23/25 at 5:59 PM EDT    **SHARE this note so that the co-signing nurse can place an eSignature**    Nurse 2 eSignature: Electronically signed by Carissa Godoy RN on 5/23/25 at 5:59 PM EDT

## 2025-05-23 NOTE — PLAN OF CARE
Problem: Occupational Therapy - Adult  Goal: By Discharge: Performs self-care activities at highest level of function for planned discharge setting.  See evaluation for individualized goals.  Description: FUNCTIONAL STATUS PRIOR TO ADMISSION: Patient was (I) with ADLs, IADLs, and functional mobility. Enjoys playing softball.     HOME SUPPORT PRIOR TO ADMISSION: The patient lives with her  and did not require assist.    Occupational Therapy Goals:  Initiated 5/22/2025    1.  Patient will perform ADLs standing 5 mins without fatigue or LOB with Moderate Assist within 7 days.  2.  Patient will perform upper body ADLs with Moderate Assist while adhering to precautions within 7 days.  3.  Patient will perform lower body ADLs with Moderate Assist using tailor sit technique vs AE prn within 7 days.    4.  Patient will perform gathering ADL items high and low 2/2 with Moderate Assist within 7 days.  5.  Patient will perform toilet transfers with Moderate Assist within 7 days.  6.  Patient will perform all aspects of toileting with Moderate Assist within 7 days.  7.  Patient will participate in cardiac/sternal upper extremity therapeutic exercise/activities to increase independence with ADLs with supervision/set-up for 5 minutes within 7 days.   8.  Patient will adhere to Move in the Tube precautions during functional tasks with min verbal cues within 7 days.      Outcome: Progressing   OCCUPATIONAL THERAPY TREATMENT  Patient: Evelio Marroquin (64 y.o. female)  Date: 5/23/2025  Primary Diagnosis: Aneurysm of ascending aorta [I71.21]  Severe aortic insufficiency [I35.1]  Procedure(s) (LRB):  AORTIC ROOT REPLACEMENT, ECC, DEDRA AND FLEXIBLE BRONCHOSCOPY BY DR BEDOYA (N/A) 4 Days Post-Op   Precautions: Fall Risk           Sternal Precautions: Move in the Tube    Chart, occupational therapy assessment, plan of care, and goals were reviewed.    ASSESSMENT  Patient continues to benefit from skilled OT services and is

## 2025-05-23 NOTE — PROCEDURES
PROCEDURE NOTE  Date: 5/23/2025   Name: Evelio Marroquin  YOB: 1960    Procedures    Pleurevac to water seal, no air leak present with deep breathing by patient. Chest tube sites cleansed with alcohol. Sutures cut and removed. Chest tubes discontinued on exhalation. Occlusive dressing applied. Patient tolerated procedure well.

## 2025-05-23 NOTE — PROGRESS NOTES
[address], [place] [address]  Phone: [phone]   [phone]    [email or web address]     Nephrology Progress Note    Patient Name : Evelio Marroquin      : 1960     MRN : [id number]  Date of Admission : 2025  Date of Servive : 25    CC:  Follow up for CAIO       Assessment and Plan   CAIO:  - 2/2 ATN from shock/hypotension  - renal U/S neg   - nonoliguric  - Cr peaked  - bumex 2mg IV x 1 now  - daily labs and I/Os  - no need for RRT     Hyperkalemia:  - resolved     Hypotension/shock:  - improving, on levo     Ascending aortic aneurysm:  - s/p aortic root replacement   - per CTS     Angioedema/resp failure:  - unclear cause  - extubated      CKD 3a:  - baseline Cr 1.1 to 1.2     HFrEF:  - EF dropped to 30%, RV dysfunction on post op ECHO     CVA like symptoms:  - CTA neg for stroke  - for MRI today    COPD  Obesity  Depression     Interval History:  Seen and examined.  Awake, alert.  Cr improving,  good UOP.   For MRI today.  No reported cp or sob    Review of Systems: Pertinent items are noted in HPI.    Current Medications:   Current Facility-Administered Medications   Medication Dose Route Frequency    albumin human 5% IV solution 12.5 g  12.5 g IntraVENous Once    HYDROmorphone HCl PF (DILAUDID) injection 1 mg  1 mg IntraVENous Q4H PRN    HYDROmorphone (DILAUDID) injection 0.5 mg  0.5 mg IntraVENous Q3H PRN    norepinephrine (LEVOPHED) 16 mg in sodium chloride 0.9 % 250 mL infusion (premix)  1-100 mcg/min IntraVENous Continuous    DOBUTamine (DOBUTREX) 500 mg in dextrose 5 % 250 mL infusion  2.5-10 mcg/kg/min IntraVENous Continuous    famotidine (PEPCID) tablet 20 mg  20 mg Oral Daily    Or    famotidine (PEPCID) 20 MG/2ML 20 mg in sodium chloride (PF) 0.9 % 10 mL injection  20 mg IntraVENous Daily    aspirin chewable tablet 81 mg  81 mg Oral Daily    glucose chewable tablet 16 g  4 tablet Oral PRN    dextrose

## 2025-05-23 NOTE — CARE COORDINATION
Update 2:15pm: EHR of [facility] and [facility] accepted. [facility] didn't respond about their ability to start auth but [facility] can.  Auth pending for [facility].  Left a HIPAA compliant message for husb to update him on plan and provide education on this level of care. Provided my contact info.    Transition of Care Plan:    RUR: 22%  Prior Level of Functioning: Lives w husb, 1 level, ind, drives  Disposition: Auth pending [facility]  If SNF or IPR: Date FOC offered: 5/23  Date FOC received: 5/23  Accepting facility: [facility]  Date authorization started with reference number: 5/23  Date authorization received and expires:   Follow up appointments: Specialist  DME needed: Defer to rehab  Transportation at discharge: TBD  Caregiver Contact: Darius [phone]  Discharge Caregiver contacted prior to discharge?   Care Conference needed?   Barriers to discharge:  Medical, IPR auth    Patient discussed in cardiac surgery IDRs and plan today is head MRI.  Team recommending IPR and auth can be started today. CM met w patient at bedside to discuss this level of care and patient agreeable and would like to be referred to IPRs in [place].     IPR referrals in [facility].  Choice preference based on who can accept and start auth today.    [facility]  Care Management   Available on Perfect Serve or [phone]            05/23/25 1009   Condition of Participation: Discharge Planning   The Plan for Transition of Care is related to the following treatment goals: Discharge planning   The Patient and/or Patient Representative was provided with a Choice of Provider? Patient   The Patient and/Or Patient Representative agree with the Discharge Plan? Yes   Freedom of Choice list was provided with basic dialogue that supports the patient's individualized plan of care/goals, treatment preferences, and shares the quality data associated with the providers?  Yes     [facility]  Care Management   Available on Perfect Serve or

## 2025-05-23 NOTE — PROGRESS NOTES
1700: TRANSFER - IN REPORT:  Bedside and Verbal report received from LEYLA Davis on Evelio Marroquin  being received from [facility] for urgent transfer    Report consisted of patient's Situation, Background, Assessment and   Recommendations(SBAR).   Information from the following report(s) Nurse Handoff Report, Index, Adult Overview, Surgery Report, Intake/Output, MAR, Recent Results, Med Rec Status, Cardiac Rhythm A fib, Alarm Parameters, and Neuro Assessment was reviewed with the receiving nurse.  Opportunity for questions and clarification was provided.    Assessment completed upon patient's arrival to unit and care assumed.     Nola: Greg @ 1     1700: Pt arrived to unit, VSS, no complaints of pain. A/Ox2. ECHO @ bedside. Labs sent. Straight cath performed for urine culture.     1845: Dr. Saravia @ bedside. Orders for ABG    1900: ABG:  pO2 88; pCO2 69.7; pH 7.25;  HCO3 30.4, %O2 Sat 94.5. Orders for BiPAP     1930: Bedside and Verbal shift change report given to LEYLA Woodruff (oncoming nurse) by LEYLA Ferrer (offgoing nurse). Report included the following information Nurse Handoff Report, Index, Adult Overview, Surgery Report, Intake/Output, MAR, Recent Results, Med Rec Status, Cardiac Rhythm A fib, Alarm Parameters, and Neuro Assessment.

## 2025-05-23 NOTE — PROGRESS NOTES
Cardiac Surgery ICU Progress Note    Admit Date: 2025  POD:  4 Days Post-Op    Procedure:     with Dr. Bradley     1. Aortic root replacement with #25 Marroquin KONECT valved conduit.     2. Insertion of percutaneous femoral A-line.    Hospital Course:  DOS : Aortic root replacement complicated by severe hypoxia and airway swelling. Transferred to ICU on Epi, Levo, Yan. Dobutamine and Levo added overnight.  POD1 : Febrile. Albumin x1 + 75 ml/hr NS. Dobutamine weaned to 2. K 5.5. Vent weaned to 40% FiO2. Failed spontaneous. Concern for angioedema and soft tissue swelling around neck. Possible Ct. Lasix 80 mg IV.  POD2 : Neck and angioedema improved. Trial extubate today. Keep CT. Diurese per nephrology  POD3 :  Stroke code for disconjugate gaze/inability to perform EOM, arm drift, word-finding difficulty.  CT negative for acute process.  Weaning pressors.  Nephrology recommending holding diuresis today.   POD4: Off pressors. Deline. Transfer orders. Head MRI ordered.     Subjective:   Patient seen with Dr. Pompa. No complaints this morning. She is on 3L NC, afebrile, in SR at 93.     Objective:   Vitals:  Blood pressure 107/66, pulse 90, temperature 98.5 °F (36.9 °C), resp. rate 18, height 1.829 m (6'), weight (!) 149.9 kg (330 lb 7.5 oz), SpO2 94%, not currently breastfeeding.  Temp (24hrs), Av.5 °F (36.9 °C), Min:98.1 °F (36.7 °C), Max:98.8 °F (37.1 °C)       EKG/Rhythm:  NSR 90s       Extubation Date / Time:   at 1041; prolonged intubation d/t significant airway swelling    CT Output: 160 + 70 = 230 ml    CXR: Xray Result (most recent):  XR CHEST PORTABLE 2025    Narrative  EXAM: XR CHEST PORTABLE  ACC#: [id number]    INDICATION: Post op open heart surgery    COMPARISON: 2025    TECHNIQUE: Portable AP semiupright view of the chest.    FINDINGS:  The patient has been extubated and the enteric tube is been removed. Right-sided  central venous catheter tip overlies the  medically ready for IPR early next week.    Signed By: CHARO Mcgowan - NP

## 2025-05-23 NOTE — PROCEDURES
PROCEDURE NOTE  Date: 5/23/2025   Name: Evelio Marroquin  YOB: 1960    Procedures    External atrial and ventricular epicardial wire sites cleansed with alcohol. Sutures cut and removed. Traction pulled on atrial wires, wires cut. Appropriate retraction into skin. Traction pulled on ventricular wire, wire cut. Appropriate retraction into skin. Pt tolerated procedure well.

## 2025-05-23 NOTE — PROGRESS NOTES
Cardiac Surgery Care Coordinator- Met with Evelio Marroquin,reviewed plan of care and encouraged her to verbalize. She is answering questions with yes and no answers. She is not initiating conversation.  Reinforced sternal precautions and encouraged continued use of the incentive spirometer. Reviewed goals for the day and emphasized the importance of increased activity to meet discharge goals. Will continue to follow for educational and emotional needs.

## 2025-05-23 NOTE — PROGRESS NOTES
0730: Bedside and Verbal shift change report given to LEYLA Ling (oncoming nurse) by LEYLA Mo (offgoing nurse). Report included the following information Nurse Handoff Report, Index, Intake/Output, MAR, and Recent Results.     0800: Cardiac surgery rounding. Per ROLANDO Novak plan for MRI and remove jimmie/CVC/gorman    0910: Per MRI plan for scan at 1230. MRI checklist completed with Darius   (Spouse).     0915: Patient back to bed with PT/OT    0922: Step down orders received from ROLANDO Novak    0950: A line and CVC removed at this time.    1005: ROLANDO Novak pulled CT and cut wires     1200: Patient taken down for MRI    1300: Gorman removed    1305: Patient transferred out to [facility]. Report given to LEYLA Davis. Patient transferred with nurse and monitor. All belongings transferred with patient.

## 2025-05-23 NOTE — CONSULTS
Neurology Consult Note     NAME: Evelio Marroquin   :  1960   MRN:  [id number]   DATE:  2025    Assessment and Plan:     65 yo F h/o CAD, HTN, CKD, COPD, prior back surgery (lumbar fusion per documentation) currently hospitalized s/p AAA repair. Course c/b CAIO on CKD c/b uremia, hypotension, CHF. On , patient reportedly developed word finding difficulty and L arm drift. Per NIHSS documentation, patient had a score 7 for drift in all extremities, mild disorientation, mild dysarthria/language deficit. CT neuroimaging showed no acute findings or significant stenosis. MRI brain  read as small acute infarct in L frontal white matter, though per personal review, this is very tiny and could possibly be artifactual. Patient noted to be in Afib today. Currently on ASA and statin. Family at bedside notes ongoing confusion. Patient denies numbness, tingling, weakness, vision changes. Notably patient has been receiving gabapentin, dilaudid, and oxycodone. On exam, patient has very poor attention, diffusely poor effort with muscle testing but not definitely asymmetric, probable negative myoclonus, no clear unilateral neglect. Presentation most consistent with delirium/toxic metabolic encephalopathy 2/2 CAIO on CKD with uremia, medication effects, other toxic metabolic factors. Difficult to exclude spinal injury given hyperreflexia but weakness seems more likely due to poor effort/AMS. Unable to clarify fully at what level patient had prior spinal surgery but seems to be lumbar, which would not cause hyperreflexia. Small area of restricted diffusion seen on MRI is incidental.    - Continue ASA and statin; defer to cardiology/primary team if AC is warranted given new onset Afib  - A1c 5.1, LDL pending  - TTE pending per primary team  - DC gabapentin given worsening renal function with myoclonus  - Recommend stopping oxycodone as this can also cause myoclonus with poor renal function  - Minimize sedating meds as  of Paying Living Expenses: Patient declined   Food Insecurity: No Food Insecurity (2025)    Hunger Vital Sign     Worried About Running Out of Food in the Last Year: Never true     Ran Out of Food in the Last Year: Never true   Transportation Needs: No Transportation Needs (2025)    PRAPARE - Transportation     Lack of Transportation (Medical): No     Lack of Transportation (Non-Medical): No   Physical Activity: Not on file   Stress: Not on file   Social Connections: Not on file   Intimate Partner Violence: Not on file   Housing Stability: Low Risk  (2025)    Housing Stability Vital Sign     Unable to Pay for Housing in the Last Year: No     Number of Times Moved in the Last Year: 0     Homeless in the Last Year: No           FH:  Family History   Problem Relation Age of Onset    Heart Disease Mother     Hypertension Mother     Stroke Father     No Known Problems Sister     Hypertension Brother     Kidney Disease Brother     No Known Problems Brother     Stroke Brother     Anesth Problems Neg Hx            Objective:     BP (!) 79/62   Pulse 99   Temp 98.3 °F (36.8 °C) (Oral)   Resp 11   Ht 1.829 m (6')   Wt (!) 149.9 kg (330 lb 7.5 oz)   SpO2 97%   BMI 44.82 kg/m²   Temp (24hrs), Av.5 °F (36.9 °C), Min:98.1 °F (36.7 °C), Max:98.9 °F (37.2 °C)      Physical Exam:  General: Well developed well nourished patient in no apparent distress.   Pulmonary: Mildly increased work of breathing  Cardiac: Afib  Extremities: No cyanosis or edema    Neurological Exam:  Mental Status: Oriented to time, place and person. Speech and language minimal and delayed. Answers some simple questions. Attention very poor. Follows some simple commands.   Cranial Nerves:   VF: grossly full, able to track bilaterally with significant encouragement, PERRL, no nystagmus, no diplopia, no ptosis. Facial sensation is normal. Facial movement shows mild L nasolabial flattening but activates symmetrically.  Hearing is intact.

## 2025-05-23 NOTE — PLAN OF CARE
Problem: Physical Therapy - Adult  Goal: By Discharge: Performs mobility at highest level of function for planned discharge setting.  See evaluation for individualized goals.  Description: FUNCTIONAL STATUS PRIOR TO ADMISSION: Patient was independent and active without use of DME. Enjoys playing softball.     HOME SUPPORT PRIOR TO ADMISSION: The patient lived with her  but did not require assist.    Physical Therapy Goals  Initiated 5/22/2025  1.  Patient will move from supine to sit and sit to supine, scoot up and down, and roll side to side in bed with minimal assistance within 5 day(s).    2.  Patient will perform sit to stand with minimal assistance within 5 day(s).  3.  Patient will transfer from bed to chair and chair to bed with minimal assistance using the least restrictive device within 5 day(s).  4.  Patient will ambulate with minimal assistance for 50 feet with the least restrictive device within 5 day(s).   5.  Patient will ascend/descend 1 stairs with right handrail(s) with minimal assistance within 5 day(s).  6.  Patient will perform cardiac exercises per protocol with supervision/set-up within 5 days.  7.  Patient will verbally recall and functionally demonstrate mindful-based movements (\"move in the tube\") principles without cues within 5 days.    Outcome: Progressing   PHYSICAL THERAPY TREATMENT    Patient: Evelio Marroquin (64 y.o. female)  Date: 5/23/2025  Diagnosis: Aneurysm of ascending aorta [I71.21]  Severe aortic insufficiency [I35.1] Aneurysm of ascending aorta  Procedure(s) (LRB):  AORTIC ROOT REPLACEMENT, ECC, DEDRA AND FLEXIBLE BRONCHOSCOPY BY DR BEDOYA (N/A) 4 Days Post-Op  Precautions: Restrictions/Precautions  Restrictions/Precautions: Fall Risk     Position Activity Restriction  Sternal Precautions: Move in the Tube      ASSESSMENT:  Patient continues to benefit from skilled PT services and is progressing towards goals. Patient received sitting in recliner chair, agreeable to

## 2025-05-24 ENCOUNTER — APPOINTMENT (OUTPATIENT)
Facility: HOSPITAL | Age: 65
DRG: 216 | End: 2025-05-24
Attending: THORACIC SURGERY (CARDIOTHORACIC VASCULAR SURGERY)
Payer: COMMERCIAL

## 2025-05-24 LAB
ANION GAP BLD CALC-SCNC: 7 (ref 10–20)
ANION GAP SERPL CALC-SCNC: 6 MMOL/L (ref 2–12)
ARTERIAL PATENCY WRIST A: POSITIVE
ARTERIAL PATENCY WRIST A: POSITIVE
BASE EXCESS BLD CALC-SCNC: 3.9 MMOL/L
BASE EXCESS BLD CALC-SCNC: 4.1 MMOL/L
BDY SITE: ABNORMAL
BDY SITE: ABNORMAL
BUN SERPL-MCNC: 88 MG/DL (ref 6–20)
BUN/CREAT SERPL: 22 (ref 12–20)
CA-I BLD-MCNC: 1.42 MMOL/L (ref 1.15–1.33)
CALCIUM SERPL-MCNC: 9.9 MG/DL (ref 8.5–10.1)
CHLORIDE BLD-SCNC: 106 MMOL/L (ref 100–111)
CHLORIDE SERPL-SCNC: 109 MMOL/L (ref 97–108)
CO2 BLD-SCNC: 31 MMOL/L (ref 22–29)
CO2 SERPL-SCNC: 26 MMOL/L (ref 21–32)
CREAT SERPL-MCNC: 3.97 MG/DL (ref 0.55–1.02)
CREAT UR-MCNC: 4 MG/DL (ref 0.6–1.3)
ECHO BSA: 2.67 M2
ECHO LV EF PHYSICIAN: 45 %
ECHO LV FRACTIONAL SHORTENING: 16 % (ref 28–44)
ECHO LV INTERNAL DIMENSION DIASTOLE INDEX: 1.67 CM/M2
ECHO LV INTERNAL DIMENSION DIASTOLIC: 4.4 CM (ref 3.9–5.3)
ECHO LV INTERNAL DIMENSION SYSTOLIC INDEX: 1.4 CM/M2
ECHO LV INTERNAL DIMENSION SYSTOLIC: 3.7 CM
ECHO LV IVSD: 1.4 CM (ref 0.6–0.9)
ECHO LV MASS 2D: 215.1 G (ref 67–162)
ECHO LV MASS INDEX 2D: 81.5 G/M2 (ref 43–95)
ECHO LV POSTERIOR WALL DIASTOLIC: 1.2 CM (ref 0.6–0.9)
ECHO LV RELATIVE WALL THICKNESS RATIO: 0.55
ERYTHROCYTE [DISTWIDTH] IN BLOOD BY AUTOMATED COUNT: 12.6 % (ref 11.5–14.5)
FIO2 ON VENT: 21 %
GAS FLOW.O2 O2 DELIVERY SYS: ABNORMAL
GAS FLOW.O2 O2 DELIVERY SYS: ABNORMAL
GLUCOSE BLD STRIP.AUTO-MCNC: 120 MG/DL (ref 65–117)
GLUCOSE BLD STRIP.AUTO-MCNC: 122 MG/DL (ref 65–117)
GLUCOSE BLD STRIP.AUTO-MCNC: 137 MG/DL (ref 65–117)
GLUCOSE BLD STRIP.AUTO-MCNC: 144 MG/DL (ref 65–117)
GLUCOSE BLD STRIP.AUTO-MCNC: 95 MG/DL (ref 74–99)
GLUCOSE SERPL-MCNC: 109 MG/DL (ref 65–100)
HCO3 BLD-SCNC: 32 MMOL/L (ref 21–28)
HCO3 BLDA-SCNC: 31 MMOL/L
HCT VFR BLD AUTO: 26.7 % (ref 35–47)
HGB BLD-MCNC: 7.9 G/DL (ref 11.5–16)
INSPIRATION.DURATION SETTING TIME VENT: 0.9 SEC
LACTATE BLD-SCNC: <0.4 MMOL/L (ref 0.4–2)
MAGNESIUM SERPL-MCNC: 2.7 MG/DL (ref 1.6–2.4)
MCH RBC QN AUTO: 29.8 PG (ref 26–34)
MCHC RBC AUTO-ENTMCNC: 29.6 G/DL (ref 30–36.5)
MCV RBC AUTO: 100.8 FL (ref 80–99)
NRBC # BLD: 0 K/UL (ref 0–0.01)
NRBC BLD-RTO: 0 PER 100 WBC
O2/TOTAL GAS SETTING VFR VENT: 50 %
PCO2 BLD: 64.2 MMHG (ref 35–48)
PCO2 BLD: 72 MMHG (ref 35–48)
PEEP RESPIRATORY: 8
PEEP RESPIRATORY: 8 CMH2O
PH BLD: 7.26 (ref 7.35–7.45)
PH BLD: 7.3 (ref 7.35–7.45)
PLATELET # BLD AUTO: 105 K/UL (ref 150–400)
PMV BLD AUTO: 10.4 FL (ref 8.9–12.9)
PO2 BLD: 142 MMHG (ref 83–108)
PO2 BLD: 72 MMHG (ref 83–108)
POTASSIUM BLD-SCNC: 5.1 MMOL/L (ref 3.5–5.5)
POTASSIUM SERPL-SCNC: 5.1 MMOL/L (ref 3.5–5.1)
PRESSURE SUPPORT SETTING VENT: 4 CMH2O
RBC # BLD AUTO: 2.65 M/UL (ref 3.8–5.2)
SAO2 % BLD: 92 % (ref 94–98)
SAO2 % BLD: 98.6 % (ref 92–97)
SERVICE CMNT-IMP: ABNORMAL
SODIUM BLD-SCNC: 144 MMOL/L (ref 136–145)
SODIUM SERPL-SCNC: 141 MMOL/L (ref 136–145)
SPECIMEN SITE: ABNORMAL
SPECIMEN TYPE: ABNORMAL
VENTILATION MODE VENT: ABNORMAL
WBC # BLD AUTO: 8.4 K/UL (ref 3.6–11)

## 2025-05-24 PROCEDURE — 6370000000 HC RX 637 (ALT 250 FOR IP): Performed by: STUDENT IN AN ORGANIZED HEALTH CARE EDUCATION/TRAINING PROGRAM

## 2025-05-24 PROCEDURE — 6360000002 HC RX W HCPCS

## 2025-05-24 PROCEDURE — 6360000002 HC RX W HCPCS: Performed by: STUDENT IN AN ORGANIZED HEALTH CARE EDUCATION/TRAINING PROGRAM

## 2025-05-24 PROCEDURE — 94640 AIRWAY INHALATION TREATMENT: CPT

## 2025-05-24 PROCEDURE — 84295 ASSAY OF SERUM SODIUM: CPT

## 2025-05-24 PROCEDURE — 6370000000 HC RX 637 (ALT 250 FOR IP): Performed by: PHYSICIAN ASSISTANT

## 2025-05-24 PROCEDURE — 6360000002 HC RX W HCPCS: Performed by: PHYSICIAN ASSISTANT

## 2025-05-24 PROCEDURE — 94660 CPAP INITIATION&MGMT: CPT

## 2025-05-24 PROCEDURE — 6370000000 HC RX 637 (ALT 250 FOR IP)

## 2025-05-24 PROCEDURE — 2000000000 HC ICU R&B

## 2025-05-24 PROCEDURE — 82947 ASSAY GLUCOSE BLOOD QUANT: CPT

## 2025-05-24 PROCEDURE — 83735 ASSAY OF MAGNESIUM: CPT

## 2025-05-24 PROCEDURE — 80048 BASIC METABOLIC PNL TOTAL CA: CPT

## 2025-05-24 PROCEDURE — 82962 GLUCOSE BLOOD TEST: CPT

## 2025-05-24 PROCEDURE — 71046 X-RAY EXAM CHEST 2 VIEWS: CPT

## 2025-05-24 PROCEDURE — 2580000003 HC RX 258: Performed by: STUDENT IN AN ORGANIZED HEALTH CARE EDUCATION/TRAINING PROGRAM

## 2025-05-24 PROCEDURE — 94761 N-INVAS EAR/PLS OXIMETRY MLT: CPT

## 2025-05-24 PROCEDURE — 2500000003 HC RX 250 WO HCPCS: Performed by: PHYSICIAN ASSISTANT

## 2025-05-24 PROCEDURE — 84132 ASSAY OF SERUM POTASSIUM: CPT

## 2025-05-24 PROCEDURE — 51702 INSERT TEMP BLADDER CATH: CPT

## 2025-05-24 PROCEDURE — 85027 COMPLETE CBC AUTOMATED: CPT

## 2025-05-24 PROCEDURE — 99233 SBSQ HOSP IP/OBS HIGH 50: CPT | Performed by: STUDENT IN AN ORGANIZED HEALTH CARE EDUCATION/TRAINING PROGRAM

## 2025-05-24 PROCEDURE — 82803 BLOOD GASES ANY COMBINATION: CPT

## 2025-05-24 PROCEDURE — 2580000003 HC RX 258

## 2025-05-24 PROCEDURE — 51798 US URINE CAPACITY MEASURE: CPT

## 2025-05-24 PROCEDURE — 71250 CT THORAX DX C-: CPT

## 2025-05-24 PROCEDURE — 51701 INSERT BLADDER CATHETER: CPT

## 2025-05-24 PROCEDURE — 36600 WITHDRAWAL OF ARTERIAL BLOOD: CPT

## 2025-05-24 PROCEDURE — 82330 ASSAY OF CALCIUM: CPT

## 2025-05-24 PROCEDURE — 2500000003 HC RX 250 WO HCPCS: Performed by: STUDENT IN AN ORGANIZED HEALTH CARE EDUCATION/TRAINING PROGRAM

## 2025-05-24 RX ORDER — METHYLPREDNISOLONE SODIUM SUCCINATE 125 MG/2ML
INJECTION INTRAMUSCULAR; INTRAVENOUS
Status: DISPENSED
Start: 2025-05-24 | End: 2025-05-24

## 2025-05-24 RX ORDER — BISACODYL 10 MG
10 SUPPOSITORY, RECTAL RECTAL ONCE
Status: COMPLETED | OUTPATIENT
Start: 2025-05-24 | End: 2025-05-24

## 2025-05-24 RX ORDER — BUMETANIDE 0.25 MG/ML
2 INJECTION, SOLUTION INTRAMUSCULAR; INTRAVENOUS ONCE
Status: COMPLETED | OUTPATIENT
Start: 2025-05-24 | End: 2025-05-24

## 2025-05-24 RX ORDER — WATER 10 ML/10ML
INJECTION INTRAMUSCULAR; INTRAVENOUS; SUBCUTANEOUS
Status: DISPENSED
Start: 2025-05-24 | End: 2025-05-24

## 2025-05-24 RX ORDER — ALBUTEROL SULFATE 0.83 MG/ML
SOLUTION RESPIRATORY (INHALATION)
Status: COMPLETED
Start: 2025-05-24 | End: 2025-05-24

## 2025-05-24 RX ORDER — SODIUM CHLORIDE 9 MG/ML
INJECTION, SOLUTION INTRAVENOUS PRN
Status: DISCONTINUED | OUTPATIENT
Start: 2025-05-24 | End: 2025-05-30 | Stop reason: HOSPADM

## 2025-05-24 RX ADMIN — HEPARIN SODIUM 5000 UNITS: 5000 INJECTION INTRAVENOUS; SUBCUTANEOUS at 21:32

## 2025-05-24 RX ADMIN — CHLORHEXIDINE GLUCONATE 15 ML: 1.2 RINSE ORAL at 07:48

## 2025-05-24 RX ADMIN — CHLORHEXIDINE GLUCONATE 15 ML: 1.2 RINSE ORAL at 21:33

## 2025-05-24 RX ADMIN — INSULIN LISPRO 2 UNITS: 100 INJECTION, SOLUTION INTRAVENOUS; SUBCUTANEOUS at 16:44

## 2025-05-24 RX ADMIN — ATORVASTATIN CALCIUM 40 MG: 40 TABLET, FILM COATED ORAL at 21:31

## 2025-05-24 RX ADMIN — ACETAMINOPHEN 1000 MG: 500 TABLET ORAL at 21:32

## 2025-05-24 RX ADMIN — SODIUM CHLORIDE, PRESERVATIVE FREE 10 ML: 5 INJECTION INTRAVENOUS at 21:32

## 2025-05-24 RX ADMIN — AMIODARONE HYDROCHLORIDE 400 MG: 200 TABLET ORAL at 21:32

## 2025-05-24 RX ADMIN — IRON SUCROSE 200 MG: 20 INJECTION, SOLUTION INTRAVENOUS at 12:11

## 2025-05-24 RX ADMIN — AMIODARONE HYDROCHLORIDE 0.5 MG/MIN: 50 INJECTION, SOLUTION INTRAVENOUS at 22:52

## 2025-05-24 RX ADMIN — METHYLPREDNISOLONE SODIUM SUCCINATE 125 MG: 125 INJECTION INTRAMUSCULAR; INTRAVENOUS at 21:32

## 2025-05-24 RX ADMIN — HEPARIN SODIUM 5000 UNITS: 5000 INJECTION INTRAVENOUS; SUBCUTANEOUS at 06:05

## 2025-05-24 RX ADMIN — HEPARIN SODIUM 5000 UNITS: 5000 INJECTION INTRAVENOUS; SUBCUTANEOUS at 14:08

## 2025-05-24 RX ADMIN — METOPROLOL TARTRATE 12.5 MG: 25 TABLET, FILM COATED ORAL at 21:32

## 2025-05-24 RX ADMIN — SODIUM CHLORIDE, PRESERVATIVE FREE 10 ML: 5 INJECTION INTRAVENOUS at 07:50

## 2025-05-24 RX ADMIN — AMIODARONE HYDROCHLORIDE 1 MG/MIN: 50 INJECTION, SOLUTION INTRAVENOUS at 00:02

## 2025-05-24 RX ADMIN — ACETAMINOPHEN 1000 MG: 500 TABLET ORAL at 16:43

## 2025-05-24 RX ADMIN — TRAZODONE HYDROCHLORIDE 50 MG: 50 TABLET ORAL at 21:31

## 2025-05-24 RX ADMIN — Medication 6 MG: at 21:31

## 2025-05-24 RX ADMIN — BISACODYL 10 MG: 10 SUPPOSITORY RECTAL at 12:12

## 2025-05-24 RX ADMIN — AMIODARONE HYDROCHLORIDE 1 MG/MIN: 50 INJECTION, SOLUTION INTRAVENOUS at 08:02

## 2025-05-24 RX ADMIN — MUPIROCIN: 20 OINTMENT TOPICAL at 07:50

## 2025-05-24 RX ADMIN — BUMETANIDE 2 MG: 0.25 INJECTION INTRAMUSCULAR; INTRAVENOUS at 06:55

## 2025-05-24 RX ADMIN — ALBUTEROL SULFATE 5 MG: 2.5 SOLUTION RESPIRATORY (INHALATION) at 07:36

## 2025-05-24 RX ADMIN — SODIUM CHLORIDE: 0.9 INJECTION, SOLUTION INTRAVENOUS at 12:10

## 2025-05-24 RX ADMIN — SENNOSIDES AND DOCUSATE SODIUM 1 TABLET: 8.6; 5 TABLET ORAL at 21:32

## 2025-05-24 RX ADMIN — METHYLPREDNISOLONE SODIUM SUCCINATE 125 MG: 125 INJECTION INTRAMUSCULAR; INTRAVENOUS at 07:48

## 2025-05-24 ASSESSMENT — PAIN SCALES - GENERAL
PAINLEVEL_OUTOF10: 0

## 2025-05-24 NOTE — PROGRESS NOTES
0730: Bedside and Verbal shift change report given to LEYLA Ferrer (oncoming nurse) by LEYLA Woodruff (offgoing nurse). Report included the following information Nurse Handoff Report, Index, Adult Overview, Intake/Output, MAR, Recent Results, Med Rec Status, Cardiac Rhythm NSR, Alarm Parameters, and Neuro Assessment.     Drips: Amio @ 1     0730: Pt minimally responsive on BiPAP, expiratory wheezing noted & low Vt. MD notified. Orders for stat breathing treatment and steroids. BiPAP changed to 22.8. ABG in 1 hour.     0845: ABG:  pO2 72; pCO2 64.2; pH 7.30;  HCO3 31, %O2 Sat 92. FiO2 21%-->30%. Pt more responsive.     0915: CTS @ ICU rounding @ bedside, orders to drop amio gtt to 0.5. Only voided 150 via purewick since diuretic admin, bladder scan = 831. Orders to place gorman.     0930: Gorman placed. 1060mL green urine returned.     1030: Placed on 6L NC. Lungs coarse, thick white secretions out. Pt unable to lay flat, MRI deferred for today until respiratory status improves.     1100: Scarlet NP @ bedside assessing MSI. Concerned for being unstable - orders for CT chest. Orders for iron IVPB & dulcolax suppository x1.     1130: Down to CT    1200: Back from CT     1215: Neuro @ bedside. Assessment improved since yesterday, OK to hold off on cervical/thoracic MRI. Signed off     1930: Bedside and Verbal shift change report given to LEYLA Feliz (oncoming nurse) by LEYLA Ferrer (offgoing nurse). Report included the following information Nurse Handoff Report, Index, Adult Overview, Intake/Output, MAR, Recent Results, Med Rec Status, Cardiac Rhythm NSR, Alarm Parameters, and Neuro Assessment.     Drips: Amio @ 0.5

## 2025-05-24 NOTE — PROGRESS NOTES
[address], [place] [address]  Phone: [phone]   [phone]    [email or web address]     Nephrology Progress Note    Patient Name : Evelio Marroquin      : 1960     MRN : [id number]  Date of Admission : 2025  Date of Servive : 25    CC:  Follow up for CAIO       Assessment and Plan   CAIO:  - 2/2 ATN from shock/hypotension  - renal U/S neg   - nonoliguric  - Cr peaked  - diurese PRN  - daily labs and I/Os  - no need for RRT at this time, family has consented if needed     Hyperkalemia:  - resolved     Hypotension/shock:  - off pressors     Ascending aortic aneurysm:  - s/p aortic root replacement   - per CTS     Angioedema/resp failure:  - unclear cause  - extubated      CKD 3a:  - baseline Cr 1.1 to 1.2     HFrEF:  - EF dropped to 30%, RV dysfunction on post op ECHO     CVA like symptoms:  - CTA neg for stroke  - for MRI today    COPD  Obesity  Depression     Interval History:  Seen and examined.  More lethargic today, no improvement in hypoxia with BiPAP.  About to be intubated. Cr stable and improving, good UO with bumex yesterday.  Unable to obtain ROS    Review of Systems: Pertinent items are noted in HPI.    Current Medications:   Current Facility-Administered Medications   Medication Dose Route Frequency    metoprolol tartrate (LOPRESSOR) tablet 12.5 mg  12.5 mg Oral BID    oxyCODONE (ROXICODONE) immediate release tablet 10 mg  10 mg Oral Q4H PRN    oxyCODONE (ROXICODONE) immediate release tablet 5 mg  5 mg Oral Q4H PRN    amiodarone (CORDARONE) 450 mg in dextrose 5 % 250 mL infusion ([id number])  1 mg/min IntraVENous Continuous    melatonin tablet 6 mg  6 mg Oral Nightly    traZODone (DESYREL) tablet 50 mg  50 mg Oral Nightly    famotidine (PEPCID) tablet 20 mg  20 mg Oral Daily    aspirin chewable tablet 81 mg  81 mg Oral Daily    glucose chewable tablet 16 g  4 tablet Oral PRN    dextrose bolus 10% 125 mL  125

## 2025-05-24 NOTE — PROGRESS NOTES
Neurology Consult Note     NAME: Evelio Marroquin   :  1960   MRN:  [id number]   DATE:  2025    Assessment and Plan:     65 yo F h/o CAD, HTN, CKD, COPD, prior back surgery (lumbar fusion per documentation) currently hospitalized s/p AAA repair. Course c/b CAIO on CKD c/b uremia, hypotension, CHF. On , patient reportedly developed word finding difficulty and L arm drift. Per NIHSS documentation, patient had a score 7 for drift in all extremities, mild disorientation, mild dysarthria/language deficit. CT neuroimaging showed no acute findings or significant stenosis. MRI brain  read as small acute infarct in L frontal white matter, though per personal review, this is very tiny and could possibly be artifactual. Patient noted to be in Afib today. Currently on ASA and statin. Family at bedside notes ongoing confusion. Patient denies numbness, tingling, weakness, vision changes. Notably patient has been receiving gabapentin, dilaudid, and oxycodone. On exam, patient has very poor attention, diffusely poor effort with muscle testing but not definitely asymmetric, probable negative myoclonus, no clear unilateral neglect. Presentation most consistent with delirium/toxic metabolic encephalopathy 2/2 CAIO on CKD with uremia, medication effects, other toxic metabolic factors. Difficult to exclude spinal injury given hyperreflexia but weakness seems more likely due to poor effort/AMS. Unable to clarify fully at what level patient had prior spinal surgery but seems to be lumbar, which would not cause hyperreflexia. Small area of restricted diffusion seen on MRI is incidental.    :  Improved mental status but still encephalopathic. At least 4/5 in all extremities currently with no sensory loss. Remains hyperreflexic but suspect this may be related to prior deficits. Moved to ICU yesterday due to worsening mental and respiratory status, found to be hypercarbic. BUN still elevated.    - Continue ASA and  05/23/25  7:41 PM   Result Value Ref Range    POC Glucose 116 65 - 117 mg/dL    Performed by: KEVIN CORONA    Troponin    Collection Time: 05/23/25  8:57 PM   Result Value Ref Range    Troponin, High Sensitivity 573 (HH) 0 - 51 ng/L   Arterial Blood Gas, POC    Collection Time: 05/24/25  4:10 AM   Result Value Ref Range    FIO2 50 %    POC pH 7.26 (L) 7.35 - 7.45      POC pCO2 72.0 (H) 35.0 - 48.0 MMHG    POC PO2 142 (H) 83 - 108 MMHG    POC HCO3 32.0 (H) 21 - 28 MMOL/L    POC O2 SAT 98.6 (H) 92 - 97 %    Base Excess 3.9 mmol/L    Site LEFT RADIAL      DEVICE BIPAP MASK      Mode BILEVEL      POC PEEP 8 cmH2O    POC Pressure Support 4 cmH2O    POC Frankie's Test Positive      Inspiratory Time 0.9 sec    Specimen type: ARTERIAL     CBC    Collection Time: 05/24/25  4:18 AM   Result Value Ref Range    WBC 8.4 3.6 - 11.0 K/uL    RBC 2.65 (L) 3.80 - 5.20 M/uL    Hemoglobin 7.9 (L) 11.5 - 16.0 g/dL    Hematocrit 26.7 (L) 35.0 - 47.0 %    .8 (H) 80.0 - 99.0 FL    MCH 29.8 26.0 - 34.0 PG    MCHC 29.6 (L) 30.0 - 36.5 g/dL    RDW 12.6 11.5 - 14.5 %    Platelets 105 (L) 150 - 400 K/uL    MPV 10.4 8.9 - 12.9 FL    Nucleated RBCs 0.0 0  WBC    nRBC 0.00 0.00 - 0.01 K/uL   Magnesium    Collection Time: 05/24/25  4:18 AM   Result Value Ref Range    Magnesium 2.7 (H) 1.6 - 2.4 mg/dL   Basic Metabolic Panel    Collection Time: 05/24/25  4:18 AM   Result Value Ref Range    Sodium 141 136 - 145 mmol/L    Potassium 5.1 3.5 - 5.1 mmol/L    Chloride 109 (H) 97 - 108 mmol/L    CO2 26 21 - 32 mmol/L    Anion Gap 6 2 - 12 mmol/L    Glucose 109 (H) 65 - 100 mg/dL    BUN 88 (H) 6 - 20 MG/DL    Creatinine 3.97 (H) 0.55 - 1.02 MG/DL    BUN/Creatinine Ratio 22 (H) 12 - 20      Est, Glom Filt Rate 12 (L) >60 ml/min/1.73m2    Calcium 9.9 8.5 - 10.1 MG/DL   POCT Glucose    Collection Time: 05/24/25  8:07 AM   Result Value Ref Range    POC Glucose 122 (H) 65 - 117 mg/dL    Performed by: EDMAR Alcazar PCT    POCT Blood Gas &

## 2025-05-24 NOTE — PROGRESS NOTES
1930: Bedside report received from Carissa MAYER    2300: Pt with no urine output since last straight ccath, bladder scan 200ml    0400: Repeat ABG obtained, MD aware of results, no new orders at this time    0500: Bladder scan 400ml, pt  straight cathed per protocol with 450ml out.      0730: Bedside report given to Carissa MAYER

## 2025-05-24 NOTE — PROGRESS NOTES
Cardiac Surgery ICU Progress Note    Admit Date: 2025  POD:  5 Days Post-Op    Procedure:     with Dr. Bradley     1. Aortic root replacement with #25 Marroquin KONECT valved conduit.     2. Insertion of percutaneous femoral A-line.    Hospital Course:  DOS : Aortic root replacement complicated by severe hypoxia and airway swelling. Transferred to ICU on Epi, Levo, Yan. Dobutamine and Levo added overnight.  POD1 : Febrile. Albumin x1 + 75 ml/hr NS. Dobutamine weaned to 2. K 5.5. Vent weaned to 40% FiO2. Failed spontaneous. Concern for angioedema and soft tissue swelling around neck. Possible Ct. Lasix 80 mg IV.  POD2 : Neck and angioedema improved. Trial extubate today. Keep CT. Diurese per nephrology  POD3 :  Stroke code for disconjugate gaze/inability to perform EOM, arm drift, word-finding difficulty.  CT negative for acute process.  Weaning pressors.  Nephrology recommending holding diuresis today.   POD4: Off pressors. Deline. Transfer orders. Head MRI revealed tiny, incidental infarct. Severe chest pain followed by labored breathing -> transfer back to CCU, bipap. Brief afib rate 100s, brief hypotension. Converted with amio infusion.   POD5: Continued tenuous respiratory status, on bipap. Unstable sternum noted.   Subjective:   Patient seen with Dr. Duggan. Continued labored breathing, may need to be Reintubated today. Oxygenating ok on 6L NC but not ventilating well, becomes obtunded. Resuming bipap and may need to be Reintubated. Afebrile, in SR at 73.     Objective:   Vitals:  Blood pressure 133/87, pulse 73, temperature 98.8 °F (37.1 °C), temperature source Axillary, resp. rate 16, height 1.829 m (6'), weight (!) 147.8 kg (325 lb 13.4 oz), SpO2 98%, not currently breastfeeding.  Temp (24hrs), Av.9 °F (37.2 °C), Min:98.3 °F (36.8 °C), Max:99.2 °F (37.3 °C)       EKG/Rhythm:  NSR 90s       Extubation Date / Time:   at 1041; prolonged intubation d/t significant airway

## 2025-05-24 NOTE — PROGRESS NOTES
Physical Therapy    Chart reviewed and events noted.  Patient transferred back to CCU after rapid response with hypotension and now requiring continuous BiPAP.  We will hold PT at this time and follow up when medically stable.    Meron Marr, PT

## 2025-05-24 NOTE — PROGRESS NOTES
CRITICAL CARE PROGRESS NOTE    Name: Evelio Marroquin   : 1960   MRN: [id number]   Date: 2025      ICU Diagnosis      Ascending Aortic Aneurysm   Vasoplegic Shock  Acute HFrEF  Morbid Obesity    Overnight Events   Remained bipap dependent overnight and this morning for CO2 narcosis. Upon optimization of ventilation, able to come off bipap. Will plan for bipap use whenever sleeping. No need for further neuro workup. Respiratory status improved but remains tenuous.     A/P   This is a 64 year old female with history of HTN, HLD, COPD, CKD III and HFpEF who presented to the hospital for elective repair of ascending aortic aneurysm - operative course complicated by hypotension and angioedema that has since improved    NEUROLOGICAL:    - neuro exam improving but still not at baseline  - MRI pending  - tylenol scheduled for pain  - oxycodone PRN pain   - gabapentin 300 TID  - continue home sertraline     PULMONOLOGY:   S/p mechanical ventilation  - Extubated on POD 2. Angioedema improved.   - IS, OOB to chair    Acute Hypercapnic Respiratory Failure  - improved with bipap; recommend use qhs and for naps        CARDIOVASCULAR:   Moderate BiV dysfunction - I suspect that this is due to her acute OR event (acidosis). Possible Anaphylaxis and/or Angioedema in OR - unknown precipitant  - goal MAP>=65  - weaned off levo  - pacer wires per CTS  - miya per CTS  - CTM  - amiodarone PPX  - still holding home BP medications; restart as tolerated        - concern for sternotomy, f/u CT chest     RENAL/ELECTROLYTE:   CAIO on CKD 3  - likely ATN 2/2 prolonged pump time  - nephrology consulted   - goal K>=4, Mg>=2, Phos >3     ENDOCRINE:   - insulin gtt per protocol  - FSBS ACHS     ID/MICRO:   - no signs or symptoms of active infection  - Ancef PPX per protocol    ICU DAILY CHECKLIST     Code Status: Full  DVT Prophylaxis: SCDs  T/L/D: PIV, CVC, jimmie  SUP: N/A  Diet: advance as tolerated  ABCDEF Bundle/Checklist

## 2025-05-24 NOTE — PROGRESS NOTES
1200: Pt arrived to CV[facility]. /65. Denies CP.    1411: Pt c/o CP, states \"it feels like an elephant is sitting on my chest\". No PRN meds avail.  Notified Scarlet NP. Orders received for STAT EKG, troponin. NP to come to bedside to assess.     1420: EKG obtained & transmitted.  Scarlet NP @ bedside. Noted that pt MSI leaking sanguinous fluid.      1422: Dilaudid given per order. See MAR for administration details.     1527: Troponin collected & sent via tube system.     1549: Pt BP 79/62. Pt lethargic, labored breathing. Appears more confused. Notified ROLANDO Novak. Orders received for Albumin 25%. Notified Charge RN of decline in pt condition.     1620: Charge RN Tonya @ bedside assessing pt. Tonya MAYER notified Scarlet NP of concern for hypotension & breathing difficulty..Amio gtt not started at this time due to concerns for hypotension per charge RN. Notified RRT RN Lionel & CV[facility] of pt decline.     1630: MD Duggan @ bedside with Scarlet MARSHALL. Orders received to transfer back to CVI/CCU. Tonya MAYER notified supervisors. Pt BP more robust, 115/72. Per Scarlet MARSHALL, okay to start amio gtt.     1645: Lionel RRT @ bedside.     1700: Pt transported to CCU on monitor with this RN. Bedside report given to LEYLA Ferrer.

## 2025-05-25 ENCOUNTER — APPOINTMENT (OUTPATIENT)
Facility: HOSPITAL | Age: 65
DRG: 216 | End: 2025-05-25
Attending: THORACIC SURGERY (CARDIOTHORACIC VASCULAR SURGERY)
Payer: COMMERCIAL

## 2025-05-25 LAB
ANION GAP SERPL CALC-SCNC: 5 MMOL/L (ref 2–12)
ARTERIAL PATENCY WRIST A: POSITIVE
BASE EXCESS BLD CALC-SCNC: 1.7 MMOL/L
BDY SITE: ABNORMAL
BUN SERPL-MCNC: 89 MG/DL (ref 6–20)
BUN/CREAT SERPL: 29 (ref 12–20)
CALCIUM SERPL-MCNC: 10.2 MG/DL (ref 8.5–10.1)
CHLORIDE SERPL-SCNC: 107 MMOL/L (ref 97–108)
CO2 SERPL-SCNC: 29 MMOL/L (ref 21–32)
CREAT SERPL-MCNC: 3.06 MG/DL (ref 0.55–1.02)
ERYTHROCYTE [DISTWIDTH] IN BLOOD BY AUTOMATED COUNT: 12.1 % (ref 11.5–14.5)
GAS FLOW.O2 O2 DELIVERY SYS: ABNORMAL
GLUCOSE BLD STRIP.AUTO-MCNC: 141 MG/DL (ref 65–117)
GLUCOSE BLD STRIP.AUTO-MCNC: 151 MG/DL (ref 65–117)
GLUCOSE BLD STRIP.AUTO-MCNC: 153 MG/DL (ref 65–117)
GLUCOSE BLD STRIP.AUTO-MCNC: 154 MG/DL (ref 65–117)
GLUCOSE SERPL-MCNC: 130 MG/DL (ref 65–100)
HCO3 BLD-SCNC: 28.2 MMOL/L (ref 21–28)
HCT VFR BLD AUTO: 26.2 % (ref 35–47)
HGB BLD-MCNC: 11.4 G/DL (ref 11.5–16)
HGB BLD-MCNC: 8 G/DL (ref 11.5–16)
INSPIRATION.DURATION SETTING TIME VENT: 0.8 SEC
MAGNESIUM SERPL-MCNC: 2.5 MG/DL (ref 1.6–2.4)
MCH RBC QN AUTO: 29.9 PG (ref 26–34)
MCHC RBC AUTO-ENTMCNC: 30.5 G/DL (ref 30–36.5)
MCV RBC AUTO: 97.8 FL (ref 80–99)
NRBC # BLD: 0 K/UL (ref 0–0.01)
NRBC BLD-RTO: 0 PER 100 WBC
O2/TOTAL GAS SETTING VFR VENT: 30 %
PCO2 BLD: 53 MMHG (ref 35–48)
PEEP RESPIRATORY: 8 CMH2O
PH BLD: 7.33 (ref 7.35–7.45)
PLATELET # BLD AUTO: 127 K/UL (ref 150–400)
PMV BLD AUTO: 10.8 FL (ref 8.9–12.9)
PO2 BLD: 64 MMHG (ref 83–108)
POTASSIUM SERPL-SCNC: 5.1 MMOL/L (ref 3.5–5.1)
PRESSURE SUPPORT SETTING VENT: 16 CMH2O
RBC # BLD AUTO: 2.68 M/UL (ref 3.8–5.2)
SAO2 % BLD: 90.1 % (ref 92–97)
SERVICE CMNT-IMP: ABNORMAL
SODIUM SERPL-SCNC: 141 MMOL/L (ref 136–145)
SPECIMEN TYPE: ABNORMAL
VENTILATION MODE VENT: ABNORMAL
WBC # BLD AUTO: 7.6 K/UL (ref 3.6–11)

## 2025-05-25 PROCEDURE — 6360000002 HC RX W HCPCS: Performed by: PHYSICIAN ASSISTANT

## 2025-05-25 PROCEDURE — 6370000000 HC RX 637 (ALT 250 FOR IP)

## 2025-05-25 PROCEDURE — 2580000003 HC RX 258

## 2025-05-25 PROCEDURE — 80048 BASIC METABOLIC PNL TOTAL CA: CPT

## 2025-05-25 PROCEDURE — 6370000000 HC RX 637 (ALT 250 FOR IP): Performed by: STUDENT IN AN ORGANIZED HEALTH CARE EDUCATION/TRAINING PROGRAM

## 2025-05-25 PROCEDURE — 94761 N-INVAS EAR/PLS OXIMETRY MLT: CPT

## 2025-05-25 PROCEDURE — 6360000002 HC RX W HCPCS: Performed by: INTERNAL MEDICINE

## 2025-05-25 PROCEDURE — 71045 X-RAY EXAM CHEST 1 VIEW: CPT

## 2025-05-25 PROCEDURE — 82962 GLUCOSE BLOOD TEST: CPT

## 2025-05-25 PROCEDURE — 82803 BLOOD GASES ANY COMBINATION: CPT

## 2025-05-25 PROCEDURE — 6360000002 HC RX W HCPCS

## 2025-05-25 PROCEDURE — 6370000000 HC RX 637 (ALT 250 FOR IP): Performed by: PHYSICIAN ASSISTANT

## 2025-05-25 PROCEDURE — 2060000000 HC ICU INTERMEDIATE R&B

## 2025-05-25 PROCEDURE — 85027 COMPLETE CBC AUTOMATED: CPT

## 2025-05-25 PROCEDURE — 36600 WITHDRAWAL OF ARTERIAL BLOOD: CPT

## 2025-05-25 PROCEDURE — 94660 CPAP INITIATION&MGMT: CPT

## 2025-05-25 PROCEDURE — 2500000003 HC RX 250 WO HCPCS: Performed by: STUDENT IN AN ORGANIZED HEALTH CARE EDUCATION/TRAINING PROGRAM

## 2025-05-25 PROCEDURE — 2500000003 HC RX 250 WO HCPCS: Performed by: PHYSICIAN ASSISTANT

## 2025-05-25 PROCEDURE — 6360000002 HC RX W HCPCS: Performed by: STUDENT IN AN ORGANIZED HEALTH CARE EDUCATION/TRAINING PROGRAM

## 2025-05-25 PROCEDURE — 83735 ASSAY OF MAGNESIUM: CPT

## 2025-05-25 RX ORDER — HYDRALAZINE HYDROCHLORIDE 25 MG/1
25 TABLET, FILM COATED ORAL ONCE
Status: DISCONTINUED | OUTPATIENT
Start: 2025-05-25 | End: 2025-05-25

## 2025-05-25 RX ORDER — METOPROLOL TARTRATE 25 MG/1
25 TABLET, FILM COATED ORAL 2 TIMES DAILY
Status: DISCONTINUED | OUTPATIENT
Start: 2025-05-25 | End: 2025-05-30 | Stop reason: HOSPADM

## 2025-05-25 RX ORDER — BUMETANIDE 0.25 MG/ML
1 INJECTION, SOLUTION INTRAMUSCULAR; INTRAVENOUS 2 TIMES DAILY
Status: DISCONTINUED | OUTPATIENT
Start: 2025-05-25 | End: 2025-05-29

## 2025-05-25 RX ORDER — BUMETANIDE 0.25 MG/ML
1 INJECTION, SOLUTION INTRAMUSCULAR; INTRAVENOUS ONCE
Status: COMPLETED | OUTPATIENT
Start: 2025-05-25 | End: 2025-05-25

## 2025-05-25 RX ORDER — LACTULOSE 10 G/15ML
20 SOLUTION ORAL 3 TIMES DAILY
Status: DISCONTINUED | OUTPATIENT
Start: 2025-05-25 | End: 2025-05-26

## 2025-05-25 RX ORDER — TAMSULOSIN HYDROCHLORIDE 0.4 MG/1
0.4 CAPSULE ORAL NIGHTLY
Status: DISCONTINUED | OUTPATIENT
Start: 2025-05-25 | End: 2025-05-30 | Stop reason: HOSPADM

## 2025-05-25 RX ADMIN — TRAZODONE HYDROCHLORIDE 50 MG: 50 TABLET ORAL at 20:08

## 2025-05-25 RX ADMIN — METOPROLOL TARTRATE 12.5 MG: 25 TABLET, FILM COATED ORAL at 09:38

## 2025-05-25 RX ADMIN — BUMETANIDE 1 MG: 0.25 INJECTION INTRAMUSCULAR; INTRAVENOUS at 07:14

## 2025-05-25 RX ADMIN — HEPARIN SODIUM 5000 UNITS: 5000 INJECTION INTRAVENOUS; SUBCUTANEOUS at 15:17

## 2025-05-25 RX ADMIN — IRON SUCROSE 200 MG: 20 INJECTION, SOLUTION INTRAVENOUS at 13:01

## 2025-05-25 RX ADMIN — METOPROLOL TARTRATE 25 MG: 25 TABLET, FILM COATED ORAL at 20:08

## 2025-05-25 RX ADMIN — Medication 6 MG: at 20:08

## 2025-05-25 RX ADMIN — METHYLPREDNISOLONE SODIUM SUCCINATE 125 MG: 125 INJECTION INTRAMUSCULAR; INTRAVENOUS at 09:39

## 2025-05-25 RX ADMIN — BUMETANIDE 1 MG: 0.25 INJECTION INTRAMUSCULAR; INTRAVENOUS at 16:09

## 2025-05-25 RX ADMIN — ASPIRIN 81 MG CHEWABLE TABLET 81 MG: 81 TABLET CHEWABLE at 09:38

## 2025-05-25 RX ADMIN — ACETAMINOPHEN 1000 MG: 500 TABLET ORAL at 17:54

## 2025-05-25 RX ADMIN — INSULIN LISPRO 2 UNITS: 100 INJECTION, SOLUTION INTRAVENOUS; SUBCUTANEOUS at 09:37

## 2025-05-25 RX ADMIN — ACETAMINOPHEN 1000 MG: 500 TABLET ORAL at 09:38

## 2025-05-25 RX ADMIN — ACETAMINOPHEN 1000 MG: 500 TABLET ORAL at 22:15

## 2025-05-25 RX ADMIN — INSULIN LISPRO 2 UNITS: 100 INJECTION, SOLUTION INTRAVENOUS; SUBCUTANEOUS at 12:59

## 2025-05-25 RX ADMIN — SERTRALINE HYDROCHLORIDE 100 MG: 50 TABLET ORAL at 09:38

## 2025-05-25 RX ADMIN — LACTULOSE 20 G: 20 SOLUTION ORAL at 16:09

## 2025-05-25 RX ADMIN — HEPARIN SODIUM 5000 UNITS: 5000 INJECTION INTRAVENOUS; SUBCUTANEOUS at 05:45

## 2025-05-25 RX ADMIN — TAMSULOSIN HYDROCHLORIDE 0.4 MG: 0.4 CAPSULE ORAL at 16:08

## 2025-05-25 RX ADMIN — SENNOSIDES AND DOCUSATE SODIUM 1 TABLET: 8.6; 5 TABLET ORAL at 20:08

## 2025-05-25 RX ADMIN — SENNOSIDES AND DOCUSATE SODIUM 1 TABLET: 8.6; 5 TABLET ORAL at 09:38

## 2025-05-25 RX ADMIN — LACTULOSE 20 G: 20 SOLUTION ORAL at 20:08

## 2025-05-25 RX ADMIN — LACTULOSE 20 G: 20 SOLUTION ORAL at 09:38

## 2025-05-25 RX ADMIN — HEPARIN SODIUM 5000 UNITS: 5000 INJECTION INTRAVENOUS; SUBCUTANEOUS at 22:15

## 2025-05-25 RX ADMIN — ACETAMINOPHEN 1000 MG: 500 TABLET ORAL at 05:45

## 2025-05-25 RX ADMIN — HYDRALAZINE HYDROCHLORIDE 10 MG: 20 INJECTION INTRAMUSCULAR; INTRAVENOUS at 16:09

## 2025-05-25 RX ADMIN — SODIUM CHLORIDE, PRESERVATIVE FREE 10 ML: 5 INJECTION INTRAVENOUS at 09:39

## 2025-05-25 RX ADMIN — ATORVASTATIN CALCIUM 40 MG: 40 TABLET, FILM COATED ORAL at 20:08

## 2025-05-25 RX ADMIN — INSULIN LISPRO 2 UNITS: 100 INJECTION, SOLUTION INTRAVENOUS; SUBCUTANEOUS at 17:54

## 2025-05-25 RX ADMIN — CHLORHEXIDINE GLUCONATE 15 ML: 1.2 RINSE ORAL at 20:11

## 2025-05-25 RX ADMIN — CHLORHEXIDINE GLUCONATE 15 ML: 1.2 RINSE ORAL at 09:39

## 2025-05-25 RX ADMIN — POLYETHYLENE GLYCOL 3350 17 G: 17 POWDER, FOR SOLUTION ORAL at 09:38

## 2025-05-25 RX ADMIN — AMIODARONE HYDROCHLORIDE 400 MG: 200 TABLET ORAL at 09:38

## 2025-05-25 RX ADMIN — AMIODARONE HYDROCHLORIDE 400 MG: 200 TABLET ORAL at 20:08

## 2025-05-25 ASSESSMENT — PAIN SCALES - GENERAL
PAINLEVEL_OUTOF10: 0
PAINLEVEL_OUTOF10: 0

## 2025-05-25 NOTE — PROGRESS NOTES
Physical Therapy 5/25/25    Chart reviewed, patient with RR requiring tx to CCU on 5/24. Pt currently requiring continuous BIPAP due to declining respiratory status, FiO2 40%. Will defer and follow up as medically appropriate.    Thank you for your consideration,    Ibis Chacko, PT, DPT

## 2025-05-25 NOTE — PROGRESS NOTES
Cardiac Surgery ICU Progress Note    Admit Date: 2025  POD:  6 Days Post-Op    Procedure:     with Dr. Bradley     1. Aortic root replacement with #25 Marroquin KONECT valved conduit.     2. Insertion of percutaneous femoral A-line.    Hospital Course:  DOS : Aortic root replacement complicated by severe hypoxia and airway swelling. Transferred to ICU on Epi, Levo, Yan. Dobutamine and Levo added overnight.  POD1 : Febrile. Albumin x1 + 75 ml/hr NS. Dobutamine weaned to 2. K 5.5. Vent weaned to 40% FiO2. Failed spontaneous. Concern for angioedema and soft tissue swelling around neck. Possible Ct. Lasix 80 mg IV.  POD2 : Neck and angioedema improved. Trial extubate today. Keep CT. Diurese per nephrology  POD3 :  Stroke code for disconjugate gaze/inability to perform EOM, arm drift, word-finding difficulty.  CT negative for acute process.  Weaning pressors.  Nephrology recommending holding diuresis today.   POD4: Off pressors. Deline. Transfer orders. Head MRI revealed tiny, incidental infarct. Severe chest pain followed by labored breathing -> transfer back to CCU, bipap. Brief afib rate 100s, brief hypotension. Converted with amio infusion.   POD5: Continued tenuous respiratory status, on bipap. Unstable sternum noted. CT chest.   POD6: Improved respiratory and mental status. Lactulose added. Transfer orders.   Subjective:   Patient seen with Dr. Duggan. Improved mentation and respiratory status. On 5L NC, in SR, afebrile.     Objective:   Vitals:  Blood pressure (!) 167/95, pulse 72, temperature 97.5 °F (36.4 °C), temperature source Axillary, resp. rate 18, height 1.829 m (6'), weight (!) 147.2 kg (324 lb 8.3 oz), SpO2 98%, not currently breastfeeding.  Temp (24hrs), Av.4 °F (36.9 °C), Min:97.5 °F (36.4 °C), Max:98.9 °F (37.2 °C)       EKG/Rhythm:  NSR 90s       Extubation Date / Time:   at 1041; prolonged intubation d/t significant airway swelling      CXR: Xray Result

## 2025-05-25 NOTE — PLAN OF CARE
Problem: Pain  Goal: Verbalizes/displays adequate comfort level or baseline comfort level  Outcome: Progressing     Problem: Chronic Conditions and Co-morbidities  Goal: Patient's chronic conditions and co-morbidity symptoms are monitored and maintained or improved  Outcome: Progressing     Problem: Discharge Planning  Goal: Discharge to home or other facility with appropriate resources  Outcome: Progressing     Problem: Safety - Adult  Goal: Free from fall injury  Outcome: Progressing     Problem: Skin/Tissue Integrity  Goal: Skin integrity remains intact  Description: 1.  Monitor for areas of redness and/or skin breakdown2.  Assess vascular access sites hourly3.  Every 4-6 hours minimum:  Change oxygen saturation probe site4.  Every 4-6 hours:  If on nasal continuous positive airway pressure, respiratory therapy assess nares and determine need for appliance change or resting period  Outcome: Progressing     Problem: Confusion  Goal: Confusion, delirium, dementia, or psychosis is improved or at baseline  Description: INTERVENTIONS:1. Assess for possible contributors to thought disturbance, including medications, impaired vision or hearing, underlying metabolic abnormalities, dehydration, psychiatric diagnoses, and notify attending LIP2. [place] high risk fall precautions, as indicated3. Provide frequent short contacts to provide reality reorientation, refocusing and direction4. Decrease environmental stimuli, including noise as appropriate5. Monitor and intervene to maintain adequate nutrition, hydration, elimination, sleep and activity6. If unable to ensure safety without constant attention obtain sitter and review sitter guidelines with assigned personnel7. Initiate Psychosocial CNS and Spiritual Care consult, as indicated  INTERVENTIONS:1. Assess for possible contributors to thought disturbance, including medications, impaired vision or hearing, underlying metabolic abnormalities, dehydration, psychiatric

## 2025-05-25 NOTE — PROGRESS NOTES
0755: Bedside report received from LEYLA Feliz.  Pt in bed resting with eyes closed, no s/s of distress noted.    1230: Assist x2 up to recliner, pt tolerated well.    1342: Transfer orders in.    1609: BP persistently elevated, 169/89, admin PRN Hydralazine.    1800: Assist x2 back to bed.    1930: Bedside report given to LEYLA Woodruff.

## 2025-05-25 NOTE — PROGRESS NOTES
CRITICAL CARE PROGRESS NOTE    Name: Evelio Marroquin   : 1960   MRN: [id number]   Date: 2025      ICU Diagnosis      Ascending Aortic Aneurysm   Vasoplegic Shock  Acute HFrEF  Morbid Obesity    Overnight Events   Bipap qhs, NC when awake. Doing well on this regimen. Will work with PT. Improving overall. Anticipate transfer out of ICU within next day or so.     A/P   Ms. Marroquin 63 y/o F w/ PMH HTN, HLD, COPD, CKD 3, and HFpEF who presented to the hospital for elective repair of ascending aortic aneurysm - operative course complicated by hypotension and angioedema that has since improved    NEUROLOGICAL:    - neuro exam improving but still not at baseline  - MRI pending  - tylenol scheduled for pain  - oxycodone PRN pain   - gabapentin 300 TID  - continue home sertraline     PULMONOLOGY:   S/p mechanical ventilation  - Extubated on POD 2. Angioedema improved.   - IS, OOB to chair    Acute Hypercapnic Respiratory Failure  OHS, RAJ  - improved with bipap; recommend use qhs and for naps        CARDIOVASCULAR:   Moderate BiV dysfunction - I suspect that this is due to her acute OR event (acidosis). Possible Anaphylaxis and/or Angioedema in OR - unknown precipitant  - goal MAP>=65  - weaned off levo  - pacer wires per CTS  - miya per CTS  - CTM  - amiodarone PPX  - still holding home BP medications; restart as tolerated        - concern for sternotomy, f/u CT chest     RENAL/ELECTROLYTE:   CAIO on CKD 3  - likely ATN 2/2 prolonged pump time  - nephrology consulted   - goal K>=4, Mg>=2, Phos >3     ENDOCRINE:   - insulin gtt per protocol  - FSBS ACHS     ID/MICRO:   - no signs or symptoms of active infection  - Ancef PPX per protocol    ICU DAILY CHECKLIST     Code Status: Full  DVT Prophylaxis: SCDs  T/L/D: PIV, CVC, jimmie  SUP: N/A  Diet: advance as tolerated  ABCDEF Bundle/Checklist Completed:Yes  Disposition: Transfer to non-ICU bed within next day or so - CCM to sign out, please re-consult if  needed  Multidisciplinary Rounds Completed:  Yes  Patient/Family Updated: Yes    OBJECTIVE:     Blood pressure (!) 153/79, pulse 59, temperature 97.5 °F (36.4 °C), temperature source Axillary, resp. rate 16, height 1.829 m (6'), weight (!) 147.2 kg (324 lb 8.3 oz), SpO2 99%, not currently breastfeeding.  Physical Exam  Gen: obese  HEENT: NC/AT, supple  Cardiac: RRR, no M/R/G  Pulm: CTA bilaterally  ABD: S/NT/ND, normal bowel sounds  Extremities: no edema  Skin: no rash  Neuro: alert    Physical Exam  Vitals reviewed.   Constitutional:       General: She is not in acute distress.     Appearance: She is obese. She is not diaphoretic.      Comments: pleasant   HENT:      Head: Normocephalic and atraumatic.      Comments: Minimal lip swelling     Nose: Nose normal.      Mouth/Throat:      Mouth: Mucous membranes are dry.   Eyes:      Extraocular Movements: Extraocular movements intact.      Pupils: Pupils are equal, round, and reactive to light.   Cardiovascular:      Rate and Rhythm: Normal rate and regular rhythm.      Pulses: Normal pulses.      Heart sounds: Normal heart sounds. No murmur heard.  Pulmonary:      Effort: Pulmonary effort is normal. No respiratory distress.      Breath sounds: No stridor. No wheezing, rhonchi or rales.   Abdominal:      General: Abdomen is flat. Bowel sounds are normal.      Palpations: Abdomen is soft.   Musculoskeletal:         General: No deformity or signs of injury. Normal range of motion.      Cervical back: Normal range of motion. No rigidity.   Skin:     General: Skin is warm and dry.   Neurological:      Mental Status: She is alert and oriented to person, place, and time. Mental status is at baseline.   Psychiatric:         Mood and Affect: Mood normal.         Behavior: Behavior normal.           I have personally reviewed and interpreted all relevant imaging and laboratory data.       CRITICAL CARE DOCUMENTATION  I had a face to face encounter with the patient, reviewed and

## 2025-05-25 NOTE — PROGRESS NOTES
[address], [address] [place] [address]  Phone: [phone]   [phone]    [email or web address]     Nephrology Progress Note    Patient Name : Evelio Marroquin      : 1960     MRN : [id number]  Date of Admission : 2025  Date of Servive : 25    CC:  Follow up for CAIO       Assessment and Plan   CAIO:  - 2/2 ATN from shock/hypotension  - renal U/S neg   - Cr improving, good UOP  - bumex 1mg IV BID ordered  - daily labs and I/Os  - no need for RRT at this time, family has consented if needed     Hyperkalemia:  - resolved     Hypotension/shock:  - off pressors     Ascending aortic aneurysm:  - s/p aortic root replacement   - per CTS     Angioedema/resp failure:  - unclear cause  - extubated      CKD 3a:  - baseline Cr 1.1 to 1.2     HFrEF:  - EF dropped to 30%, RV dysfunction on post op ECHO     CVA like symptoms:  - CTA neg for stroke  - for MRI today    COPD  Obesity  Depression     Interval History:  Seen and examined.  More awake, on BiPAP.  Good response to bumex overnight, 1mg ordered this AM.  Unable to obtain ROS.    Review of Systems: Pertinent items are noted in HPI.    Current Medications:   Current Facility-Administered Medications   Medication Dose Route Frequency    lactulose (CHRONULAC) 10 GM/15ML solution 20 g  20 g Oral TID    methylPREDNISolone sodium succ (SOLU-MEDROL) 125 mg in sterile water 2 mL injection  125 mg IntraVENous Q12H    iron sucrose (VENOFER) 200 mg in sodium chloride 0.9 % 100 mL IVPB  200 mg IntraVENous Q24H    0.9 % sodium chloride infusion   IntraVENous PRN    metoprolol tartrate (LOPRESSOR) tablet 12.5 mg  12.5 mg Oral BID    oxyCODONE (ROXICODONE) immediate release tablet 10 mg  10 mg Oral Q4H PRN    oxyCODONE (ROXICODONE) immediate release tablet 5 mg  5 mg Oral Q4H PRN    melatonin tablet 6 mg  6 mg Oral Nightly    traZODone (DESYREL) tablet 50 mg  50 mg Oral Nightly    aspirin chewable

## 2025-05-26 ENCOUNTER — APPOINTMENT (OUTPATIENT)
Facility: HOSPITAL | Age: 65
DRG: 216 | End: 2025-05-26
Attending: THORACIC SURGERY (CARDIOTHORACIC VASCULAR SURGERY)
Payer: COMMERCIAL

## 2025-05-26 LAB
ANION GAP SERPL CALC-SCNC: 3 MMOL/L (ref 2–12)
BUN SERPL-MCNC: 85 MG/DL (ref 6–20)
BUN/CREAT SERPL: 36 (ref 12–20)
CALCIUM SERPL-MCNC: 10.7 MG/DL (ref 8.5–10.1)
CHLORIDE SERPL-SCNC: 105 MMOL/L (ref 97–108)
CO2 SERPL-SCNC: 28 MMOL/L (ref 21–32)
CREAT SERPL-MCNC: 2.35 MG/DL (ref 0.55–1.02)
ERYTHROCYTE [DISTWIDTH] IN BLOOD BY AUTOMATED COUNT: 11.9 % (ref 11.5–14.5)
GLUCOSE BLD STRIP.AUTO-MCNC: 105 MG/DL (ref 65–117)
GLUCOSE BLD STRIP.AUTO-MCNC: 110 MG/DL (ref 65–117)
GLUCOSE BLD STRIP.AUTO-MCNC: 110 MG/DL (ref 65–117)
GLUCOSE BLD STRIP.AUTO-MCNC: 118 MG/DL (ref 65–117)
GLUCOSE SERPL-MCNC: 115 MG/DL (ref 65–100)
HCT VFR BLD AUTO: 26.5 % (ref 35–47)
HGB BLD-MCNC: 8.3 G/DL (ref 11.5–16)
MAGNESIUM SERPL-MCNC: 2.2 MG/DL (ref 1.6–2.4)
MCH RBC QN AUTO: 29.2 PG (ref 26–34)
MCHC RBC AUTO-ENTMCNC: 31.3 G/DL (ref 30–36.5)
MCV RBC AUTO: 93.3 FL (ref 80–99)
NRBC # BLD: 0 K/UL (ref 0–0.01)
NRBC BLD-RTO: 0 PER 100 WBC
PLATELET # BLD AUTO: 176 K/UL (ref 150–400)
PMV BLD AUTO: 10.8 FL (ref 8.9–12.9)
POTASSIUM SERPL-SCNC: 4.3 MMOL/L (ref 3.5–5.1)
RBC # BLD AUTO: 2.84 M/UL (ref 3.8–5.2)
SERVICE CMNT-IMP: ABNORMAL
SERVICE CMNT-IMP: NORMAL
SODIUM SERPL-SCNC: 136 MMOL/L (ref 136–145)
WBC # BLD AUTO: 8.9 K/UL (ref 3.6–11)

## 2025-05-26 PROCEDURE — 97530 THERAPEUTIC ACTIVITIES: CPT

## 2025-05-26 PROCEDURE — 2580000003 HC RX 258

## 2025-05-26 PROCEDURE — 85027 COMPLETE CBC AUTOMATED: CPT

## 2025-05-26 PROCEDURE — 6370000000 HC RX 637 (ALT 250 FOR IP): Performed by: PHYSICIAN ASSISTANT

## 2025-05-26 PROCEDURE — 83735 ASSAY OF MAGNESIUM: CPT

## 2025-05-26 PROCEDURE — 97164 PT RE-EVAL EST PLAN CARE: CPT

## 2025-05-26 PROCEDURE — 71045 X-RAY EXAM CHEST 1 VIEW: CPT

## 2025-05-26 PROCEDURE — 97168 OT RE-EVAL EST PLAN CARE: CPT

## 2025-05-26 PROCEDURE — 82962 GLUCOSE BLOOD TEST: CPT

## 2025-05-26 PROCEDURE — 6360000002 HC RX W HCPCS: Performed by: INTERNAL MEDICINE

## 2025-05-26 PROCEDURE — 2060000000 HC ICU INTERMEDIATE R&B

## 2025-05-26 PROCEDURE — 6370000000 HC RX 637 (ALT 250 FOR IP)

## 2025-05-26 PROCEDURE — 6360000002 HC RX W HCPCS: Performed by: PHYSICIAN ASSISTANT

## 2025-05-26 PROCEDURE — 6370000000 HC RX 637 (ALT 250 FOR IP): Performed by: STUDENT IN AN ORGANIZED HEALTH CARE EDUCATION/TRAINING PROGRAM

## 2025-05-26 PROCEDURE — 2500000003 HC RX 250 WO HCPCS: Performed by: PHYSICIAN ASSISTANT

## 2025-05-26 PROCEDURE — 6360000002 HC RX W HCPCS

## 2025-05-26 PROCEDURE — 80048 BASIC METABOLIC PNL TOTAL CA: CPT

## 2025-05-26 RX ORDER — MAGNESIUM SULFATE IN WATER 40 MG/ML
2000 INJECTION, SOLUTION INTRAVENOUS ONCE
Status: COMPLETED | OUTPATIENT
Start: 2025-05-26 | End: 2025-05-26

## 2025-05-26 RX ORDER — POTASSIUM CHLORIDE 750 MG/1
10 TABLET, EXTENDED RELEASE ORAL 2 TIMES DAILY
Status: DISCONTINUED | OUTPATIENT
Start: 2025-05-26 | End: 2025-05-30 | Stop reason: HOSPADM

## 2025-05-26 RX ADMIN — IRON SUCROSE 200 MG: 20 INJECTION, SOLUTION INTRAVENOUS at 12:01

## 2025-05-26 RX ADMIN — ACETAMINOPHEN 1000 MG: 500 TABLET ORAL at 03:18

## 2025-05-26 RX ADMIN — ATORVASTATIN CALCIUM 40 MG: 40 TABLET, FILM COATED ORAL at 22:21

## 2025-05-26 RX ADMIN — SODIUM CHLORIDE, PRESERVATIVE FREE 10 ML: 5 INJECTION INTRAVENOUS at 22:22

## 2025-05-26 RX ADMIN — POTASSIUM CHLORIDE 10 MEQ: 750 TABLET, FILM COATED, EXTENDED RELEASE ORAL at 08:59

## 2025-05-26 RX ADMIN — ASPIRIN 81 MG CHEWABLE TABLET 81 MG: 81 TABLET CHEWABLE at 08:59

## 2025-05-26 RX ADMIN — CHLORHEXIDINE GLUCONATE 15 ML: 1.2 RINSE ORAL at 09:03

## 2025-05-26 RX ADMIN — BUMETANIDE 1 MG: 0.25 INJECTION INTRAMUSCULAR; INTRAVENOUS at 08:58

## 2025-05-26 RX ADMIN — ACETAMINOPHEN 1000 MG: 500 TABLET ORAL at 22:20

## 2025-05-26 RX ADMIN — BUMETANIDE 1 MG: 0.25 INJECTION INTRAMUSCULAR; INTRAVENOUS at 18:00

## 2025-05-26 RX ADMIN — Medication 6 MG: at 22:21

## 2025-05-26 RX ADMIN — METOPROLOL TARTRATE 25 MG: 25 TABLET, FILM COATED ORAL at 08:58

## 2025-05-26 RX ADMIN — ACETAMINOPHEN 1000 MG: 500 TABLET ORAL at 08:58

## 2025-05-26 RX ADMIN — TRAZODONE HYDROCHLORIDE 50 MG: 50 TABLET ORAL at 22:21

## 2025-05-26 RX ADMIN — SERTRALINE HYDROCHLORIDE 100 MG: 50 TABLET ORAL at 08:58

## 2025-05-26 RX ADMIN — HEPARIN SODIUM 5000 UNITS: 5000 INJECTION INTRAVENOUS; SUBCUTANEOUS at 05:58

## 2025-05-26 RX ADMIN — MAGNESIUM SULFATE HEPTAHYDRATE 2000 MG: 40 INJECTION, SOLUTION INTRAVENOUS at 07:00

## 2025-05-26 RX ADMIN — TAMSULOSIN HYDROCHLORIDE 0.4 MG: 0.4 CAPSULE ORAL at 22:21

## 2025-05-26 RX ADMIN — HEPARIN SODIUM 5000 UNITS: 5000 INJECTION INTRAVENOUS; SUBCUTANEOUS at 22:21

## 2025-05-26 RX ADMIN — AMIODARONE HYDROCHLORIDE 400 MG: 200 TABLET ORAL at 08:58

## 2025-05-26 RX ADMIN — HEPARIN SODIUM 5000 UNITS: 5000 INJECTION INTRAVENOUS; SUBCUTANEOUS at 14:14

## 2025-05-26 RX ADMIN — SENNOSIDES AND DOCUSATE SODIUM 1 TABLET: 8.6; 5 TABLET ORAL at 22:21

## 2025-05-26 RX ADMIN — POTASSIUM CHLORIDE 10 MEQ: 750 TABLET, FILM COATED, EXTENDED RELEASE ORAL at 18:00

## 2025-05-26 RX ADMIN — CHLORHEXIDINE GLUCONATE 15 ML: 1.2 RINSE ORAL at 22:21

## 2025-05-26 RX ADMIN — METOPROLOL TARTRATE 25 MG: 25 TABLET, FILM COATED ORAL at 22:21

## 2025-05-26 RX ADMIN — SENNOSIDES AND DOCUSATE SODIUM 1 TABLET: 8.6; 5 TABLET ORAL at 08:58

## 2025-05-26 RX ADMIN — AMIODARONE HYDROCHLORIDE 400 MG: 200 TABLET ORAL at 22:23

## 2025-05-26 RX ADMIN — LACTULOSE 20 G: 20 SOLUTION ORAL at 09:02

## 2025-05-26 RX ADMIN — MINERAL OIL 1 ENEMA: 100 ENEMA RECTAL at 14:16

## 2025-05-26 ASSESSMENT — PAIN SCALES - GENERAL
PAINLEVEL_OUTOF10: 0

## 2025-05-26 NOTE — PROGRESS NOTES
[address], [facility] [place] [address]  Phone: [phone]   [phone]    [email or web address]     Nephrology Progress Note    Patient Name : Evelio Marroquin      : 1960     MRN : [id number]  Date of Admission : 2025  Date of Servive : 25    CC:  Follow up for CAIO       Assessment and Plan   CAIO:  - 2/2 ATN from shock/hypotension  - renal U/S neg   - Cr improving, good UOP  - bumex 1mg IV BID ordered  - daily labs and I/Os  - no need for RRT at this time     Hyperkalemia:  - resolved     Hypotension/shock:  - off pressors     Ascending aortic aneurysm:  - s/p aortic root replacement   - per CTS     Angioedema/resp failure:  - unclear cause  - extubated      CKD 3a:  - baseline Cr 1.1 to 1.2     HFrEF:  - EF dropped to 30%, RV dysfunction on post op ECHO     CVA like symptoms:  - CTA neg for stroke  - for MRI today    COPD  Obesity  Depression     Interval History:  Seen and examined.  More awake, on NC O2.  Good UOP and Cr better. Eating breakfast this AM.  Possible tx out of CCU today.     Review of Systems: Pertinent items are noted in HPI.    Current Medications:   Current Facility-Administered Medications   Medication Dose Route Frequency    potassium chloride (KLOR-CON) extended release tablet 10 mEq  10 mEq Oral BID    lactulose (CHRONULAC) 10 GM/15ML solution 20 g  20 g Oral TID    bumetanide (BUMEX) injection 1 mg  1 mg IntraVENous BID    metoprolol tartrate (LOPRESSOR) tablet 25 mg  25 mg Oral BID    tamsulosin (FLOMAX) capsule 0.4 mg  0.4 mg Oral Nightly    iron sucrose (VENOFER) 200 mg in sodium chloride 0.9 % 100 mL IVPB  200 mg IntraVENous Q24H    0.9 % sodium chloride infusion   IntraVENous PRN    oxyCODONE (ROXICODONE) immediate release tablet 10 mg  10 mg Oral Q4H PRN    oxyCODONE (ROXICODONE) immediate release tablet 5 mg  5 mg Oral Q4H PRN    melatonin tablet 6 mg  6 mg Oral Nightly    traZODone (DESYREL)

## 2025-05-26 NOTE — PROGRESS NOTES
Cardiac Surgery ICU Progress Note    Admit Date: 2025  POD:  7 Days Post-Op    Procedure:     with Dr. Bradley     1. Aortic root replacement with #25 Marroquin KONECT valved conduit.     2. Insertion of percutaneous femoral A-line.    Hospital Course:  DOS : Aortic root replacement complicated by severe hypoxia and airway swelling. Transferred to ICU on Epi, Levo, Yan. Dobutamine and Levo added overnight.  POD1 : Febrile. Albumin x1 + 75 ml/hr NS. Dobutamine weaned to 2. K 5.5. Vent weaned to 40% FiO2. Failed spontaneous. Concern for angioedema and soft tissue swelling around neck. Possible Ct. Lasix 80 mg IV.  POD2 : Neck and angioedema improved. Trial extubate today. Keep CT. Diurese per nephrology  POD3 :  Stroke code for disconjugate gaze/inability to perform EOM, arm drift, word-finding difficulty.  CT negative for acute process.  Weaning pressors.  Nephrology recommending holding diuresis today.   POD4: Off pressors. Deline. Transfer orders. Head MRI revealed tiny, incidental infarct. Severe chest pain followed by labored breathing -> transfer back to CCU, bipap. Brief afib rate 100s, brief hypotension. Converted with amio infusion.   POD5: Continued tenuous respiratory status, on bipap. Unstable sternum noted. CT chest.   POD6: Improved respiratory and mental status. Lactulose added. Transfer orders.   POD7: Mineral enema. D/c gorman. Continues to improve.   Subjective:   Patient seen with Dr. Duggan. Improved mentation and respiratory status. On 5L NC, in SR, afebrile.     Objective:   Vitals:  Blood pressure 130/85, pulse 66, temperature 98 °F (36.7 °C), temperature source Axillary, resp. rate 15, height 1.829 m (6'), weight (!) 145.6 kg (320 lb 15.8 oz), SpO2 97%, not currently breastfeeding.  Temp (24hrs), Av.1 °F (36.7 °C), Min:98 °F (36.7 °C), Max:98.3 °F (36.8 °C)       EKG/Rhythm:  NSR 90s       Extubation Date / Time:   at 1041; prolonged intubation  BID    Urinary retention: Gorman replaced 5/23  - cont flomax  - remove gorman AFTER bowel movement     Depression: PTA Zoloft   - Continue home Zoloft 100 mg daily    Nutrition: advance as tolerated  GI proph: pepcid  Bowel reg: glycolax, senokot, bisacodyl, mineral oil enema  DVT proph: SCDs, heparin SQ    Dispo: Transfer to [facility]. Anticipate home with  Wednesday after she is weaned off oxygen.    Signed By: CHARO Mcgowan - NP

## 2025-05-26 NOTE — PROGRESS NOTES
1930: Bedside report received from Yesica MAYER    2200: Pt placed on bipap for sleep.      0230: Pt requesting break from bipap, placed on 5LNC.      0730: Bedside report given to Mirta MAYER

## 2025-05-26 NOTE — PLAN OF CARE
Problem: Physical Therapy - Adult  Goal: By Discharge: Performs mobility at highest level of function for planned discharge setting.  See evaluation for individualized goals.  Description: FUNCTIONAL STATUS PRIOR TO ADMISSION: Patient was independent and active without use of DME. Enjoys playing softball.     HOME SUPPORT PRIOR TO ADMISSION: The patient lived with her  but did not require assist.    Physical Therapy Goals  Goals re-evaluated 5/26/2025   1.  Patient will move from supine to sit and sit to supine, scoot up and down, and roll side to side in bed with minimal assistance within 5 day(s).    2.  Patient will perform sit to stand with moderate assistance within 5 day(s).  3.  Patient will transfer from bed to chair and chair to bed with moderate assistance using the least restrictive device within 5 day(s).  4.  Patient will ambulate with modA for 20 feet with the least restrictive device within 5 day(s).   5.  Patient will perform cardiac exercises per protocol with supervision/set-up within 5 days.  6.  Patient will verbally recall and functionally demonstrate mindful-based movements (\"move in the tube\") principles without cues within 5 days.  Initiated 5/22/2025  1.  Patient will move from supine to sit and sit to supine, scoot up and down, and roll side to side in bed with minimal assistance within 5 day(s).    2.  Patient will perform sit to stand with minimal assistance within 5 day(s).  3.  Patient will transfer from bed to chair and chair to bed with minimal assistance using the least restrictive device within 5 day(s).  4.  Patient will ambulate with minimal assistance for 50 feet with the least restrictive device within 5 day(s).   5.  Patient will ascend/descend 1 stairs with right handrail(s) with minimal assistance within 5 day(s).  6.  Patient will perform cardiac exercises per protocol with supervision/set-up within 5 days.  7.  Patient will verbally recall and functionally

## 2025-05-26 NOTE — PLAN OF CARE
Problem: Occupational Therapy - Adult  Goal: By Discharge: Performs self-care activities at highest level of function for planned discharge setting.  See evaluation for individualized goals.  Description: FUNCTIONAL STATUS PRIOR TO ADMISSION: Patient was (I) with ADLs, IADLs, and functional mobility. Enjoys playing softball.     HOME SUPPORT PRIOR TO ADMISSION: The patient lives with her  and did not require assist.    Occupational Therapy Goals:  Re-Evaluation s/p rapid response on 5/24 for respiratory distress requiring transfer to higher level of care in CCU.    1.  Patient will perform ADLs standing 5 mins without fatigue or LOB with Moderate Assist within 7 days.  2.  Patient will perform upper body ADLs with Minimal Assist within 7 days.  3.  Patient will perform lower body ADLs with Moderate Assist within 7 days.    4.  Patient will perform gathering ADL items high and low 2/2 with Moderate Assist within 7 days.  5.  Patient will perform toilet transfers with Maximal Assist within 7 days.  6.  Patient will perform all aspects of toileting with Maximal Assist within 7 days.  7.  Patient will participate in cardiac/sternal upper extremity therapeutic exercise/activities to increase independence with ADLs with supervision/set-up for 5 minutes within 7 days.   8.  Patient will adhere to Move in the Tube precautions during functional tasks with 2 cues within 7 days.    Initiated 5/22/2025  1.  Patient will perform ADLs standing 5 mins without fatigue or LOB with Moderate Assist within 7 days.  2.  Patient will perform upper body ADLs with Moderate Assist while adhering to precautions within 7 days.  3.  Patient will perform lower body ADLs with Moderate Assist using tailor sit technique vs AE prn within 7 days.    4.  Patient will perform gathering ADL items high and low 2/2 with Moderate Assist within 7 days.  5.  Patient will perform toilet transfers with Moderate Assist within 7 days.  6.  Patient will  Generally decreased, functional (RUE shoulder flexion> LUE shoulder flexion.)      Coordination:  Coordination: Generally decreased, functional (Increased time for LUE finger to nose compared to RUE.)     Coordination: Generally decreased, functional      Tone & Sensation:   Tone: Normal  Sensation: Intact        Functional Mobility and Transfers for ADLs:  Bed Mobility:     Bed Mobility Training  Bed Mobility Training: Yes  Supine to Sit: Partial/Moderate assistance  Sit to Supine:  (up in chair)  Scooting: Partial/Moderate assistance    Transfers:     Transfer Training  Transfer Training: Yes  Sit to Stand: Minimal assistance;2 Person assistance (in wilmer stedy)  Stand to Sit: Partial/Moderate assistance;2 Person assistance (sitting in low recliner)  Bed to Chair: Dependent/Total (use of wilmer stedy)          Balance:      Balance  Sitting: Impaired  Sitting - Static: Fair (occasional)  Sitting - Dynamic: Poor (constant support)  Standing: Impaired  Standing - Static: Constant support;Fair (use of wilmer stedy- no knee buckling)  Standing - Dynamic: Poor;Constant support      ADL Assessment:     Feeding: Modified independent      Grooming: Setup;Based on clinical judgement     UE Bathing: Minimal assistance;Based on clinical judgement     LE Bathing: Maximum assistance;Based on clinical judgement     UE Dressing: Moderate assistance;Based on clinical judgement     LE Dressing: Dependent/Total;Based on clinical judgement     Toileting: Dependent/Total;Based on clinical judgement     ADL Intervention and task modifications:    Patient instructed and educated on mindful movement principles based on “Move in The Tube” maintaining bilateral elbows close to rib cage when performing any load-bearing activity.  Educated on applying this concept when getting in/out of bed, pushing up from a chair, opening a door, or lifting a box.  Patient has been provided handouts with diagrams of each correct/incorrect method of performing

## 2025-05-27 ENCOUNTER — APPOINTMENT (OUTPATIENT)
Facility: HOSPITAL | Age: 65
DRG: 216 | End: 2025-05-27
Attending: THORACIC SURGERY (CARDIOTHORACIC VASCULAR SURGERY)
Payer: COMMERCIAL

## 2025-05-27 LAB
ANION GAP SERPL CALC-SCNC: 3 MMOL/L (ref 2–12)
BUN SERPL-MCNC: 72 MG/DL (ref 6–20)
BUN/CREAT SERPL: 39 (ref 12–20)
CALCIUM SERPL-MCNC: 10.4 MG/DL (ref 8.5–10.1)
CHLORIDE SERPL-SCNC: 103 MMOL/L (ref 97–108)
CO2 SERPL-SCNC: 31 MMOL/L (ref 21–32)
CREAT SERPL-MCNC: 1.85 MG/DL (ref 0.55–1.02)
ERYTHROCYTE [DISTWIDTH] IN BLOOD BY AUTOMATED COUNT: 11.7 % (ref 11.5–14.5)
GLUCOSE BLD STRIP.AUTO-MCNC: 118 MG/DL (ref 65–117)
GLUCOSE BLD STRIP.AUTO-MCNC: 129 MG/DL (ref 65–117)
GLUCOSE BLD STRIP.AUTO-MCNC: 145 MG/DL (ref 65–117)
GLUCOSE BLD STRIP.AUTO-MCNC: 150 MG/DL (ref 65–117)
GLUCOSE SERPL-MCNC: 100 MG/DL (ref 65–100)
HCT VFR BLD AUTO: 27.5 % (ref 35–47)
HGB BLD-MCNC: 8.7 G/DL (ref 11.5–16)
MAGNESIUM SERPL-MCNC: 2.1 MG/DL (ref 1.6–2.4)
MCH RBC QN AUTO: 29.2 PG (ref 26–34)
MCHC RBC AUTO-ENTMCNC: 31.6 G/DL (ref 30–36.5)
MCV RBC AUTO: 92.3 FL (ref 80–99)
NRBC # BLD: 0 K/UL (ref 0–0.01)
NRBC BLD-RTO: 0 PER 100 WBC
PLATELET # BLD AUTO: 205 K/UL (ref 150–400)
PMV BLD AUTO: 10.2 FL (ref 8.9–12.9)
POTASSIUM SERPL-SCNC: 4.1 MMOL/L (ref 3.5–5.1)
RBC # BLD AUTO: 2.98 M/UL (ref 3.8–5.2)
SERVICE CMNT-IMP: ABNORMAL
SODIUM SERPL-SCNC: 137 MMOL/L (ref 136–145)
WBC # BLD AUTO: 8.8 K/UL (ref 3.6–11)

## 2025-05-27 PROCEDURE — 6360000002 HC RX W HCPCS

## 2025-05-27 PROCEDURE — 2580000003 HC RX 258

## 2025-05-27 PROCEDURE — 6370000000 HC RX 637 (ALT 250 FOR IP)

## 2025-05-27 PROCEDURE — 94760 N-INVAS EAR/PLS OXIMETRY 1: CPT

## 2025-05-27 PROCEDURE — 6370000000 HC RX 637 (ALT 250 FOR IP): Performed by: PHYSICIAN ASSISTANT

## 2025-05-27 PROCEDURE — 2500000003 HC RX 250 WO HCPCS: Performed by: THORACIC SURGERY (CARDIOTHORACIC VASCULAR SURGERY)

## 2025-05-27 PROCEDURE — 6360000002 HC RX W HCPCS: Performed by: INTERNAL MEDICINE

## 2025-05-27 PROCEDURE — 2700000000 HC OXYGEN THERAPY PER DAY

## 2025-05-27 PROCEDURE — 2500000003 HC RX 250 WO HCPCS: Performed by: PHYSICIAN ASSISTANT

## 2025-05-27 PROCEDURE — 85027 COMPLETE CBC AUTOMATED: CPT

## 2025-05-27 PROCEDURE — 74018 RADEX ABDOMEN 1 VIEW: CPT

## 2025-05-27 PROCEDURE — 97116 GAIT TRAINING THERAPY: CPT

## 2025-05-27 PROCEDURE — 92526 ORAL FUNCTION THERAPY: CPT

## 2025-05-27 PROCEDURE — 94660 CPAP INITIATION&MGMT: CPT

## 2025-05-27 PROCEDURE — 80048 BASIC METABOLIC PNL TOTAL CA: CPT

## 2025-05-27 PROCEDURE — 97530 THERAPEUTIC ACTIVITIES: CPT

## 2025-05-27 PROCEDURE — 6370000000 HC RX 637 (ALT 250 FOR IP): Performed by: STUDENT IN AN ORGANIZED HEALTH CARE EDUCATION/TRAINING PROGRAM

## 2025-05-27 PROCEDURE — 83735 ASSAY OF MAGNESIUM: CPT

## 2025-05-27 PROCEDURE — 82962 GLUCOSE BLOOD TEST: CPT

## 2025-05-27 PROCEDURE — 2060000000 HC ICU INTERMEDIATE R&B

## 2025-05-27 PROCEDURE — 2580000003 HC RX 258: Performed by: STUDENT IN AN ORGANIZED HEALTH CARE EDUCATION/TRAINING PROGRAM

## 2025-05-27 PROCEDURE — 6360000002 HC RX W HCPCS: Performed by: PHYSICIAN ASSISTANT

## 2025-05-27 PROCEDURE — 71045 X-RAY EXAM CHEST 1 VIEW: CPT

## 2025-05-27 PROCEDURE — 97535 SELF CARE MNGMENT TRAINING: CPT

## 2025-05-27 RX ORDER — MAGNESIUM SULFATE IN WATER 40 MG/ML
2000 INJECTION, SOLUTION INTRAVENOUS ONCE
Status: COMPLETED | OUTPATIENT
Start: 2025-05-27 | End: 2025-05-27

## 2025-05-27 RX ORDER — LACTULOSE 10 G/15ML
20 SOLUTION ORAL 3 TIMES DAILY
Status: DISCONTINUED | OUTPATIENT
Start: 2025-05-27 | End: 2025-05-29

## 2025-05-27 RX ORDER — PROCHLORPERAZINE EDISYLATE 5 MG/ML
10 INJECTION INTRAMUSCULAR; INTRAVENOUS EVERY 6 HOURS PRN
Status: DISCONTINUED | OUTPATIENT
Start: 2025-05-27 | End: 2025-05-30 | Stop reason: HOSPADM

## 2025-05-27 RX ORDER — POLYETHYLENE GLYCOL 3350 17 G/17G
17 POWDER, FOR SOLUTION ORAL 2 TIMES DAILY
Status: DISCONTINUED | OUTPATIENT
Start: 2025-05-27 | End: 2025-05-30 | Stop reason: HOSPADM

## 2025-05-27 RX ADMIN — INSULIN LISPRO 2 UNITS: 100 INJECTION, SOLUTION INTRAVENOUS; SUBCUTANEOUS at 17:10

## 2025-05-27 RX ADMIN — PROCHLORPERAZINE EDISYLATE 10 MG: 5 INJECTION INTRAMUSCULAR; INTRAVENOUS at 23:50

## 2025-05-27 RX ADMIN — ACETAMINOPHEN 1000 MG: 500 TABLET ORAL at 17:10

## 2025-05-27 RX ADMIN — AMIODARONE HYDROCHLORIDE 150 MG: 50 INJECTION, SOLUTION INTRAVENOUS at 11:31

## 2025-05-27 RX ADMIN — METHYLNALTREXONE BROMIDE 12 MG: 12 INJECTION, SOLUTION SUBCUTANEOUS at 11:40

## 2025-05-27 RX ADMIN — AMIODARONE HYDROCHLORIDE 1 MG/MIN: 50 INJECTION, SOLUTION INTRAVENOUS at 20:14

## 2025-05-27 RX ADMIN — POTASSIUM CHLORIDE 10 MEQ: 750 TABLET, FILM COATED, EXTENDED RELEASE ORAL at 17:10

## 2025-05-27 RX ADMIN — ATORVASTATIN CALCIUM 40 MG: 40 TABLET, FILM COATED ORAL at 20:29

## 2025-05-27 RX ADMIN — ACETAMINOPHEN 1000 MG: 500 TABLET ORAL at 12:59

## 2025-05-27 RX ADMIN — TRAZODONE HYDROCHLORIDE 50 MG: 50 TABLET ORAL at 20:29

## 2025-05-27 RX ADMIN — MAGNESIUM SULFATE HEPTAHYDRATE 2000 MG: 40 INJECTION, SOLUTION INTRAVENOUS at 16:48

## 2025-05-27 RX ADMIN — AMIODARONE HYDROCHLORIDE 400 MG: 200 TABLET ORAL at 20:28

## 2025-05-27 RX ADMIN — Medication 6 MG: at 20:28

## 2025-05-27 RX ADMIN — AMIODARONE HYDROCHLORIDE 400 MG: 200 TABLET ORAL at 09:04

## 2025-05-27 RX ADMIN — CHLORHEXIDINE GLUCONATE 15 ML: 1.2 RINSE ORAL at 20:30

## 2025-05-27 RX ADMIN — LACTULOSE 20 G: 20 SOLUTION ORAL at 20:28

## 2025-05-27 RX ADMIN — MAGNESIUM HYDROXIDE 30 ML: 400 SUSPENSION ORAL at 13:43

## 2025-05-27 RX ADMIN — POTASSIUM CHLORIDE 10 MEQ: 750 TABLET, FILM COATED, EXTENDED RELEASE ORAL at 09:13

## 2025-05-27 RX ADMIN — MICONAZOLE NITRATE: 20 POWDER TOPICAL at 09:03

## 2025-05-27 RX ADMIN — POLYETHYLENE GLYCOL 3350 17 G: 17 POWDER, FOR SOLUTION ORAL at 09:13

## 2025-05-27 RX ADMIN — AMIODARONE HYDROCHLORIDE 1 MG/MIN: 50 INJECTION, SOLUTION INTRAVENOUS at 11:48

## 2025-05-27 RX ADMIN — CHLORHEXIDINE GLUCONATE 15 ML: 1.2 RINSE ORAL at 09:03

## 2025-05-27 RX ADMIN — BUMETANIDE 1 MG: 0.25 INJECTION INTRAMUSCULAR; INTRAVENOUS at 17:10

## 2025-05-27 RX ADMIN — ASPIRIN 81 MG CHEWABLE TABLET 81 MG: 81 TABLET CHEWABLE at 09:03

## 2025-05-27 RX ADMIN — TAMSULOSIN HYDROCHLORIDE 0.4 MG: 0.4 CAPSULE ORAL at 20:28

## 2025-05-27 RX ADMIN — METOPROLOL TARTRATE 25 MG: 25 TABLET, FILM COATED ORAL at 09:04

## 2025-05-27 RX ADMIN — POLYETHYLENE GLYCOL 3350 17 G: 17 POWDER, FOR SOLUTION ORAL at 20:28

## 2025-05-27 RX ADMIN — SODIUM CHLORIDE, PRESERVATIVE FREE 10 ML: 5 INJECTION INTRAVENOUS at 20:30

## 2025-05-27 RX ADMIN — SERTRALINE HYDROCHLORIDE 100 MG: 50 TABLET ORAL at 09:04

## 2025-05-27 RX ADMIN — APIXABAN 5 MG: 5 TABLET, FILM COATED ORAL at 09:04

## 2025-05-27 RX ADMIN — MAGNESIUM SULFATE HEPTAHYDRATE 2000 MG: 40 INJECTION, SOLUTION INTRAVENOUS at 09:12

## 2025-05-27 RX ADMIN — MICONAZOLE NITRATE: 20 POWDER TOPICAL at 20:30

## 2025-05-27 RX ADMIN — SODIUM CHLORIDE 27 ML: 0.9 INJECTION, SOLUTION INTRAVENOUS at 16:47

## 2025-05-27 RX ADMIN — HEPARIN SODIUM 5000 UNITS: 5000 INJECTION INTRAVENOUS; SUBCUTANEOUS at 06:25

## 2025-05-27 RX ADMIN — SENNOSIDES AND DOCUSATE SODIUM 1 TABLET: 8.6; 5 TABLET ORAL at 20:29

## 2025-05-27 RX ADMIN — APIXABAN 5 MG: 5 TABLET, FILM COATED ORAL at 20:29

## 2025-05-27 RX ADMIN — AMIODARONE HYDROCHLORIDE 150 MG: 50 INJECTION, SOLUTION INTRAVENOUS at 16:41

## 2025-05-27 RX ADMIN — LACTULOSE 20 G: 20 SOLUTION ORAL at 13:43

## 2025-05-27 RX ADMIN — MINERAL OIL 1 ENEMA: 100 ENEMA RECTAL at 17:16

## 2025-05-27 RX ADMIN — ONDANSETRON 4 MG: 2 INJECTION INTRAMUSCULAR; INTRAVENOUS at 10:33

## 2025-05-27 RX ADMIN — ONDANSETRON 4 MG: 2 INJECTION INTRAMUSCULAR; INTRAVENOUS at 03:17

## 2025-05-27 RX ADMIN — SENNOSIDES AND DOCUSATE SODIUM 1 TABLET: 8.6; 5 TABLET ORAL at 09:04

## 2025-05-27 RX ADMIN — BUMETANIDE 1 MG: 0.25 INJECTION INTRAMUSCULAR; INTRAVENOUS at 09:04

## 2025-05-27 RX ADMIN — METOPROLOL TARTRATE 25 MG: 25 TABLET, FILM COATED ORAL at 20:29

## 2025-05-27 RX ADMIN — ACETAMINOPHEN 1000 MG: 500 TABLET ORAL at 06:25

## 2025-05-27 ASSESSMENT — PAIN SCALES - GENERAL
PAINLEVEL_OUTOF10: 0

## 2025-05-27 NOTE — PROGRESS NOTES
Spiritual Health History and Assessment/Progress Note  [facility]    Attempted Encounter,  ,  ,      Name: Evelio Marroquin MRN: [id number]    Age: 64 y.o.     Sex: female   Language: English   Catholic: Mu-ism   Aneurysm of ascending aorta     Date: 5/27/2025            Total Time Calculated: 9 min              Spiritual Assessment began in [facility] CORONARY CARE        Referral/Consult From: Rounding   Encounter Overview/Reason: Attempted Encounter  Service Provided For: Patient not available    Mildred, Belief, Meaning:   Patient unable to assess at this time  Family/Friends No family/friends present      Importance and Influence:  Patient unable to assess at this time  Family/Friends No family/friends present    Community:  Patient Other: Unable to assess  Family/Friends No family/friends present    Assessment and Plan of Care:     Patient Interventions include: Other: Attempted to visit Ms Marroquin for spiritual assessment. PT was working with patient and no family was present; unable to do assessment at that time  Family/Friends Interventions include: No family/friends present    Patient Plan of Care: Spiritual Care available upon further referral  Family/Friends Plan of Care: No family/friends present      Rev Atiya Ho MDiv, BCC  To dayo : [phone])

## 2025-05-27 NOTE — PLAN OF CARE
time.  Discharge Recommendations: No, additional SLP treatment not indicated at discharge     SUBJECTIVE:   Patient stated, “They gave me a bad batch of the pulled pork.”    OBJECTIVE:     Past Medical History:   Diagnosis Date    Acute combined systolic and diastolic congestive heart failure (HCC) 7/28/2022    CAD (coronary artery disease)     Chronic obstructive pulmonary disease (HCC)     Chronic renal disease, stage III (HCC) [276303] 07/06/2023    Depression     Hypertension     Menopause     LMP-46 years old?     Past Surgical History:   Procedure Laterality Date    BACK SURGERY      DISK REMOVAL AND FUSION LUMBAR    CARDIAC PROCEDURE N/A 5/16/2025    Left heart cath / coronary angiography performed by Kadeem Mo MD at [facility] CARDIAC CATH LAB    COLONOSCOPY  2015    THORACIC AORTIC ANEURYSM REPAIR N/A 5/19/2025    AORTIC ROOT REPLACEMENT, ECC, DEDRA AND FLEXIBLE BRONCHOSCOPY BY DR BEDOYA performed by Loyd Bradley MD at [facility] OPEN HEART    WISDOM TOOTH EXTRACTION       Prior Level of Function/Home Situation:   Social/Functional History  Lives With: Spouse  Type of Home: House  Home Layout: One level  Home Access: Stairs to enter without rails  Entrance Stairs - Number of Steps: 1  Bathroom Shower/Tub: Tub/Shower unit  Bathroom Toilet: Standard  Bathroom Equipment: Grab bars in shower, Shower chair  Has the patient had two or more falls in the past year or any fall with injury in the past year?: No  Prior Level of Assist for ADLs: Independent  Prior Level of Assist for Homemaking: Independent  Prior Level of Assist for Ambulation: Independent household ambulator, with or without device  Prior Level of Assist for Transfers: Independent  Active : Yes  Occupation: On disability  Leisure & Hobbies: Softball    Cognitive and Communication Status:  Neurologic State: Alert  Orientation Level: Oriented x4  Cognition: Decreased attention/concentration    Dysphagia:  Oral Assessment:     P.O. Trials:  PO

## 2025-05-27 NOTE — CARDIO/PULMONARY
Chart reviewed: Patient is 64 y.o. female admitted with Aneurysm of ascending aorta [I71.21]  Severe aortic insufficiency [I35.1]    Education: ERAS education folder  not currently at bedside .  Met with Evelio Marroquin and her  to begin  cardiac surgery post discharge instructions and to discuss participation in Cardiac Rehab Program.    Educated using teach back method. Reviewed use of bear for sternal support, daily weights and temperatures,  and use of incentive spirometer. Evelio Marroquin was able to demonstrate proper use of incentive spirometer, achieving 450-600 ml.       Discussed Cardiac Rehab Program benefits, format, and encouraged participation. Initial Cardiac Rehab Program intake appointment scheduled for Wednesday June 25 at 12 noon and appointment information is on the AVS. General questions answered. Evelio Marroquin verbalized understanding. Her  did the OP program at [facility] and asked about enrollment there. Advised to discuss with surgery team.    Chelsea Dior RN

## 2025-05-27 NOTE — PLAN OF CARE
Problem: Occupational Therapy - Adult  Goal: By Discharge: Performs self-care activities at highest level of function for planned discharge setting.  See evaluation for individualized goals.  Description: FUNCTIONAL STATUS PRIOR TO ADMISSION: Patient was (I) with ADLs, IADLs, and functional mobility. Enjoys playing softball.     HOME SUPPORT PRIOR TO ADMISSION: The patient lives with her  and did not require assist.    Occupational Therapy Goals:  Re-Evaluation s/p rapid response on 5/24 for respiratory distress requiring transfer to higher level of care in CCU.    1.  Patient will perform ADLs standing 5 mins without fatigue or LOB with Moderate Assist within 7 days.  2.  Patient will perform upper body ADLs with Minimal Assist within 7 days.  3.  Patient will perform lower body ADLs with Moderate Assist within 7 days.    4.  Patient will perform gathering ADL items high and low 2/2 with Moderate Assist within 7 days.  5.  Patient will perform toilet transfers with Maximal Assist within 7 days.  6.  Patient will perform all aspects of toileting with Maximal Assist within 7 days.  7.  Patient will participate in cardiac/sternal upper extremity therapeutic exercise/activities to increase independence with ADLs with supervision/set-up for 5 minutes within 7 days.   8.  Patient will adhere to Move in the Tube precautions during functional tasks with 2 cues within 7 days.    Initiated 5/22/2025  1.  Patient will perform ADLs standing 5 mins without fatigue or LOB with Moderate Assist within 7 days.  2.  Patient will perform upper body ADLs with Moderate Assist while adhering to precautions within 7 days.  3.  Patient will perform lower body ADLs with Moderate Assist using tailor sit technique vs AE prn within 7 days.    4.  Patient will perform gathering ADL items high and low 2/2 with Moderate Assist within 7 days.  5.  Patient will perform toilet transfers with Moderate Assist within 7 days.  6.  Patient will

## 2025-05-27 NOTE — PROGRESS NOTES
2000 Received report and assumed care.  2300 Placed on bipap.  0250 Labs drawn.  0320 Feeling nauseated, bipap removed and placed on NC 5L. Vomitted small amount of bilious emesis. Zofran given.  0730 Bedside and Verbal shift change report given to Mirta (oncoming nurse) by Sonam (offgoing nurse). Report included the following information Nurse Handoff Report, Adult Overview, Surgery Report, Intake/Output, MAR, and Recent Results.

## 2025-05-27 NOTE — DISCHARGE INSTR - OTHER ORDERS
Implants:   Implant Name Type Inv. Item Serial No.  Lot No. LRB No. Used Action   VALVE AORT 25 MM CONDUIT KONECT RESILIA - [id number] Aortic valves VALVE AORT 25 MM CONDUIT KONECT RESILIA [id number] [employer]- NA N/A 1 Implanted

## 2025-05-27 NOTE — CARE COORDINATION
Update 3:33pm: Patient moved to CVSU. Therapy is still recommending IPR so NP Scarlet scheduled a P2P for tomorrow, 5/28 at 4pm to overturn IPR denial for [facility].     Transition of Care Plan:     RUR: 22%  Prior Level of Functioning: Lives w husb, 1 level, ind, drives  Disposition: P2P pending for 5/28 at 4pm to overturn denial for [facility]  If SNF or IPR: Date FOC offered: 5/23  Date FOC received: 5/23  Accepting facility: [facility]  Date authorization started with reference number: 5/23  Date authorization received and expires: 5/27- IPR denied  Follow up appointments: Specialist  DME needed: Defer to rehab  Transportation at discharge: TBD  Caregiver Contact: , Darius Marroquin [phone]  Discharge Caregiver contacted prior to discharge?   Care Conference needed?   Barriers to discharge:  Medical, IPR auth     Rec'd this message from [facility] \"Auth denied due to not medically necessary, for p2p call [phone] within 5 business days to schedule. REF# [id number]\".    Forwarded update to NP and CCU CM.    REX Arroyo CCM  Care Management   Available on Perfect Serve or [phone]

## 2025-05-27 NOTE — DIABETES MGMT
Evelio Marroquin BG pattern has been stable since transitioning off insulin infusion post op aortic aneurysm repair other than to treat steroid induced hyperglycemia which had minimal effects on BG pattern. She is not requiring any insulin therapy, if BG continues to remain below 150 x3 POC can discontinue ACHS POC and check BG via AM labs.     Blood Glucose Pattern         Diabetes Discharge Plan   Medication  A1c is 5.1% given cardiac history and obesity recommend changes to diet to stick to carb controlled diet and increase exercise as tolerated. Would also consider GLP-1 such as Ozempic or Wegovy given added benefit of cardioprotective properties will assess renal function before discharge to determine appropriateness.    Referral  []        Outpatient diabetes education   Additional orders  Follow up with PCP to start GLP-1 therapy      Diabetes Management Team to sign off at this point as patient's blood glucose remains stable.  Please re-consult us if patient needs change.  Thank you for including us in their care.     CHARO Hein - CNS   Diabetes Clinical Nurse Specialist   Program for Diabetes Health  Access via [employer]On[employer] Serve

## 2025-05-27 NOTE — PROGRESS NOTES
[address], [place] [address]  Phone: [phone]   [phone]    [email or web address]     Nephrology Progress Note    Patient Name : Evelio Marroquin      : 1960     MRN : [id number]  Date of Admission : 2025  Date of Servive : 25    CC:  Follow up for CAIO       Assessment and Plan   CAIO:  - 2/2 ATN from shock/hypotension  - renal U/S neg   - Keep gorman until constipation resolves   - continue IV bumex and daily labs     Hypotension/shock:  - off pressors     Ascending aortic aneurysm:  - s/p aortic root replacement   - per CTS     Angioedema/resp failure:  - unclear cause  - extubated      CKD 3a:  - baseline Cr 1.1 to 1.2     HFrEF:  - EF dropped to 30%, RV dysfunction on post op ECHO     CVA like symptoms:  - CTA neg for stroke  - for MRI today    COPD  Obesity  Depression     Interval History:  Seen and examined.  Good UOP--3L, cough +, constipated. Received enema   Reports nausea this morning     Review of Systems: Pertinent items are noted in HPI.    Current Medications:   Current Facility-Administered Medications   Medication Dose Route Frequency    miconazole (MICOTIN) 2 % powder   Topical BID    potassium chloride (KLOR-CON) extended release tablet 10 mEq  10 mEq Oral BID    bumetanide (BUMEX) injection 1 mg  1 mg IntraVENous BID    metoprolol tartrate (LOPRESSOR) tablet 25 mg  25 mg Oral BID    tamsulosin (FLOMAX) capsule 0.4 mg  0.4 mg Oral Nightly    0.9 % sodium chloride infusion   IntraVENous PRN    oxyCODONE (ROXICODONE) immediate release tablet 10 mg  10 mg Oral Q4H PRN    oxyCODONE (ROXICODONE) immediate release tablet 5 mg  5 mg Oral Q4H PRN    melatonin tablet 6 mg  6 mg Oral Nightly    traZODone (DESYREL) tablet 50 mg  50 mg Oral Nightly    aspirin chewable tablet 81 mg  81 mg Oral Daily    glucose chewable tablet 16 g  4 tablet Oral PRN    dextrose bolus 10% 125 mL  125 mL IntraVENous PRN    Or

## 2025-05-27 NOTE — PROGRESS NOTES
Cardiac Surgery ICU Progress Note    Admit Date: 5/19/2025  POD:  8 Days Post-Op    Procedure:    5/19 with Dr. Bradley     1. Aortic root replacement with #25 Marroquin KONECT valved conduit.     2. Insertion of percutaneous femoral A-line.    Hospital Course:  DOS 5/19: Aortic root replacement complicated by severe hypoxia and airway swelling. Transferred to ICU on Epi, Levo, Yan. Dobutamine and Levo added overnight.  POD1 5/20: Febrile. Albumin x1 + 75 ml/hr NS. Dobutamine weaned to 2. K 5.5. Vent weaned to 40% FiO2. Failed spontaneous. Concern for angioedema and soft tissue swelling around neck. Possible Ct. Lasix 80 mg IV.  POD2 5/21: Neck and angioedema improved. Trial extubate today. Keep CT. Diurese per nephrology  POD3 5/22:  Stroke code for disconjugate gaze/inability to perform EOM, arm drift, word-finding difficulty.  CT negative for acute process.  Weaning pressors.  Nephrology recommending holding diuresis today.  5/23 POD4: Off pressors. Deline. Transfer orders. Head MRI revealed tiny, incidental infarct. Severe chest pain followed by labored breathing -> transfer back to CCU, bipap. Brief afib rate 100s, brief hypotension. Converted with amio infusion.  5/24 POD5: Continued tenuous respiratory status, on bipap. Unstable sternum noted. CT chest.  5/25 POD6: Improved respiratory and mental status. Lactulose added. Transfer orders.  5/26 POD7: Mineral enema. Renal function and mentation continues to improve.  5/27 POD8: Back in afib, rate 100-114. Add Eliquis. Amio bolus and infusion. Methylnaltrexone.   Subjective:   Patient seen with Dr. Bradley. CC is nausea and vomiting this morning. She did not have a bowel movement with lactulose and mineral oil enema. She is on 4L NC, afebrile, and converted to afib this morning, rate 100-114.     Objective:   Vitals:  Blood pressure (!) 136/99, pulse 81, temperature 98 °F (36.7 °C), temperature source Axillary, resp. rate 21, height 1.829 m (6'), weight (!) 145.3 kg

## 2025-05-27 NOTE — PROGRESS NOTES
0730: Bedside shift change report given to Mirta RN (oncoming nurse) by Sonam RN (offgoing nurse). Report included the following information Nurse Handoff Report.

## 2025-05-28 ENCOUNTER — APPOINTMENT (OUTPATIENT)
Facility: HOSPITAL | Age: 65
DRG: 216 | End: 2025-05-28
Attending: THORACIC SURGERY (CARDIOTHORACIC VASCULAR SURGERY)
Payer: COMMERCIAL

## 2025-05-28 LAB
ANION GAP SERPL CALC-SCNC: 4 MMOL/L (ref 2–12)
BUN SERPL-MCNC: 56 MG/DL (ref 6–20)
BUN/CREAT SERPL: 31 (ref 12–20)
CALCIUM SERPL-MCNC: 10.3 MG/DL (ref 8.5–10.1)
CHLORIDE SERPL-SCNC: 100 MMOL/L (ref 97–108)
CO2 SERPL-SCNC: 31 MMOL/L (ref 21–32)
CREAT SERPL-MCNC: 1.81 MG/DL (ref 0.55–1.02)
EKG DIAGNOSIS: NORMAL
EKG Q-T INTERVAL: 346 MS
EKG QRS DURATION: 112 MS
EKG QTC CALCULATION (BAZETT): 450 MS
EKG R AXIS: -3 DEGREES
EKG T AXIS: 165 DEGREES
EKG VENTRICULAR RATE: 102 BPM
ERYTHROCYTE [DISTWIDTH] IN BLOOD BY AUTOMATED COUNT: 11.8 % (ref 11.5–14.5)
GLUCOSE BLD STRIP.AUTO-MCNC: 123 MG/DL (ref 65–117)
GLUCOSE BLD STRIP.AUTO-MCNC: 135 MG/DL (ref 65–117)
GLUCOSE BLD STRIP.AUTO-MCNC: 137 MG/DL (ref 65–117)
GLUCOSE BLD STRIP.AUTO-MCNC: 154 MG/DL (ref 65–117)
GLUCOSE SERPL-MCNC: 109 MG/DL (ref 65–100)
HCT VFR BLD AUTO: 28.5 % (ref 35–47)
HGB BLD-MCNC: 8.7 G/DL (ref 11.5–16)
MAGNESIUM SERPL-MCNC: 2.7 MG/DL (ref 1.6–2.4)
MCH RBC QN AUTO: 29 PG (ref 26–34)
MCHC RBC AUTO-ENTMCNC: 30.5 G/DL (ref 30–36.5)
MCV RBC AUTO: 95 FL (ref 80–99)
NRBC # BLD: 0 K/UL (ref 0–0.01)
NRBC BLD-RTO: 0 PER 100 WBC
PLATELET # BLD AUTO: 230 K/UL (ref 150–400)
PMV BLD AUTO: 10.3 FL (ref 8.9–12.9)
POTASSIUM SERPL-SCNC: 4.2 MMOL/L (ref 3.5–5.1)
RBC # BLD AUTO: 3 M/UL (ref 3.8–5.2)
SERVICE CMNT-IMP: ABNORMAL
SODIUM SERPL-SCNC: 135 MMOL/L (ref 136–145)
WBC # BLD AUTO: 9.3 K/UL (ref 3.6–11)

## 2025-05-28 PROCEDURE — 71045 X-RAY EXAM CHEST 1 VIEW: CPT

## 2025-05-28 PROCEDURE — 2500000003 HC RX 250 WO HCPCS: Performed by: PHYSICIAN ASSISTANT

## 2025-05-28 PROCEDURE — 2700000000 HC OXYGEN THERAPY PER DAY

## 2025-05-28 PROCEDURE — 6370000000 HC RX 637 (ALT 250 FOR IP): Performed by: PHYSICIAN ASSISTANT

## 2025-05-28 PROCEDURE — 97530 THERAPEUTIC ACTIVITIES: CPT

## 2025-05-28 PROCEDURE — 2580000003 HC RX 258

## 2025-05-28 PROCEDURE — 2060000000 HC ICU INTERMEDIATE R&B

## 2025-05-28 PROCEDURE — 97116 GAIT TRAINING THERAPY: CPT

## 2025-05-28 PROCEDURE — 6360000002 HC RX W HCPCS

## 2025-05-28 PROCEDURE — 85027 COMPLETE CBC AUTOMATED: CPT

## 2025-05-28 PROCEDURE — 94760 N-INVAS EAR/PLS OXIMETRY 1: CPT

## 2025-05-28 PROCEDURE — 94660 CPAP INITIATION&MGMT: CPT

## 2025-05-28 PROCEDURE — 6370000000 HC RX 637 (ALT 250 FOR IP): Performed by: STUDENT IN AN ORGANIZED HEALTH CARE EDUCATION/TRAINING PROGRAM

## 2025-05-28 PROCEDURE — 83735 ASSAY OF MAGNESIUM: CPT

## 2025-05-28 PROCEDURE — 80048 BASIC METABOLIC PNL TOTAL CA: CPT

## 2025-05-28 PROCEDURE — 6370000000 HC RX 637 (ALT 250 FOR IP)

## 2025-05-28 PROCEDURE — 6360000002 HC RX W HCPCS: Performed by: INTERNAL MEDICINE

## 2025-05-28 PROCEDURE — 97535 SELF CARE MNGMENT TRAINING: CPT

## 2025-05-28 PROCEDURE — 82962 GLUCOSE BLOOD TEST: CPT

## 2025-05-28 RX ADMIN — AMIODARONE HYDROCHLORIDE 400 MG: 200 TABLET ORAL at 09:03

## 2025-05-28 RX ADMIN — ACETAMINOPHEN 1000 MG: 500 TABLET ORAL at 18:32

## 2025-05-28 RX ADMIN — POTASSIUM CHLORIDE 10 MEQ: 750 TABLET, FILM COATED, EXTENDED RELEASE ORAL at 09:03

## 2025-05-28 RX ADMIN — APIXABAN 5 MG: 5 TABLET, FILM COATED ORAL at 09:04

## 2025-05-28 RX ADMIN — ASPIRIN 81 MG CHEWABLE TABLET 81 MG: 81 TABLET CHEWABLE at 09:04

## 2025-05-28 RX ADMIN — SERTRALINE HYDROCHLORIDE 100 MG: 50 TABLET ORAL at 09:04

## 2025-05-28 RX ADMIN — SODIUM CHLORIDE, PRESERVATIVE FREE 10 ML: 5 INJECTION INTRAVENOUS at 21:50

## 2025-05-28 RX ADMIN — POTASSIUM CHLORIDE 10 MEQ: 750 TABLET, FILM COATED, EXTENDED RELEASE ORAL at 18:32

## 2025-05-28 RX ADMIN — ACETAMINOPHEN 1000 MG: 500 TABLET ORAL at 06:02

## 2025-05-28 RX ADMIN — METOPROLOL TARTRATE 25 MG: 25 TABLET, FILM COATED ORAL at 09:04

## 2025-05-28 RX ADMIN — BUMETANIDE 1 MG: 0.25 INJECTION INTRAMUSCULAR; INTRAVENOUS at 09:04

## 2025-05-28 RX ADMIN — TRAZODONE HYDROCHLORIDE 50 MG: 50 TABLET ORAL at 21:44

## 2025-05-28 RX ADMIN — APIXABAN 5 MG: 5 TABLET, FILM COATED ORAL at 21:30

## 2025-05-28 RX ADMIN — TAMSULOSIN HYDROCHLORIDE 0.4 MG: 0.4 CAPSULE ORAL at 21:45

## 2025-05-28 RX ADMIN — Medication 6 MG: at 21:45

## 2025-05-28 RX ADMIN — ACETAMINOPHEN 1000 MG: 500 TABLET ORAL at 11:25

## 2025-05-28 RX ADMIN — AMIODARONE HYDROCHLORIDE 400 MG: 200 TABLET ORAL at 21:44

## 2025-05-28 RX ADMIN — AMIODARONE HYDROCHLORIDE 0.5 MG/MIN: 50 INJECTION, SOLUTION INTRAVENOUS at 16:28

## 2025-05-28 RX ADMIN — CHLORHEXIDINE GLUCONATE 15 ML: 1.2 RINSE ORAL at 21:45

## 2025-05-28 RX ADMIN — ATORVASTATIN CALCIUM 40 MG: 40 TABLET, FILM COATED ORAL at 21:46

## 2025-05-28 RX ADMIN — METOPROLOL TARTRATE 25 MG: 25 TABLET, FILM COATED ORAL at 21:43

## 2025-05-28 RX ADMIN — INSULIN LISPRO 1 UNITS: 100 INJECTION, SOLUTION INTRAVENOUS; SUBCUTANEOUS at 21:46

## 2025-05-28 RX ADMIN — MICONAZOLE NITRATE: 20 POWDER TOPICAL at 09:05

## 2025-05-28 RX ADMIN — MICONAZOLE NITRATE: 20 POWDER TOPICAL at 21:45

## 2025-05-28 RX ADMIN — BUMETANIDE 1 MG: 0.25 INJECTION INTRAMUSCULAR; INTRAVENOUS at 18:32

## 2025-05-28 RX ADMIN — SODIUM CHLORIDE, PRESERVATIVE FREE 10 ML: 5 INJECTION INTRAVENOUS at 09:04

## 2025-05-28 RX ADMIN — CHLORHEXIDINE GLUCONATE 15 ML: 1.2 RINSE ORAL at 09:04

## 2025-05-28 ASSESSMENT — PAIN SCALES - GENERAL
PAINLEVEL_OUTOF10: 8
PAINLEVEL_OUTOF10: 8

## 2025-05-28 ASSESSMENT — PAIN DESCRIPTION - DESCRIPTORS
DESCRIPTORS: TIGHTNESS
DESCRIPTORS: TIGHTNESS

## 2025-05-28 ASSESSMENT — PAIN DESCRIPTION - LOCATION
LOCATION: CHEST
LOCATION: CHEST

## 2025-05-28 ASSESSMENT — PAIN DESCRIPTION - ORIENTATION
ORIENTATION: MID;ANTERIOR
ORIENTATION: MID;ANTERIOR

## 2025-05-28 NOTE — PROGRESS NOTES
Cardiac Surgery Care Coordinator- Met with Evelio Marroquin,reviewed plan of care and discussed potential discharge plan. Discussed insurance approval for inpt rehab stay. Left message for her  [place]. Reinforced sternal precautions and encouraged continued use of the incentive spirometer. Reviewed goals for the day and emphasized the importance of increased activity to meet discharge goals. Provided Evelio Marroquin with red reminder bracelet. Reviewed purpose of the bracelet and when to call MD. Will continue to follow for educational and emotional needs.

## 2025-05-28 NOTE — PLAN OF CARE
0825: Patient weaned to 3 L NC.     1030: Patient ambulated to the outside of room and back to chair. Chair alarm back in place. Patient remained on 3 L NC.    1124: Patient weaned to 2 L NC    1145: Patient ambulated with PT/OT. Patient voided. Patient returned back to chair. Chair alarm in place.     1445: Patient ambulated to bedside commode. Patient voided. Patient returned back to chair. Chair alarm in place.     1830: Patient ambulated to bedside commode. Patient voided. Patient ambulated to outside of room and back to bed.     Care Plan:   Problem: Pain  Goal: Verbalizes/displays adequate comfort level or baseline comfort level  5/28/2025 0933 by Zaria Barnett RN  Outcome: Progressing  5/27/2025 2103 by Claudio Burciaga RN  Outcome: Progressing  Flowsheets (Taken 5/27/2025 2010)  Verbalizes/displays adequate comfort level or baseline comfort level: Encourage patient to monitor pain and request assistance     Problem: Chronic Conditions and Co-morbidities  Goal: Patient's chronic conditions and co-morbidity symptoms are monitored and maintained or improved  5/28/2025 0933 by Zaria Barnett RN  Outcome: Progressing  5/28/2025 0933 by Zaria Barnett RN  Outcome: Progressing  5/27/2025 2103 by Claudio Burciaga RN  Outcome: Progressing  Flowsheets (Taken 5/27/2025 2010)  Care Plan - Patient's Chronic Conditions and Co-Morbidity Symptoms are Monitored and Maintained or Improved: Monitor and assess patient's chronic conditions and comorbid symptoms for stability, deterioration, or improvement     Problem: Discharge Planning  Goal: Discharge to home or other facility with appropriate resources  5/28/2025 0933 by Zaria Barnett RN  Outcome: Progressing  5/27/2025 2103 by Claudio Burciaga RN  Outcome: Progressing  Flowsheets (Taken 5/27/2025 2010)  Discharge to home or other facility with appropriate resources: Identify barriers to discharge with patient and caregiver     Problem: Safety - Adult  Goal:

## 2025-05-28 NOTE — CARE COORDINATION
Transition of Care Plan:     RUR: 20%  Prior Level of Functioning: Lives w husb, 1 level, ind, drives  Disposition: [facility] and auth approved  LIVIA: Medically ready for IPR  If SNF or IPR: Date FOC offered: 5/23  Date FOC received: 5/23  Accepting facility: [facility]  Date authorization started with reference number: 5/23  Date authorization received and expires: 5/27- IPR denied. 5/28-Approved  Follow up appointments: Specialist  DME needed: Defer to rehab  Transportation at discharge: On new 3L O2 and min assist x 2  Caregiver Contact: , Darius Marroquin [phone]  Discharge Caregiver contacted prior to discharge?   Care Conference needed?   Barriers to discharge:  IPR bed    P2P completed and IPR denial has been overturned and now approved. CM updated [facility] and they will have their auth team follow up.  They are hopeful for a bed tomorrow.    REX Arroyo CCM  Care Management   Available on Perfect Serve or [phone]

## 2025-05-28 NOTE — PROGRESS NOTES
[address], [place] [address]  Phone: [phone]   [phone]    [email or web address]     Nephrology Progress Note    Patient Name : Evelio Marroquin      : 1960     MRN : [id number]  Date of Admission : 2025  Date of Servive : 25    CC:  Follow up for CAIO       Assessment and Plan   CAIO:  - 2/2 ATN from shock/hypotension  - continue IV Bumex, change to po tomorrow   - ok to remove gorman   - daily labs     Hypotension/shock:  - off pressors     Ascending aortic aneurysm:  - s/p aortic root replacement   - per CTS     Angioedema/resp failure:  - unclear cause  - extubated      CKD 3a:  - baseline Cr 1.1 to 1.2     HFrEF:  - EF dropped to 30%, RV dysfunction on post op ECHO     CVA like symptoms:  - CTA neg for stroke  - for MRI today    COPD  Obesity  Depression     Interval History:  Seen and examined. Doing well. Out of ICU. Gorman removed this morning, No new sx     Review of Systems: Pertinent items are noted in HPI.    Current Medications:   Current Facility-Administered Medications   Medication Dose Route Frequency    miconazole (MICOTIN) 2 % powder   Topical BID    polyethylene glycol (GLYCOLAX) packet 17 g  17 g Oral BID    apixaban (ELIQUIS) tablet 5 mg  5 mg Oral BID    amiodarone (CORDARONE) 450 mg in dextrose 5 % 250 mL infusion ([id number])  0.5 mg/min IntraVENous Continuous    prochlorperazine (COMPAZINE) injection 10 mg  10 mg IntraVENous Q6H PRN    lactulose (CHRONULAC) 10 GM/15ML solution 20 g  20 g Oral TID    potassium chloride (KLOR-CON) extended release tablet 10 mEq  10 mEq Oral BID    bumetanide (BUMEX) injection 1 mg  1 mg IntraVENous BID    metoprolol tartrate (LOPRESSOR) tablet 25 mg  25 mg Oral BID    tamsulosin (FLOMAX) capsule 0.4 mg  0.4 mg Oral Nightly    0.9 % sodium chloride infusion   IntraVENous PRN    oxyCODONE (ROXICODONE) immediate release tablet 10 mg  10 mg Oral Q4H PRN    oxyCODONE  results for input(s): \"INR\", \"APTT\" in the last 72 hours.    Invalid input(s): \"PTP\"     No results for input(s): \"CPK\", \"CKMB\", \"TROPONINI\" in the last 72 hours.    Invalid input(s): \"B-NP\"  Invalid input(s): \"PHI\", \"PCO2I\", \"PO2I\", \"FIO2I\"       Ventilator:       Microbiology:  No results found for: \"SDES\"  No components found for: \"CULT\"      I have reviewed the flowsheets.  Chart and Pertinent Notes have been reviewed.   No change in PMH ,family and social history from Consult note.      MD [facility]

## 2025-05-28 NOTE — PROGRESS NOTES
Cardiac Surgery ICU Progress Note    Admit Date: 5/19/2025  POD:  9 Days Post-Op    Procedure:    5/19 with Dr. Bradley     1. Aortic root replacement with #25 Marroquin KONECT valved conduit.     2. Insertion of percutaneous femoral A-line.    Hospital Course:  DOS 5/19: Aortic root replacement complicated by severe hypoxia and airway swelling. Transferred to ICU on Epi, Levo, Yan. Dobutamine and Levo added overnight.  POD1 5/20: Febrile. Albumin x1 + 75 ml/hr NS. Dobutamine weaned to 2. K 5.5. Vent weaned to 40% FiO2. Failed spontaneous. Concern for angioedema and soft tissue swelling around neck. Possible Ct. Lasix 80 mg IV.  POD2 5/21: Neck and angioedema improved. Trial extubate today. Keep CT. Diurese per nephrology  POD3 5/22:  Stroke code for disconjugate gaze/inability to perform EOM, arm drift, word-finding difficulty.  CT negative for acute process.  Weaning pressors.  Nephrology recommending holding diuresis today.  5/23 POD4: Off pressors. Deline. Transfer orders. Head MRI revealed tiny, incidental infarct. Severe chest pain followed by labored breathing -> transfer back to CCU, bipap. Brief afib rate 100s, brief hypotension. Converted with amio infusion.  5/24 POD5: Continued tenuous respiratory status, on bipap. Unstable sternum noted. CT chest.  5/25 POD6: Improved respiratory and mental status. Lactulose added. Transfer orders.  5/26 POD7: Mineral enema. Renal function and mentation continues to improve.  5/27 POD8: Back in afib, rate 100-114. Add Eliquis. Amio bolus and infusion. Methylnaltrexone.Conversted to SR overnight, HR in upper 50s. Dripped turned off.  5/28 POD9: Amio gtt restarted at .5. Not following sternal precautions. Bipap overnight. Continue diureses   Subjective:   Patient seen with Dr. Bradley. Pt feels well, better this morning. Tired. Mild discomfort. She is on 4L NC, afebrile, and converted to SR this morning. HR 70s this morning.    Objective:   Vitals:  Blood pressure 130/78,

## 2025-05-28 NOTE — PROGRESS NOTES
2350: Pt c/o nausea. Bipap removed, placed back on 4 L NC.  PRN compazine given.      0115: Pt converted from aflutter 120s to sinus zohaib 50s.  Amio gtt paused.  Pt had several large, soft BMs.  Bathed pt with CHG, gown changed.    0300: Pt incontinent x5 BMs.      0600: Ibarra removed.    0630: Pt up to BSC, had another BM.     0645: Pt up to chair.  Chest tube site cleansed with CHG and dressing changed. New abd binder applied.

## 2025-05-29 ENCOUNTER — APPOINTMENT (OUTPATIENT)
Facility: HOSPITAL | Age: 65
DRG: 216 | End: 2025-05-29
Attending: THORACIC SURGERY (CARDIOTHORACIC VASCULAR SURGERY)
Payer: COMMERCIAL

## 2025-05-29 LAB
ANION GAP SERPL CALC-SCNC: 9 MMOL/L (ref 2–12)
BUN SERPL-MCNC: 56 MG/DL (ref 6–20)
BUN/CREAT SERPL: 32 (ref 12–20)
CALCIUM SERPL-MCNC: 10.3 MG/DL (ref 8.5–10.1)
CHLORIDE SERPL-SCNC: 97 MMOL/L (ref 97–108)
CO2 SERPL-SCNC: 27 MMOL/L (ref 21–32)
CREAT SERPL-MCNC: 1.76 MG/DL (ref 0.55–1.02)
ERYTHROCYTE [DISTWIDTH] IN BLOOD BY AUTOMATED COUNT: 11.9 % (ref 11.5–14.5)
GLUCOSE BLD STRIP.AUTO-MCNC: 121 MG/DL (ref 65–117)
GLUCOSE BLD STRIP.AUTO-MCNC: 126 MG/DL (ref 65–117)
GLUCOSE BLD STRIP.AUTO-MCNC: 137 MG/DL (ref 65–117)
GLUCOSE BLD STRIP.AUTO-MCNC: 164 MG/DL (ref 65–117)
GLUCOSE SERPL-MCNC: 106 MG/DL (ref 65–100)
HCT VFR BLD AUTO: 30.1 % (ref 35–47)
HGB BLD-MCNC: 9.1 G/DL (ref 11.5–16)
MAGNESIUM SERPL-MCNC: 2.6 MG/DL (ref 1.6–2.4)
MCH RBC QN AUTO: 28.9 PG (ref 26–34)
MCHC RBC AUTO-ENTMCNC: 30.2 G/DL (ref 30–36.5)
MCV RBC AUTO: 95.6 FL (ref 80–99)
NRBC # BLD: 0 K/UL (ref 0–0.01)
NRBC BLD-RTO: 0 PER 100 WBC
PLATELET # BLD AUTO: 255 K/UL (ref 150–400)
PMV BLD AUTO: 10.1 FL (ref 8.9–12.9)
POTASSIUM SERPL-SCNC: 4.1 MMOL/L (ref 3.5–5.1)
RBC # BLD AUTO: 3.15 M/UL (ref 3.8–5.2)
SERVICE CMNT-IMP: ABNORMAL
SODIUM SERPL-SCNC: 133 MMOL/L (ref 136–145)
WBC # BLD AUTO: 11.8 K/UL (ref 3.6–11)

## 2025-05-29 PROCEDURE — 6370000000 HC RX 637 (ALT 250 FOR IP): Performed by: PHYSICIAN ASSISTANT

## 2025-05-29 PROCEDURE — 82962 GLUCOSE BLOOD TEST: CPT

## 2025-05-29 PROCEDURE — 6360000002 HC RX W HCPCS: Performed by: PHYSICIAN ASSISTANT

## 2025-05-29 PROCEDURE — 94760 N-INVAS EAR/PLS OXIMETRY 1: CPT

## 2025-05-29 PROCEDURE — 6360000002 HC RX W HCPCS

## 2025-05-29 PROCEDURE — 6370000000 HC RX 637 (ALT 250 FOR IP): Performed by: STUDENT IN AN ORGANIZED HEALTH CARE EDUCATION/TRAINING PROGRAM

## 2025-05-29 PROCEDURE — 6370000000 HC RX 637 (ALT 250 FOR IP): Performed by: INTERNAL MEDICINE

## 2025-05-29 PROCEDURE — 2060000000 HC ICU INTERMEDIATE R&B

## 2025-05-29 PROCEDURE — 2700000000 HC OXYGEN THERAPY PER DAY

## 2025-05-29 PROCEDURE — 6370000000 HC RX 637 (ALT 250 FOR IP)

## 2025-05-29 PROCEDURE — 83735 ASSAY OF MAGNESIUM: CPT

## 2025-05-29 PROCEDURE — 80048 BASIC METABOLIC PNL TOTAL CA: CPT

## 2025-05-29 PROCEDURE — 71045 X-RAY EXAM CHEST 1 VIEW: CPT

## 2025-05-29 PROCEDURE — 85027 COMPLETE CBC AUTOMATED: CPT

## 2025-05-29 PROCEDURE — 2500000003 HC RX 250 WO HCPCS: Performed by: PHYSICIAN ASSISTANT

## 2025-05-29 PROCEDURE — 2580000003 HC RX 258

## 2025-05-29 RX ORDER — ONDANSETRON 2 MG/ML
4 INJECTION INTRAMUSCULAR; INTRAVENOUS EVERY 6 HOURS PRN
Status: DISCONTINUED | OUTPATIENT
Start: 2025-05-29 | End: 2025-05-30 | Stop reason: HOSPADM

## 2025-05-29 RX ORDER — LACTULOSE 10 G/15ML
20 SOLUTION ORAL 3 TIMES DAILY PRN
Status: DISCONTINUED | OUTPATIENT
Start: 2025-05-29 | End: 2025-05-30 | Stop reason: HOSPADM

## 2025-05-29 RX ORDER — BUMETANIDE 1 MG/1
2 TABLET ORAL DAILY
Status: DISCONTINUED | OUTPATIENT
Start: 2025-05-29 | End: 2025-05-30 | Stop reason: HOSPADM

## 2025-05-29 RX ADMIN — POTASSIUM CHLORIDE 10 MEQ: 750 TABLET, FILM COATED, EXTENDED RELEASE ORAL at 17:05

## 2025-05-29 RX ADMIN — TAMSULOSIN HYDROCHLORIDE 0.4 MG: 0.4 CAPSULE ORAL at 20:43

## 2025-05-29 RX ADMIN — APIXABAN 5 MG: 5 TABLET, FILM COATED ORAL at 09:10

## 2025-05-29 RX ADMIN — SERTRALINE HYDROCHLORIDE 100 MG: 50 TABLET ORAL at 09:11

## 2025-05-29 RX ADMIN — CHLORHEXIDINE GLUCONATE 15 ML: 1.2 RINSE ORAL at 09:13

## 2025-05-29 RX ADMIN — ACETAMINOPHEN 1000 MG: 500 TABLET ORAL at 17:05

## 2025-05-29 RX ADMIN — Medication 6 MG: at 20:44

## 2025-05-29 RX ADMIN — METOPROLOL TARTRATE 25 MG: 25 TABLET, FILM COATED ORAL at 09:11

## 2025-05-29 RX ADMIN — POTASSIUM CHLORIDE 10 MEQ: 750 TABLET, FILM COATED, EXTENDED RELEASE ORAL at 09:11

## 2025-05-29 RX ADMIN — ATORVASTATIN CALCIUM 40 MG: 40 TABLET, FILM COATED ORAL at 20:44

## 2025-05-29 RX ADMIN — ACETAMINOPHEN 1000 MG: 500 TABLET ORAL at 12:00

## 2025-05-29 RX ADMIN — INSULIN LISPRO 2 UNITS: 100 INJECTION, SOLUTION INTRAVENOUS; SUBCUTANEOUS at 17:05

## 2025-05-29 RX ADMIN — MAGNESIUM HYDROXIDE 30 ML: 400 SUSPENSION ORAL at 17:14

## 2025-05-29 RX ADMIN — PROCHLORPERAZINE EDISYLATE 10 MG: 5 INJECTION INTRAMUSCULAR; INTRAVENOUS at 17:12

## 2025-05-29 RX ADMIN — PROCHLORPERAZINE EDISYLATE 10 MG: 5 INJECTION INTRAMUSCULAR; INTRAVENOUS at 03:02

## 2025-05-29 RX ADMIN — ACETAMINOPHEN 1000 MG: 500 TABLET ORAL at 06:34

## 2025-05-29 RX ADMIN — ASPIRIN 81 MG CHEWABLE TABLET 81 MG: 81 TABLET CHEWABLE at 09:11

## 2025-05-29 RX ADMIN — ONDANSETRON 4 MG: 2 INJECTION, SOLUTION INTRAMUSCULAR; INTRAVENOUS at 18:02

## 2025-05-29 RX ADMIN — SENNOSIDES AND DOCUSATE SODIUM 1 TABLET: 8.6; 5 TABLET ORAL at 09:11

## 2025-05-29 RX ADMIN — AMIODARONE HYDROCHLORIDE 400 MG: 200 TABLET ORAL at 20:43

## 2025-05-29 RX ADMIN — SODIUM CHLORIDE, PRESERVATIVE FREE 10 ML: 5 INJECTION INTRAVENOUS at 09:12

## 2025-05-29 RX ADMIN — MICONAZOLE NITRATE: 20 POWDER TOPICAL at 09:13

## 2025-05-29 RX ADMIN — BUMETANIDE 2 MG: 1 TABLET ORAL at 09:11

## 2025-05-29 RX ADMIN — AMIODARONE HYDROCHLORIDE 0.5 MG/MIN: 50 INJECTION, SOLUTION INTRAVENOUS at 06:34

## 2025-05-29 RX ADMIN — SODIUM CHLORIDE, PRESERVATIVE FREE 10 ML: 5 INJECTION INTRAVENOUS at 20:46

## 2025-05-29 RX ADMIN — METOPROLOL TARTRATE 25 MG: 25 TABLET, FILM COATED ORAL at 20:43

## 2025-05-29 RX ADMIN — APIXABAN 5 MG: 5 TABLET, FILM COATED ORAL at 20:44

## 2025-05-29 RX ADMIN — CHLORHEXIDINE GLUCONATE 15 ML: 1.2 RINSE ORAL at 20:47

## 2025-05-29 RX ADMIN — SENNOSIDES AND DOCUSATE SODIUM 1 TABLET: 8.6; 5 TABLET ORAL at 20:43

## 2025-05-29 RX ADMIN — MICONAZOLE NITRATE: 20 POWDER TOPICAL at 20:47

## 2025-05-29 RX ADMIN — TRAZODONE HYDROCHLORIDE 50 MG: 50 TABLET ORAL at 20:44

## 2025-05-29 RX ADMIN — AMIODARONE HYDROCHLORIDE 400 MG: 200 TABLET ORAL at 09:10

## 2025-05-29 ASSESSMENT — PAIN SCALES - GENERAL
PAINLEVEL_OUTOF10: 0

## 2025-05-29 NOTE — CARE COORDINATION
Transition of Care Plan:     RUR: 20%  Prior Level of Functioning: Lives w husb, 1 level, ind, drives  Disposition: [facility] and auth approved  LIVIA: 1 day  If SNF or IPR: Date FOC offered: 5/23  Date FOC received: 5/23  Accepting facility: [facility]  Date authorization started with reference number: 5/23  Date authorization received and expires: 5/27- IPR denied. 5/28-Approved  Follow up appointments: Specialist  DME needed: Defer to rehab  Transportation at discharge: On new 3L O2 and min assist x 2  Caregiver Contact: , Darius Marroquin [phone]  Discharge Caregiver contacted prior to discharge?   Care Conference needed?   Barriers to discharge:  Medical-needs to have a BM, IPR bed    Followed up w [facility] liaisonAnjelica and they don't anticipate a bed today but should have one tomorrow.  Patient was discussed in cardiac surgery IDRs and medical barrier is patient needs to have a BM.      REX Arroyo CCM  Care Management   Available on Perfect Serve or [phone]

## 2025-05-29 NOTE — PROGRESS NOTES
1930  Verbal bedside report given to LEYLA Snyder by LEYLA Martinez. Report included updated vitals and SBAR. Patient is in bed asleep and respirations are even and unlabored.     1720  Patient experiencing \"belching\" and nausea. PRNs given and not resolving symptoms, RN contacted WENDI Olguin with concerns.     1256  Amio gtt stopped per order.     0730  Verbal bedside report received from LEYLA Snyder given to LEYLA Martinez. Report included vital signs, SBAR, and plan for the day. Patient rhythm NSR. Patient is up in chair awake and respirations are even and unlabored. Call bell is within reach. Bed alarm is on.     Care Plan:    Problem: Pain  Goal: Verbalizes/displays adequate comfort level or baseline comfort level  5/29/2025 1031 by Michelle Fulton RN  Outcome: Progressing  5/28/2025 2244 by Claudio Burciaga RN  Outcome: Progressing  Flowsheets (Taken 5/28/2025 2030)  Verbalizes/displays adequate comfort level or baseline comfort level: Encourage patient to monitor pain and request assistance     Problem: Chronic Conditions and Co-morbidities  Goal: Patient's chronic conditions and co-morbidity symptoms are monitored and maintained or improved  5/29/2025 1031 by Michelle Fulton RN  Outcome: Progressing  Flowsheets (Taken 5/29/2025 0900)  Care Plan - Patient's Chronic Conditions and Co-Morbidity Symptoms are Monitored and Maintained or Improved: Monitor and assess patient's chronic conditions and comorbid symptoms for stability, deterioration, or improvement  5/28/2025 2244 by Claudio Burciaga RN  Outcome: Progressing  Flowsheets (Taken 5/28/2025 2030)  Care Plan - Patient's Chronic Conditions and Co-Morbidity Symptoms are Monitored and Maintained or Improved: Monitor and assess patient's chronic conditions and comorbid symptoms for stability, deterioration, or improvement     Problem: Discharge Planning  Goal: Discharge to home or other facility with appropriate resources  5/29/2025 1031 by Michelle Fulton RN  Outcome:  guidelines with assigned personnel7. Initiate Psychosocial CNS and Spiritual Care consult, as indicated  INTERVENTIONS:1. Assess for possible contributors to thought disturbance, including medications, impaired vision or hearing, underlying metabolic abnormalities, dehydration, psychiatric diagnoses, and notify attending LIP2. [place] high risk fall precautions, as indicated3. Provide frequent short contacts to provide reality reorientation, refocusing and direction4. Decrease environmental stimuli, including noise as appropriate5. Monitor and intervene to maintain adequate nutrition, hydration, elimination, sleep and activity6. If unable to ensure safety without constant attention obtain sitter and review sitter guidelines with assigned personnel7. Initiate Psychosocial CNS and Spiritual Care consult, as indicated  INTERVENTIONS:1. Assess for possible contributors to thought disturbance, including medications, impaired vision or hearing, underlying metabolic abnormalities, dehydration, psychiatric diagnoses, and notify attending LIP2. [place] high risk fall precautions, as indicated3. Provide frequent short contacts to provide reality reorientation, refocusing and direction4. Decrease environmental stimuli, including noise as appropriate5. Monitor and intervene to maintain adequate nutrition, hydration, elimination, sleep and activity6. If unable to ensure safety without constant attention obtain sitter and review sitter guidelines with assigned personnel7. Initiate Psychosocial CNS and Spiritual Care consult, as indicated  INTERVENTIONS:1. Assess for possible contributors to thought disturbance, including medications, impaired vision or hearing, underlying metabolic abnormalities, dehydration, psychiatric diagnoses, and notify attending LIP2. [place] high risk fall precautions, as indicated3. Provide frequent short contacts to provide reality reorientation, refocusing and direction4. Decrease environmental

## 2025-05-29 NOTE — PROGRESS NOTES
[address], [place] [address]  Phone: [phone]   [phone]    [email or web address]     Nephrology Progress Note    Patient Name : Evelio Marroquin      : 1960     MRN : [id number]  Date of Admission : 2025  Date of Servive : 25    CC:  Follow up for CAIO       Assessment and Plan   CAIO:  - 2/2 ATN from shock/hypotension  -changed Bumex to po and dose can be lowered at [facility]   - check PVR   - daily labs      Ascending aortic aneurysm:  - s/p aortic root replacement   - per CTS     Angioedema/resp failure:  - unclear cause  - extubated      CKD 3a:  - baseline Cr 1.1 to 1.2     HFrEF:  - EF dropped to 30%, RV dysfunction on post op ECHO     Acute Left frontal CVA  COPD  Obesity  Depression     Interval History:  Seen and examined. She reports needing straight cath overnight but not documented in nursing care. Stable renal fx. Denies any new sx     Review of Systems: Pertinent items are noted in HPI.    Current Medications:   Current Facility-Administered Medications   Medication Dose Route Frequency    bumetanide (BUMEX) tablet 2 mg  2 mg Oral Daily    miconazole (MICOTIN) 2 % powder   Topical BID    polyethylene glycol (GLYCOLAX) packet 17 g  17 g Oral BID    apixaban (ELIQUIS) tablet 5 mg  5 mg Oral BID    amiodarone (CORDARONE) 450 mg in dextrose 5 % 250 mL infusion ([id number])  0.5 mg/min IntraVENous Continuous    prochlorperazine (COMPAZINE) injection 10 mg  10 mg IntraVENous Q6H PRN    lactulose (CHRONULAC) 10 GM/15ML solution 20 g  20 g Oral TID    potassium chloride (KLOR-CON) extended release tablet 10 mEq  10 mEq Oral BID    metoprolol tartrate (LOPRESSOR) tablet 25 mg  25 mg Oral BID    tamsulosin (FLOMAX) capsule 0.4 mg  0.4 mg Oral Nightly    0.9 % sodium chloride infusion   IntraVENous PRN    oxyCODONE (ROXICODONE) immediate release tablet 10 mg  10 mg Oral Q4H PRN    oxyCODONE (ROXICODONE) immediate  release tablet 5 mg  5 mg Oral Q4H PRN    melatonin tablet 6 mg  6 mg Oral Nightly    traZODone (DESYREL) tablet 50 mg  50 mg Oral Nightly    aspirin chewable tablet 81 mg  81 mg Oral Daily    glucose chewable tablet 16 g  4 tablet Oral PRN    dextrose bolus 10% 125 mL  125 mL IntraVENous PRN    Or    dextrose bolus 10% 250 mL  250 mL IntraVENous PRN    glucagon injection 1 mg  1 mg SubCUTAneous PRN    dextrose 10 % infusion   IntraVENous Continuous PRN    sertraline (ZOLOFT) tablet 100 mg  100 mg Oral Daily    sodium chloride flush 0.9 % injection 5-40 mL  5-40 mL IntraVENous 2 times per day    sodium chloride flush 0.9 % injection 5-40 mL  5-40 mL IntraVENous PRN    acetaminophen (TYLENOL) tablet 1,000 mg  1,000 mg Oral Q6H    lidocaine 4 % external patch 2 patch  2 patch Topical Daily    amiodarone (CORDARONE) tablet 400 mg  400 mg Oral BID    chlorhexidine (PERIDEX) 0.12 % solution 15 mL  15 mL Mouth/Throat BID    hydrALAZINE (APRESOLINE) injection 10 mg  10 mg IntraVENous Q6H PRN    diphenhydrAMINE (BENADRYL) capsule 25 mg  25 mg Oral Nightly PRN    sennosides-docusate sodium (SENOKOT-S) 8.6-50 MG tablet 1 tablet  1 tablet Oral BID    bisacodyl (DULCOLAX) suppository 10 mg  10 mg Rectal Daily PRN    magnesium hydroxide (MILK OF MAGNESIA) 400 MG/5ML suspension 30 mL  30 mL Oral Daily PRN    atorvastatin (LIPITOR) tablet 40 mg  40 mg Oral Nightly    magnesium sulfate 2000 mg in 50 mL IVPB premix  2,000 mg IntraVENous PRN    ipratropium 0.5 mg-albuterol 2.5 mg (DUONEB) nebulizer solution 1 Dose  1 Dose Inhalation Q4H PRN    insulin glargine (LANTUS) injection vial 1-50 Units  1-50 Units SubCUTAneous Once PRN    insulin lispro (HUMALOG,ADMELOG) injection vial 0-12 Units  0-12 Units SubCUTAneous TID WC    insulin lispro (HUMALOG,ADMELOG) injection vial 0-6 Units  0-6 Units SubCUTAneous Nightly    glucose chewable tablet 16 g  4 tablet Oral PRN    dextrose bolus 10% 125 mL  125 mL IntraVENous PRN    Or    dextrose

## 2025-05-29 NOTE — PROGRESS NOTES
Comprehensive Nutrition Assessment    Type and Reason for Visit: Initial, LOS    Nutrition Recommendations/Plan:   Continue on regular diet.  Ensure HP once daily.  Continue to document intakes in flowsheet.  Daily weights.     Malnutrition Assessment:  Malnutrition Status:  At risk for malnutrition (05/29/25 1621)    Context:  Acute Illness     Findings of the 6 clinical characteristics of malnutrition:  Energy Intake:  75% or less of estimated energy requirements for 7 or more days  Weight Loss:  No weight loss     Body Fat Loss:  No body fat loss     Muscle Mass Loss:  No muscle mass loss    Fluid Accumulation:  No fluid accumulation     Strength:  Not Performed     Nutrition Assessment:    63 yo female admitted for ascending aortic aneurysm s/p aortic root replacement and LAAL. PMH includes AI, CHF, HTN, COPD, CKD III, obesity and depression/anxiety.     She is seen today for LOS, on a regular diet with varied intakes since admission. She had a code stroke on 5/22 and was subsequently made NPO then advanced to a pureed then soft diet from 5/22-5/27. Since diet advanced to regular by SLP, intakes have improved. Ensure HP was d/chika when pt was made NPO. She is agreeable to having this ordered again.     Her weight has fluctuated since admission r/t fluid accumulation. She is on Bumex daily and weight has returned to UBW ~305-310 lbs. She continues with diuresis today on 2L nasal cannula. BM this morning.    Nutritionally Significant Medications:  Scheduled: eliquis, lipitor, bumex, ss insulin w/ meals and nightly, melatonin, lopressor, glycolax, Klor-con, senokot, zoloft, flomax, trazodone  PRN: compazine    Estimated Daily Nutrient Needs:  Energy Requirements Based On: Formula  Weight Used for Energy Requirements: Adjusted  Energy (kcal/day): 1068-5396 kcal/d (AF 1.3 x SF 1-1.1)  Weight Used for Protein Requirements: Adjusted  Protein (g/day): 60-79 g/d (0.6-0.6 g/kg/d - CKD)  Method Used for Fluid    05/27/25 1800 1 - 25%   05/25/25 1800 26 - 50%   05/25/25 1300 51 - 75%   05/25/25 1000 26 - 50%   05/24/25 1900 1 - 25%   05/24/25 1400 1 - 25%   05/23/25 1520 0%   05/23/25 0800 0%   05/22/25 1809 1 - 25%     Supplement Intake:  Patient Vitals for the past 168 hrs:   PO Supplement (%)   05/28/25 0820 0%   05/23/25 0800 76 - 100%   05/22/25 1809 76 - 100%   Nutrition Support: n/a    Anthropometric Measures:  Height: 182.9 cm (6')  Ideal Body Weight (IBW): 160 lbs (73 kg)    Admission Body Weight: 142.9 kg (315 lb 0.6 oz)  Current Body Weight: 138.3 kg (304 lb 14.3 oz), 190.6 % IBW. Weight Source: Standing scale  Current BMI (kg/m2): 41.3  Usual Body Weight: 138.3 kg (305 lb)  % Weight Change (Calculated): 0  Weight Adjustment For: No Adjustment  BMI Categories: Obese Class 3 (BMI 40.0 or greater)    Wt Readings from Last 10 Encounters:   05/29/25 (!) 138.3 kg (304 lb 14.3 oz)   05/16/25 (!) 142.9 kg (315 lb)   05/12/25 (!) 142.9 kg (315 lb)   05/13/25 (!) 142.9 kg (315 lb)   05/05/25 (!) 142.9 kg (315 lb)   05/02/25 (!) 140.4 kg (309 lb 9.6 oz)   01/02/25 (!) 139.8 kg (308 lb 3.2 oz)   11/07/24 135.2 kg (298 lb)   11/04/24 135.3 kg (298 lb 4.5 oz)   11/01/24 (!) 140.3 kg (309 lb 6.4 oz)     Nutrition Diagnosis:   Inadequate oral intake related to impaired respiratory function, acute injury/trauma as evidenced by intake 26-50%, intake 51-75%    Nutrition Interventions:   Food and/or Nutrient Delivery: Continue Current Diet, Start Oral Nutrition Supplement (Ensure HP once daily)  Nutrition Education/Counseling: No recommendation at this time  Coordination of Nutrition Care: Continue to monitor while inpatient       Goals:  Previous Goal Met: New Goal  Goals: PO intake 75% or greater       Nutrition Monitoring and Evaluation:   Behavioral-Environmental Outcomes: None Identified  Food/Nutrient Intake Outcomes: Food and Nutrient Intake, Supplement Intake  Physical Signs/Symptoms Outcomes: Biochemical Data, Meal Time

## 2025-05-29 NOTE — PROGRESS NOTES
Cardiac Surgery Care Coordinator- Met with Evelio Marroquin and her family, reviewed plan of care and discussed upcoming discharge plan. Reinforced sternal precautions and encouraged continued use of the incentive spirometer. Began discharge teaching and encouraged her to verbalize. Reviewed goals for the day and discussed the importance of increased activity and continued activity after discharge. Reviewed importance of wearing the red reminder bracelet and when to call MD. Will continue to follow for educational and emotional needs.

## 2025-05-29 NOTE — PROGRESS NOTES
Spiritual Health History and Assessment/Progress Note  [facility]    Initial Encounter,  ,  ,      Name: Evelio Marroquin MRN: [id number]    Age: 64 y.o.     Sex: female   Language: English   Religious: Catholic   Aneurysm of ascending aorta     Date: 5/29/2025            Total Time Calculated: 14 min              Spiritual Assessment began in [facility] 4 CV SERVICES UNIT        Referral/Consult From: Rounding   Encounter Overview/Reason: Initial Encounter  Service Provided For: Patient    Mildred, Belief, Meaning:   Patient is connected with a mildred tradition or spiritual practice and has beliefs or practices that help with coping during difficult times  Family/Friends No family/friends present      Importance and Influence:  Patient has spiritual/personal beliefs that influence decisions regarding their health  Family/Friends No family/friends present    Community:  Patient feels well-supported. Support system includes: Other: Family members  Family/Friends No family/friends present    Assessment and Plan of Care:     Patient Interventions include: Facilitated expression of thoughts and feelings, Explored spiritual coping/struggle/distress, and Affirmed coping skills/support systems  Family/Friends Interventions include: No family/friends present    Patient Plan of Care: Spiritual Care available upon further referral  Family/Friends Plan of Care: No family/friends present    I visited with patient Evelio due to LOS while rounding on the unit. Evelio is spiritually coping. She expresses feelings of support from God and family. Mildred is an important aspect in her life. She says that God tells her \"she is healing\".     I discussed and affirmed Evelio's feelings, beliefs, and attitude as she navigates her way through this health journey.   Evelio expressed sincere gratitude for the care she has received and is aware that spiritual care is available to her upon request.     Electronically signed by Blair Wilson

## 2025-05-29 NOTE — PROGRESS NOTES
Cardiac Surgery ICU Progress Note    Admit Date: 5/19/2025  POD:  10 Days Post-Op    Procedure:    5/19 with Dr. Bradley     1. Aortic root replacement with #25 Marroquin KONECT valved conduit.     2. Insertion of percutaneous femoral A-line.    Hospital Course:  DOS 5/19: Aortic root replacement complicated by severe hypoxia and airway swelling. Transferred to ICU on Epi, Levo, Yan. Dobutamine and Levo added overnight.  POD1 5/20: Febrile. Albumin x1 + 75 ml/hr NS. Dobutamine weaned to 2. K 5.5. Vent weaned to 40% FiO2. Failed spontaneous. Concern for angioedema and soft tissue swelling around neck. Possible Ct. Lasix 80 mg IV.  POD2 5/21: Neck and angioedema improved. Trial extubate today. Keep CT. Diurese per nephrology  POD3 5/22:  Stroke code for disconjugate gaze/inability to perform EOM, arm drift, word-finding difficulty.  CT negative for acute process.  Weaning pressors.  Nephrology recommending holding diuresis today.  5/23 POD4: Off pressors. Deline. Transfer orders. Head MRI revealed tiny, incidental infarct. Severe chest pain followed by labored breathing -> transfer back to CCU, bipap. Brief afib rate 100s, brief hypotension. Converted with amio infusion.  5/24 POD5: Continued tenuous respiratory status, on bipap. Unstable sternum noted. CT chest.  5/25 POD6: Improved respiratory and mental status. Lactulose added. Transfer orders.  5/26 POD7: Mineral enema. Renal function and mentation continues to improve.  5/27 POD8: Back in afib, rate 100-114. Add Eliquis. Amio bolus and infusion. Methylnaltrexone.Conversted to SR overnight, HR in upper 50s. Dripped turned off.  5/28 POD9: Amio gtt restarted at .5. Not following sternal precautions. Bipap overnight. Continue diureses  5/29 POD 10:  Amio infusion off.  PO intake improving.  On 2 L nasal cannula.  Ongoing diuresis today.     Subjective:   Patient seen with Dr. Pompa. Pt feels \"pretty good.\"  She is on 2L NC, afebrile, NSR - HR 70-80s this morning.       completed for airway/neck swelling    Respiratory insufficiency: R/t poor ventilation, improved today, on NC  - Pulm toilet: acapella, IS, cough, deep breathe, ambulate   - Needs bipap at night  - Continue Bumex 2 mg daily    COPD: Pt reports she does not see lung doctor, per chart review has seen pulmonary associates in the past. PFTs completed. PRN albuterol.   - PRN Duonebs    Acute postoperative blood loss anemia: Expected, normocytic. Above transfusion threshold.   - Monitor cbc     CAIO on CKD stage III: Pre-op creatinine 1.37, eGFR 43.  Crt peak 4.86 on POD2/3.  - appreciate nephrology, she continues to improve  - avoid hypotension and nephrotoxic meds  - Bumex 2 mg daily    Urinary retention: Gorman replaced 5/23  - cont flomax  - Urinating okay since gorman removal    Constipation: Has had BMs since surgery  - previously on glycolax BID, senokot BID, methylnaltrexone. Has received lactulose and multiple suppositories.  - Continue glycolax and senokot     Depression: PTA Zoloft   - Continue home Zoloft 100 mg daily    Nutrition: advance as tolerated  GI proph: pepcid  Bowel reg: glycolax, senokot,   DVT proph: SCDs, heparin SQ    Dispo: IPR initially denied. Peer to peer completed 5/28 with acceptance; Plan for IPR at [facility] hopefully tomorrow pending bed availability. Continued diureses.    Signed By: Antonieta Donaldson PA-C

## 2025-05-30 ENCOUNTER — APPOINTMENT (OUTPATIENT)
Facility: HOSPITAL | Age: 65
DRG: 216 | End: 2025-05-30
Attending: THORACIC SURGERY (CARDIOTHORACIC VASCULAR SURGERY)
Payer: COMMERCIAL

## 2025-05-30 VITALS
DIASTOLIC BLOOD PRESSURE: 94 MMHG | BODY MASS INDEX: 39.68 KG/M2 | TEMPERATURE: 98.4 F | OXYGEN SATURATION: 95 % | SYSTOLIC BLOOD PRESSURE: 153 MMHG | WEIGHT: 293 LBS | HEART RATE: 82 BPM | HEIGHT: 72 IN | RESPIRATION RATE: 19 BRPM

## 2025-05-30 PROBLEM — I35.1 SEVERE AORTIC INSUFFICIENCY: Status: RESOLVED | Noted: 2025-05-19 | Resolved: 2025-05-30

## 2025-05-30 PROBLEM — R73.9 TRANSIENT HYPERGLYCEMIA POST PROCEDURE: Status: RESOLVED | Noted: 2025-05-20 | Resolved: 2025-05-30

## 2025-05-30 PROBLEM — I71.21 ANEURYSM OF ASCENDING AORTA: Status: RESOLVED | Noted: 2025-05-05 | Resolved: 2025-05-30

## 2025-05-30 LAB
ANION GAP SERPL CALC-SCNC: 6 MMOL/L (ref 2–12)
BUN SERPL-MCNC: 48 MG/DL (ref 6–20)
BUN/CREAT SERPL: 29 (ref 12–20)
CALCIUM SERPL-MCNC: 10.6 MG/DL (ref 8.5–10.1)
CHLORIDE SERPL-SCNC: 99 MMOL/L (ref 97–108)
CO2 SERPL-SCNC: 29 MMOL/L (ref 21–32)
CREAT SERPL-MCNC: 1.67 MG/DL (ref 0.55–1.02)
ERYTHROCYTE [DISTWIDTH] IN BLOOD BY AUTOMATED COUNT: 11.8 % (ref 11.5–14.5)
GLUCOSE BLD STRIP.AUTO-MCNC: 129 MG/DL (ref 65–117)
GLUCOSE BLD STRIP.AUTO-MCNC: 129 MG/DL (ref 65–117)
GLUCOSE BLD STRIP.AUTO-MCNC: 166 MG/DL (ref 65–117)
GLUCOSE SERPL-MCNC: 118 MG/DL (ref 65–100)
HCT VFR BLD AUTO: 28.4 % (ref 35–47)
HGB BLD-MCNC: 8.8 G/DL (ref 11.5–16)
MAGNESIUM SERPL-MCNC: 2 MG/DL (ref 1.6–2.4)
MCH RBC QN AUTO: 29.5 PG (ref 26–34)
MCHC RBC AUTO-ENTMCNC: 31 G/DL (ref 30–36.5)
MCV RBC AUTO: 95.3 FL (ref 80–99)
NRBC # BLD: 0 K/UL (ref 0–0.01)
NRBC BLD-RTO: 0 PER 100 WBC
PLATELET # BLD AUTO: 260 K/UL (ref 150–400)
PMV BLD AUTO: 10 FL (ref 8.9–12.9)
POTASSIUM SERPL-SCNC: 4 MMOL/L (ref 3.5–5.1)
RBC # BLD AUTO: 2.98 M/UL (ref 3.8–5.2)
SERVICE CMNT-IMP: ABNORMAL
SODIUM SERPL-SCNC: 134 MMOL/L (ref 136–145)
WBC # BLD AUTO: 14 K/UL (ref 3.6–11)

## 2025-05-30 PROCEDURE — 6370000000 HC RX 637 (ALT 250 FOR IP): Performed by: PHYSICIAN ASSISTANT

## 2025-05-30 PROCEDURE — 71045 X-RAY EXAM CHEST 1 VIEW: CPT

## 2025-05-30 PROCEDURE — 6360000002 HC RX W HCPCS: Performed by: PHYSICIAN ASSISTANT

## 2025-05-30 PROCEDURE — 2700000000 HC OXYGEN THERAPY PER DAY

## 2025-05-30 PROCEDURE — 94760 N-INVAS EAR/PLS OXIMETRY 1: CPT

## 2025-05-30 PROCEDURE — 6370000000 HC RX 637 (ALT 250 FOR IP): Performed by: STUDENT IN AN ORGANIZED HEALTH CARE EDUCATION/TRAINING PROGRAM

## 2025-05-30 PROCEDURE — 80048 BASIC METABOLIC PNL TOTAL CA: CPT

## 2025-05-30 PROCEDURE — 74018 RADEX ABDOMEN 1 VIEW: CPT

## 2025-05-30 PROCEDURE — 85027 COMPLETE CBC AUTOMATED: CPT

## 2025-05-30 PROCEDURE — 82962 GLUCOSE BLOOD TEST: CPT

## 2025-05-30 PROCEDURE — 6370000000 HC RX 637 (ALT 250 FOR IP): Performed by: INTERNAL MEDICINE

## 2025-05-30 PROCEDURE — 6370000000 HC RX 637 (ALT 250 FOR IP)

## 2025-05-30 PROCEDURE — 83735 ASSAY OF MAGNESIUM: CPT

## 2025-05-30 PROCEDURE — 2500000003 HC RX 250 WO HCPCS: Performed by: PHYSICIAN ASSISTANT

## 2025-05-30 RX ORDER — TRAZODONE HYDROCHLORIDE 50 MG/1
50 TABLET ORAL NIGHTLY
Qty: 30 TABLET | Refills: 0 | Status: SHIPPED
Start: 2025-05-30

## 2025-05-30 RX ORDER — SENNA AND DOCUSATE SODIUM 50; 8.6 MG/1; MG/1
1 TABLET, FILM COATED ORAL 2 TIMES DAILY
Qty: 60 TABLET | Refills: 0 | Status: SHIPPED
Start: 2025-05-30

## 2025-05-30 RX ORDER — TAMSULOSIN HYDROCHLORIDE 0.4 MG/1
0.4 CAPSULE ORAL NIGHTLY
Qty: 30 CAPSULE | Refills: 3 | Status: SHIPPED
Start: 2025-05-30

## 2025-05-30 RX ORDER — AMIODARONE HYDROCHLORIDE 400 MG/1
400 TABLET ORAL 2 TIMES DAILY
Qty: 60 TABLET | Refills: 0 | Status: SHIPPED
Start: 2025-05-30

## 2025-05-30 RX ORDER — POTASSIUM CHLORIDE 750 MG/1
10 TABLET, EXTENDED RELEASE ORAL 2 TIMES DAILY
Qty: 60 TABLET | Refills: 0 | Status: SHIPPED
Start: 2025-05-30

## 2025-05-30 RX ORDER — CALCIUM CARBONATE 500 MG/1
500 TABLET, CHEWABLE ORAL 3 TIMES DAILY PRN
Status: DISCONTINUED | OUTPATIENT
Start: 2025-05-30 | End: 2025-05-30 | Stop reason: HOSPADM

## 2025-05-30 RX ORDER — METOPROLOL TARTRATE 25 MG/1
25 TABLET, FILM COATED ORAL 2 TIMES DAILY
Qty: 60 TABLET | Refills: 3 | Status: SHIPPED
Start: 2025-05-30

## 2025-05-30 RX ORDER — ATORVASTATIN CALCIUM 40 MG/1
40 TABLET, FILM COATED ORAL NIGHTLY
Qty: 30 TABLET | Refills: 3 | Status: SHIPPED
Start: 2025-05-30

## 2025-05-30 RX ORDER — BISACODYL 10 MG
10 SUPPOSITORY, RECTAL RECTAL DAILY PRN
Qty: 30 SUPPOSITORY | Refills: 0 | Status: SHIPPED
Start: 2025-05-30 | End: 2025-06-29

## 2025-05-30 RX ORDER — ASPIRIN 81 MG/1
81 TABLET, CHEWABLE ORAL DAILY
Qty: 30 TABLET | Refills: 3 | Status: SHIPPED
Start: 2025-05-31

## 2025-05-30 RX ORDER — ACETAMINOPHEN 500 MG
1000 TABLET ORAL EVERY 6 HOURS
Qty: 120 TABLET | Refills: 3 | Status: SHIPPED
Start: 2025-05-30

## 2025-05-30 RX ORDER — BUMETANIDE 2 MG/1
2 TABLET ORAL DAILY
Qty: 30 TABLET | Refills: 0 | Status: SHIPPED
Start: 2025-05-31

## 2025-05-30 RX ORDER — POLYETHYLENE GLYCOL 3350 17 G/17G
17 POWDER, FOR SOLUTION ORAL 2 TIMES DAILY
Qty: 527 G | Refills: 1 | Status: SHIPPED
Start: 2025-05-30 | End: 2025-06-29

## 2025-05-30 RX ORDER — CALCIUM CARBONATE 500 MG/1
500 TABLET, CHEWABLE ORAL 3 TIMES DAILY PRN
Qty: 90 TABLET | Refills: 0 | Status: SHIPPED
Start: 2025-05-30 | End: 2025-06-29

## 2025-05-30 RX ORDER — LIDOCAINE 4 G/G
2 PATCH TOPICAL DAILY
Qty: 60 EACH | Refills: 0 | Status: SHIPPED
Start: 2025-05-31

## 2025-05-30 RX ADMIN — POTASSIUM CHLORIDE 10 MEQ: 750 TABLET, FILM COATED, EXTENDED RELEASE ORAL at 08:50

## 2025-05-30 RX ADMIN — APIXABAN 5 MG: 5 TABLET, FILM COATED ORAL at 08:50

## 2025-05-30 RX ADMIN — METOPROLOL TARTRATE 25 MG: 25 TABLET, FILM COATED ORAL at 08:50

## 2025-05-30 RX ADMIN — INSULIN LISPRO 2 UNITS: 100 INJECTION, SOLUTION INTRAVENOUS; SUBCUTANEOUS at 11:44

## 2025-05-30 RX ADMIN — ONDANSETRON 4 MG: 2 INJECTION, SOLUTION INTRAMUSCULAR; INTRAVENOUS at 11:48

## 2025-05-30 RX ADMIN — BUMETANIDE 2 MG: 1 TABLET ORAL at 08:50

## 2025-05-30 RX ADMIN — MICONAZOLE NITRATE: 20 POWDER TOPICAL at 08:51

## 2025-05-30 RX ADMIN — ACETAMINOPHEN 1000 MG: 500 TABLET ORAL at 11:44

## 2025-05-30 RX ADMIN — SERTRALINE HYDROCHLORIDE 100 MG: 50 TABLET ORAL at 08:50

## 2025-05-30 RX ADMIN — AMIODARONE HYDROCHLORIDE 400 MG: 200 TABLET ORAL at 08:50

## 2025-05-30 RX ADMIN — CHLORHEXIDINE GLUCONATE 15 ML: 1.2 RINSE ORAL at 08:51

## 2025-05-30 RX ADMIN — SODIUM CHLORIDE, PRESERVATIVE FREE 10 ML: 5 INJECTION INTRAVENOUS at 08:51

## 2025-05-30 RX ADMIN — ACETAMINOPHEN 1000 MG: 500 TABLET ORAL at 06:30

## 2025-05-30 RX ADMIN — ASPIRIN 81 MG CHEWABLE TABLET 81 MG: 81 TABLET CHEWABLE at 08:50

## 2025-05-30 RX ADMIN — SENNOSIDES AND DOCUSATE SODIUM 1 TABLET: 8.6; 5 TABLET ORAL at 08:50

## 2025-05-30 ASSESSMENT — PAIN SCALES - GENERAL
PAINLEVEL_OUTOF10: 0
PAINLEVEL_OUTOF10: 0

## 2025-05-30 NOTE — DISCHARGE SUMMARY
Cardiac Surgery Discharge Summary     Patient ID:  Evelio Marroquin  [id number]  64 y.o.  1960    Admit date: 5/19/2025    Discharge date: 5/30/2025     Admitting Physician: Loyd Bradley MD     Referring Cardiologist:  Dr. Mo    PCP:  Yesica Askew, CHARO - NP    Admitting Diagnoses:   Aneurysm of ascending aorta [I71.21]  Severe aortic insufficiency [I35.1]     Discharge Diagnoses:   1. Severe aortic insufficiency    2. S/P AVR      Discharged Condition: Stable and Fair    [facility]    Procedures for this admission:    5/19 with Dr. Bradley     1. Aortic root replacement with #25 Marroquin KONECT valved conduit.     2. Insertion of percutaneous femoral A-line.    Discharge Medications:      Medication List        START taking these medications      acetaminophen 500 MG tablet  Commonly known as: TYLENOL  Take 2 tablets by mouth every 6 hours     amiodarone 400 MG tablet  Commonly known as: PACERONE  Take 1 tablet by mouth 2 times daily     apixaban 5 MG Tabs tablet  Commonly known as: ELIQUIS  Take 1 tablet by mouth 2 times daily     aspirin 81 MG chewable tablet  Take 1 tablet by mouth daily  Start taking on: May 31, 2025     atorvastatin 40 MG tablet  Commonly known as: LIPITOR  Take 1 tablet by mouth nightly     bisacodyl 10 MG suppository  Commonly known as: DULCOLAX  Place 1 suppository rectally daily as needed for Constipation     bumetanide 2 MG tablet  Commonly known as: BUMEX  Take 1 tablet by mouth daily  Start taking on: May 31, 2025     calcium carbonate 500 MG chewable tablet  Commonly known as: TUMS  Take 1 tablet by mouth 3 times daily as needed for Heartburn     lidocaine 4 % external patch  Apply 2 patches topically daily  Start taking on: May 31, 2025     metoprolol tartrate 25 MG tablet  Commonly known as: LOPRESSOR  Take 1 tablet by mouth 2 times daily     miconazole 2 % powder  Commonly known as: MICOTIN  Apply topically 2 times daily.     polyethylene glycol 17 g

## 2025-05-30 NOTE — CARE COORDINATION
Inpatient Rehab/ Hospital to Hospital Transition of Care Note /Discharge Note     Accepting Physician: Kailee    Discharging Physician: Kirk    Accepting Representative: Anjelica    Accepting Facility: [facility]     RN call to report: [phone]    Transport: [facility] phone [phone]      time: 4pm    Ambulance packet at bedside chart.       Family notified:  CM met w patient at bedside to discuss the d/c plan w  [facility] and [facility] transport at 4pm w O2. Patient agreeable and reports her  is on the way to visit so she will share the update.  CM set her up w her room phone if she wanted to reach out to anyone else.  All questions have been answered and CM wished patient well.    The attending physician and the primary nurse were notified of the plan.      REX Arroyo CCM  Care Management   Available on [employer] or [phone]

## 2025-05-30 NOTE — PROGRESS NOTES
2200:  Pt refusing Bipap.  Pt states that it \"makes her gag\", offered pt PRN nausea medication, pt declining.  Pt remaining on 2 L while sleeping, satting 94-98%.

## 2025-05-30 NOTE — PROGRESS NOTES
1715  [facility] here to  patient. RN called [facility] to notify of transport.     1645  Report given over the phone to LEYLA Jurado at [facility]. Discussed SBAR, vitals, and plan.     0730  Verbal bedside report received from LEYLA Snyder given to LEYLA Martinez. Report included vital signs, SBAR, and plan for the day. Patient rhythm NSR. Patient is up in chair awake and respirations are even and unlabored. Call bell is within reach. Bed alarm is on.     Care Plan:    Problem: Pain  Goal: Verbalizes/displays adequate comfort level or baseline comfort level  5/30/2025 1717 by Michelle Fulton RN  Outcome: Adequate for Discharge  5/30/2025 0957 by Michelle Fulton RN  Outcome: Progressing     Problem: Chronic Conditions and Co-morbidities  Goal: Patient's chronic conditions and co-morbidity symptoms are monitored and maintained or improved  5/30/2025 1717 by Michelle Fulton RN  Outcome: Adequate for Discharge  5/30/2025 0957 by Michelle Fulton RN  Outcome: Progressing  Flowsheets (Taken 5/30/2025 0850)  Care Plan - Patient's Chronic Conditions and Co-Morbidity Symptoms are Monitored and Maintained or Improved: Monitor and assess patient's chronic conditions and comorbid symptoms for stability, deterioration, or improvement     Problem: Discharge Planning  Goal: Discharge to home or other facility with appropriate resources  5/30/2025 1717 by Michelle Fulton RN  Outcome: Adequate for Discharge  5/30/2025 0957 by Michelle Fulton RN  Outcome: Progressing  Flowsheets (Taken 5/30/2025 0850)  Discharge to home or other facility with appropriate resources: Identify barriers to discharge with patient and caregiver     Problem: Safety - Adult  Goal: Free from fall injury  5/30/2025 1717 by Michelle Fulton RN  Outcome: Adequate for Discharge  5/30/2025 0957 by Michelle Fulton RN  Outcome: Progressing     Problem: Skin/Tissue Integrity  Goal: Skin integrity remains intact  Description: 1.  Monitor for areas of redness and/or skin breakdown2.  Assess vascular access

## 2025-05-30 NOTE — PROGRESS NOTES
[address], [address] [place] [address]  Phone: [phone]   [phone]    [email or web address]     Nephrology Progress Note    Patient Name : Evelio Marroquin      : 1960     MRN : [id number]  Date of Admission : 2025  Date of Servive : 25    CC:  Follow up for CAIO       Assessment and Plan   CAIO:  - 2/2 ATN from shock/hypotension  - continue PO Bumex on d/c   - daily labs      Ascending aortic aneurysm:  - s/p aortic root replacement   - per CTS     Angioedema/resp failure:  - unclear cause  - extubated      CKD 3a:  - baseline Cr 1.1 to 1.2     HFrEF:  A fib   - EF dropped to 30%, RV dysfunction on post op ECHO     Acute Left frontal CVA  COPD  Obesity  Depression     Interval History:  Seen and examined. Awaiting dispo to [facility] today. Has some nausea     Review of Systems: Pertinent items are noted in HPI.    Current Medications:   Current Facility-Administered Medications   Medication Dose Route Frequency    bumetanide (BUMEX) tablet 2 mg  2 mg Oral Daily    lactulose (CHRONULAC) 10 GM/15ML solution 20 g  20 g Oral TID PRN    ondansetron (ZOFRAN) injection 4 mg  4 mg IntraVENous Q6H PRN    miconazole (MICOTIN) 2 % powder   Topical BID    polyethylene glycol (GLYCOLAX) packet 17 g  17 g Oral BID    apixaban (ELIQUIS) tablet 5 mg  5 mg Oral BID    prochlorperazine (COMPAZINE) injection 10 mg  10 mg IntraVENous Q6H PRN    potassium chloride (KLOR-CON) extended release tablet 10 mEq  10 mEq Oral BID    metoprolol tartrate (LOPRESSOR) tablet 25 mg  25 mg Oral BID    tamsulosin (FLOMAX) capsule 0.4 mg  0.4 mg Oral Nightly    0.9 % sodium chloride infusion   IntraVENous PRN    oxyCODONE (ROXICODONE) immediate release tablet 10 mg  10 mg Oral Q4H PRN    oxyCODONE (ROXICODONE) immediate release tablet 5 mg  5 mg Oral Q4H PRN    melatonin tablet 6 mg  6 mg Oral Nightly    traZODone (DESYREL) tablet 50 mg  50 mg Oral Nightly    aspirin      Ventilator:       Microbiology:  No results found for: \"SDES\"  No components found for: \"CULT\"      I have reviewed the flowsheets.  Chart and Pertinent Notes have been reviewed.   No change in PMH ,family and social history from Consult note.      MD [facility]

## 2025-05-30 NOTE — PROGRESS NOTES
Cardiac Surgery Care Coordinator- Met with Evelio Marroquin reviewed plan of care and discussed upcoming discharge to inpt rehab. [facility] should have a bed today. Reinforced sternal precautions and encouraged continued use of the incentive spirometer. Reviewed goals for the day and discussed the importance of increased activity and continued activity after discharge. Reviewed importance of wearing the red reminder bracelet and when to call MD. Will continue to follow for educational and emotional needs.

## 2025-05-30 NOTE — DISCHARGE INSTRUCTIONS
Cardiac Surgery Specialist    [address]       [address]                             [place] [address]                       [facility] [address]  Office- [phone]  Fax- [phone]       Office- [phone]  Fax- [phone]  _____________________________________________________________  Dr. Neeraj Stephenson, NP            Mary Farris, NP  Dr. Atul Garcia,NP       MELLO Hester, PACortneyC  Dr. Vahe Niño, NP     Brandon Olguin, PACortneyC  Dr. Jeff Payne, ROLANDO Cruz, PACortneyC     Dr. Carmine Mariano, ROLANDO Donaldson, PACortneyC              _____________________________________________________________    Name:Evelio Marroquin     Surgery & Date:     5/19 with Dr. Bradley     1. Aortic root replacement with #25 Marroquin KONECT valved conduit.     2. Insertion of percutaneous femoral A-line.    Discharge Date: 05/30/25    MEDICATIONS:  Please refer to your After Visit Summary for your medication list. If you do not have a prescription for a new medication, you may purchase the medication over the counter.   DO NOT TAKE ANY MEDICATIONS THAT ARE NOT ON THIS LIST    INSTRUCTIONS:  NO SMOKING OR TOBACCO PRODUCTS  Follow all the instructions in your discharge book  You may shower.  Wash all incisions twice daily with mild soap and water.  No lotions, ointments or powder.  Call the office immediately for any redness, swelling, or drainage from your incision.   Take your temperature daily and call for a temperature of 101 degrees or higher or for any symptoms that make you think you have and infection.  Weigh yourself each morning.  Call if you gain more than 5 pounds in 48 hours.  Use the incentive spirometer 6-8 times a day-10 breaths each time.  Use a pillow or your bear to splint your breastbone when coughing or sneezing.  If you feel

## 2025-06-16 ENCOUNTER — TELEPHONE (OUTPATIENT)
Age: 65
End: 2025-06-16

## 2025-06-18 DIAGNOSIS — Z95.828 S/P ASCENDING AORTIC REPLACEMENT: ICD-10-CM

## 2025-06-18 DIAGNOSIS — Z95.2 S/P AVR: Primary | ICD-10-CM

## 2025-06-18 RX ORDER — ASPIRIN 81 MG/1
81 TABLET, CHEWABLE ORAL DAILY
Qty: 30 TABLET | Refills: 3 | Status: SHIPPED | OUTPATIENT
Start: 2025-06-18

## 2025-06-18 NOTE — TELEPHONE ENCOUNTER
Phoned facility spoke to Khloe patient should be taking both meds and RX with refills was given to patient at discharge.  She has attempted to fax the discharge summary for several days and the fax says \"no answer\"

## 2025-06-18 NOTE — TELEPHONE ENCOUNTER
Spoke to patient and caregiver. Per CG her discharge summary says to hold ASA and Eliquis d/t low HBG and trace blood in urine.  They were given a [facility] order to have CBC done and sent to our office.  Patient advised to hold ASA and Eliquis have CBC drawn today and when we receive results we will dose appropriately.  Patient was not discharged with HH.

## 2025-06-19 ENCOUNTER — TELEPHONE (OUTPATIENT)
Age: 65
End: 2025-06-19

## 2025-06-19 NOTE — TELEPHONE ENCOUNTER
----- Message from CHARO Nino NP sent at 6/19/2025 10:07 AM EDT -----  Regarding: RE: hold on eliquis and ASA  Hgb is 9 which is improved. She needs to resume ASA and eliquis. Please call and let her know. Thanks!!  ----- Message -----  From: Meron Cardenas RN  Sent: 6/19/2025   9:16 AM EDT  To: CHARO Nino NP  Subject: hold on eliquis and ASA                          CBC results

## 2025-06-20 ENCOUNTER — OFFICE VISIT (OUTPATIENT)
Age: 65
End: 2025-06-20
Payer: COMMERCIAL

## 2025-06-20 VITALS
DIASTOLIC BLOOD PRESSURE: 84 MMHG | TEMPERATURE: 96.9 F | SYSTOLIC BLOOD PRESSURE: 120 MMHG | BODY MASS INDEX: 39.68 KG/M2 | HEIGHT: 72 IN | WEIGHT: 293 LBS | HEART RATE: 72 BPM | RESPIRATION RATE: 15 BRPM | OXYGEN SATURATION: 93 %

## 2025-06-20 DIAGNOSIS — Z95.828 S/P ASCENDING AORTIC REPLACEMENT: ICD-10-CM

## 2025-06-20 DIAGNOSIS — I63.9 ACUTE CVA (CEREBROVASCULAR ACCIDENT) (HCC): ICD-10-CM

## 2025-06-20 DIAGNOSIS — Z09 HOSPITAL DISCHARGE FOLLOW-UP: Primary | ICD-10-CM

## 2025-06-20 DIAGNOSIS — I48.0 PAF (PAROXYSMAL ATRIAL FIBRILLATION) (HCC): ICD-10-CM

## 2025-06-20 DIAGNOSIS — N17.9 ACUTE RENAL FAILURE SUPERIMPOSED ON STAGE 3B CHRONIC KIDNEY DISEASE, UNSPECIFIED ACUTE RENAL FAILURE TYPE (HCC): ICD-10-CM

## 2025-06-20 DIAGNOSIS — I50.42 CHF (CONGESTIVE HEART FAILURE), NYHA CLASS IV, CHRONIC, COMBINED (HCC): ICD-10-CM

## 2025-06-20 DIAGNOSIS — N18.32 ACUTE RENAL FAILURE SUPERIMPOSED ON STAGE 3B CHRONIC KIDNEY DISEASE, UNSPECIFIED ACUTE RENAL FAILURE TYPE (HCC): ICD-10-CM

## 2025-06-20 DIAGNOSIS — D64.9 POSTOPERATIVE ANEMIA: ICD-10-CM

## 2025-06-20 PROCEDURE — 3079F DIAST BP 80-89 MM HG: CPT | Performed by: NURSE PRACTITIONER

## 2025-06-20 PROCEDURE — 99215 OFFICE O/P EST HI 40 MIN: CPT | Performed by: NURSE PRACTITIONER

## 2025-06-20 PROCEDURE — 1111F DSCHRG MED/CURRENT MED MERGE: CPT | Performed by: NURSE PRACTITIONER

## 2025-06-20 PROCEDURE — 3074F SYST BP LT 130 MM HG: CPT | Performed by: NURSE PRACTITIONER

## 2025-06-20 RX ORDER — CEFDINIR 300 MG/1
CAPSULE ORAL
COMMUNITY
Start: 2025-06-14 | End: 2025-06-20

## 2025-06-20 NOTE — PROGRESS NOTES
[facility], [place] Note     Post-Discharge Transitional Care  Follow Up      Evelio Marroquin   YOB: 1960    Date of Office Visit:  6/20/2025  Date of Hospital Admission: 5/19/25  Date of Hospital Discharge: 5/30/25  [facility] admission: 5/30/2025 to 6/14/2025  Risk of hospital readmission (high >=14%. Medium >=10%) :Readmission Risk Score: 15.9      Care management risk score Rising risk (score 2-5) and Complex Care (Scores >=6): No Risk Score On File     Non face to face  following discharge, date last encounter closed (first attempt may have been earlier): *No documented post hospital discharge outreach found in the last 14 days    Call initiated 2 business days of discharge: *No response recorded in the last 14 days    ASSESSMENT/PLAN:   Hospital discharge follow-up  -     [place] DISCHARGE MEDS RECONCILED W/ CURRENT OUTPATIENT MED LIST    Acute renal failure superimposed on stage 3b chronic kidney disease,   unspecified acute renal failure type (HCC)  - To follow up w/ Nephrology as outpatient as she is a established patient of nephrology specialists.  Lab Results   Component Value Date    CREATININE 1.67 (H) 05/30/2025       Acute CVA (cerebrovascular accident) (HCC)  - No deficits, working w/ PT/OT/SLP  - 5/23/2025 MRI brain results reviewed: Tiny acute infarction at the gray-white junction in the posterior left frontal lobe.  Scattered periventricular and subcortical white matter abnormalities consistent with chronic small vessel ischemic disease.    CHF (congestive heart failure), NYHA class IV, chronic, combined (HCC)  - Stable.  No evidence of fluid overload.  - On Entresto, metoprolol, Jardiance and Bumex   5/19/2025   Weight Loss Metrics    Height 6' 0\"    Weight - Scale 309 lbs (H)    BMI (Calculated) 42 kg/m2       6/20/2025   Weight Loss Metrics    Height 6' 0\"    Weight - Scale 297 lbs 13 oz    BMI (Calculated) 40.5 kg/m2           S/P ascending aortic

## 2025-06-20 NOTE — PROGRESS NOTES
Chief Complaint   Patient presents with    Follow-Up from Hospital         \"Have you been to the ER, urgent care clinic since your last visit?  Hospitalized since your last visit?\"    YES - When: approximately 1 months ago.  Where and Why: [facility]    “Have you seen or consulted any other health care providers outside of [facility] since your last visit?”    NO    Have you had a mammogram?”   NO    Date of last Mammogram: 6/13/2023      “Have you had a pap smear?”    NO    Date of last Cervical Cancer screen (HPV or PAP): 4/17/2018             Click Here for Release of Records Request           5/2/2025     8:52 AM   PHQ-9    Little interest or pleasure in doing things 0   Feeling down, depressed, or hopeless 0   Trouble falling or staying asleep, or sleeping too much 3   Feeling tired or having little energy 0   Poor appetite or overeating 2   Feeling bad about yourself - or that you are a failure or have let yourself or your family down 0   Trouble concentrating on things, such as reading the newspaper or watching television 0   Moving or speaking so slowly that other people could have noticed. Or the opposite - being so fidgety or restless that you have been moving around a lot more than usual 0   Thoughts that you would be better off dead, or of hurting yourself in some way 0   PHQ-2 Score 0   PHQ-9 Total Score 5   If you checked off any problems, how difficult have these problems made it for you to do your work, take care of things at home, or get along with other people? 0           Financial Resource Strain: Patient Declined (5/13/2024)    Overall Financial Resource Strain (CARDIA)     Difficulty of Paying Living Expenses: Patient declined      Food Insecurity: No Food Insecurity (5/23/2025)    Hunger Vital Sign     Worried About Running Out of Food in the Last Year: Never true     Ran Out of Food in the Last Year: Never true          Health Maintenance Due   Topic Date Due

## 2025-06-24 ENCOUNTER — PATIENT MESSAGE (OUTPATIENT)
Age: 65
End: 2025-06-24

## 2025-06-30 ENCOUNTER — OFFICE VISIT (OUTPATIENT)
Age: 65
End: 2025-06-30

## 2025-06-30 VITALS
DIASTOLIC BLOOD PRESSURE: 78 MMHG | BODY MASS INDEX: 39.51 KG/M2 | SYSTOLIC BLOOD PRESSURE: 134 MMHG | OXYGEN SATURATION: 94 % | WEIGHT: 291.3 LBS | HEART RATE: 74 BPM

## 2025-06-30 DIAGNOSIS — Z95.2 S/P AVR: ICD-10-CM

## 2025-06-30 DIAGNOSIS — Z95.828 S/P ASCENDING AORTIC REPLACEMENT: Primary | ICD-10-CM

## 2025-06-30 PROCEDURE — 99024 POSTOP FOLLOW-UP VISIT: CPT | Performed by: THORACIC SURGERY (CARDIOTHORACIC VASCULAR SURGERY)

## 2025-06-30 RX ORDER — ONDANSETRON 4 MG/1
4 TABLET, ORALLY DISINTEGRATING ORAL 3 TIMES DAILY PRN
Qty: 21 TABLET | Refills: 0 | Status: SHIPPED | OUTPATIENT
Start: 2025-06-30

## 2025-06-30 NOTE — PROGRESS NOTES
drainage; resolved     Acute Postoperative PAF:  - cont amio 400 mg BID  - Continue Eliquis 5 mg BID  - maintain K > 3.9, mg > 2.4  - metoprolol 25 mg BID     HTN: On PTA chlorthalidone, coreg.   - Cont Metoprolol 25 mg BID     Acute on chronic HFimpEF: h/o LVEF ~ 30%; was improved on recent TTE (LVEF 50%); DEDRA intra-op initially showed normal bi-ventricular function, but after surgery showed LVEF 30%, RV dysfunction  - Repeat TTE EF 45-50%, poor images  - Metoprolol, has not resumed other HF meds. Pt to see cardiology tomorrow.      Acute postoperative cognitive changes, s/p small infarct: Personality a bit different, L arm weakness, had some gaze deviation yesterday. Code stroke called, head CTA negative. Presentation most consistent with delirium/toxic metabolic encephalopathy 2/2 CAIO on CKD with uremia, medication effects, other toxic metabolic factors.   - MRI 5/23 revealed tiny, incidental infarct  - Neurology consulted and signed off     Oropharyngeal swelling intra-op/presumed anaphylaxis of unclear etiology: Intra-op angioedema, hypoxia and hypotension. Extubated POD 2. Resolved.  - Solu-Medrol burst completed for airway/neck swelling  - Resolved     Respiratory insufficiency: resolved     COPD: Pt reports she does not see lung doctor, per chart review has seen pulmonary associates in the past. PFTs completed. PRN albuterol.      CAIO on CKD stage III: Pre-op creatinine 1.37, eGFR 43.  Crt peak 4.86 on POD2/3.  - appreciate nephrology, she continues to improve  - avoid hypotension and nephrotoxic meds  - Bumex 2 mg daily     Urinary retention: Gorman replaced 5/23  - cont flomax daily  - Urinating okay since gorman removal     Constipation: improved, cont PRN bowel meds     Depression: PTA Zoloft   - Continue home Zoloft 100 mg daily    Pt is ready to start cardiac rehab.     Rehab - y  Walking: y  Glucometer: n/a  Antibiotic card for valves: y

## 2025-07-25 ENCOUNTER — TELEPHONE (OUTPATIENT)
Age: 65
End: 2025-07-25

## 2025-08-11 RX ORDER — METOPROLOL TARTRATE 25 MG/1
25 TABLET, FILM COATED ORAL 2 TIMES DAILY
Qty: 60 TABLET | Refills: 0 | Status: SHIPPED | OUTPATIENT
Start: 2025-08-11

## 2025-08-11 RX ORDER — ATORVASTATIN CALCIUM 40 MG/1
40 TABLET, FILM COATED ORAL NIGHTLY
Qty: 30 TABLET | Refills: 0 | Status: SHIPPED | OUTPATIENT
Start: 2025-08-11

## 2025-08-12 RX ORDER — BUMETANIDE 2 MG/1
2 TABLET ORAL DAILY
Qty: 30 TABLET | Refills: 0 | Status: SHIPPED | OUTPATIENT
Start: 2025-08-12

## 2025-08-18 ENCOUNTER — PATIENT MESSAGE (OUTPATIENT)
Age: 65
End: 2025-08-18

## 2025-08-21 RX ORDER — BUMETANIDE 2 MG/1
2 TABLET ORAL 2 TIMES DAILY
Qty: 60 TABLET | Refills: 0 | Status: SHIPPED | OUTPATIENT
Start: 2025-08-21

## 2025-09-02 RX ORDER — METOPROLOL TARTRATE 25 MG/1
25 TABLET, FILM COATED ORAL 2 TIMES DAILY
Qty: 180 TABLET | Refills: 0 | Status: SHIPPED | OUTPATIENT
Start: 2025-09-02 | End: 2025-12-01

## (undated) DEVICE — PRESSURE MONITORING SET: Brand: TRUWAVE

## (undated) DEVICE — CANNULA PERF ART 4 MM CORONARY RT ANGLE W/ BLLN LF

## (undated) DEVICE — SYSTEM SKIN CLOSURE 42CM DERMABOND PRINEO

## (undated) DEVICE — Device: Brand: JELCO

## (undated) DEVICE — FOGARTY - HYDRAGRIP SURGICAL - CLAMP INSERTS: Brand: FOGARTY SOFTJAW

## (undated) DEVICE — SUMP PERICARD AD 20FR WT TIP VERSATILE DSGN MULT PRT VENT

## (undated) DEVICE — SYRINGE MED 50ML LUERLOCK TIP

## (undated) DEVICE — ANGIOGRAPHY KIT

## (undated) DEVICE — CANNULA PERF ART HI FLO VENT DIL TIP J TIP 3/8IN CONN 22FR

## (undated) DEVICE — COR-KNOT MINI® COMBO KITBASE PACKAGE TYPE - KITEACH STERILE PACKAGE KIT CONTAINS (2) SINGLE PATIENT USE COR-KNOT MINI® DEVICES AND (12) COR-KNOT® QUICK LOADS®.: Brand: COR-KNOT MINI®

## (undated) DEVICE — 6 FOOT DISPOSABLE EXTENSION CABLE WITH SAFE CONNECT / SCREW-DOWN

## (undated) DEVICE — CAUTERY ES L0.5IN FN TIP HI TEMP ACCU-TEMP

## (undated) DEVICE — AGENT HEMSTAT W4XL4IN OXIDIZED REGENERATED CELOS ABSRB SFT

## (undated) DEVICE — DRAIN SURG SGL COLL PT TB FOR ATS BG OASIS

## (undated) DEVICE — SUTURE TICRON DBL ARMED 2 0 CV 305 42IN BLU N ABSRB BRAID 8886303551

## (undated) DEVICE — SOLUTION IV 1000ML PH 7.4 INJ NRMSOL R

## (undated) DEVICE — KIT MFLD ISOLATN NACL CNTRST PRT TBNG SPIK W/ PRSS TRNSDUC

## (undated) DEVICE — CANNULA AORT ROOT INTRO STD TIP 5FR OVERALL LEN 55IN DLP

## (undated) DEVICE — OPEN HEART B-RICHMOND: Brand: MEDLINE INDUSTRIES, INC.

## (undated) DEVICE — TR BAND RADIAL ARTERY COMPRESSION DEVICE: Brand: TR BAND

## (undated) DEVICE — GOWN,SIRUS,NONRNF,SETINSLV,XL,20/CS: Brand: MEDLINE

## (undated) DEVICE — SYRINGE MED 10ML LUERLOCK TIP W/O SFTY DISP

## (undated) DEVICE — SUTURE STRATAFIX SPRL SZ 4 0 L12IN ABSRB UD FS L26MM 3 8 CIR SXMP2B413

## (undated) DEVICE — WRAP SURG W1.31XL1.34M CARD FOR PT 165-172CM THERMOWRP

## (undated) DEVICE — SUTURE PROL SZ 4-0 L36IN NONABSORBABLE BLU L26MM SH 1/2 CIR 8521H

## (undated) DEVICE — SUTURE PROL SZ 5-0 L30IN NONABSORBABLE BLU L13MM RB-2 1/2 8710H

## (undated) DEVICE — PADPRO DEFIBRILLATION/PACING/CARDIOVERSION/MONITORING ELECTRODES, ADULT/CHILD GREATER THAN 10 KG RADIOTRANSPARENT ELECTRODE, PHYSIO-CONTROL QUIK-COMBO (M) 60" (152 CM): Brand: PADPRO

## (undated) DEVICE — 72" ARTERIAL PRESSURE TUBING: Brand: ICU MEDICAL

## (undated) DEVICE — 1000ML,PRESSURE INFUSER W/STOPCOCK: Brand: MEDLINE

## (undated) DEVICE — SOLUTION IV 1000ML 140MEQ/L SOD 5MEQ/L K 3MEQ/L MG 27MEQ/L

## (undated) DEVICE — LEAD PACE L475MM CHNL A OR V MYOCARDIAL STEROID ELUT SIL

## (undated) DEVICE — LUER-LOK 360°: Brand: CONNECTA, LUER-LOK

## (undated) DEVICE — DRESSING FOAM W8.7XL9.1IN SAFETAC LAYR SELF ADH MEPILEX

## (undated) DEVICE — CANNULA PERF 15FR L12.5IN RG STPCOCK WIREWOUND BODY

## (undated) DEVICE — COR-KNOT® QUICK LOAD® SINGLES: Brand: COR-KNOT® QUICK LOAD®

## (undated) DEVICE — SOLUTION IV 500 ML 0.9 NACL INJ EXCEL CONTAINER USP LF

## (undated) DEVICE — SUTURE S STL SZ 6 L18IN NONABSORBABLE SIL L48MM V-40 1/2 M649G

## (undated) DEVICE — CATHETER DIAG 5FR L100CM LUMN ID0.047IN JL3.5 CRV 0 SIDE H

## (undated) DEVICE — YANKAUER,BULB TIP,W/O VENT,RIGID,STERILE: Brand: MEDLINE

## (undated) DEVICE — Device

## (undated) DEVICE — COVER,LIGHT,CAMERA,HARD,1/PK,STRL: Brand: MEDLINE

## (undated) DEVICE — KIT HND CTRL 3 W STPCOCK ROT END 54IN PREM HI PRSS TBNG AT

## (undated) DEVICE — CATHETER DIAG 5FR L100CM LUMN ID0.047IN JR4 CRV 0 SIDE H

## (undated) DEVICE — CATHETER COR DIAG JUDKINS L 5.0 CRV 5FR 100CM 0 SIDE H

## (undated) DEVICE — GLIDESHEATH SLENDER ACCESS KIT: Brand: GLIDESHEATH SLENDER

## (undated) DEVICE — SOLUTION IV 1000 ML 0.9 NACL INJ USP EXCEL PLAS CONTAINER

## (undated) DEVICE — X-RAY DETECTABLE SPONGES,16 PLY: Brand: VISTEC

## (undated) DEVICE — SUTURE ETHIBOND 2-0 30IN NONABSORB GRN WHT V-5 17MM 1/2 PXX52N

## (undated) DEVICE — WASTE KIT - ST MARY: Brand: MEDLINE INDUSTRIES, INC.

## (undated) DEVICE — SUTURE NONABSORBABLE MONOFILAMENT 4-0 RB-1 36 IN BLU PROLENE 8557H

## (undated) DEVICE — KIT MED IMAG CNTRST AGNT W/ IOPAMIDOL REUSE

## (undated) DEVICE — SUTURE DEK POLY GRN BR SZ3 0 T 2 2N 6716

## (undated) DEVICE — 6 INCH MONITORING EXTENSION SET: Brand: ICU MEDICAL

## (undated) DEVICE — KIT AT-X65 ANGIOTOUCH HAND CONTROLLER

## (undated) DEVICE — SYRINGE MEDICAL 3ML CLEAR PLASTIC STANDARD NON CONTROL LUERLOCK TIP DISPOSABLE

## (undated) DEVICE — CATHETER DIAG 5FR L100CM LUMN ID0.047IN JL4 CRV 0 SIDE H

## (undated) DEVICE — OPEN HEART A- RICHMOND: Brand: MEDLINE INDUSTRIES, INC.

## (undated) DEVICE — SUTURE VICRYL + SZ 0 L18IN ABSRB VLT CT L40MM 1 2 CIR TAPR PNT

## (undated) DEVICE — SPECIAL PROCEDURE DRAPE 32" X 34": Brand: SPECIAL PROCEDURE DRAPE

## (undated) DEVICE — SYSTEM TISS GLUE 5ML CONTAIN SYR PLUNG STD SYR TIP 12MM 5PKS/EA

## (undated) DEVICE — TIDISHIELD TRANSPORT, CONTAINMENT COVER FOR BACK TABLE 4'6" (1.37M) TO 8' (2.43M) IN LENGTH: Brand: TIDISHIELD

## (undated) DEVICE — CANNULA PERF RT ANGLE 5 MM LT CORONARY ART

## (undated) DEVICE — AVID DUAL STAGE VENOUS DRAINAGE CANNULA: Brand: AVID DUAL STAGE VENOUS DRAINAGE CANNULA

## (undated) DEVICE — CONNECTOR DRNGE W3/8-0.5XH3/16XL3/16IN 2:1 SIL CARD STR

## (undated) DEVICE — 40418 TRENDELENBURG ONE-STEP ARM PROTECTORS LARGE (1 PAIR): Brand: 40418 TRENDELENBURG ONE-STEP ARM PROTECTORS LARGE (1 PAIR)

## (undated) DEVICE — PROVE COVER: Brand: UNBRANDED